# Patient Record
Sex: MALE | Race: WHITE | NOT HISPANIC OR LATINO | Employment: OTHER | URBAN - METROPOLITAN AREA
[De-identification: names, ages, dates, MRNs, and addresses within clinical notes are randomized per-mention and may not be internally consistent; named-entity substitution may affect disease eponyms.]

---

## 2017-04-19 PROBLEM — D69.6 THROMBOCYTOPENIA: Status: ACTIVE | Noted: 2017-04-19

## 2017-04-19 PROBLEM — M1A.09X0 IDIOPATHIC CHRONIC GOUT OF MULTIPLE SITES WITHOUT TOPHUS: Status: ACTIVE | Noted: 2017-04-19

## 2017-04-19 PROBLEM — D64.9 ANEMIA: Status: ACTIVE | Noted: 2017-04-19

## 2017-04-19 PROBLEM — K76.0 FATTY LIVER: Status: ACTIVE | Noted: 2017-04-19

## 2017-04-19 PROBLEM — E03.8 SUBCLINICAL HYPOTHYROIDISM: Status: ACTIVE | Noted: 2017-04-19

## 2017-04-19 PROBLEM — R74.01 TRANSAMINITIS: Status: ACTIVE | Noted: 2017-04-19

## 2017-05-19 PROBLEM — K62.5 BRBPR (BRIGHT RED BLOOD PER RECTUM): Status: ACTIVE | Noted: 2017-05-19

## 2017-05-23 DIAGNOSIS — E11.9 DIABETES MELLITUS WITHOUT COMPLICATION: ICD-10-CM

## 2017-06-07 PROBLEM — K62.5 RECTAL BLEEDING: Status: ACTIVE | Noted: 2017-06-07

## 2017-07-17 PROBLEM — K90.0 CELIAC DISEASE: Status: ACTIVE | Noted: 2017-07-17

## 2017-07-17 PROBLEM — K74.60 CIRRHOSIS, NONALCOHOLIC: Status: ACTIVE | Noted: 2017-07-17

## 2017-11-20 PROBLEM — K74.60 CIRRHOSIS OF LIVER WITHOUT ASCITES: Status: ACTIVE | Noted: 2017-11-20

## 2018-07-16 ENCOUNTER — TELEPHONE (OUTPATIENT)
Dept: PHARMACY | Facility: CLINIC | Age: 53
End: 2018-07-16

## 2018-07-16 PROBLEM — R93.3 IMAGING OF GASTROINTESTINAL TRACT ABNORMAL: Status: ACTIVE | Noted: 2018-07-16

## 2018-07-24 NOTE — TELEPHONE ENCOUNTER
I am faxing over the provider portion of the [employer] financial assistance application to [phone]. Please review and sign the prescription portion then fax back to [phone] so that I may submit the application in its entirety.    Thank you,  Christian Sileo [facility]   Ph: [phone]

## 2018-09-06 NOTE — TELEPHONE ENCOUNTER
We have tried contacting the patient several times to try to check the status of the [employer] assistance application and the patient's income. We sent a postcard to the patient on 8/27 and sent a message to the provider that the has been non-compliant. Closing the patient out due to no contact. Jules [email or web address]

## 2018-09-11 ENCOUNTER — TELEPHONE (OUTPATIENT)
Dept: ADMINISTRATIVE | Facility: HOSPITAL | Age: 53
End: 2018-09-11

## 2018-11-13 PROBLEM — K76.6 PORTAL HYPERTENSIVE GASTROPATHY: Status: ACTIVE | Noted: 2018-11-13

## 2018-11-13 PROBLEM — K31.89 PORTAL HYPERTENSIVE GASTROPATHY: Status: ACTIVE | Noted: 2018-11-13

## 2018-11-13 PROBLEM — L40.9 PSORIASIS: Status: ACTIVE | Noted: 2018-11-13

## 2018-11-13 PROBLEM — L40.9 PSORIASIS: Status: RESOLVED | Noted: 2018-11-13 | Resolved: 2018-11-13

## 2019-07-08 ENCOUNTER — PATIENT OUTREACH (OUTPATIENT)
Dept: ADMINISTRATIVE | Facility: HOSPITAL | Age: 54
End: 2019-07-08

## 2020-07-15 ENCOUNTER — PATIENT OUTREACH (OUTPATIENT)
Dept: ADMINISTRATIVE | Facility: HOSPITAL | Age: 55
End: 2020-07-15

## 2020-07-22 PROBLEM — Z12.12 SCREENING FOR COLORECTAL CANCER: Status: ACTIVE | Noted: 2020-07-22

## 2020-07-22 PROBLEM — Z12.11 SCREENING FOR COLORECTAL CANCER: Status: ACTIVE | Noted: 2020-07-22

## 2020-07-29 PROBLEM — R82.79 URINE CULTURE POSITIVE: Status: ACTIVE | Noted: 2020-07-29

## 2020-07-29 PROBLEM — R39.14 BENIGN PROSTATIC HYPERPLASIA WITH INCOMPLETE BLADDER EMPTYING: Status: ACTIVE | Noted: 2020-07-29

## 2020-07-29 PROBLEM — N40.1 BENIGN PROSTATIC HYPERPLASIA WITH INCOMPLETE BLADDER EMPTYING: Status: ACTIVE | Noted: 2020-07-29

## 2020-07-29 PROBLEM — Z22.322 MRSA (METHICILLIN RESISTANT STAPH AUREUS) CULTURE POSITIVE: Status: ACTIVE | Noted: 2020-07-29

## 2020-11-30 PROBLEM — M54.42 CHRONIC BILATERAL LOW BACK PAIN WITH LEFT-SIDED SCIATICA: Status: ACTIVE | Noted: 2020-11-30

## 2020-11-30 PROBLEM — R06.09 DYSPNEA ON EXERTION: Status: ACTIVE | Noted: 2020-11-30

## 2020-11-30 PROBLEM — G89.29 CHRONIC BILATERAL LOW BACK PAIN WITH LEFT-SIDED SCIATICA: Status: ACTIVE | Noted: 2020-11-30

## 2020-11-30 PROBLEM — R04.0 EPISTAXIS: Status: ACTIVE | Noted: 2020-11-30

## 2020-11-30 PROBLEM — M54.2 CERVICALGIA: Status: ACTIVE | Noted: 2020-11-30

## 2021-05-25 PROBLEM — C88.40 MALT LYMPHOMA: Status: ACTIVE | Noted: 2021-05-25

## 2021-05-25 PROBLEM — C88.4 MALT LYMPHOMA: Status: ACTIVE | Noted: 2021-05-25

## 2021-08-05 ENCOUNTER — TELEPHONE (OUTPATIENT)
Dept: HEMATOLOGY/ONCOLOGY | Facility: CLINIC | Age: 56
End: 2021-08-05

## 2021-08-06 ENCOUNTER — TELEPHONE (OUTPATIENT)
Dept: HEMATOLOGY/ONCOLOGY | Facility: CLINIC | Age: 56
End: 2021-08-06

## 2021-08-06 DIAGNOSIS — C88.4 MALT LYMPHOMA: Primary | ICD-10-CM

## 2021-08-10 PROBLEM — I81 PORTAL VEIN THROMBOSIS: Status: ACTIVE | Noted: 2021-08-10

## 2021-08-10 PROBLEM — R16.1 SPLENOMEGALY: Status: ACTIVE | Noted: 2021-08-10

## 2021-08-17 ENCOUNTER — OFFICE VISIT (OUTPATIENT)
Dept: HEMATOLOGY/ONCOLOGY | Facility: CLINIC | Age: 56
End: 2021-08-17
Payer: MEDICAID

## 2021-08-17 VITALS
TEMPERATURE: 98 F | BODY MASS INDEX: 32.38 KG/M2 | OXYGEN SATURATION: 97 % | HEIGHT: 75 IN | RESPIRATION RATE: 12 BRPM | DIASTOLIC BLOOD PRESSURE: 69 MMHG | HEART RATE: 75 BPM | WEIGHT: 260.38 LBS | SYSTOLIC BLOOD PRESSURE: 125 MMHG

## 2021-08-17 DIAGNOSIS — D61.818 PANCYTOPENIA: ICD-10-CM

## 2021-08-17 DIAGNOSIS — D50.8 OTHER IRON DEFICIENCY ANEMIA: ICD-10-CM

## 2021-08-17 DIAGNOSIS — K74.60 CIRRHOSIS OF LIVER WITHOUT ASCITES, UNSPECIFIED HEPATIC CIRRHOSIS TYPE: ICD-10-CM

## 2021-08-17 DIAGNOSIS — C88.4 MALT LYMPHOMA: Primary | ICD-10-CM

## 2021-08-17 PROCEDURE — 99999 PR PBB SHADOW E&M-EST. PATIENT-LVL IV: ICD-10-PCS | Mod: PBBFAC,,,

## 2021-08-17 PROCEDURE — 99999 PR PBB SHADOW E&M-EST. PATIENT-LVL IV: CPT | Mod: PBBFAC,,,

## 2021-08-17 PROCEDURE — 99214 OFFICE O/P EST MOD 30 MIN: CPT | Mod: PBBFAC

## 2021-08-17 PROCEDURE — 99205 PR OFFICE/OUTPT VISIT, NEW, LEVL V, 60-74 MIN: ICD-10-PCS | Mod: S$PBB,,,

## 2021-08-17 PROCEDURE — 99205 OFFICE O/P NEW HI 60 MIN: CPT | Mod: S$PBB,,,

## 2021-09-15 ENCOUNTER — TELEPHONE (OUTPATIENT)
Dept: HEMATOLOGY/ONCOLOGY | Facility: CLINIC | Age: 56
End: 2021-09-15

## 2021-09-15 DIAGNOSIS — C88.4 MALT LYMPHOMA: Primary | ICD-10-CM

## 2021-09-15 DIAGNOSIS — D50.8 OTHER IRON DEFICIENCY ANEMIA: ICD-10-CM

## 2021-09-24 DIAGNOSIS — C88.4 MALT LYMPHOMA: Primary | ICD-10-CM

## 2021-09-27 ENCOUNTER — TELEPHONE (OUTPATIENT)
Dept: HEMATOLOGY/ONCOLOGY | Facility: CLINIC | Age: 56
End: 2021-09-27

## 2021-09-27 ENCOUNTER — OFFICE VISIT (OUTPATIENT)
Dept: HEMATOLOGY/ONCOLOGY | Facility: CLINIC | Age: 56
End: 2021-09-27
Payer: MEDICAID

## 2021-09-27 ENCOUNTER — LAB VISIT (OUTPATIENT)
Dept: LAB | Facility: HOSPITAL | Age: 56
End: 2021-09-27
Payer: MEDICAID

## 2021-09-27 VITALS
TEMPERATURE: 98 F | HEIGHT: 72 IN | WEIGHT: 255.63 LBS | OXYGEN SATURATION: 97 % | DIASTOLIC BLOOD PRESSURE: 58 MMHG | BODY MASS INDEX: 34.62 KG/M2 | RESPIRATION RATE: 17 BRPM | SYSTOLIC BLOOD PRESSURE: 121 MMHG | HEART RATE: 69 BPM

## 2021-09-27 DIAGNOSIS — Z51.11 ENCOUNTER FOR ANTINEOPLASTIC CHEMOTHERAPY: ICD-10-CM

## 2021-09-27 DIAGNOSIS — C88.4 MALT LYMPHOMA: Primary | ICD-10-CM

## 2021-09-27 DIAGNOSIS — C88.4 MALT LYMPHOMA: ICD-10-CM

## 2021-09-27 PROCEDURE — 99215 OFFICE O/P EST HI 40 MIN: CPT | Mod: S$PBB,,, | Performed by: INTERNAL MEDICINE

## 2021-09-27 PROCEDURE — 86706 HEP B SURFACE ANTIBODY: CPT

## 2021-09-27 PROCEDURE — 87340 HEPATITIS B SURFACE AG IA: CPT

## 2021-09-27 PROCEDURE — 99213 OFFICE O/P EST LOW 20 MIN: CPT | Mod: PBBFAC | Performed by: INTERNAL MEDICINE

## 2021-09-27 PROCEDURE — 36415 COLL VENOUS BLD VENIPUNCTURE: CPT

## 2021-09-27 PROCEDURE — 99215 PR OFFICE/OUTPT VISIT, EST, LEVL V, 40-54 MIN: ICD-10-PCS | Mod: S$PBB,,, | Performed by: INTERNAL MEDICINE

## 2021-09-27 PROCEDURE — 99999 PR PBB SHADOW E&M-EST. PATIENT-LVL III: CPT | Mod: PBBFAC,,, | Performed by: INTERNAL MEDICINE

## 2021-09-27 PROCEDURE — 99999 PR PBB SHADOW E&M-EST. PATIENT-LVL III: ICD-10-PCS | Mod: PBBFAC,,, | Performed by: INTERNAL MEDICINE

## 2021-09-27 PROCEDURE — 86704 HEP B CORE ANTIBODY TOTAL: CPT

## 2021-09-27 RX ORDER — ACETAMINOPHEN 325 MG/1
650 TABLET ORAL
Status: CANCELLED | OUTPATIENT
Start: 2021-10-05

## 2021-09-27 RX ORDER — ACETAMINOPHEN 325 MG/1
650 TABLET ORAL
Status: CANCELLED | OUTPATIENT
Start: 2021-09-28

## 2021-09-27 RX ORDER — SODIUM CHLORIDE 0.9 % (FLUSH) 0.9 %
10 SYRINGE (ML) INJECTION
Status: CANCELLED | OUTPATIENT
Start: 2021-09-28

## 2021-09-27 RX ORDER — MEPERIDINE HYDROCHLORIDE 50 MG/ML
25 INJECTION INTRAMUSCULAR; INTRAVENOUS; SUBCUTANEOUS
Status: CANCELLED | OUTPATIENT
Start: 2021-10-12

## 2021-09-27 RX ORDER — ACETAMINOPHEN 325 MG/1
650 TABLET ORAL
Status: CANCELLED | OUTPATIENT
Start: 2021-10-12

## 2021-09-27 RX ORDER — SODIUM CHLORIDE 0.9 % (FLUSH) 0.9 %
10 SYRINGE (ML) INJECTION
Status: CANCELLED | OUTPATIENT
Start: 2021-10-12

## 2021-09-27 RX ORDER — MEPERIDINE HYDROCHLORIDE 50 MG/ML
25 INJECTION INTRAMUSCULAR; INTRAVENOUS; SUBCUTANEOUS
Status: CANCELLED | OUTPATIENT
Start: 2021-10-19

## 2021-09-27 RX ORDER — ACETAMINOPHEN 325 MG/1
650 TABLET ORAL
Status: CANCELLED | OUTPATIENT
Start: 2021-10-19

## 2021-09-27 RX ORDER — SODIUM CHLORIDE 0.9 % (FLUSH) 0.9 %
10 SYRINGE (ML) INJECTION
Status: CANCELLED | OUTPATIENT
Start: 2021-10-19

## 2021-09-27 RX ORDER — HEPARIN 100 UNIT/ML
500 SYRINGE INTRAVENOUS
Status: CANCELLED | OUTPATIENT
Start: 2021-09-28

## 2021-09-27 RX ORDER — MEPERIDINE HYDROCHLORIDE 50 MG/ML
25 INJECTION INTRAMUSCULAR; INTRAVENOUS; SUBCUTANEOUS
Status: CANCELLED | OUTPATIENT
Start: 2021-09-28

## 2021-09-27 RX ORDER — FAMOTIDINE 20 MG/1
20 TABLET, FILM COATED ORAL
Status: CANCELLED | OUTPATIENT
Start: 2021-10-19

## 2021-09-27 RX ORDER — FAMOTIDINE 20 MG/1
20 TABLET, FILM COATED ORAL
Status: CANCELLED | OUTPATIENT
Start: 2021-10-12

## 2021-09-27 RX ORDER — DIPHENHYDRAMINE HCL 50 MG
50 CAPSULE ORAL
Status: CANCELLED | OUTPATIENT
Start: 2021-10-19

## 2021-09-27 RX ORDER — HEPARIN 100 UNIT/ML
500 SYRINGE INTRAVENOUS
Status: CANCELLED | OUTPATIENT
Start: 2021-10-19

## 2021-09-27 RX ORDER — HEPARIN 100 UNIT/ML
500 SYRINGE INTRAVENOUS
Status: CANCELLED | OUTPATIENT
Start: 2021-10-05

## 2021-09-27 RX ORDER — FAMOTIDINE 10 MG/ML
20 INJECTION INTRAVENOUS
Status: CANCELLED | OUTPATIENT
Start: 2021-09-28

## 2021-09-27 RX ORDER — FAMOTIDINE 20 MG/1
20 TABLET, FILM COATED ORAL
Status: CANCELLED | OUTPATIENT
Start: 2021-10-05

## 2021-09-27 RX ORDER — DIPHENHYDRAMINE HCL 50 MG
50 CAPSULE ORAL
Status: CANCELLED | OUTPATIENT
Start: 2021-10-05

## 2021-09-27 RX ORDER — HEPARIN 100 UNIT/ML
500 SYRINGE INTRAVENOUS
Status: CANCELLED | OUTPATIENT
Start: 2021-10-12

## 2021-09-27 RX ORDER — MEPERIDINE HYDROCHLORIDE 50 MG/ML
25 INJECTION INTRAMUSCULAR; INTRAVENOUS; SUBCUTANEOUS
Status: CANCELLED | OUTPATIENT
Start: 2021-10-05

## 2021-09-27 RX ORDER — SODIUM CHLORIDE 0.9 % (FLUSH) 0.9 %
10 SYRINGE (ML) INJECTION
Status: CANCELLED | OUTPATIENT
Start: 2021-10-05

## 2021-09-27 RX ORDER — DIPHENHYDRAMINE HCL 50 MG
50 CAPSULE ORAL
Status: CANCELLED | OUTPATIENT
Start: 2021-10-12

## 2021-09-28 ENCOUNTER — INFUSION (OUTPATIENT)
Dept: INFUSION THERAPY | Facility: HOSPITAL | Age: 56
End: 2021-09-28
Payer: MEDICAID

## 2021-09-28 VITALS
DIASTOLIC BLOOD PRESSURE: 74 MMHG | WEIGHT: 255.63 LBS | OXYGEN SATURATION: 99 % | RESPIRATION RATE: 18 BRPM | TEMPERATURE: 98 F | HEIGHT: 72 IN | BODY MASS INDEX: 34.62 KG/M2 | HEART RATE: 75 BPM | SYSTOLIC BLOOD PRESSURE: 122 MMHG

## 2021-09-28 DIAGNOSIS — C88.4 MALT LYMPHOMA: Primary | ICD-10-CM

## 2021-09-28 LAB
HBV CORE AB SERPL QL IA: NEGATIVE
HBV SURFACE AB SER-ACNC: NEGATIVE M[IU]/ML
HBV SURFACE AG SERPL QL IA: NEGATIVE

## 2021-09-28 PROCEDURE — 96375 TX/PRO/DX INJ NEW DRUG ADDON: CPT

## 2021-09-28 PROCEDURE — 96367 TX/PROPH/DG ADDL SEQ IV INF: CPT

## 2021-09-28 PROCEDURE — 96415 CHEMO IV INFUSION ADDL HR: CPT

## 2021-09-28 PROCEDURE — 96413 CHEMO IV INFUSION 1 HR: CPT

## 2021-09-28 PROCEDURE — 25000003 PHARM REV CODE 250: Performed by: INTERNAL MEDICINE

## 2021-09-28 PROCEDURE — 63600175 PHARM REV CODE 636 W HCPCS: Mod: TB | Performed by: INTERNAL MEDICINE

## 2021-09-28 RX ORDER — MEPERIDINE HYDROCHLORIDE 50 MG/ML
25 INJECTION INTRAMUSCULAR; INTRAVENOUS; SUBCUTANEOUS
Status: DISCONTINUED | OUTPATIENT
Start: 2021-09-28 | End: 2021-09-28 | Stop reason: HOSPADM

## 2021-09-28 RX ORDER — FAMOTIDINE 10 MG/ML
20 INJECTION INTRAVENOUS
Status: COMPLETED | OUTPATIENT
Start: 2021-09-28 | End: 2021-09-28

## 2021-09-28 RX ORDER — SODIUM CHLORIDE 0.9 % (FLUSH) 0.9 %
10 SYRINGE (ML) INJECTION
Status: DISCONTINUED | OUTPATIENT
Start: 2021-09-28 | End: 2021-09-28 | Stop reason: HOSPADM

## 2021-09-28 RX ORDER — ACETAMINOPHEN 325 MG/1
650 TABLET ORAL
Status: COMPLETED | OUTPATIENT
Start: 2021-09-28 | End: 2021-09-28

## 2021-09-28 RX ORDER — HEPARIN 100 UNIT/ML
500 SYRINGE INTRAVENOUS
Status: DISCONTINUED | OUTPATIENT
Start: 2021-09-28 | End: 2021-09-28 | Stop reason: HOSPADM

## 2021-09-28 RX ADMIN — DIPHENHYDRAMINE HYDROCHLORIDE 50 MG: 50 INJECTION INTRAMUSCULAR; INTRAVENOUS at 11:09

## 2021-09-28 RX ADMIN — SODIUM CHLORIDE 938 MG: 0.9 INJECTION, SOLUTION INTRAVENOUS at 11:09

## 2021-09-28 RX ADMIN — FAMOTIDINE 20 MG: 10 INJECTION INTRAVENOUS at 11:09

## 2021-09-28 RX ADMIN — SODIUM CHLORIDE: 0.9 INJECTION, SOLUTION INTRAVENOUS at 11:09

## 2021-09-28 RX ADMIN — ACETAMINOPHEN 650 MG: 325 TABLET ORAL at 11:09

## 2021-10-08 ENCOUNTER — INFUSION (OUTPATIENT)
Dept: INFUSION THERAPY | Facility: HOSPITAL | Age: 56
End: 2021-10-08
Payer: MEDICAID

## 2021-10-08 VITALS
HEART RATE: 67 BPM | SYSTOLIC BLOOD PRESSURE: 120 MMHG | TEMPERATURE: 98 F | DIASTOLIC BLOOD PRESSURE: 65 MMHG | WEIGHT: 255.06 LBS | BODY MASS INDEX: 34.59 KG/M2 | RESPIRATION RATE: 18 BRPM

## 2021-10-08 DIAGNOSIS — C88.4 MALT LYMPHOMA: Primary | ICD-10-CM

## 2021-10-08 PROCEDURE — 96401 CHEMO ANTI-NEOPL SQ/IM: CPT

## 2021-10-08 PROCEDURE — 63600175 PHARM REV CODE 636 W HCPCS: Mod: TB | Performed by: INTERNAL MEDICINE

## 2021-10-08 PROCEDURE — 25000003 PHARM REV CODE 250: Performed by: INTERNAL MEDICINE

## 2021-10-08 RX ORDER — HEPARIN 100 UNIT/ML
500 SYRINGE INTRAVENOUS
Status: DISCONTINUED | OUTPATIENT
Start: 2021-10-08 | End: 2021-10-08 | Stop reason: HOSPADM

## 2021-10-08 RX ORDER — ACETAMINOPHEN 325 MG/1
650 TABLET ORAL
Status: COMPLETED | OUTPATIENT
Start: 2021-10-08 | End: 2021-10-08

## 2021-10-08 RX ORDER — MEPERIDINE HYDROCHLORIDE 50 MG/ML
25 INJECTION INTRAMUSCULAR; INTRAVENOUS; SUBCUTANEOUS
Status: DISCONTINUED | OUTPATIENT
Start: 2021-10-08 | End: 2021-10-08 | Stop reason: HOSPADM

## 2021-10-08 RX ORDER — FAMOTIDINE 20 MG/1
20 TABLET, FILM COATED ORAL
Status: COMPLETED | OUTPATIENT
Start: 2021-10-08 | End: 2021-10-08

## 2021-10-08 RX ORDER — SODIUM CHLORIDE 0.9 % (FLUSH) 0.9 %
10 SYRINGE (ML) INJECTION
Status: DISCONTINUED | OUTPATIENT
Start: 2021-10-08 | End: 2021-10-08 | Stop reason: HOSPADM

## 2021-10-08 RX ORDER — DIPHENHYDRAMINE HCL 50 MG
50 CAPSULE ORAL
Status: COMPLETED | OUTPATIENT
Start: 2021-10-08 | End: 2021-10-08

## 2021-10-08 RX ADMIN — DIPHENHYDRAMINE HYDROCHLORIDE 50 MG: 50 CAPSULE ORAL at 04:10

## 2021-10-08 RX ADMIN — RITUXIMAB AND HYALURONIDASE 1400 MG: 120; 2000 INJECTION, SOLUTION SUBCUTANEOUS at 04:10

## 2021-10-08 RX ADMIN — FAMOTIDINE 20 MG: 20 TABLET, FILM COATED ORAL at 04:10

## 2021-10-08 RX ADMIN — ACETAMINOPHEN 650 MG: 325 TABLET ORAL at 04:10

## 2021-10-15 ENCOUNTER — INFUSION (OUTPATIENT)
Dept: INFUSION THERAPY | Facility: HOSPITAL | Age: 56
End: 2021-10-15
Payer: MEDICAID

## 2021-10-15 VITALS
SYSTOLIC BLOOD PRESSURE: 124 MMHG | DIASTOLIC BLOOD PRESSURE: 61 MMHG | HEART RATE: 75 BPM | TEMPERATURE: 98 F | RESPIRATION RATE: 18 BRPM

## 2021-10-15 DIAGNOSIS — C88.4 MALT LYMPHOMA: Primary | ICD-10-CM

## 2021-10-15 PROCEDURE — 96401 CHEMO ANTI-NEOPL SQ/IM: CPT

## 2021-10-15 PROCEDURE — 25000003 PHARM REV CODE 250: Performed by: INTERNAL MEDICINE

## 2021-10-15 PROCEDURE — 63600175 PHARM REV CODE 636 W HCPCS: Mod: TB | Performed by: INTERNAL MEDICINE

## 2021-10-15 RX ORDER — ACETAMINOPHEN 325 MG/1
650 TABLET ORAL
Status: COMPLETED | OUTPATIENT
Start: 2021-10-15 | End: 2021-10-15

## 2021-10-15 RX ORDER — DIPHENHYDRAMINE HCL 50 MG
50 CAPSULE ORAL
Status: COMPLETED | OUTPATIENT
Start: 2021-10-15 | End: 2021-10-15

## 2021-10-15 RX ORDER — FAMOTIDINE 20 MG/1
20 TABLET, FILM COATED ORAL
Status: COMPLETED | OUTPATIENT
Start: 2021-10-15 | End: 2021-10-15

## 2021-10-15 RX ORDER — HEPARIN 100 UNIT/ML
500 SYRINGE INTRAVENOUS
Status: DISCONTINUED | OUTPATIENT
Start: 2021-10-15 | End: 2021-10-15 | Stop reason: HOSPADM

## 2021-10-15 RX ORDER — SODIUM CHLORIDE 0.9 % (FLUSH) 0.9 %
10 SYRINGE (ML) INJECTION
Status: DISCONTINUED | OUTPATIENT
Start: 2021-10-15 | End: 2021-10-15 | Stop reason: HOSPADM

## 2021-10-15 RX ORDER — MEPERIDINE HYDROCHLORIDE 50 MG/ML
25 INJECTION INTRAMUSCULAR; INTRAVENOUS; SUBCUTANEOUS
Status: DISCONTINUED | OUTPATIENT
Start: 2021-10-15 | End: 2021-10-15 | Stop reason: HOSPADM

## 2021-10-15 RX ADMIN — RITUXIMAB AND HYALURONIDASE 1400 MG: 120; 2000 INJECTION, SOLUTION SUBCUTANEOUS at 05:10

## 2021-10-15 RX ADMIN — ACETAMINOPHEN 650 MG: 325 TABLET ORAL at 04:10

## 2021-10-15 RX ADMIN — DIPHENHYDRAMINE HYDROCHLORIDE 50 MG: 50 CAPSULE ORAL at 04:10

## 2021-10-15 RX ADMIN — FAMOTIDINE 20 MG: 20 TABLET, FILM COATED ORAL at 04:10

## 2021-10-22 ENCOUNTER — INFUSION (OUTPATIENT)
Dept: INFUSION THERAPY | Facility: HOSPITAL | Age: 56
End: 2021-10-22
Payer: MEDICAID

## 2021-10-22 VITALS
DIASTOLIC BLOOD PRESSURE: 63 MMHG | HEIGHT: 72 IN | BODY MASS INDEX: 34.55 KG/M2 | SYSTOLIC BLOOD PRESSURE: 137 MMHG | TEMPERATURE: 99 F | WEIGHT: 255.06 LBS | HEART RATE: 79 BPM

## 2021-10-22 DIAGNOSIS — C88.4 MALT LYMPHOMA: Primary | ICD-10-CM

## 2021-10-22 PROCEDURE — 63600175 PHARM REV CODE 636 W HCPCS: Mod: TB | Performed by: INTERNAL MEDICINE

## 2021-10-22 PROCEDURE — 25000003 PHARM REV CODE 250: Performed by: INTERNAL MEDICINE

## 2021-10-22 PROCEDURE — 96401 CHEMO ANTI-NEOPL SQ/IM: CPT

## 2021-10-22 RX ORDER — FAMOTIDINE 20 MG/1
20 TABLET, FILM COATED ORAL
Status: COMPLETED | OUTPATIENT
Start: 2021-10-22 | End: 2021-10-22

## 2021-10-22 RX ORDER — SODIUM CHLORIDE 0.9 % (FLUSH) 0.9 %
10 SYRINGE (ML) INJECTION
Status: DISCONTINUED | OUTPATIENT
Start: 2021-10-22 | End: 2021-10-22 | Stop reason: HOSPADM

## 2021-10-22 RX ORDER — HEPARIN 100 UNIT/ML
500 SYRINGE INTRAVENOUS
Status: DISCONTINUED | OUTPATIENT
Start: 2021-10-22 | End: 2021-10-22 | Stop reason: HOSPADM

## 2021-10-22 RX ORDER — DIPHENHYDRAMINE HCL 50 MG
50 CAPSULE ORAL
Status: COMPLETED | OUTPATIENT
Start: 2021-10-22 | End: 2021-10-22

## 2021-10-22 RX ORDER — MEPERIDINE HYDROCHLORIDE 50 MG/ML
25 INJECTION INTRAMUSCULAR; INTRAVENOUS; SUBCUTANEOUS
Status: DISCONTINUED | OUTPATIENT
Start: 2021-10-22 | End: 2021-10-22 | Stop reason: HOSPADM

## 2021-10-22 RX ORDER — ACETAMINOPHEN 325 MG/1
650 TABLET ORAL
Status: COMPLETED | OUTPATIENT
Start: 2021-10-22 | End: 2021-10-22

## 2021-10-22 RX ADMIN — DIPHENHYDRAMINE HYDROCHLORIDE 50 MG: 50 CAPSULE ORAL at 03:10

## 2021-10-22 RX ADMIN — RITUXIMAB AND HYALURONIDASE 1400 MG: 120; 2000 INJECTION, SOLUTION SUBCUTANEOUS at 04:10

## 2021-10-22 RX ADMIN — ACETAMINOPHEN 650 MG: 325 TABLET ORAL at 03:10

## 2021-10-22 RX ADMIN — FAMOTIDINE 20 MG: 20 TABLET, FILM COATED ORAL at 03:10

## 2021-11-10 PROBLEM — I85.10 SECONDARY ESOPHAGEAL VARICES WITHOUT BLEEDING: Status: ACTIVE | Noted: 2021-11-10

## 2021-11-10 PROBLEM — K76.82 HEPATIC ENCEPHALOPATHY: Status: ACTIVE | Noted: 2021-11-10

## 2021-11-10 PROBLEM — R18.8 OTHER ASCITES: Status: ACTIVE | Noted: 2021-11-10

## 2021-12-02 ENCOUNTER — OFFICE VISIT (OUTPATIENT)
Dept: HEMATOLOGY/ONCOLOGY | Facility: CLINIC | Age: 56
End: 2021-12-02
Payer: MEDICAID

## 2021-12-02 ENCOUNTER — HOSPITAL ENCOUNTER (OUTPATIENT)
Dept: RADIOLOGY | Facility: HOSPITAL | Age: 56
Discharge: HOME | End: 2021-12-02
Attending: INTERNAL MEDICINE
Payer: MEDICAID

## 2021-12-02 VITALS
SYSTOLIC BLOOD PRESSURE: 124 MMHG | BODY MASS INDEX: 31.98 KG/M2 | HEIGHT: 75 IN | WEIGHT: 257.19 LBS | OXYGEN SATURATION: 97 % | RESPIRATION RATE: 16 BRPM | DIASTOLIC BLOOD PRESSURE: 65 MMHG | HEART RATE: 78 BPM

## 2021-12-02 DIAGNOSIS — C88.4 MALT LYMPHOMA: Primary | ICD-10-CM

## 2021-12-02 DIAGNOSIS — C88.4 MALT LYMPHOMA: ICD-10-CM

## 2021-12-02 LAB — POCT GLUCOSE: 245 MG/DL (ref 70–110)

## 2021-12-02 PROCEDURE — 78815 NM PET CT ROUTINE: ICD-10-PCS | Mod: 26,PS,, | Performed by: RADIOLOGY

## 2021-12-02 PROCEDURE — 99214 OFFICE O/P EST MOD 30 MIN: CPT | Mod: PBBFAC,25 | Performed by: INTERNAL MEDICINE

## 2021-12-02 PROCEDURE — 78815 PET IMAGE W/CT SKULL-THIGH: CPT | Mod: 26,PS,, | Performed by: RADIOLOGY

## 2021-12-02 PROCEDURE — 78815 PET IMAGE W/CT SKULL-THIGH: CPT | Mod: TC

## 2021-12-02 PROCEDURE — A9698 NON-RAD CONTRAST MATERIALNOC: HCPCS | Performed by: INTERNAL MEDICINE

## 2021-12-02 PROCEDURE — 99999 PR PBB SHADOW E&M-EST. PATIENT-LVL IV: CPT | Mod: PBBFAC,,, | Performed by: INTERNAL MEDICINE

## 2021-12-02 PROCEDURE — 99215 PR OFFICE/OUTPT VISIT, EST, LEVL V, 40-54 MIN: ICD-10-PCS | Mod: S$PBB,,, | Performed by: INTERNAL MEDICINE

## 2021-12-02 PROCEDURE — 99999 PR PBB SHADOW E&M-EST. PATIENT-LVL IV: ICD-10-PCS | Mod: PBBFAC,,, | Performed by: INTERNAL MEDICINE

## 2021-12-02 PROCEDURE — 25500020 PHARM REV CODE 255: Performed by: INTERNAL MEDICINE

## 2021-12-02 PROCEDURE — 99215 OFFICE O/P EST HI 40 MIN: CPT | Mod: S$PBB,,, | Performed by: INTERNAL MEDICINE

## 2021-12-02 RX ADMIN — IOHEXOL 1000 ML: 9 SOLUTION ORAL at 08:12

## 2022-01-03 PROBLEM — K74.69 OTHER CIRRHOSIS OF LIVER: Status: ACTIVE | Noted: 2017-11-20

## 2022-01-03 PROBLEM — D61.818 PANCYTOPENIA: Status: ACTIVE | Noted: 2022-01-03

## 2022-01-29 DIAGNOSIS — D84.9 IMMUNOSUPPRESSED STATUS: ICD-10-CM

## 2022-02-04 DIAGNOSIS — D84.9 IMMUNOSUPPRESSED STATUS: ICD-10-CM

## 2022-02-06 DIAGNOSIS — D84.9 IMMUNOSUPPRESSED STATUS: ICD-10-CM

## 2022-02-10 PROBLEM — E61.1 IRON DEFICIENCY: Status: ACTIVE | Noted: 2022-02-10

## 2022-02-16 ENCOUNTER — PATIENT MESSAGE (OUTPATIENT)
Dept: ADMINISTRATIVE | Facility: HOSPITAL | Age: 57
End: 2022-02-16
Payer: MEDICAID

## 2022-03-09 DIAGNOSIS — E11.9 TYPE 2 DIABETES MELLITUS WITHOUT COMPLICATION: ICD-10-CM

## 2022-04-04 ENCOUNTER — PATIENT MESSAGE (OUTPATIENT)
Dept: ADMINISTRATIVE | Facility: HOSPITAL | Age: 57
End: 2022-04-04
Payer: MEDICAID

## 2022-07-06 DIAGNOSIS — E11.9 TYPE 2 DIABETES MELLITUS WITHOUT COMPLICATION: ICD-10-CM

## 2022-07-13 ENCOUNTER — DOCUMENTATION ONLY (OUTPATIENT)
Dept: TRANSPLANT | Facility: CLINIC | Age: 57
End: 2022-07-13
Payer: MEDICAID

## 2022-07-13 NOTE — NURSING
Pt records reviewed.  Pt will be referred to Hepatology due to Cirrhosis with meld  11  Initial referral received  from FERNANDO Baum   Referral letter sent to provider and patient.      RECORDS SCANNED IN MEDIA UNDER HEPATOLOGY REFERRAL .

## 2022-07-14 ENCOUNTER — TELEPHONE (OUTPATIENT)
Dept: HEPATOLOGY | Facility: CLINIC | Age: 57
End: 2022-07-14
Payer: MEDICAID

## 2022-07-14 NOTE — TELEPHONE ENCOUNTER
----- Message -----   From: Michelle Payne   Sent: 7/13/2022   4:45 PM CDT   To: [facility] Hepatology Scheduling   Subject: REFERRAL                                         Pt records reviewed.   Pt will be referred to Hepatology due to Cirrhosis with meld  11   Initial referral received  from FERNANDO Baum   Referral letter sent to provider and patient.       RECORDS SCANNED IN MEDIA UNDER HEPATOLOGY REFERRAL .     Spoke with Mr. Ochoa offering him Monday 7/18/2022 at 1230 to see Dr. Urban, he excepted

## 2022-07-18 ENCOUNTER — OFFICE VISIT (OUTPATIENT)
Dept: HEPATOLOGY | Facility: CLINIC | Age: 57
End: 2022-07-18
Payer: MEDICAID

## 2022-07-18 ENCOUNTER — HOSPITAL ENCOUNTER (EMERGENCY)
Facility: HOSPITAL | Age: 57
Discharge: HOME | End: 2022-07-18
Attending: EMERGENCY MEDICINE
Payer: MEDICAID

## 2022-07-18 ENCOUNTER — TELEPHONE (OUTPATIENT)
Dept: HEPATOLOGY | Facility: CLINIC | Age: 57
End: 2022-07-18
Payer: MEDICAID

## 2022-07-18 VITALS
BODY MASS INDEX: 32.2 KG/M2 | HEART RATE: 86 BPM | TEMPERATURE: 99 F | RESPIRATION RATE: 18 BRPM | OXYGEN SATURATION: 97 % | HEIGHT: 74 IN | WEIGHT: 250.88 LBS | SYSTOLIC BLOOD PRESSURE: 123 MMHG | DIASTOLIC BLOOD PRESSURE: 66 MMHG

## 2022-07-18 VITALS
RESPIRATION RATE: 18 BRPM | OXYGEN SATURATION: 96 % | HEART RATE: 98 BPM | TEMPERATURE: 99 F | DIASTOLIC BLOOD PRESSURE: 72 MMHG | BODY MASS INDEX: 31.18 KG/M2 | WEIGHT: 243 LBS | SYSTOLIC BLOOD PRESSURE: 130 MMHG | HEIGHT: 74 IN

## 2022-07-18 DIAGNOSIS — K76.82 HEPATIC ENCEPHALOPATHY: ICD-10-CM

## 2022-07-18 DIAGNOSIS — K76.0 FATTY LIVER: Primary | ICD-10-CM

## 2022-07-18 DIAGNOSIS — K76.6 PORTAL HYPERTENSIVE GASTROPATHY: ICD-10-CM

## 2022-07-18 DIAGNOSIS — K31.89 PORTAL HYPERTENSIVE GASTROPATHY: ICD-10-CM

## 2022-07-18 DIAGNOSIS — I81 PORTAL VEIN THROMBOSIS: ICD-10-CM

## 2022-07-18 DIAGNOSIS — R18.8 OTHER ASCITES: ICD-10-CM

## 2022-07-18 DIAGNOSIS — I85.10 SECONDARY ESOPHAGEAL VARICES WITHOUT BLEEDING: ICD-10-CM

## 2022-07-18 DIAGNOSIS — R73.9 HYPERGLYCEMIA: Primary | ICD-10-CM

## 2022-07-18 DIAGNOSIS — K74.69 OTHER CIRRHOSIS OF LIVER: ICD-10-CM

## 2022-07-18 LAB
ALBUMIN SERPL BCP-MCNC: 3.3 G/DL (ref 3.5–5.2)
ALLENS TEST: ABNORMAL
ALP SERPL-CCNC: 168 U/L (ref 55–135)
ALT SERPL W/O P-5'-P-CCNC: 25 U/L (ref 10–44)
ANION GAP SERPL CALC-SCNC: 10 MMOL/L (ref 8–16)
ANISOCYTOSIS BLD QL SMEAR: SLIGHT
AST SERPL-CCNC: 26 U/L (ref 10–40)
B-OH-BUTYR BLD STRIP-SCNC: 0.1 MMOL/L (ref 0–0.5)
BASOPHILS # BLD AUTO: 0.01 K/UL (ref 0–0.2)
BASOPHILS NFR BLD: 0.4 % (ref 0–1.9)
BILIRUB SERPL-MCNC: 1.1 MG/DL (ref 0.1–1)
BUN SERPL-MCNC: 16 MG/DL (ref 6–20)
CALCIUM SERPL-MCNC: 8.6 MG/DL (ref 8.7–10.5)
CHLORIDE SERPL-SCNC: 102 MMOL/L (ref 95–110)
CO2 SERPL-SCNC: 20 MMOL/L (ref 23–29)
CREAT SERPL-MCNC: 1.1 MG/DL (ref 0.5–1.4)
DIFFERENTIAL METHOD: ABNORMAL
EOSINOPHIL # BLD AUTO: 0.1 K/UL (ref 0–0.5)
EOSINOPHIL NFR BLD: 4.1 % (ref 0–8)
ERYTHROCYTE [DISTWIDTH] IN BLOOD BY AUTOMATED COUNT: 15.1 % (ref 11.5–14.5)
EST. GFR  (AFRICAN AMERICAN): >60 ML/MIN/1.73 M^2
EST. GFR  (NON AFRICAN AMERICAN): >60 ML/MIN/1.73 M^2
GLUCOSE SERPL-MCNC: 368 MG/DL (ref 70–110)
GLUCOSE SERPL-MCNC: 458 MG/DL (ref 70–110)
HCO3 UR-SCNC: 20.6 MMOL/L (ref 24–28)
HCT VFR BLD AUTO: 31.7 % (ref 40–54)
HGB BLD-MCNC: 9.8 G/DL (ref 14–18)
HYPOCHROMIA BLD QL SMEAR: ABNORMAL
IMM GRANULOCYTES # BLD AUTO: 0.01 K/UL (ref 0–0.04)
IMM GRANULOCYTES NFR BLD AUTO: 0.4 % (ref 0–0.5)
LYMPHOCYTES # BLD AUTO: 0.3 K/UL (ref 1–4.8)
LYMPHOCYTES NFR BLD: 10.7 % (ref 18–48)
MCH RBC QN AUTO: 23.6 PG (ref 27–31)
MCHC RBC AUTO-ENTMCNC: 30.9 G/DL (ref 32–36)
MCV RBC AUTO: 76 FL (ref 82–98)
MONOCYTES # BLD AUTO: 0.3 K/UL (ref 0.3–1)
MONOCYTES NFR BLD: 11.1 % (ref 4–15)
NEUTROPHILS # BLD AUTO: 2 K/UL (ref 1.8–7.7)
NEUTROPHILS NFR BLD: 73.3 % (ref 38–73)
NRBC BLD-RTO: 0 /100 WBC
PCO2 BLDA: 32.5 MMHG (ref 35–45)
PH SMN: 7.41 [PH] (ref 7.35–7.45)
PLATELET # BLD AUTO: 38 K/UL (ref 150–450)
PLATELET BLD QL SMEAR: ABNORMAL
PMV BLD AUTO: 12.2 FL (ref 9.2–12.9)
PO2 BLDA: 38 MMHG (ref 40–60)
POC BE: -4 MMOL/L
POC SATURATED O2: 74 % (ref 95–100)
POC TCO2: 22 MMOL/L (ref 24–29)
POCT GLUCOSE: 344 MG/DL (ref 70–110)
POCT GLUCOSE: 368 MG/DL (ref 70–110)
POCT GLUCOSE: 458 MG/DL (ref 70–110)
POTASSIUM SERPL-SCNC: 4.1 MMOL/L (ref 3.5–5.1)
PROT SERPL-MCNC: 7.4 G/DL (ref 6–8.4)
RBC # BLD AUTO: 4.16 M/UL (ref 4.6–6.2)
SAMPLE: ABNORMAL
SCHISTOCYTES BLD QL SMEAR: ABNORMAL
SITE: ABNORMAL
SODIUM SERPL-SCNC: 132 MMOL/L (ref 136–145)
SPHEROCYTES BLD QL SMEAR: ABNORMAL
WBC # BLD AUTO: 2.7 K/UL (ref 3.9–12.7)

## 2022-07-18 PROCEDURE — 82962 GLUCOSE BLOOD TEST: CPT

## 2022-07-18 PROCEDURE — 99999 PR PBB SHADOW E&M-EST. PATIENT-LVL V: ICD-10-PCS | Mod: PBBFAC,,, | Performed by: INTERNAL MEDICINE

## 2022-07-18 PROCEDURE — 63600175 PHARM REV CODE 636 W HCPCS: Performed by: EMERGENCY MEDICINE

## 2022-07-18 PROCEDURE — 99999 PR PBB SHADOW E&M-EST. PATIENT-LVL V: CPT | Mod: PBBFAC,,, | Performed by: INTERNAL MEDICINE

## 2022-07-18 PROCEDURE — 3008F BODY MASS INDEX DOCD: CPT | Mod: CPTII,,, | Performed by: INTERNAL MEDICINE

## 2022-07-18 PROCEDURE — 3074F SYST BP LT 130 MM HG: CPT | Mod: CPTII,,, | Performed by: INTERNAL MEDICINE

## 2022-07-18 PROCEDURE — 99284 EMERGENCY DEPT VISIT MOD MDM: CPT | Mod: 25,27

## 2022-07-18 PROCEDURE — 25000003 PHARM REV CODE 250: Performed by: EMERGENCY MEDICINE

## 2022-07-18 PROCEDURE — 85025 COMPLETE CBC W/AUTO DIFF WBC: CPT | Mod: 91 | Performed by: EMERGENCY MEDICINE

## 2022-07-18 PROCEDURE — 80053 COMPREHEN METABOLIC PANEL: CPT | Mod: 91 | Performed by: EMERGENCY MEDICINE

## 2022-07-18 PROCEDURE — 96374 THER/PROPH/DIAG INJ IV PUSH: CPT

## 2022-07-18 PROCEDURE — 3046F HEMOGLOBIN A1C LEVEL >9.0%: CPT | Mod: CPTII,,, | Performed by: INTERNAL MEDICINE

## 2022-07-18 PROCEDURE — 82010 KETONE BODYS QUAN: CPT | Performed by: EMERGENCY MEDICINE

## 2022-07-18 PROCEDURE — 82803 BLOOD GASES ANY COMBINATION: CPT

## 2022-07-18 PROCEDURE — 3074F PR MOST RECENT SYSTOLIC BLOOD PRESSURE < 130 MM HG: ICD-10-PCS | Mod: CPTII,,, | Performed by: INTERNAL MEDICINE

## 2022-07-18 PROCEDURE — 3078F DIAST BP <80 MM HG: CPT | Mod: CPTII,,, | Performed by: INTERNAL MEDICINE

## 2022-07-18 PROCEDURE — 3008F PR BODY MASS INDEX (BMI) DOCUMENTED: ICD-10-PCS | Mod: CPTII,,, | Performed by: INTERNAL MEDICINE

## 2022-07-18 PROCEDURE — 99285 PR EMERGENCY DEPT VISIT,LEVEL V: ICD-10-PCS | Mod: ,,, | Performed by: EMERGENCY MEDICINE

## 2022-07-18 PROCEDURE — 99204 OFFICE O/P NEW MOD 45 MIN: CPT | Mod: S$PBB,,, | Performed by: INTERNAL MEDICINE

## 2022-07-18 PROCEDURE — 96361 HYDRATE IV INFUSION ADD-ON: CPT

## 2022-07-18 PROCEDURE — 99204 PR OFFICE/OUTPT VISIT, NEW, LEVL IV, 45-59 MIN: ICD-10-PCS | Mod: S$PBB,,, | Performed by: INTERNAL MEDICINE

## 2022-07-18 PROCEDURE — 99215 OFFICE O/P EST HI 40 MIN: CPT | Mod: PBBFAC | Performed by: INTERNAL MEDICINE

## 2022-07-18 PROCEDURE — 3046F PR MOST RECENT HEMOGLOBIN A1C LEVEL > 9.0%: ICD-10-PCS | Mod: CPTII,,, | Performed by: INTERNAL MEDICINE

## 2022-07-18 PROCEDURE — 99900035 HC TECH TIME PER 15 MIN (STAT)

## 2022-07-18 PROCEDURE — 99285 EMERGENCY DEPT VISIT HI MDM: CPT | Mod: ,,, | Performed by: EMERGENCY MEDICINE

## 2022-07-18 PROCEDURE — 86803 HEPATITIS C AB TEST: CPT | Performed by: PHYSICIAN ASSISTANT

## 2022-07-18 PROCEDURE — 3078F PR MOST RECENT DIASTOLIC BLOOD PRESSURE < 80 MM HG: ICD-10-PCS | Mod: CPTII,,, | Performed by: INTERNAL MEDICINE

## 2022-07-18 PROCEDURE — 87389 HIV-1 AG W/HIV-1&-2 AB AG IA: CPT | Performed by: PHYSICIAN ASSISTANT

## 2022-07-18 RX ADMIN — INSULIN HUMAN 3 UNITS: 100 INJECTION, SOLUTION PARENTERAL at 09:07

## 2022-07-18 RX ADMIN — SODIUM CHLORIDE 500 ML: 0.9 INJECTION, SOLUTION INTRAVENOUS at 08:07

## 2022-07-18 NOTE — TELEPHONE ENCOUNTER
Please forward this or call [employer] regarding these numbers.  He is a potential transplant candidate and these need to be controlled or he's not going to get anything.

## 2022-07-18 NOTE — PROGRESS NOTES
"HEPATOLOGY CONSULTATION    Referring Physician: FERNANDO Baum, Sarmad Estrella MD, Vale Covarrubias MD  Current Corresponding Physician: FERNANDO Baum    Reason for Consultation: Consultation for evaluation of Cirrhosis    History of Present Illness: Corky Bach is a 56 y.o. 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    -dx few years ago  --ascites-started on diuretics lasix 40 mg and aldactone 100 mg daily; now distended abdomen  --HE- on rifaximin; no longer on lactulose  -EGD-03/22 and 07/22/20- small varices; on propranolol    MELD-Na score: 11 at 6/13/2022 10:35 AM  MELD score: 11 at 6/13/2022 10:35 AM  Calculated from:  Serum Creatinine: 0.9 mg/dL (Using min of 1 mg/dL) at 6/13/2022 10:35 AM  Serum Sodium: 133 mmol/L at 6/13/2022 10:35 AM  Total Bilirubin: 1.6 mg/dL at 6/13/2022 10:35 AM  INR(ratio): 1.3 at 6/13/2022 10:35 AM  Age: 56 years    Chief Complaint   Patient presents with    Cirrhosis       Past Medical History:   Diagnosis Date    Anemia     Celiac disease     Cervicalgia     Cirrhosis     Colon polyp     Depression     Diabetes mellitus, type 2     Elevated LFTs     Esophageal varices     Gout, unspecified     History of COVID-19 01/15/2021    Hyperlipidemia     Mood disorder in conditions classified elsewhere     Obesity, unspecified     Other chronic nonalcoholic liver disease     Portal hypertensive gastropathy     Thyroid disease     Unspecified psychosis      Outpatient Encounter Medications as of 7/18/2022   Medication Sig Dispense Refill    ascorbic acid, vitamin C, (VITAMIN C) 500 MG tablet Take 1 tablet (500 mg total) by mouth once daily. 90 tablet 3    BD ULTRA-FINE ORIG PEN NEEDLE 29 gauge x 1/2" Ndle USE PEN NEEDLE TO INJECT INSULIN EVERY EVENING 100 each 11    ferrous sulfate 325 (65 FE) " MG EC tablet Take 1 tablet (325 mg total) by mouth once daily. Take with vitamin C 90 tablet 3    finasteride (PROSCAR) 5 mg tablet Take 1 tablet (5 mg total) by mouth once daily. 90 tablet 3    furosemide (LASIX) 40 MG tablet Take 1 tablet (40 mg total) by mouth once daily. New dose as of 07/11/2022- for fluid management / ascites 90 tablet 3    insulin detemir U-100 (LEVEMIR FLEXTOUCH U-100 INSULN) 100 unit/mL (3 mL) InPn pen Inject 80 Units into the skin every evening. New dose as of 01/03/2022 72 mL 3    insulin lispro (HUMALOG KWIKPEN INSULIN) 100 unit/mL pen Inject 60 Units into the skin 3 (three) times daily with meals. 162 mL 3    ketoconazole (NIZORAL) 2 % shampoo Apply topically twice a week. 120 mL 3    lovastatin (MEVACOR) 10 MG tablet TAKE 1 TABLET BY MOUTH ONCE DAILY IN THE EVENING 90 tablet 3    magnesium oxide (MAGOX) 400 mg (241.3 mg magnesium) tablet Take 1 tablet (400 mg total) by mouth once daily. 200 tablet 1    metFORMIN (GLUCOPHAGE) 1000 MG tablet TAKE 1 TABLET BY MOUTH TWICE DAILY WITH MEALS 180 tablet 3    oxybutynin (DITROPAN-XL) 5 MG TR24 Take 1 tablet (5 mg total) by mouth once daily. 30 tablet 11    propranoloL (INDERAL) 10 MG tablet Take 1 tablet (10 mg total) by mouth 3 (three) times daily. 90 tablet 0    rifAXIMin (XIFAXAN) 550 mg Tab Take 1 tablet (550 mg total) by mouth 2 (two) times daily. 60 tablet 11    spironolactone (ALDACTONE) 100 MG tablet Take 1 tablet (100 mg total) by mouth once daily. For fluid management / ascites- new as of 07/11/2022 90 tablet 3    tamsulosin (FLOMAX) 0.4 mg Cap Take 1 capsule (0.4 mg total) by mouth every evening. 90 capsule 3    TRADJENTA 5 mg Tab tablet Take 1 tablet (5 mg total) by mouth once daily. 90 tablet 3    triamcinolone acetonide 0.1% (KENALOG) 0.1 % cream Apply topically 2 (two) times daily. Use 3-4 wks max 240 g 0    venlafaxine (EFFEXOR) 75 MG tablet Take 1 tablet (75 mg total) by mouth 2 (two) times daily. 180 tablet 3     JARDIANCE 10 mg tablet Take 1 tablet (10 mg total) by mouth every morning. (Patient not taking: Reported on 7/18/2022) 90 tablet 3     No facility-administered encounter medications on file as of 7/18/2022.     Review of patient's allergies indicates:  No Known Allergies  Family History   Problem Relation Age of Onset    Hypertension Mother     Diabetes Mother     Diabetes Father     Heart disease Father     Ovarian cancer Maternal Grandmother     Colon cancer Neg Hx        Social History     Socioeconomic History    Marital status:     Years of education: 12   Tobacco Use    Smoking status: Never Smoker    Smokeless tobacco: Never Used   Substance and Sexual Activity    Alcohol use: No    Drug use: No     Review of Systems   Constitutional: Negative.    HENT: Negative.    Eyes: Negative.    Respiratory: Negative.    Cardiovascular: Negative.    Gastrointestinal: Negative.    Genitourinary: Negative.    Musculoskeletal: Negative.    Skin: Negative.    Neurological: Negative.    Psychiatric/Behavioral: Negative.      Vitals:    07/18/22 1213   BP: 123/66   Pulse: 86   Resp: 18   Temp: 98.6 °F (37 °C)       Physical Exam  Vitals reviewed.   Constitutional:       Appearance: He is well-developed.   HENT:      Head: Normocephalic and atraumatic.   Eyes:      General: No scleral icterus.     Conjunctiva/sclera: Conjunctivae normal.      Pupils: Pupils are equal, round, and reactive to light.   Neck:      Thyroid: No thyromegaly.   Cardiovascular:      Rate and Rhythm: Normal rate and regular rhythm.      Heart sounds: Normal heart sounds.   Pulmonary:      Effort: Pulmonary effort is normal.      Breath sounds: Normal breath sounds. No rales.   Abdominal:      General: Bowel sounds are normal. There is no distension.      Palpations: Abdomen is soft. There is no mass.      Tenderness: There is no abdominal tenderness.   Musculoskeletal:         General: Normal range of motion.      Cervical back:  Normal range of motion and neck supple.   Skin:     General: Skin is warm and dry.      Findings: No rash.   Neurological:      Mental Status: He is alert and oriented to person, place, and time.         MELD-Na score: 11 at 6/13/2022 10:35 AM  MELD score: 11 at 6/13/2022 10:35 AM  Calculated from:  Serum Creatinine: 0.9 mg/dL (Using min of 1 mg/dL) at 6/13/2022 10:35 AM  Serum Sodium: 133 mmol/L at 6/13/2022 10:35 AM  Total Bilirubin: 1.6 mg/dL at 6/13/2022 10:35 AM  INR(ratio): 1.3 at 6/13/2022 10:35 AM  Age: 56 years    Lab Results   Component Value Date     (H) 07/14/2022    BUN 16 07/14/2022    CREATININE 1.2 07/14/2022    CALCIUM 8.7 07/14/2022     (L) 07/14/2022    K 4.5 07/14/2022    CL 99 07/14/2022    PROT 7.5 07/14/2022    CO2 27 07/14/2022    ANIONGAP 7 (L) 07/14/2022    WBC 4.54 07/14/2022    RBC 4.51 (L) 07/14/2022    HGB 10.3 (L) 07/14/2022    HCT 34.0 (L) 07/14/2022    MCV 75 (L) 07/14/2022    MCH 22.8 (L) 07/14/2022    MCHC 30.3 (L) 07/14/2022     Lab Results   Component Value Date    RDW 15.3 (H) 07/14/2022    PLT 43 (L) 07/14/2022    MPV 10.7 07/14/2022    GRAN 3.5 07/14/2022    GRAN 77.8 (H) 07/14/2022    LYMPH 0.4 (L) 07/14/2022    LYMPH 8.8 (L) 07/14/2022    MONO 0.4 07/14/2022    MONO 9.7 07/14/2022    EOSINOPHIL 3.3 07/14/2022    BASOPHIL 0.2 07/14/2022    EOS 0.2 07/14/2022    BASO 0.01 07/14/2022    APTT 27.8 04/19/2017    CHOL 89 (L) 07/06/2022    TRIG 195 (H) 07/06/2022    HDL 25 (L) 07/06/2022    CHOLHDL 28.1 07/06/2022    TOTALCHOLEST 3.6 07/06/2022    ALBUMIN 3.3 (L) 07/14/2022    AST 29 07/14/2022    ALT 32 07/14/2022    ALKPHOS 147 (H) 07/14/2022    MG 1.3 (L) 07/06/2022    LABPROT 13.6 (H) 06/13/2022    INR 1.3 (H) 06/13/2022    PSA 0.78 07/06/2022       Assessment and Plan:  Corky Bach is a 56 y.o. male with decompensated NASG Cirrhosis  Current recommendations:  1. Decompensated cirrhosis; MELD <15; recommend referral for liver tx when MELD >/= 15. Will need  oncology clearance prior to liver transplant listing. Check meld labs monthly; screen for HCC every 6 months;   2. Portal htn: recheck EV with EGD in 1-2 years; continue propranolol  3. AScites: paracentesis; continue diuretics    Return 3 months

## 2022-07-18 NOTE — FIRST PROVIDER EVALUATION
"Medical screening exam completed.  I have conducted a focused provider triage encounter, findings are as follows:    Brief history of present illness:  56yM with liver disease, getting worked up for transplant, DM, here with elevated glucose on labs drawn today. Was not fasting when labs were drawn. Called to come in for glucose in the 500s.       Vitals:    07/18/22 1759   BP: 130/72   Pulse: 98   Resp: 18   Temp: 99.1 °F (37.3 °C)   TempSrc: Oral   SpO2: 96%   Weight: 110.2 kg (243 lb)   Height: 6' 2" (1.88 m)       Pertinent physical exam:     Gen: AxOx4, NAD, appears stated age, well appearing  Eye: EOMI, no scleral icterus, no periorbital edema or ecchymosis  Head: Normocephalic, atraumatic, no lesions, scalp grossly normal  ENT: neck supple, no stridor, no masses, no abnormal phonation or drooling  CVS: warm and well perfused, cap refill <2 seconds, no extremity pallor  PULM: normal work of breathing, no stridor, equal chest rise, no peripheral cyanosis  ABD: scaphoid, nondistended  Ext: no rash, no deformities, moving all joints with normal ROM  Neuro: ORELLANA, gait intact, face grossly symmetric  Psych: well groomed, mood and affect congruent, makes good eye contact, cooperative      Brief workup plan: labs to evaluate for DKA    Preliminary workup initiated; this workup will be continued and followed by the physician or advanced practice provider that is assigned to the patient when roomed.  "

## 2022-07-18 NOTE — PATIENT INSTRUCTIONS
Liver cancer screening every 6 months with US  EGD and propranolol in 1-2 years  Labs monthly  Paracentesis and LVP  Return 3 months    [phone]

## 2022-07-18 NOTE — TELEPHONE ENCOUNTER
Received call form Nickie SANTOS with [facility] with a critical lab value.  Glucose 548. Read back and verified.  Secure chat message sent to Dr Urban.  Response: Have the Patient go to the ED.    Call placed to the patient. Message relayed from Dr Urban. Patient stated he was [place].    Patient asked if he has been taking his Insulin?  Patient stated sometimes. I don't have any place to do on my stomach. You have other areas on your body where you can give yourself the Insulin, your thighs and arms.  You will need further diabetic instructions.  Voiced understanding.    Will let Provider know.

## 2022-07-19 ENCOUNTER — TELEPHONE (OUTPATIENT)
Dept: HEPATOLOGY | Facility: CLINIC | Age: 57
End: 2022-07-19
Payer: MEDICAID

## 2022-07-19 LAB
HCV AB SERPL QL IA: NEGATIVE
HIV 1+2 AB+HIV1 P24 AG SERPL QL IA: NEGATIVE

## 2022-07-19 NOTE — TELEPHONE ENCOUNTER
Response from Ana Paula Burkett he will do para this week        Mitch Pan MD sent to Ila Chaudhary MA; Malick Castro RN; Rachel Headley RN  No problem,     Malick/Racehl, please schedule him this week     ThanksCiara             Previous Messages       ----- Message -----   From: Ila Chaudhary MA   Sent: 7/18/2022   1:31 PM CDT   To: Mitch Pan MD   Subject: in need od a PARA                                 Dr. Lehman is requesting for this pt to have a paracentesis. Would you please consider him.   Thank you   Ila Chaudhary

## 2022-07-19 NOTE — ED TRIAGE NOTES
Came here for an appointment had blood work done and glucose was in the 500s, has not taken his home medications today. Has no complaints of symptoms.

## 2022-07-19 NOTE — DISCHARGE INSTRUCTIONS
You were seen in the emergency department for high blood sugar.  It is very important for you to monitor your blood sugar regularly and take insulin as prescribed to control your blood sugar.  Please follow-up with your PCP for further management.

## 2022-07-19 NOTE — TELEPHONE ENCOUNTER
RE: in need od a PARA  Received: Today  Rachel Headley RN sent to Ila Chaudhary MA; Mitch Pan MD; Malick Castro RN  Spoke with patient and he does not think he needs a Para this week. Paracentesis scheduled next week 7/27/22. Pt verbalized understanding

## 2022-07-19 NOTE — ED PROVIDER NOTES
Encounter Date: 7/18/2022    SCRIBE #1 NOTE: I, Laure Silva, am scribing for, and in the presence of,  Salina Robin MD. I have scribed the entire note.       History     Chief Complaint   Patient presents with    Abnormal Lab     Not fasting blood lab, glucose 600     Time patient was seen by the provider: 7:55 PM      The patient is a 56 y.o. male with co-morbidities including: T2 DM, cirrhosis of the liver with portal vein thrombosis, MALT lymphoma who presents to the ED with a complaint of hyperglycemia today. He takes insulin and normally regularly injects it into the abdomen. He has been experiencing abdominal swelling so has not been taking his insulin regularly. H was noted to be hyperglycemic recently and was referred to ED for workup and glucose control. He was recently told by his doctor other locations he can inject his insulin.  The swelling has decreased some, and he denies abdominal pain.  He is scheduled for a paracentesis soon and is waiting to be called in by his doctor. He has not had a previous paracentesis done. He denies shortness of breath, fevr/chills, changes in urination, N/V, diarrhea, confusion.    The history is provided by the patient, medical records and the spouse. No  was used.     Review of patient's allergies indicates:  No Known Allergies  Past Medical History:   Diagnosis Date    Anemia     Celiac disease     Cervicalgia     Cirrhosis     Colon polyp     Depression     Diabetes mellitus, type 2     Elevated LFTs     Esophageal varices     Gout, unspecified     History of COVID-19 01/15/2021    Hyperlipidemia     Mood disorder in conditions classified elsewhere     Obesity, unspecified     Other chronic nonalcoholic liver disease     Portal hypertensive gastropathy     Thyroid disease     Unspecified psychosis      Past Surgical History:   Procedure Laterality Date    COLONOSCOPY N/A 6/7/2017    Procedure: COLONOSCOPY;  Surgeon:  Placido Green MD;  Location: [facility];  Service: Endoscopy;  Laterality: N/A;    ESOPHAGOGASTRODUODENOSCOPY N/A 7/16/2018    Procedure: ESOPHAGOGASTRODUODENOSCOPY (EGD);  Surgeon: Mitch Pan MD;  Location: [facility];  Service: Endoscopy;  Laterality: N/A;    ESOPHAGOGASTRODUODENOSCOPY N/A 7/22/2020    Procedure: ESOPHAGOGASTRODUODENOSCOPY (EGD);  Surgeon: Mitch Pan MD;  Location: [facility];  Service: Endoscopy;  Laterality: N/A;    ESOPHAGOGASTRODUODENOSCOPY N/A 3/22/2022    Procedure: EGD (ESOPHAGOGASTRODUODENOSCOPY);  Surgeon: Mitch Pan MD;  Location: [facility];  Service: Endoscopy;  Laterality: N/A;    HERNIA REPAIR      TONSILLECTOMY      UPPER GASTROINTESTINAL ENDOSCOPY       Family History   Problem Relation Age of Onset    Hypertension Mother     Diabetes Mother     Diabetes Father     Heart disease Father     Ovarian cancer Maternal Grandmother     Colon cancer Neg Hx      Social History     Tobacco Use    Smoking status: Never Smoker    Smokeless tobacco: Never Used   Substance Use Topics    Alcohol use: No    Drug use: No     Review of Systems   Constitutional: Negative for chills, diaphoresis, fatigue and fever.   HENT: Negative for congestion, rhinorrhea and sore throat.    Respiratory: Negative for cough, chest tightness and shortness of breath.    Gastrointestinal: Positive for abdominal distention. Negative for abdominal pain, diarrhea, nausea and vomiting.   Endocrine: Positive for polyuria (chronic).   Genitourinary: Negative for decreased urine volume, dysuria, flank pain and urgency.        Neg changes in urination   Musculoskeletal: Negative for back pain and joint swelling.   Skin: Negative for color change.   Allergic/Immunologic: Negative for immunocompromised state.   Neurological: Negative for dizziness, weakness, light-headedness and headaches.   Hematological: Does not bruise/bleed easily.   Psychiatric/Behavioral: Negative  for confusion.       Physical Exam     Initial Vitals [07/18/22 1759]   BP Pulse Resp Temp SpO2   130/72 98 18 99.1 °F (37.3 °C) 96 %      MAP       --         Physical Exam    Nursing note and vitals reviewed.  Constitutional: He appears well-developed and well-nourished. He is not diaphoretic. No distress.   HENT:   Head: Normocephalic and atraumatic.   Nose: Nose normal.   Eyes: Conjunctivae and EOM are normal. Pupils are equal, round, and reactive to light. No scleral icterus.   Neck: Neck supple.   Normal range of motion.  Cardiovascular: Normal rate, regular rhythm and intact distal pulses.   Pulmonary/Chest: Breath sounds normal. No respiratory distress. He has no wheezes. He has no rhonchi. He has no rales. He exhibits no tenderness.   Abdominal: Abdomen is soft. Bowel sounds are normal. He exhibits distension (+ fluid wave). There is no abdominal tenderness. There is no rebound and no guarding.   Musculoskeletal:         General: Edema present. No tenderness. Normal range of motion.      Cervical back: Normal range of motion and neck supple.      Comments: 1-2+ bilateral lower extremity edema.     Neurological: He is alert and oriented to person, place, and time. He has normal strength.   Skin: Skin is warm and dry. Capillary refill takes less than 2 seconds. No rash noted. No pallor.   Psoriatic plaques on chest.   Psychiatric: He has a normal mood and affect. His behavior is normal. Judgment and thought content normal.         ED Course   Procedures  Labs Reviewed   CBC W/ AUTO DIFFERENTIAL - Abnormal; Notable for the following components:       Result Value    WBC 2.70 (*)     RBC 4.16 (*)     Hemoglobin 9.8 (*)     Hematocrit 31.7 (*)     MCV 76 (*)     MCH 23.6 (*)     MCHC 30.9 (*)     RDW 15.1 (*)     Platelets 38 (*)     Lymph # 0.3 (*)     Gran % 73.3 (*)     Lymph % 10.7 (*)     Platelet Estimate Decreased (*)     All other components within normal limits    Narrative:     Release to  patient->Immediate   PLT  critical result(s) called and verbal readback obtained from   SANDRA GOLDBERG RN @7:38PM by CHECO 07/18/2022 19:38   COMPREHENSIVE METABOLIC PANEL - Abnormal; Notable for the following components:    Sodium 132 (*)     CO2 20 (*)     Glucose 458 (*)     Calcium 8.6 (*)     Albumin 3.3 (*)     Total Bilirubin 1.1 (*)     Alkaline Phosphatase 168 (*)     All other components within normal limits    Narrative:     Release to patient->Immediate   POCT GLUCOSE - Abnormal; Notable for the following components:    POCT Glucose 458 (*)     All other components within normal limits   ISTAT PROCEDURE - Abnormal; Notable for the following components:    POC PCO2 32.5 (*)     POC PO2 38 (*)     POC HCO3 20.6 (*)     POC SATURATED O2 74 (*)     POC TCO2 22 (*)     All other components within normal limits   POCT GLUCOSE - Abnormal; Notable for the following components:    POCT Glucose 368 (*)     All other components within normal limits   POCT GLUCOSE - Abnormal; Notable for the following components:    POCT Glucose 344 (*)     All other components within normal limits   BETA - HYDROXYBUTYRATE, SERUM    Narrative:     Release to patient->Immediate   HIV 1 / 2 ANTIBODY    Narrative:     Release to patient->Immediate   HEPATITIS C ANTIBODY    Narrative:     Release to patient->Immediate          Imaging Results    None          Medications   insulin regular injection 3 Units 0.03 mL (3 Units Intravenous Given 7/18/22 2107)   sodium chloride 0.9% bolus 500 mL (0 mLs Intravenous Stopped 7/18/22 2142)     Medical Decision Making:   History:   Old Medical Records: I decided to obtain old medical records.  Old Records Summarized: records from clinic visits.       <> Summary of Records: Reviewed records and patient was notified of hyperglycemia and advised to be compliant with insulin as he is a potential liver transplant candidate.   Initial Assessment:   Patient well appearing, no distress, abdomen soft and  nontender without concern for SBP, however, glucose elevated to 400s associated with patient's noncompliance with insulin. Will give patient IV insulin and a small dose of IV fluids (given general volume overload status). Advised on insulin injection sites for subcutaneous at home.   Differential Diagnosis:   Hyperglycemia, DKA, electrolyte abnormality, no signs of SBP, no symptoms of UTI or PNA  Clinical Tests:   Lab Tests: Ordered and Reviewed  ED Management:  No DKA. Pt given IV insulin with improvement in glucose to 344, no change in pulm status after 500 ml and will give additional IVF and insulin.    Pt declines to wait for additional treatment in ED, he states that now he knows of other injection sites he will be more compliant with insulin, states he has enough supplies and insulin to monitor and adjust regularly. Stable for d/c, I discussed outpatient follow up and return precautions with pt and answered all questions.            Scribe Attestation:   Scribe #1: I performed the above scribed service and the documentation accurately describes the services I performed. I attest to the accuracy of the note.        ED Course as of 07/19/22 1626   Mon Jul 18, 2022 1951 Glucose(!!): 458 [JR]   1949 WBC(!): 2.70 [JR]   1949 Hemoglobin(!): 9.8  chronic [JR]   1951 Platelets(!!): 38  Chronic thrombocytopenia  [JR]   1951 Beta-Hydroxybutyrate: 0.1 [JR]   2001 BILIRUBIN TOTAL(!): 1.1  improved [JR]      ED Course User Index  [JR] Salina Robin MD             Clinical Impression:   Final diagnoses:  [R73.9] Hyperglycemia (Primary)          ED Disposition Condition    Discharge Stable        ED Prescriptions     None        Follow-up Information     Follow up With Specialties Details Why Contact Info    Sarmad Estrella MD Internal Medicine, Critical Care Medicine Schedule an appointment as soon as possible for a visit   [address] [place] [id number] [phone]      [facility] - Emergency Dept Emergency  Medicine Go to  As needed, If symptoms worsen [address]  [facility] [id number] [phone]           Salina Robin MD  07/19/22 5889

## 2022-07-28 ENCOUNTER — TELEPHONE (OUTPATIENT)
Dept: HEPATOLOGY | Facility: CLINIC | Age: 57
End: 2022-07-28
Payer: MEDICAID

## 2022-07-28 NOTE — TELEPHONE ENCOUNTER
Patient went to ER on 7/18/22.      ----- Message from Sri Urban MD sent at 7/25/2022  4:20 PM CDT -----  BS>500- to go to ER  MELD-Na score: 11

## 2022-09-12 ENCOUNTER — TELEPHONE (OUTPATIENT)
Dept: HEPATOLOGY | Facility: CLINIC | Age: 57
End: 2022-09-12
Payer: MEDICARE

## 2022-09-12 NOTE — TELEPHONE ENCOUNTER
Received call from Kadie Zimmer with [facility] with a Critical Lab Value.  Platelets 35. Read back and verified. History of similar abnormal lab value.  Will notify Provider.

## 2022-09-12 NOTE — TELEPHONE ENCOUNTER
----- Message from Sri Urban MD -----  Cbc stable. Let pt know. Rest of labs are pending.    Call placed to the patient at [phone]. No Answer. Left above VM message. Will send a My Chart message.

## 2022-09-22 PROBLEM — Z79.4 TYPE 2 DIABETES MELLITUS WITH HYPERGLYCEMIA, WITH LONG-TERM CURRENT USE OF INSULIN: Status: ACTIVE | Noted: 2022-09-22

## 2022-09-22 PROBLEM — E11.65 TYPE 2 DIABETES MELLITUS WITH HYPERGLYCEMIA, WITH LONG-TERM CURRENT USE OF INSULIN: Status: ACTIVE | Noted: 2022-09-22

## 2022-09-22 PROBLEM — E83.42 HYPOMAGNESEMIA: Status: ACTIVE | Noted: 2022-09-22

## 2022-10-25 ENCOUNTER — OFFICE VISIT (OUTPATIENT)
Dept: HEPATOLOGY | Facility: CLINIC | Age: 57
DRG: 871 | End: 2022-10-25
Payer: MEDICAID

## 2022-10-25 ENCOUNTER — TELEPHONE (OUTPATIENT)
Dept: HEPATOLOGY | Facility: CLINIC | Age: 57
End: 2022-10-25
Payer: MEDICAID

## 2022-10-25 ENCOUNTER — HOSPITAL ENCOUNTER (INPATIENT)
Facility: HOSPITAL | Age: 57
LOS: 4 days | Discharge: HOME | DRG: 871 | End: 2022-10-29
Attending: EMERGENCY MEDICINE | Admitting: EMERGENCY MEDICINE
Payer: MEDICAID

## 2022-10-25 VITALS
DIASTOLIC BLOOD PRESSURE: 76 MMHG | SYSTOLIC BLOOD PRESSURE: 129 MMHG | RESPIRATION RATE: 19 BRPM | HEIGHT: 74 IN | WEIGHT: 260.38 LBS | OXYGEN SATURATION: 97 % | TEMPERATURE: 100 F | HEART RATE: 89 BPM | BODY MASS INDEX: 33.42 KG/M2

## 2022-10-25 DIAGNOSIS — K76.6 PORTAL HYPERTENSIVE GASTROPATHY: ICD-10-CM

## 2022-10-25 DIAGNOSIS — D61.818 PANCYTOPENIA: ICD-10-CM

## 2022-10-25 DIAGNOSIS — E61.1 IRON DEFICIENCY: ICD-10-CM

## 2022-10-25 DIAGNOSIS — K76.0 FATTY LIVER: Primary | ICD-10-CM

## 2022-10-25 DIAGNOSIS — K65.2 SBP (SPONTANEOUS BACTERIAL PERITONITIS): Primary | ICD-10-CM

## 2022-10-25 DIAGNOSIS — Z79.4 TYPE 2 DIABETES MELLITUS WITH HYPERGLYCEMIA, WITH LONG-TERM CURRENT USE OF INSULIN: ICD-10-CM

## 2022-10-25 DIAGNOSIS — R05.9 COUGH, UNSPECIFIED TYPE: ICD-10-CM

## 2022-10-25 DIAGNOSIS — E11.65 TYPE 2 DIABETES MELLITUS WITH HYPERGLYCEMIA, WITH LONG-TERM CURRENT USE OF INSULIN: ICD-10-CM

## 2022-10-25 DIAGNOSIS — R18.8 OTHER ASCITES: ICD-10-CM

## 2022-10-25 DIAGNOSIS — D69.6 THROMBOCYTOPENIA: ICD-10-CM

## 2022-10-25 DIAGNOSIS — C88.4 MALT LYMPHOMA: ICD-10-CM

## 2022-10-25 DIAGNOSIS — I81 PORTAL VEIN THROMBOSIS: ICD-10-CM

## 2022-10-25 DIAGNOSIS — I85.10 SECONDARY ESOPHAGEAL VARICES WITHOUT BLEEDING: ICD-10-CM

## 2022-10-25 DIAGNOSIS — K76.82 HEPATIC ENCEPHALOPATHY: ICD-10-CM

## 2022-10-25 DIAGNOSIS — K65.9 BACTERIAL PERITONITIS: ICD-10-CM

## 2022-10-25 DIAGNOSIS — E83.42 HYPOMAGNESEMIA: ICD-10-CM

## 2022-10-25 DIAGNOSIS — K31.89 PORTAL HYPERTENSIVE GASTROPATHY: ICD-10-CM

## 2022-10-25 DIAGNOSIS — Z51.81 MEDICATION MONITORING ENCOUNTER: ICD-10-CM

## 2022-10-25 DIAGNOSIS — K74.69 OTHER CIRRHOSIS OF LIVER: ICD-10-CM

## 2022-10-25 LAB
ALBUMIN SERPL BCP-MCNC: 2.9 G/DL (ref 3.5–5.2)
ALP SERPL-CCNC: 128 U/L (ref 55–135)
ALT SERPL W/O P-5'-P-CCNC: 32 U/L (ref 10–44)
ANION GAP SERPL CALC-SCNC: 8 MMOL/L (ref 8–16)
AST SERPL-CCNC: 45 U/L (ref 10–40)
BACTERIA #/AREA URNS AUTO: ABNORMAL /HPF
BASOPHILS # BLD AUTO: 0.01 K/UL (ref 0–0.2)
BASOPHILS NFR BLD: 0.5 % (ref 0–1.9)
BILIRUB SERPL-MCNC: 1.7 MG/DL (ref 0.1–1)
BILIRUB UR QL STRIP: NEGATIVE
BUN SERPL-MCNC: 12 MG/DL (ref 6–20)
CALCIUM SERPL-MCNC: 7.9 MG/DL (ref 8.7–10.5)
CHLORIDE SERPL-SCNC: 99 MMOL/L (ref 95–110)
CLARITY UR REFRACT.AUTO: CLEAR
CO2 SERPL-SCNC: 22 MMOL/L (ref 23–29)
COLOR UR AUTO: YELLOW
CREAT SERPL-MCNC: 0.8 MG/DL (ref 0.5–1.4)
DIFFERENTIAL METHOD: ABNORMAL
EOSINOPHIL # BLD AUTO: 0 K/UL (ref 0–0.5)
EOSINOPHIL NFR BLD: 0.9 % (ref 0–8)
ERYTHROCYTE [DISTWIDTH] IN BLOOD BY AUTOMATED COUNT: 17.4 % (ref 11.5–14.5)
EST. GFR  (NO RACE VARIABLE): >60 ML/MIN/1.73 M^2
ESTIMATED AVG GLUCOSE: 301 MG/DL (ref 68–131)
GLUCOSE SERPL-MCNC: 313 MG/DL (ref 70–110)
GLUCOSE UR QL STRIP: ABNORMAL
HBA1C MFR BLD: 12.1 % (ref 4–5.6)
HCT VFR BLD AUTO: 34.3 % (ref 40–54)
HGB BLD-MCNC: 10.7 G/DL (ref 14–18)
HGB UR QL STRIP: ABNORMAL
IMM GRANULOCYTES # BLD AUTO: 0.01 K/UL (ref 0–0.04)
IMM GRANULOCYTES NFR BLD AUTO: 0.5 % (ref 0–0.5)
INFLUENZA A, MOLECULAR: POSITIVE
INFLUENZA B, MOLECULAR: NEGATIVE
INR PPP: 1.4 (ref 0.8–1.2)
KETONES UR QL STRIP: NEGATIVE
LACTATE SERPL-SCNC: 2.2 MMOL/L (ref 0.5–2.2)
LEUKOCYTE ESTERASE UR QL STRIP: NEGATIVE
LYMPHOCYTES # BLD AUTO: 0.2 K/UL (ref 1–4.8)
LYMPHOCYTES NFR BLD: 11.1 % (ref 18–48)
MCH RBC QN AUTO: 22.9 PG (ref 27–31)
MCHC RBC AUTO-ENTMCNC: 31.2 G/DL (ref 32–36)
MCV RBC AUTO: 73 FL (ref 82–98)
MICROSCOPIC COMMENT: ABNORMAL
MONOCYTES # BLD AUTO: 0.4 K/UL (ref 0.3–1)
MONOCYTES NFR BLD: 16.7 % (ref 4–15)
NEUTROPHILS # BLD AUTO: 1.5 K/UL (ref 1.8–7.7)
NEUTROPHILS NFR BLD: 70.3 % (ref 38–73)
NITRITE UR QL STRIP: NEGATIVE
NRBC BLD-RTO: 0 /100 WBC
PH UR STRIP: 6 [PH] (ref 5–8)
PLATELET # BLD AUTO: 40 K/UL (ref 150–450)
PMV BLD AUTO: ABNORMAL FL (ref 9.2–12.9)
POCT GLUCOSE: 334 MG/DL (ref 70–110)
POTASSIUM SERPL-SCNC: 3.4 MMOL/L (ref 3.5–5.1)
PROCALCITONIN SERPL IA-MCNC: 0.08 NG/ML
PROT SERPL-MCNC: 6.3 G/DL (ref 6–8.4)
PROT UR QL STRIP: ABNORMAL
PROTHROMBIN TIME: 14.3 SEC (ref 9–12.5)
RBC # BLD AUTO: 4.67 M/UL (ref 4.6–6.2)
RBC #/AREA URNS AUTO: 89 /HPF (ref 0–4)
SARS-COV-2 RDRP RESP QL NAA+PROBE: NEGATIVE
SODIUM SERPL-SCNC: 129 MMOL/L (ref 136–145)
SP GR UR STRIP: 1.02 (ref 1–1.03)
SPECIMEN SOURCE: ABNORMAL
SQUAMOUS #/AREA URNS AUTO: 0 /HPF
URN SPEC COLLECT METH UR: ABNORMAL
WBC # BLD AUTO: 2.16 K/UL (ref 3.9–12.7)
WBC #/AREA URNS AUTO: 4 /HPF (ref 0–5)
YEAST UR QL AUTO: ABNORMAL

## 2022-10-25 PROCEDURE — 88189 PR  FLOWCYTOMETRY/READ, 16 & > MARKERS: ICD-10-PCS | Mod: ,,, | Performed by: PATHOLOGY

## 2022-10-25 PROCEDURE — 87075 CULTR BACTERIA EXCEPT BLOOD: CPT | Performed by: EMERGENCY MEDICINE

## 2022-10-25 PROCEDURE — 88112 CYTOPATH CELL ENHANCE TECH: CPT | Performed by: PATHOLOGY

## 2022-10-25 PROCEDURE — 99285 EMERGENCY DEPT VISIT HI MDM: CPT | Mod: CS,,, | Performed by: EMERGENCY MEDICINE

## 2022-10-25 PROCEDURE — 99999 PR PBB SHADOW E&M-EST. PATIENT-LVL IV: CPT | Mod: PBBFAC,,, | Performed by: INTERNAL MEDICINE

## 2022-10-25 PROCEDURE — 83036 HEMOGLOBIN GLYCOSYLATED A1C: CPT | Performed by: PHYSICIAN ASSISTANT

## 2022-10-25 PROCEDURE — 88185 FLOWCYTOMETRY/TC ADD-ON: CPT | Mod: 59 | Performed by: PATHOLOGY

## 2022-10-25 PROCEDURE — 99215 PR OFFICE/OUTPT VISIT, EST, LEVL V, 40-54 MIN: ICD-10-PCS | Mod: S$PBB,,, | Performed by: INTERNAL MEDICINE

## 2022-10-25 PROCEDURE — 88341 IMHCHEM/IMCYTCHM EA ADD ANTB: CPT | Mod: 59 | Performed by: PATHOLOGY

## 2022-10-25 PROCEDURE — 3074F SYST BP LT 130 MM HG: CPT | Mod: CPTII,,, | Performed by: INTERNAL MEDICINE

## 2022-10-25 PROCEDURE — 99214 OFFICE O/P EST MOD 30 MIN: CPT | Mod: PBBFAC,PN,25 | Performed by: INTERNAL MEDICINE

## 2022-10-25 PROCEDURE — 36415 COLL VENOUS BLD VENIPUNCTURE: CPT | Performed by: HOSPITALIST

## 2022-10-25 PROCEDURE — 85025 COMPLETE CBC W/AUTO DIFF WBC: CPT | Performed by: PHYSICIAN ASSISTANT

## 2022-10-25 PROCEDURE — 84157 ASSAY OF PROTEIN OTHER: CPT | Performed by: EMERGENCY MEDICINE

## 2022-10-25 PROCEDURE — 88342 IMHCHEM/IMCYTCHM 1ST ANTB: CPT | Mod: 26,,, | Performed by: PATHOLOGY

## 2022-10-25 PROCEDURE — 87502 INFLUENZA DNA AMP PROBE: CPT

## 2022-10-25 PROCEDURE — 99999 PR PBB SHADOW E&M-EST. PATIENT-LVL IV: ICD-10-PCS | Mod: PBBFAC,,, | Performed by: INTERNAL MEDICINE

## 2022-10-25 PROCEDURE — 25000003 PHARM REV CODE 250: Performed by: EMERGENCY MEDICINE

## 2022-10-25 PROCEDURE — 88184 FLOWCYTOMETRY/ TC 1 MARKER: CPT | Performed by: PATHOLOGY

## 2022-10-25 PROCEDURE — 85610 PROTHROMBIN TIME: CPT | Performed by: EMERGENCY MEDICINE

## 2022-10-25 PROCEDURE — 88112 CYTOPATH CELL ENHANCE TECH: CPT | Mod: 26,,, | Performed by: PATHOLOGY

## 2022-10-25 PROCEDURE — 3074F PR MOST RECENT SYSTOLIC BLOOD PRESSURE < 130 MM HG: ICD-10-PCS | Mod: CPTII,,, | Performed by: INTERNAL MEDICINE

## 2022-10-25 PROCEDURE — 81001 URINALYSIS AUTO W/SCOPE: CPT | Performed by: PHYSICIAN ASSISTANT

## 2022-10-25 PROCEDURE — 1160F RVW MEDS BY RX/DR IN RCRD: CPT | Mod: CPTII,,, | Performed by: INTERNAL MEDICINE

## 2022-10-25 PROCEDURE — 12000002 HC ACUTE/MED SURGE SEMI-PRIVATE ROOM

## 2022-10-25 PROCEDURE — 89051 BODY FLUID CELL COUNT: CPT | Performed by: EMERGENCY MEDICINE

## 2022-10-25 PROCEDURE — 88305 TISSUE EXAM BY PATHOLOGIST: CPT | Mod: 26,,, | Performed by: PATHOLOGY

## 2022-10-25 PROCEDURE — 87502 INFLUENZA DNA AMP PROBE: CPT | Performed by: EMERGENCY MEDICINE

## 2022-10-25 PROCEDURE — 1159F PR MEDICATION LIST DOCUMENTED IN MEDICAL RECORD: ICD-10-PCS | Mod: CPTII,,, | Performed by: INTERNAL MEDICINE

## 2022-10-25 PROCEDURE — 1159F MED LIST DOCD IN RCRD: CPT | Mod: CPTII,,, | Performed by: INTERNAL MEDICINE

## 2022-10-25 PROCEDURE — 88189 FLOWCYTOMETRY/READ 16 & >: CPT | Mod: ,,, | Performed by: PATHOLOGY

## 2022-10-25 PROCEDURE — 87205 SMEAR GRAM STAIN: CPT | Performed by: EMERGENCY MEDICINE

## 2022-10-25 PROCEDURE — 87070 CULTURE OTHR SPECIMN AEROBIC: CPT | Performed by: EMERGENCY MEDICINE

## 2022-10-25 PROCEDURE — 80053 COMPREHEN METABOLIC PANEL: CPT | Performed by: HOSPITALIST

## 2022-10-25 PROCEDURE — 63600175 PHARM REV CODE 636 W HCPCS: Performed by: EMERGENCY MEDICINE

## 2022-10-25 PROCEDURE — 99215 OFFICE O/P EST HI 40 MIN: CPT | Mod: S$PBB,,, | Performed by: INTERNAL MEDICINE

## 2022-10-25 PROCEDURE — 3078F PR MOST RECENT DIASTOLIC BLOOD PRESSURE < 80 MM HG: ICD-10-PCS | Mod: CPTII,,, | Performed by: INTERNAL MEDICINE

## 2022-10-25 PROCEDURE — U0002 COVID-19 LAB TEST NON-CDC: HCPCS | Performed by: EMERGENCY MEDICINE

## 2022-10-25 PROCEDURE — 88305 TISSUE EXAM BY PATHOLOGIST: CPT | Performed by: PATHOLOGY

## 2022-10-25 PROCEDURE — 99285 EMERGENCY DEPT VISIT HI MDM: CPT | Mod: 25,27

## 2022-10-25 PROCEDURE — 83605 ASSAY OF LACTIC ACID: CPT | Performed by: PHYSICIAN ASSISTANT

## 2022-10-25 PROCEDURE — 84145 PROCALCITONIN (PCT): CPT | Performed by: HOSPITALIST

## 2022-10-25 PROCEDURE — 25500020 PHARM REV CODE 255: Performed by: HOSPITALIST

## 2022-10-25 PROCEDURE — 87040 BLOOD CULTURE FOR BACTERIA: CPT | Performed by: PHYSICIAN ASSISTANT

## 2022-10-25 PROCEDURE — 83615 LACTATE (LD) (LDH) ENZYME: CPT | Performed by: EMERGENCY MEDICINE

## 2022-10-25 PROCEDURE — 27000207 HC ISOLATION

## 2022-10-25 PROCEDURE — 88305 TISSUE EXAM BY PATHOLOGIST: ICD-10-PCS | Mod: 26,,, | Performed by: PATHOLOGY

## 2022-10-25 PROCEDURE — 3046F HEMOGLOBIN A1C LEVEL >9.0%: CPT | Mod: CPTII,,, | Performed by: INTERNAL MEDICINE

## 2022-10-25 PROCEDURE — 1160F PR REVIEW ALL MEDS BY PRESCRIBER/CLIN PHARMACIST DOCUMENTED: ICD-10-PCS | Mod: CPTII,,, | Performed by: INTERNAL MEDICINE

## 2022-10-25 PROCEDURE — 3078F DIAST BP <80 MM HG: CPT | Mod: CPTII,,, | Performed by: INTERNAL MEDICINE

## 2022-10-25 PROCEDURE — 88341 PR IHC OR ICC EACH ADD'L SINGLE ANTIBODY  STAINPR: ICD-10-PCS | Mod: 26,,, | Performed by: PATHOLOGY

## 2022-10-25 PROCEDURE — 88112 PR  CYTOPATH, CELL ENHANCE TECH: ICD-10-PCS | Mod: 26,,, | Performed by: PATHOLOGY

## 2022-10-25 PROCEDURE — 63600175 PHARM REV CODE 636 W HCPCS: Performed by: HOSPITALIST

## 2022-10-25 PROCEDURE — 88342 CHG IMMUNOCYTOCHEMISTRY: ICD-10-PCS | Mod: 26,,, | Performed by: PATHOLOGY

## 2022-10-25 PROCEDURE — 82042 OTHER SOURCE ALBUMIN QUAN EA: CPT | Performed by: EMERGENCY MEDICINE

## 2022-10-25 PROCEDURE — 99285 PR EMERGENCY DEPT VISIT,LEVEL V: ICD-10-PCS | Mod: CS,,, | Performed by: EMERGENCY MEDICINE

## 2022-10-25 PROCEDURE — 88342 IMHCHEM/IMCYTCHM 1ST ANTB: CPT | Performed by: PATHOLOGY

## 2022-10-25 PROCEDURE — 25000003 PHARM REV CODE 250: Performed by: HOSPITALIST

## 2022-10-25 PROCEDURE — 88341 IMHCHEM/IMCYTCHM EA ADD ANTB: CPT | Mod: 26,,, | Performed by: PATHOLOGY

## 2022-10-25 PROCEDURE — 3046F PR MOST RECENT HEMOGLOBIN A1C LEVEL > 9.0%: ICD-10-PCS | Mod: CPTII,,, | Performed by: INTERNAL MEDICINE

## 2022-10-25 RX ORDER — FAMOTIDINE 20 MG/1
20 TABLET, FILM COATED ORAL 2 TIMES DAILY
Status: DISCONTINUED | OUTPATIENT
Start: 2022-10-25 | End: 2022-10-29 | Stop reason: HOSPADM

## 2022-10-25 RX ORDER — FINASTERIDE 5 MG/1
5 TABLET, FILM COATED ORAL DAILY
Status: DISCONTINUED | OUTPATIENT
Start: 2022-10-25 | End: 2022-10-29 | Stop reason: HOSPADM

## 2022-10-25 RX ORDER — INSULIN ASPART 100 [IU]/ML
0-5 INJECTION, SOLUTION INTRAVENOUS; SUBCUTANEOUS
Status: DISCONTINUED | OUTPATIENT
Start: 2022-10-25 | End: 2022-10-29 | Stop reason: HOSPADM

## 2022-10-25 RX ORDER — VENLAFAXINE HYDROCHLORIDE 75 MG/1
75 CAPSULE, EXTENDED RELEASE ORAL 2 TIMES DAILY
Status: DISCONTINUED | OUTPATIENT
Start: 2022-10-25 | End: 2022-10-25

## 2022-10-25 RX ORDER — IBUPROFEN 200 MG
16 TABLET ORAL
Status: DISCONTINUED | OUTPATIENT
Start: 2022-10-25 | End: 2022-10-29 | Stop reason: HOSPADM

## 2022-10-25 RX ORDER — IBUPROFEN 200 MG
24 TABLET ORAL
Status: DISCONTINUED | OUTPATIENT
Start: 2022-10-25 | End: 2022-10-29 | Stop reason: HOSPADM

## 2022-10-25 RX ORDER — SODIUM CHLORIDE 0.9 % (FLUSH) 0.9 %
10 SYRINGE (ML) INJECTION
Status: DISCONTINUED | OUTPATIENT
Start: 2022-10-25 | End: 2022-10-29 | Stop reason: HOSPADM

## 2022-10-25 RX ORDER — PROCHLORPERAZINE EDISYLATE 5 MG/ML
5 INJECTION INTRAMUSCULAR; INTRAVENOUS EVERY 6 HOURS PRN
Status: DISCONTINUED | OUTPATIENT
Start: 2022-10-25 | End: 2022-10-29 | Stop reason: HOSPADM

## 2022-10-25 RX ORDER — OXYBUTYNIN CHLORIDE 5 MG/1
5 TABLET ORAL DAILY
Status: DISCONTINUED | OUTPATIENT
Start: 2022-10-25 | End: 2022-10-25

## 2022-10-25 RX ORDER — INSULIN ASPART 100 [IU]/ML
20 INJECTION, SOLUTION INTRAVENOUS; SUBCUTANEOUS
Status: DISCONTINUED | OUTPATIENT
Start: 2022-10-25 | End: 2022-10-29 | Stop reason: HOSPADM

## 2022-10-25 RX ORDER — ATORVASTATIN CALCIUM 10 MG/1
10 TABLET, FILM COATED ORAL DAILY
Status: DISCONTINUED | OUTPATIENT
Start: 2022-10-25 | End: 2022-10-29 | Stop reason: HOSPADM

## 2022-10-25 RX ORDER — VENLAFAXINE 37.5 MG/1
75 TABLET ORAL 2 TIMES DAILY
Status: DISCONTINUED | OUTPATIENT
Start: 2022-10-25 | End: 2022-10-29 | Stop reason: HOSPADM

## 2022-10-25 RX ORDER — TALC
6 POWDER (GRAM) TOPICAL NIGHTLY PRN
Status: DISCONTINUED | OUTPATIENT
Start: 2022-10-25 | End: 2022-10-29 | Stop reason: HOSPADM

## 2022-10-25 RX ORDER — TAMSULOSIN HYDROCHLORIDE 0.4 MG/1
0.4 CAPSULE ORAL DAILY
Status: DISCONTINUED | OUTPATIENT
Start: 2022-10-25 | End: 2022-10-29 | Stop reason: HOSPADM

## 2022-10-25 RX ORDER — ACETAMINOPHEN 325 MG/1
650 TABLET ORAL EVERY 4 HOURS PRN
Status: DISCONTINUED | OUTPATIENT
Start: 2022-10-25 | End: 2022-10-29 | Stop reason: HOSPADM

## 2022-10-25 RX ORDER — SODIUM CHLORIDE 0.9 % (FLUSH) 0.9 %
10 SYRINGE (ML) INJECTION
Status: CANCELLED | OUTPATIENT
Start: 2022-10-25

## 2022-10-25 RX ORDER — HYDROMORPHONE HYDROCHLORIDE 1 MG/ML
1 INJECTION, SOLUTION INTRAMUSCULAR; INTRAVENOUS; SUBCUTANEOUS EVERY 4 HOURS PRN
Status: DISCONTINUED | OUTPATIENT
Start: 2022-10-25 | End: 2022-10-29 | Stop reason: HOSPADM

## 2022-10-25 RX ORDER — ACETAMINOPHEN 325 MG/1
650 TABLET ORAL
Status: COMPLETED | OUTPATIENT
Start: 2022-10-25 | End: 2022-10-25

## 2022-10-25 RX ORDER — OXYBUTYNIN CHLORIDE 5 MG/1
5 TABLET, EXTENDED RELEASE ORAL DAILY
Status: DISCONTINUED | OUTPATIENT
Start: 2022-10-26 | End: 2022-10-29 | Stop reason: HOSPADM

## 2022-10-25 RX ORDER — GLUCAGON 1 MG
1 KIT INJECTION
Status: DISCONTINUED | OUTPATIENT
Start: 2022-10-25 | End: 2022-10-29 | Stop reason: HOSPADM

## 2022-10-25 RX ORDER — OXYCODONE HYDROCHLORIDE 5 MG/1
5 TABLET ORAL EVERY 4 HOURS PRN
Status: DISCONTINUED | OUTPATIENT
Start: 2022-10-25 | End: 2022-10-29 | Stop reason: HOSPADM

## 2022-10-25 RX ORDER — ONDANSETRON 8 MG/1
8 TABLET, ORALLY DISINTEGRATING ORAL EVERY 8 HOURS PRN
Status: DISCONTINUED | OUTPATIENT
Start: 2022-10-25 | End: 2022-10-29 | Stop reason: HOSPADM

## 2022-10-25 RX ORDER — OSELTAMIVIR PHOSPHATE 75 MG/1
75 CAPSULE ORAL 2 TIMES DAILY
Status: DISCONTINUED | OUTPATIENT
Start: 2022-10-25 | End: 2022-10-26

## 2022-10-25 RX ORDER — TALC
6 POWDER (GRAM) TOPICAL NIGHTLY PRN
Status: CANCELLED | OUTPATIENT
Start: 2022-10-25

## 2022-10-25 RX ORDER — ACETAMINOPHEN 325 MG/1
650 TABLET ORAL EVERY 8 HOURS PRN
Status: DISCONTINUED | OUTPATIENT
Start: 2022-10-25 | End: 2022-10-29 | Stop reason: HOSPADM

## 2022-10-25 RX ORDER — FLUCONAZOLE 2 MG/ML
400 INJECTION, SOLUTION INTRAVENOUS
Status: DISCONTINUED | OUTPATIENT
Start: 2022-10-25 | End: 2022-10-26

## 2022-10-25 RX ADMIN — VENLAFAXINE HYDROCHLORIDE 75 MG: 37.5 TABLET ORAL at 08:10

## 2022-10-25 RX ADMIN — RIFAXIMIN 550 MG: 550 TABLET ORAL at 08:10

## 2022-10-25 RX ADMIN — FAMOTIDINE 20 MG: 20 TABLET ORAL at 08:10

## 2022-10-25 RX ADMIN — VANCOMYCIN HYDROCHLORIDE 2250 MG: 1.25 INJECTION, POWDER, LYOPHILIZED, FOR SOLUTION INTRAVENOUS at 03:10

## 2022-10-25 RX ADMIN — INSULIN ASPART 2 UNITS: 100 INJECTION, SOLUTION INTRAVENOUS; SUBCUTANEOUS at 08:10

## 2022-10-25 RX ADMIN — Medication 6 MG: at 08:10

## 2022-10-25 RX ADMIN — MEROPENEM 2 G: 1 INJECTION INTRAVENOUS at 06:10

## 2022-10-25 RX ADMIN — OSELTAMIVIR PHOSPHATE 75 MG: 75 CAPSULE ORAL at 09:10

## 2022-10-25 RX ADMIN — IOHEXOL 100 ML: 350 INJECTION, SOLUTION INTRAVENOUS at 02:10

## 2022-10-25 RX ADMIN — INSULIN DETEMIR 30 UNITS: 100 INJECTION, SOLUTION SUBCUTANEOUS at 08:10

## 2022-10-25 RX ADMIN — ACETAMINOPHEN 650 MG: 325 TABLET ORAL at 02:10

## 2022-10-25 NOTE — PLAN OF CARE
Please send peritoneal fluid for the following studies:     Cell count and differential  Gram stain  Cultures: Bacterial, fungal, AFB and modified AFB for nocardia   Protein  LDH  Adenosine deaminase  TB PCR  Cytology    Start meropenem after paracentesis    Full consult to follow    Abigail Monet DO  Transplant Infectious Disease

## 2022-10-25 NOTE — NURSING
Pt arrived to the unit at 1750. Pt is able to ambulate. A&O x 4 and able to make needs known. Pt had a paracentesis today before arriving to the floor. Pt arrived with Vanco infusing. Vanco dose completed and meropenum dose started. Pt diet added for low Na diet.

## 2022-10-25 NOTE — PLAN OF CARE
Paracentesis done. Removed 4950 ml. No Albumin administered. Patient AAOx3, no distress noted, respirations even and unlabored, will continue to monitor. VSS. Abdominal site clean, dry, and intact; no bleeding or hematoma noted. Patient to be transferred to ED via patient transporter. Patient stable for transport. Phone report given to RIK Graves.

## 2022-10-25 NOTE — HPI
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 Patient has a pMHx of cirrhosis likely 2/2 to CORDOVA, obesity, hypothyroidism, HLD, HTN, and NIDDM. Was seen by hem/onc in March 2021 for pancytopenia as well has multiple concerning symptoms as in abdominal distension/pain, unintentional weight loss, night sweats, fatigue and general unwell feeling. US of the liver 1 month prior to visit revealed cirrhosis, mild ascites and splenomegaly (24 cm). Recommendation per H/O was for a bone marrow biopsy and work up. Bone marrow that was done for his pancytopenia showed (4/7/21) involvement with a mature b-cell lymphoma (accounting for 10% of cellularity). Normocellularity of 50%. No obvious dysplasia was noted. Absent iron storage. Flow showed a lambda restricted b cell population that was CD 5 negative, CD 10 negative, CD 19/20 +. ddx at that time was a low-grade b cell lymphoma. He underwent a PET on 4/30 that was normal. He was given iron supplementation and 3 month follow up was recommended.

## 2022-10-25 NOTE — PROGRESS NOTES
HEPATOLOGY FOLLOW UP    Referring Physician: FERNANDO Baum  Current Corresponding Physician: FERNANDO Baum, Sarmad Estrella MD, Vale Covarrubias MD, Tank Curtis MD    Corky Peraltascvicky is here for follow up of     HPI   Corky Bach is a 56 y.o. male with a past medical history of MALT lymphoma, on maintenance Rituxan every 4 weeks (for >2 years; has 15 mos left) who presented 7/24/22 for evaluation of decompensated cirrhosis secondary to CORDOVA.     -dx few years ago  --ascites-started on diuretics lasix 40 mg and aldactone 100 mg daily; noted to have distended abdomen when seen in consultation  --HE- on rifaximin; no longer on lactulose  -EGD-03/22 and 07/22/20- small varices; on propranolol     MELD-Na score: 11 at 6/13/2022 10:35 AM  MELD score: 11 at 6/13/2022 10:35 AM  Calculated from:  Serum Creatinine: 0.9 mg/dL (Using min of 1 mg/dL) at 6/13/2022 10:35 AM  Serum Sodium: 133 mmol/L at 6/13/2022 10:35 AM  Total Bilirubin: 1.6 mg/dL at 6/13/2022 10:35 AM  INR(ratio): 1.3 at 6/13/2022 10:35 AM    Interval History  At Corky J Isreal's consultation with me:  --paracentesis ordered; diuretics continued; paracentesis not done  --monthly labs to monitor meld score; most recent meld 13 on 10/20/22    Has develoepd large ascites. Had paracentesis 10/10/22:  ,000; 12% PMNs = 18,720 ANC; 14% lymph; rest monocytes  Had repeat paracentesis 10/20/22 but no cell count sent  Now with continued large ascites  CT PET 8/11/22: neg pet scan; no appreciable ct scan change  Temp today 99.6    HE, ongoing; on rifaximin; has not had a need for lactulose    WBC 2.78, ANC 2.1, ALC 0.3, plts 41  MELD-Na score: 13 at 10/20/2022  2:22 PM  MELD score: 12 at 10/20/2022  2:22 PM  Calculated from:  Serum Creatinine: 0.8 mg/dL (Using min of 1 mg/dL) at 10/20/2022  2:22 PM  Serum Sodium: 136 mmol/L at 10/20/2022  2:22 PM  Total Bilirubin: 1.8 mg/dL at 10/20/2022  2:22 PM  INR(ratio): 1.3 at 10/20/2022   2:22 PM  Age: 56 years     Outpatient Encounter Medications as of 10/25/2022   Medication Sig Dispense Refill    ascorbic acid, vitamin C, (VITAMIN C) 500 MG tablet Take 1 tablet (500 mg total) by mouth once daily. 90 tablet 3    ferrous sulfate 325 (65 FE) MG EC tablet Take 1 tablet (325 mg total) by mouth once daily. Take with vitamin C 90 tablet 3    finasteride (PROSCAR) 5 mg tablet Take 1 tablet (5 mg total) by mouth once daily. 90 tablet 3    furosemide (LASIX) 40 MG tablet Take 1 tablet (40 mg total) by mouth once daily. New dose as of 07/11/2022- for fluid management / ascites 90 tablet 3    insulin detemir U-100 (LEVEMIR FLEXTOUCH U-100 INSULN) 100 unit/mL (3 mL) InPn pen Inject 80 Units into the skin every evening. New dose as of 01/03/2022 (Patient taking differently: Inject 60 Units into the skin every evening. New dose as of 01/03/2022) 72 mL 3    insulin lispro (HUMALOG KWIKPEN INSULIN) 100 unit/mL pen Inject 60 Units into the skin 3 (three) times daily with meals. (Patient taking differently: Inject 20 Units into the skin 3 (three) times daily with meals.) 162 mL 3    linaGLIPtin (TRADJENTA) 5 mg Tab tablet TAKE 1 TABLET (5 MG TOTAL) BY MOUTH ONCE DAILY. 90 tablet 3    lovastatin (MEVACOR) 10 MG tablet TAKE 1 TABLET BY MOUTH ONCE DAILY IN THE EVENING 90 tablet 3    magnesium oxide (MAGOX) 400 mg (241.3 mg magnesium) tablet Take 1 tablet (400 mg total) by mouth once daily. 200 tablet 1    metFORMIN (GLUCOPHAGE) 1000 MG tablet TAKE 1 TABLET BY MOUTH TWICE DAILY WITH MEALS 180 tablet 3    oxybutynin (DITROPAN-XL) 5 MG TR24 Take 1 tablet (5 mg total) by mouth once daily. 30 tablet 11    propranoloL (INDERAL) 10 MG tablet TAKE 1 TABLET (10 MG TOTAL) BY MOUTH 3 (THREE) TIMES DAILY. 90 tablet 0    rifAXIMin (XIFAXAN) 550 mg Tab Take 1 tablet (550 mg total) by mouth 2 (two) times daily. 60 tablet 11    spironolactone (ALDACTONE) 100 MG tablet Take 1 tablet (100 mg total) by mouth once daily. For fluid  "management / ascites- new as of 07/11/2022 90 tablet 3    tamsulosin (FLOMAX) 0.4 mg Cap Take 1 capsule (0.4 mg total) by mouth every evening. 90 capsule 3    venlafaxine (EFFEXOR) 75 MG tablet TAKE 1 TABLET (75 MG TOTAL) BY MOUTH 2 (TWO) TIMES DAILY. 180 tablet 3    BD ULTRA-FINE ORIG PEN NEEDLE 29 gauge x 1/2" Ndle USE PEN NEEDLE TO INJECT INSULIN EVERY EVENING 100 each 11    JARDIANCE 10 mg tablet Take 1 tablet (10 mg total) by mouth every morning. (Patient not taking: Reported on 10/25/2022) 90 tablet 3    ketoconazole (NIZORAL) 2 % shampoo Apply topically twice a week. (Patient not taking: Reported on 10/25/2022) 120 mL 3    triamcinolone acetonide 0.1% (KENALOG) 0.1 % cream Apply topically 2 (two) times daily. Use 3-4 wks max 240 g 0     No facility-administered encounter medications on file as of 10/25/2022.     Review of patient's allergies indicates:  No Known Allergies  Past Medical History:   Diagnosis Date    Anemia     Celiac disease     Cervicalgia     Cirrhosis     Colon polyp     Depression     Diabetes mellitus, type 2     Elevated LFTs     Esophageal varices     Gout, unspecified     History of COVID-19 01/15/2021    Hyperlipidemia     Mood disorder in conditions classified elsewhere     Obesity, unspecified     Other chronic nonalcoholic liver disease     Portal hypertensive gastropathy     Thyroid disease     Unspecified psychosis        Review of Systems   Constitutional:  Positive for fever.   HENT: Negative.     Eyes: Negative.    Respiratory: Negative.     Cardiovascular: Negative.    Gastrointestinal:  Positive for abdominal distention.   Genitourinary: Negative.    Musculoskeletal: Negative.    Skin: Negative.    Neurological: Negative.    Psychiatric/Behavioral: Negative.     Vitals:    10/25/22 1027   BP: 129/76   Pulse: 89   Resp: 19   Temp: 99.6 °F (37.6 °C)       Physical Exam  Vitals reviewed.   Constitutional:       Appearance: He is well-developed.   HENT:      Head: Normocephalic " and atraumatic.   Eyes:      General: No scleral icterus.     Conjunctiva/sclera: Conjunctivae normal.      Pupils: Pupils are equal, round, and reactive to light.   Neck:      Thyroid: No thyromegaly.   Cardiovascular:      Rate and Rhythm: Normal rate and regular rhythm.      Heart sounds: Normal heart sounds.   Pulmonary:      Effort: Pulmonary effort is normal.      Breath sounds: Normal breath sounds. No rales.   Abdominal:      General: Bowel sounds are normal. There is distension.      Palpations: Abdomen is soft. There is no mass.      Tenderness: There is no abdominal tenderness.   Musculoskeletal:         General: Normal range of motion.      Cervical back: Normal range of motion and neck supple.   Skin:     General: Skin is warm and dry.      Findings: No rash.   Neurological:      Mental Status: He is alert and oriented to person, place, and time.       MELD-Na score: 13 at 10/20/2022  2:22 PM  MELD score: 12 at 10/20/2022  2:22 PM  Calculated from:  Serum Creatinine: 0.8 mg/dL (Using min of 1 mg/dL) at 10/20/2022  2:22 PM  Serum Sodium: 136 mmol/L at 10/20/2022  2:22 PM  Total Bilirubin: 1.8 mg/dL at 10/20/2022  2:22 PM  INR(ratio): 1.3 at 10/20/2022  2:22 PM  Age: 56 years    Lab Results   Component Value Date     (H) 10/20/2022    BUN 11 10/20/2022    CREATININE 0.8 10/20/2022    CALCIUM 8.6 (L) 10/20/2022     10/20/2022    K 4.0 10/20/2022     10/20/2022    PROT 7.2 10/20/2022    CO2 25 10/20/2022    ANIONGAP 8 10/20/2022    WBC 2.78 (L) 10/20/2022    RBC 4.40 (L) 10/20/2022    HGB 9.8 (L) 10/20/2022    HCT 32.3 (L) 10/20/2022    MCV 73 (L) 10/20/2022    MCH 22.3 (L) 10/20/2022    MCHC 30.3 (L) 10/20/2022     Lab Results   Component Value Date    RDW 16.9 (H) 10/20/2022    PLT 41 (L) 10/20/2022    MPV 9.6 10/20/2022    GRAN 2.1 10/20/2022    GRAN 76.1 (H) 10/20/2022    LYMPH 0.3 (L) 10/20/2022    LYMPH 9.4 (L) 10/20/2022    MONO 0.3 10/20/2022    MONO 10.1 10/20/2022    EOSINOPHIL  3.6 10/20/2022    BASOPHIL 0.4 10/20/2022    EOS 0.1 10/20/2022    BASO 0.01 10/20/2022    APTT 24.1 10/10/2022    CHOL 89 (L) 07/06/2022    TRIG 195 (H) 07/06/2022    HDL 25 (L) 07/06/2022    CHOLHDL 28.1 07/06/2022    TOTALCHOLEST 3.6 07/06/2022    ALBUMIN 3.6 10/20/2022    BILIDIR 0.7 (H) 10/20/2022    AST 27 10/20/2022    ALT 30 10/20/2022    ALKPHOS 123 10/20/2022    MG 1.4 (L) 09/22/2022    LABPROT 13.8 (H) 10/20/2022    INR 1.3 (H) 10/20/2022    PSA 0.46 10/17/2022       Assessment and Plan:  Corky Bach is a 56 y.o. male with Cirrhosis and Ascites  He has developed bacterial peritonitis with an unusually very high WBC, concerning for a secondary infection. I am recommending admission to hospital with: urgent repeat paracentesis with cell count, albumin, protein, cytology, cultures; Tx ID consult; broad spectrum abx, TP ct abdo/pelvis; hepatology consult; oncology consult. Continue HE meds. May need a change in diuretics after LVP.

## 2022-10-25 NOTE — TELEPHONE ENCOUNTER
Call for direct admission.  Paracentesis 10/10/22: 156,000 WBC; 12% neurtrophils  Repeat paracentesis yesterday- no cell count  Has temp 99.6     Needs admission with ID consult, repeat para, TP ct scan

## 2022-10-25 NOTE — PLAN OF CARE
Pt arrived to [facility] for INpt PARA. Pt in no acute distress, placed on monitor, VSS. Awaiting consent.

## 2022-10-25 NOTE — CONSULTS
Bone Marrow Transplant    Mr. oCrky Bach is a 56 year old male with MALT lymphoma who is currently on maintenance rituximab and followed by Dr. Rene. He was admitted to internal medicine today with concern for SBP after he was seen in hepatology clinic today. Hematology was consulted due to the concerning differential on recent paracentesis with only 12% segmented cells but [id number] WBC overall.     Unable to see patient today as he is in IR for paracentesis.     Recommend obtaining flow cytometry and cytology on ascitic fluid. Discussed plan for cytology specimen with pathology and IR nurse. To maximize the diagnostic yield of cytology, as much fluid as possible should be sent to pathology department for cytology evaluation.     Will follow up on cytology and flow cytometry when these have resulted. Discussed with Dr. Winter.    Buffy Clark DO  PGY-V  Hematology/Oncology Fellow

## 2022-10-25 NOTE — H&P
Inpatient Radiology Pre-procedure Note    History of Present Illness:  Corky Bach is a 56 y.o. male who presents for  US guided Paracentesis.    Admission H&P reviewed.  Past Medical History:   Diagnosis Date    Anemia     Bacterial peritonitis 10/25/2022    Celiac disease     Cervicalgia     Cirrhosis     Colon polyp     Depression     Diabetes mellitus, type 2     Elevated LFTs     Esophageal varices     Gout, unspecified     History of COVID-19 01/15/2021    Hyperlipidemia     Mood disorder in conditions classified elsewhere     Obesity, unspecified     Other chronic nonalcoholic liver disease     Portal hypertensive gastropathy     Thyroid disease     Unspecified psychosis      Past Surgical History:   Procedure Laterality Date    COLONOSCOPY N/A 6/7/2017    Procedure: COLONOSCOPY;  Surgeon: Placido Green MD;  Location: [facility];  Service: Endoscopy;  Laterality: N/A;    ESOPHAGOGASTRODUODENOSCOPY N/A 7/16/2018    Procedure: ESOPHAGOGASTRODUODENOSCOPY (EGD);  Surgeon: Mitch Pan MD;  Location: [facility];  Service: Endoscopy;  Laterality: N/A;    ESOPHAGOGASTRODUODENOSCOPY N/A 7/22/2020    Procedure: ESOPHAGOGASTRODUODENOSCOPY (EGD);  Surgeon: Mitch Pan MD;  Location: [facility];  Service: Endoscopy;  Laterality: N/A;    ESOPHAGOGASTRODUODENOSCOPY N/A 3/22/2022    Procedure: EGD (ESOPHAGOGASTRODUODENOSCOPY);  Surgeon: Mitch Pan MD;  Location: [facility];  Service: Endoscopy;  Laterality: N/A;    HERNIA REPAIR      REPEAT ABDOMINAL PARACENTESIS N/A 10/20/2022    Procedure: PARACENTESIS, ABDOMINAL, REPEAT;  Surgeon: Ruddy Salomon MD;  Location: [facility];  Service: Endoscopy;  Laterality: N/A;    TONSILLECTOMY      UPPER GASTROINTESTINAL ENDOSCOPY         Review of Systems:   As documented in primary team H&P    Home Meds:   Prior to Admission medications    Medication Sig Start Date End Date Taking? Authorizing Provider   ascorbic acid, vitamin C, (VITAMIN  "C) 500 MG tablet Take 1 tablet (500 mg total) by mouth once daily. 1/3/22 1/3/23 Yes Sarmad Estrella MD   BD ULTRA-FINE ORIG PEN NEEDLE 29 gauge x 1/2" Ndle USE PEN NEEDLE TO INJECT INSULIN EVERY EVENING 11/17/21  Yes Sarmad Estrella MD   ferrous sulfate 325 (65 FE) MG EC tablet Take 1 tablet (325 mg total) by mouth once daily. Take with vitamin C 1/4/22  Yes Sarmad Estrella MD   finasteride (PROSCAR) 5 mg tablet Take 1 tablet (5 mg total) by mouth once daily. 6/20/22 6/20/23 Yes Willie Carolina PA-C   furosemide (LASIX) 40 MG tablet Take 1 tablet (40 mg total) by mouth once daily. New dose as of 07/11/2022- for fluid management / ascites 7/11/22 7/11/23 Yes Sarmad Estrella MD   insulin detemir U-100 (LEVEMIR FLEXTOUCH U-100 INSULN) 100 unit/mL (3 mL) InPn pen Inject 80 Units into the skin every evening. New dose as of 01/03/2022  Patient taking differently: Inject 60 Units into the skin every evening. New dose as of 01/03/2022 2/10/22 2/10/23 Yes Sarmad Estrella MD   insulin lispro (HUMALOG KWIKPEN INSULIN) 100 unit/mL pen Inject 60 Units into the skin 3 (three) times daily with meals.  Patient taking differently: Inject 20 Units into the skin 3 (three) times daily with meals. 2/10/22 2/10/23 Yes Sarmad Estrella MD   JARDIANCE 10 mg tablet Take 1 tablet (10 mg total) by mouth every morning. 7/11/22  Yes Sarmad Estrella MD   ketoconazole (NIZORAL) 2 % shampoo Apply topically twice a week. 5/17/18  Yes Sarmad Estrella MD   linaGLIPtin (TRADJENTA) 5 mg Tab tablet TAKE 1 TABLET (5 MG TOTAL) BY MOUTH ONCE DAILY. 6/2/22 6/2/23 Yes Sarmad Estrella MD   lovastatin (MEVACOR) 10 MG tablet TAKE 1 TABLET BY MOUTH ONCE DAILY IN THE EVENING 2/14/21  Yes Sarmad Estrella MD   magnesium oxide (MAGOX) 400 mg (241.3 mg magnesium) tablet Take 1 tablet (400 mg total) by mouth once daily. 2/10/22 2/10/23 Yes Malika Ventura NP   metFORMIN (GLUCOPHAGE) 1000 MG tablet TAKE 1 TABLET BY MOUTH TWICE DAILY WITH MEALS " 5/12/21  Yes Sarmad Estrella MD   oxybutynin (DITROPAN-XL) 5 MG TR24 Take 1 tablet (5 mg total) by mouth once daily. 6/20/22 6/20/23 Yes Willie Carolina PA-C   propranoloL (INDERAL) 10 MG tablet TAKE 1 TABLET (10 MG TOTAL) BY MOUTH 3 (THREE) TIMES DAILY. 9/22/22 9/22/23 Yes FERNANDO Baum   rifAXIMin (XIFAXAN) 550 mg Tab Take 1 tablet (550 mg total) by mouth 2 (two) times daily. 6/13/22 6/13/23 Yes FERNANDO Baum   spironolactone (ALDACTONE) 100 MG tablet Take 1 tablet (100 mg total) by mouth once daily. For fluid management / ascites- new as of 07/11/2022 7/11/22 7/11/23 Yes Sarmad Estrella MD   tamsulosin (FLOMAX) 0.4 mg Cap Take 1 capsule (0.4 mg total) by mouth every evening. 6/20/22 6/20/23 Yes Willie Carolina PA-C   venlafaxine (EFFEXOR) 75 MG tablet TAKE 1 TABLET (75 MG TOTAL) BY MOUTH 2 (TWO) TIMES DAILY. 6/2/22 6/2/23 Yes Sarmad Estrella MD   triamcinolone acetonide 0.1% (KENALOG) 0.1 % cream Apply topically 2 (two) times daily. Use 3-4 wks max 6/11/18 7/27/22  Konstantin Gold MD     Scheduled Meds:    atorvastatin  10 mg Oral Daily    famotidine  20 mg Oral BID    finasteride  5 mg Oral Daily    fluconazole (DIFLUCAN) IV (PEDS and ADULTS)  400 mg Intravenous Q24H    insulin aspart U-100  20 Units Subcutaneous TIDWM    insulin detemir U-100  30 Units Subcutaneous QHS    meropenem (MERREM) IVPB  2 g Intravenous Q8H    oseltamivir  75 mg Oral BID    [START ON 10/26/2022] oxybutynin  5 mg Oral Daily    rifAXImin  550 mg Oral BID    tamsulosin  0.4 mg Oral Daily    vancomycin (VANCOCIN) IVPB  20 mg/kg Intravenous ED 1 Time    venlafaxine  75 mg Oral BID     Continuous Infusions:   PRN Meds:acetaminophen, acetaminophen, dextrose 10%, dextrose 10%, glucagon (human recombinant), glucose, glucose, HYDROmorphone, insulin aspart U-100, melatonin, ondansetron, oxyCODONE, prochlorperazine, sodium chloride 0.9%  Anticoagulants/Antiplatelets: no anticoagulation    Allergies: Review of  patient's allergies indicates:  No Known Allergies  Sedation Hx: have not been any systemic reactions    Vitals:  Temp: (!) 101.4 °F (38.6 °C) (10/25/22 1253)  Pulse: 95 (10/25/22 1618)  Resp: 18 (10/25/22 1618)  BP: 133/74 (10/25/22 1618)  SpO2: (!) 93 % (10/25/22 1618)     Physical Exam:  ASA: 2  Mallampati: N/A    General: no acute distress  Mental Status: alert and oriented to person, place and time  HEENT: normocephalic, atraumatic  Chest: unlabored breathing  Heart: regular heart rate  Abdomen: distended  Extremity: moves all extremities    Plan: US guided paracentesis.  Sedation Plan: local    Crystal Jacobson PA-C  Interventional Radiology  [phone]

## 2022-10-25 NOTE — ED PROVIDER NOTES
CC: abdominal swelling (Sent from [facility] for ID admission, abdominal swelling (liver hx), and fever (last night). Paracentesis last week showed infection)      History provided by:   Patient   Family wife    HPI: Corky Bach is a 56 y.o. year old male PMH of cirrhosis, depression, diabetes, esophageal varices, bacterial peritonitis, who presents to the ED for positive paracentesis on 10/10/22, patient with fever    Patient reports paracentesis done on October 10th and the fluid reaccumulation happened immediately, currently with discomfort over the upper side of the abdomen secondary to abdominal distension also has with shortness of breath also secondary to abdominal distension   has been having a new cough for the last 2 days, no chest pain, fever since last night, also sore throat no congestion, no diarrhea no nausea no vomiting no skin rash  He is not currently on antibiotics  Last chemotherapy was last month        October 10, 2020 2 g stain no organisms, culture no growth, urine culture positive for Staph aureus sensitive to Bactrim and oxacillin  Paracentesis with [id number] wbc's    Past Medical History:   Diagnosis Date    Anemia     Bacterial peritonitis 10/25/2022    Celiac disease     Cervicalgia     Cirrhosis     Colon polyp     Depression     Diabetes mellitus, type 2     Elevated LFTs     Esophageal varices     Gout, unspecified     History of COVID-19 01/15/2021    Hyperlipidemia     Mood disorder in conditions classified elsewhere     Obesity, unspecified     Other chronic nonalcoholic liver disease     Portal hypertensive gastropathy     Thyroid disease     Unspecified psychosis      Past Surgical History:   Procedure Laterality Date    COLONOSCOPY N/A 6/7/2017    Procedure: COLONOSCOPY;  Surgeon: Placido Green MD;  Location: [facility];  Service: Endoscopy;  Laterality: N/A;    ESOPHAGOGASTRODUODENOSCOPY N/A 7/16/2018    Procedure: ESOPHAGOGASTRODUODENOSCOPY (EGD);  Surgeon:  "Mitch Pan MD;  Location: [facility];  Service: Endoscopy;  Laterality: N/A;    ESOPHAGOGASTRODUODENOSCOPY N/A 7/22/2020    Procedure: ESOPHAGOGASTRODUODENOSCOPY (EGD);  Surgeon: Mitch Pan MD;  Location: [facility];  Service: Endoscopy;  Laterality: N/A;    ESOPHAGOGASTRODUODENOSCOPY N/A 3/22/2022    Procedure: EGD (ESOPHAGOGASTRODUODENOSCOPY);  Surgeon: Mitch Pan MD;  Location: [facility];  Service: Endoscopy;  Laterality: N/A;    HERNIA REPAIR      REPEAT ABDOMINAL PARACENTESIS N/A 10/20/2022    Procedure: PARACENTESIS, ABDOMINAL, REPEAT;  Surgeon: Ruddy Salomon MD;  Location: [facility];  Service: Endoscopy;  Laterality: N/A;    TONSILLECTOMY      UPPER GASTROINTESTINAL ENDOSCOPY       Family History   Problem Relation Age of Onset    Hypertension Mother     Diabetes Mother     Diabetes Father     Heart disease Father     Ovarian cancer Maternal Grandmother     Colon cancer Neg Hx      No current facility-administered medications on file prior to encounter.     Current Outpatient Medications on File Prior to Encounter   Medication Sig Dispense Refill    ascorbic acid, vitamin C, (VITAMIN C) 500 MG tablet Take 1 tablet (500 mg total) by mouth once daily. 90 tablet 3    BD ULTRA-FINE ORIG PEN NEEDLE 29 gauge x 1/2" Ndle USE PEN NEEDLE TO INJECT INSULIN EVERY EVENING 100 each 11    ferrous sulfate 325 (65 FE) MG EC tablet Take 1 tablet (325 mg total) by mouth once daily. Take with vitamin C 90 tablet 3    finasteride (PROSCAR) 5 mg tablet Take 1 tablet (5 mg total) by mouth once daily. 90 tablet 3    furosemide (LASIX) 40 MG tablet Take 1 tablet (40 mg total) by mouth once daily. New dose as of 07/11/2022- for fluid management / ascites 90 tablet 3    insulin detemir U-100 (LEVEMIR FLEXTOUCH U-100 INSULN) 100 unit/mL (3 mL) InPn pen Inject 80 Units into the skin every evening. New dose as of 01/03/2022 (Patient taking differently: Inject 60 Units into the skin every evening. New " dose as of 01/03/2022) 72 mL 3    insulin lispro (HUMALOG KWIKPEN INSULIN) 100 unit/mL pen Inject 60 Units into the skin 3 (three) times daily with meals. (Patient taking differently: Inject 20 Units into the skin 3 (three) times daily with meals.) 162 mL 3    JARDIANCE 10 mg tablet Take 1 tablet (10 mg total) by mouth every morning. 90 tablet 3    ketoconazole (NIZORAL) 2 % shampoo Apply topically twice a week. 120 mL 3    linaGLIPtin (TRADJENTA) 5 mg Tab tablet TAKE 1 TABLET (5 MG TOTAL) BY MOUTH ONCE DAILY. 90 tablet 3    lovastatin (MEVACOR) 10 MG tablet TAKE 1 TABLET BY MOUTH ONCE DAILY IN THE EVENING 90 tablet 3    magnesium oxide (MAGOX) 400 mg (241.3 mg magnesium) tablet Take 1 tablet (400 mg total) by mouth once daily. 200 tablet 1    metFORMIN (GLUCOPHAGE) 1000 MG tablet TAKE 1 TABLET BY MOUTH TWICE DAILY WITH MEALS 180 tablet 3    oxybutynin (DITROPAN-XL) 5 MG TR24 Take 1 tablet (5 mg total) by mouth once daily. 30 tablet 11    propranoloL (INDERAL) 10 MG tablet TAKE 1 TABLET (10 MG TOTAL) BY MOUTH 3 (THREE) TIMES DAILY. 90 tablet 0    rifAXIMin (XIFAXAN) 550 mg Tab Take 1 tablet (550 mg total) by mouth 2 (two) times daily. 60 tablet 11    spironolactone (ALDACTONE) 100 MG tablet Take 1 tablet (100 mg total) by mouth once daily. For fluid management / ascites- new as of 07/11/2022 90 tablet 3    tamsulosin (FLOMAX) 0.4 mg Cap Take 1 capsule (0.4 mg total) by mouth every evening. 90 capsule 3    venlafaxine (EFFEXOR) 75 MG tablet TAKE 1 TABLET (75 MG TOTAL) BY MOUTH 2 (TWO) TIMES DAILY. 180 tablet 3    triamcinolone acetonide 0.1% (KENALOG) 0.1 % cream Apply topically 2 (two) times daily. Use 3-4 wks max 240 g 0     Patient has no known allergies.  Social History     Socioeconomic History    Marital status:     Years of education: 12   Tobacco Use    Smoking status: Never    Smokeless tobacco: Never   Substance and Sexual Activity    Alcohol use: No    Drug use: No       ROS:     Constitutional :  pos for fever, neg for weakness  HENT neg for head injury, neg for sore throat  Eyes: neg for visual changes, neg for eye pain  Resp pos for SOB, neg for cough  Cardiac  neg for chest pain, neg for palpitations  GI pos for abd pain and distension, neg for nausea, neg for vomiting   neg for urinary changes  Neuro neg for focal weakness or numbness  Skin neg for skin rash  MSK: neg for myalgia, neg for arthralgia  ALL: Patient has no known allergies.    PHYSICAL EXAM:  Vitals:    10/25/22 1618   BP: 133/74   Pulse: 95   Resp: 18   Temp:      VS: triage VS reviewed    general: comfortable, in no acute distress, pleasant  HENT: neck symmetric, trachea midline, + post oropharynx erythema, no edema no ulcers or patches  Eyes: PERRL,no conjunctival injection and symmetrical eyelids  CV: RRR, no  murmurs, no rubs, no gallops, no LE edema  Resp: comfortable breathing, speaks in full sentences, CTAB, no wheezing, no crackles, no ronchi  ABD:  soft, ++ abdominal distension,  + normal BS, mild discomfort upper abdomen  Renal: No CVAT  Neuro: AAO x 3, 5/5 strength x 4 extremities, sensation intact, face symmetric, speech normal  MSK: NC/AT, extremities w/out deformity   Skin: warm, dry            DATA & INTERVENTIONS:    LABS reviewed:  Labs Reviewed   INFLUENZA A & B BY MOLECULAR - Abnormal; Notable for the following components:       Result Value    Influenza A, Molecular Positive (*)     All other components within normal limits   CBC W/ AUTO DIFFERENTIAL - Abnormal; Notable for the following components:    WBC 2.16 (*)     Hemoglobin 10.7 (*)     Hematocrit 34.3 (*)     MCV 73 (*)     MCH 22.9 (*)     MCHC 31.2 (*)     RDW 17.4 (*)     Platelets 40 (*)     Gran # (ANC) 1.5 (*)     Lymph # 0.2 (*)     Lymph % 11.1 (*)     Mono % 16.7 (*)     All other components within normal limits   URINALYSIS, REFLEX TO URINE CULTURE - Abnormal; Notable for the following components:    Protein, UA Trace (*)     Glucose, UA 4+ (*)      Occult Blood UA 2+ (*)     All other components within normal limits    Narrative:     Specimen Source->Urine   PROTIME-INR - Abnormal; Notable for the following components:    Prothrombin Time 14.3 (*)     INR 1.4 (*)     All other components within normal limits   URINALYSIS MICROSCOPIC - Abnormal; Notable for the following components:    RBC, UA 89 (*)     All other components within normal limits    Narrative:     Specimen Source->Urine   CULTURE, BLOOD   CULTURE, BLOOD   CULTURE, AEROBIC  (SPECIFY SOURCE)   CULTURE, ANAEROBIC   GRAM STAIN   RESPIRATORY INFECTION PANEL (PCR), NASOPHARYNGEAL   LACTIC ACID, PLASMA   SARS-COV-2 RNA AMPLIFICATION, QUAL   PROCALCITONIN   HEMOGLOBIN A1C   COMPREHENSIVE METABOLIC PANEL   LIPASE   WBC & DIFF, BODY FLUID   ALBUMIN, PERITONEAL, PLEURAL FLUID OR ISRA DRAINAGE, IN-HOUSE   PROTEIN, PERITONEAL, PLEURAL FLUID OR ISRA DRAINAGE, IN-HOUSE   LDH, PERITONEAL, PLEURAL FLUID OR ISRA DRAINAGE, IN-HOUSE   FLOW CYTOMETRY ANALYSIS (BODY FLUID)   CYTOLOGY SPECIMEN- MEDICAL CYTOLOGY (FLUID/WASH/BRUSH)   POCT GLUCOSE MONITORING CONTINUOUS       RADIOLOGY reviewed:  Imaging Results              IR Paracentesis with Imaging (In process)                       CT Abdomen Pelvis With Contrast (Final result)  Result time 10/25/22 15:19:46      Final result by Ruddy Villanueva MD (10/25/22 15:19:46)                   Impression:      This report was flagged in Epic as abnormal.    1. Findings suggesting cirrhosis superimposed portal hypertension including ascites, splenomegaly, and several varices.  The ascites has increased since the previous examination without discrete peritoneal thickening.  Correlation with ascitic fluid sampling as warranted.  2. There is some indistinctness about the distal descending colon and proximal sigmoid colon.  Possible infectious or inflammatory colitis is a consideration versus diverticulitis however evaluation for inflammation is limited secondary to fluid in the  region.  Correlation with any symptomatology to support a diagnosis of the same should be assessed.  3. Stable chronic appearing thrombus involving the portal vein and splenic vein as described.  4. Right nonobstructive nephrolithiasis.  5. Wall thickening involving the distal esophagus, correlation with any history of reflux esophagitis.  Direct visualization as warranted.  6. Please see above for additional findings.      Electronically signed by: Ruddy Villanueva MD  Date:    10/25/2022  Time:    15:19               Narrative:    EXAMINATION:  CT ABDOMEN PELVIS WITH CONTRAST    CLINICAL HISTORY:  Sepsis;fever;    TECHNIQUE:  Low dose axial images, sagittal and coronal reformations were obtained from the lung bases to the pubic symphysis following the IV administration of 100 mL of Omnipaque 350 .  Oral contrast was not given.    COMPARISON:  PET-CT 08/11/2022, CT urogram 04/22/2022    FINDINGS:  Images of the lower thorax are remarkable for bilateral dependent atelectasis.  There is thickening of the distal esophagus, may be on the basis of reflux esophagitis however correlation with direct visualization as warranted.    The liver is cirrhotic in configuration.  The spleen is enlarged.  There is a low attenuating lesion within the posterior aspect of the spleen measuring 2.5 cm, stable.  The gallbladder is grossly unremarkable.  The stomach is decompressed without wall thickening.  There are several scattered abdominal lymph nodes.  There is cavernous transformation of the portal vein.  There are splenic and gabriela hepatic collateral vessels.  There is chronic appearing thrombus with calcification involving the extrahepatic portal vein, similar to the previous examination extending to the proximal aspect of the SMV.  Additional thrombus noted within the splenic vein near the pancreatic tail also containing scattered regions of calcification.  These findings were better demonstrated on previous examination secondary  to improved bolus timing at that time.    The kidneys enhance symmetrically without hydronephrosis.  There is right nonobstructive nephrolithiasis.  The bilateral ureters are unable to be followed in their entirety to the urinary bladder, no definite calculi seen.  The urinary bladder is decompressed noting there may be residual wall thickening.  There is moderate to large volume abdominopelvic ascites, worsened since most recent PET-CT.  The prostate is not enlarged.    There are several scattered colonic diverticula.  There is some indistinctness about the distal descending colon and proximal sigmoid colon.  The terminal ileum is unremarkable.  The appendix is unremarkable.  The small bowel is grossly unremarkable.  There is scattered mesenteric edema.  There are a few scattered shotty periaortic, pericaval, and mesenteric lymph nodes.  No focal organized fluid collection or overt peritoneal thickening to suggest discrete changes of peritonitis.  Peritoneal fluid sampling however would be needed for definitive exclusion of the same.    There is osteopenia.  There are degenerative changes of the spine.  There is bilateral gynecomastia.  No significant inguinal lymphadenopathy.                                       X-Ray Chest AP Portable (Final result)  Result time 10/25/22 14:27:33      Final result by Hubert Dupont MD (10/25/22 14:27:33)                   Impression:      No significant intrathoracic abnormality.  Allowing for differences in projection and a poorer inspiratory depth level on the current examination, there has been no significant detrimental interval change in the appearance of the chest since 01/15/2021.      Electronically signed by: Hubert Dupont MD  Date:    10/25/2022  Time:    14:27               Narrative:    EXAMINATION:  XR CHEST AP PORTABLE    CLINICAL HISTORY:  fever;    TECHNIQUE:  One view    COMPARISON:  Comparison is made to 01/15/2021, the only available prior chest  radiograph.    FINDINGS:  Allowing for magnification of the cardiomediastinal silhouette related to projection and inspiratory depth level, the heart size is within normal limits.  Pulmonary vascularity is normal as well.  Lung zones are clear, and are free of significant airspace consolidation or volume loss.  No pleural fluid.  No abnormal mediastinal widening.  No pneumothorax.                                      MEDICATIONS/FLUIDS:  Medications   vancomycin (VANCOCIN) 2,250 mg in dextrose 5 % 500 mL IVPB (2,250 mg Intravenous New Bag 10/25/22 3544)   finasteride tablet 5 mg (has no administration in time range)   atorvastatin tablet 10 mg (has no administration in time range)   tamsulosin 24 hr capsule 0.4 mg (has no administration in time range)   glucose chewable tablet 16 g (has no administration in time range)   glucose chewable tablet 24 g (has no administration in time range)   glucagon (human recombinant) injection 1 mg (has no administration in time range)   dextrose 10% bolus 125 mL (has no administration in time range)   dextrose 10% bolus 250 mL (has no administration in time range)   insulin detemir U-100 pen 30 Units (has no administration in time range)   insulin aspart U-100 pen 0-5 Units (has no administration in time range)   insulin aspart U-100 pen 20 Units (has no administration in time range)   rifAXIMin tablet 550 mg (has no administration in time range)   sodium chloride 0.9% flush 10 mL (has no administration in time range)   acetaminophen tablet 650 mg (has no administration in time range)   oxyCODONE immediate release tablet 5 mg (has no administration in time range)   HYDROmorphone injection 1 mg (has no administration in time range)   famotidine tablet 20 mg (has no administration in time range)   ondansetron disintegrating tablet 8 mg (has no administration in time range)   prochlorperazine injection Soln 5 mg (has no administration in time range)   acetaminophen tablet 650 mg (has  no administration in time range)   melatonin tablet 6 mg (has no administration in time range)   fluconazole (DIFLUCAN) IVPB 400 mg (has no administration in time range)   venlafaxine tablet 75 mg (has no administration in time range)   meropenem (MERREM) 2 g in sodium chloride 0.9% 100 mL IVPB (has no administration in time range)   oxybutynin 24 hr tablet 5 mg (has no administration in time range)   oseltamivir capsule 75 mg (has no administration in time range)   acetaminophen tablet 650 mg (650 mg Oral Given 10/25/22 1446)   iohexoL (OMNIPAQUE 350) injection 100 mL (100 mLs Intravenous Given 10/25/22 1440)         MDM:  Corky Bach is a 56 y.o. year old male who presents to the ED for fever since yesterday, + cough x 2 days, + abd distension with upper abdominal discomfort and shortness of breath  Previous para on October 10th abnormal  Patient referred to the emergency department today for full workup with CT abdomen pelvis, broad-spectrum antibiotics, IR for paracentesis, Heme-Onc, ID, hepatology consult   patient to be admitted to Hospital Medicine  DDX includes but not limited to: sbp, viral vs bacterial resp infection    Labs ordered and reviewed:   Blood cx  Cbc white count of 2.1, platelets 40 similar to prior, hemoglobin 10.7 stable from prior  Cmp  Lipase  Ua  Lactate within normal limits  Inr 1.4  Procalcitonin normal  COVID negative  Influenza a positive  Para labs  UA negative for nitrites and leukocytes       Medication given in the ED:  Tylenol for fever, vancomycin and Zosyn    CXR (ordered and reviewed): No significant intrathoracic abnormality.  Allowing for differences in projection and a poorer inspiratory depth level on the current examination, there has been no significant detrimental interval change in the appearance of the chest since 01/15/2021.   CT abdomen pelvis  1. Findings suggesting cirrhosis superimposed portal hypertension including ascites, splenomegaly, and several  varices.  The ascites has increased since the previous examination without discrete peritoneal thickening.  Correlation with ascitic fluid sampling as warranted.   2. There is some indistinctness about the distal descending colon and proximal sigmoid colon.  Possible infectious or inflammatory colitis is a consideration versus diverticulitis however evaluation for inflammation is limited secondary to fluid in the region.  Correlation with any symptomatology to support a diagnosis of the same should be assessed.   3. Stable chronic appearing thrombus involving the portal vein and splenic vein as described.   4. Right nonobstructive nephrolithiasis.   5. Wall thickening involving the distal esophagus, correlation with any history of reflux esophagitis.  Direct visualization as warranted  Imagings independently visualized: n/a    Old records obtained and reviewed: yes, previous para results    Case discussed with the consultant:     IMPRESSION:  1.) ascites r/o SBP  2.) Influenza A    Dispo: admit    Critical Care Time: N/A       Layla Andrew MD  10/25/22 0940

## 2022-10-25 NOTE — FIRST PROVIDER EVALUATION
Emergency Department TeleTriage Encounter Note      CHIEF COMPLAINT    Chief Complaint   Patient presents with    abdominal swelling     Sent from [facility] for ID admission, abdominal swelling (liver hx), and fever (last night). Paracentesis last week showed infection       VITAL SIGNS   Initial Vitals [10/25/22 1253]   BP Pulse Resp Temp SpO2   (!) 163/76 98 20 (!) 101.4 °F (38.6 °C) 98 %      MAP       --            ALLERGIES    Review of patient's allergies indicates:  No Known Allergies    PROVIDER TRIAGE NOTE  This is a teletriage evaluation of a 56 y.o. male presenting to the ED complaining of abdominal distention. Patient sent from hepatology clinic for admission. Patient has abdominal distention and fever since last night. Recent paracentesis last week showed elevated WBC count. Dr. Urban's note says needs admission with ID consult, repeat para, TP CT scan.    Initial orders will be placed and care will be transferred to an alternate provider when patient is roomed for a full evaluation. Any additional orders and the final disposition will be determined by that provider.         ORDERS  Labs Reviewed   CULTURE, BLOOD   CULTURE, BLOOD   CBC W/ AUTO DIFFERENTIAL   COMPREHENSIVE METABOLIC PANEL   LIPASE   URINALYSIS, REFLEX TO URINE CULTURE   LACTIC ACID, PLASMA       ED Orders (720h ago, onward)      Start Ordered     Status Ordering Provider    10/25/22 1315 10/25/22 1314  Blood culture #1 **CANNOT BE ORDERED STAT**  Once         Ordered ANASTASIIA, ALISTAIR G.    10/25/22 1315 10/25/22 1314  Blood culture #2 **CANNOT BE ORDERED STAT**  Once         Ordered ANASTASIIA, ALISTAIR G.    10/25/22 1314 10/25/22 1313  Vital signs  Every 2 hours         Ordered ANASTASIIA, ALISTAIR G.    10/25/22 1314 10/25/22 1313  Diet NPO  Diet effective now         Ordered ANASTASIIA, ALISTAIR G.    10/25/22 1314 10/25/22 1313  Insert peripheral IV  Once         Ordered ANASTASIIA, ALISTAIR G.    10/25/22 1314 10/25/22 1313  CBC W/ AUTO DIFFERENTIAL  STAT          Ordered ANASTASIIA, ALISTAIR G.    10/25/22 1314 10/25/22 1313  Comp. Metabolic Panel  STAT         Ordered ANASTASIIA, ALISTAIR G.    10/25/22 1314 10/25/22 1313  Lipase  STAT         Ordered ANASTASIIA, ALISTAIR G.    10/25/22 1314 10/25/22 1313  Urinalysis, Reflex to Urine Culture Urine, Clean Catch  STAT         Ordered ANASTASIIA, ALISTAIR G.    10/25/22 1314 10/25/22 1314  Lactic acid, plasma  STAT         Ordered ALISTAIR LAURENT G.              Virtual Visit Note: The provider triage portion of this emergency department evaluation and documentation was performed via [employer], a HIPAA-compliant telemedicine application, in concert with a tele-presenter in the room. A face to face patient evaluation with one of my colleagues will occur once the patient is placed in an emergency department room.      DISCLAIMER: This note was prepared with digitalbox voice recognition transcription software. Garbled syntax, mangled pronouns, and other bizarre constructions may be attributed to that software system.

## 2022-10-25 NOTE — PLAN OF CARE
Plan of care  Full HPI to follow    Lymphoma - on rituxan, immunosuppressed  SBP  Sepsis  Colitis from CT scan?   Merlyn Root

## 2022-10-25 NOTE — CONSULTS
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 Pt follows w/ GI/hepatology for serial paracenteses for recurrent ascites that impair ADLs (such as causing LARKIN and abdominal discomfort). Pt has had developed some fevers over the past few days and a recent OSH paracentesis done showed significant WBCs >15k as per discussion with OSH providers.     SBP rule out vs recurrence/progression in malignancy  -Obtain paracentesis and send for: Cell count and differential, Gram stain, Protein, LDH, Adenosine deaminase, TB PCR, CytologyCultures -- Bacterial, fungal, AFB and modified AFB for nocardia   -Awaiting CXR and CT AP -- will follow-up  -Once paracentesis is obtained -- ok to start vancomycin, meropenem, and fluconazole    Thank you for allowing us to participate in the care of this patient. Please contact me if you have any questions regarding this consult.    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 Pt follows w/ GI/hepatology for serial paracenteses for recurrent ascites that impair ADLs (such as causing LARKIN and abdominal discomfort). Pt has had developed some fevers over the past few days and a recent OSH paracentesis done showed significant WBCs >15k as per discussion with OSH providers. Due to fevers and significant WBCs found on the paracentesis, ID service has been consulted for assistance in management.    Past Medical History:  Past Medical History:   Diagnosis Date    Anemia     Bacterial peritonitis 10/25/2022    Celiac disease     Cervicalgia     Cirrhosis     Colon polyp     Depression     Diabetes mellitus, type 2     Elevated LFTs     Esophageal varices     Gout, unspecified     History of COVID-19 01/15/2021    Hyperlipidemia     Mood disorder in conditions classified elsewhere     Obesity, unspecified     Other chronic nonalcoholic liver disease     Portal hypertensive gastropathy     Thyroid disease     Unspecified psychosis        Past Surgical History:  Past Surgical History:   Procedure Laterality Date    COLONOSCOPY N/A 6/7/2017    Procedure: COLONOSCOPY;  Surgeon: Placido Green MD;  Location: [facility];  Service: Endoscopy;  Laterality: N/A;    ESOPHAGOGASTRODUODENOSCOPY N/A 7/16/2018    Procedure: ESOPHAGOGASTRODUODENOSCOPY (EGD);  Surgeon: Mitch Pan MD;  Location: [facility];  Service: Endoscopy;  Laterality: N/A;    ESOPHAGOGASTRODUODENOSCOPY N/A 7/22/2020    Procedure: ESOPHAGOGASTRODUODENOSCOPY (EGD);  Surgeon: Mitch Pan MD;  Location: [facility];  Service: Endoscopy;  Laterality: N/A;    ESOPHAGOGASTRODUODENOSCOPY N/A 3/22/2022    Procedure: EGD (ESOPHAGOGASTRODUODENOSCOPY);  Surgeon: Mitch Pan MD;  Location: [facility];  Service: Endoscopy;  Laterality: N/A;    HERNIA REPAIR      REPEAT ABDOMINAL PARACENTESIS N/A 10/20/2022    Procedure: PARACENTESIS, ABDOMINAL, REPEAT;  Surgeon: Ruddy Salomon MD;  Location: [facility];   "Service: Endoscopy;  Laterality: N/A;    TONSILLECTOMY      UPPER GASTROINTESTINAL ENDOSCOPY         Allergies:  Review of patient's allergies indicates:  No Known Allergies    Medications:   Home Medications:  Prior to Admission medications    Medication Sig Start Date End Date Taking? Authorizing Provider   ascorbic acid, vitamin C, (VITAMIN C) 500 MG tablet Take 1 tablet (500 mg total) by mouth once daily. 1/3/22 1/3/23  Sarmad Estrella MD   BD ULTRA-FINE ORIG PEN NEEDLE 29 gauge x 1/2" Ndle USE PEN NEEDLE TO INJECT INSULIN EVERY EVENING 11/17/21   Sarmad Estrella MD   ferrous sulfate 325 (65 FE) MG EC tablet Take 1 tablet (325 mg total) by mouth once daily. Take with vitamin C 1/4/22   Sarmad Estrella MD   finasteride (PROSCAR) 5 mg tablet Take 1 tablet (5 mg total) by mouth once daily. 6/20/22 6/20/23  Willie Carolina PA-C   furosemide (LASIX) 40 MG tablet Take 1 tablet (40 mg total) by mouth once daily. New dose as of 07/11/2022- for fluid management / ascites 7/11/22 7/11/23  Sarmad Estrella MD   insulin detemir U-100 (LEVEMIR FLEXTOUCH U-100 INSULN) 100 unit/mL (3 mL) InPn pen Inject 80 Units into the skin every evening. New dose as of 01/03/2022  Patient taking differently: Inject 60 Units into the skin every evening. New dose as of 01/03/2022 2/10/22 2/10/23  Sarmad Estrella MD   insulin lispro (HUMALOG KWIKPEN INSULIN) 100 unit/mL pen Inject 60 Units into the skin 3 (three) times daily with meals.  Patient taking differently: Inject 20 Units into the skin 3 (three) times daily with meals. 2/10/22 2/10/23  Sarmad Estrella MD   JARDIANCE 10 mg tablet Take 1 tablet (10 mg total) by mouth every morning.  Patient not taking: Reported on 10/25/2022 7/11/22   Sarmad Estrella MD   ketoconazole (NIZORAL) 2 % shampoo Apply topically twice a week.  Patient not taking: Reported on 10/25/2022 5/17/18   Sarmad Estrella MD   linaGLIPtin (TRADJENTA) 5 mg Tab tablet TAKE 1 TABLET (5 MG TOTAL) BY MOUTH " ONCE DAILY. 6/2/22 6/2/23  Sarmad Estrella MD   lovastatin (MEVACOR) 10 MG tablet TAKE 1 TABLET BY MOUTH ONCE DAILY IN THE EVENING 2/14/21   Sarmad Estrella MD   magnesium oxide (MAGOX) 400 mg (241.3 mg magnesium) tablet Take 1 tablet (400 mg total) by mouth once daily. 2/10/22 2/10/23  Malika Montgomeryte, NP   metFORMIN (GLUCOPHAGE) 1000 MG tablet TAKE 1 TABLET BY MOUTH TWICE DAILY WITH MEALS 5/12/21   Sarmad Estrella MD   oxybutynin (DITROPAN-XL) 5 MG TR24 Take 1 tablet (5 mg total) by mouth once daily. 6/20/22 6/20/23  Willie Carolina PA-C   propranoloL (INDERAL) 10 MG tablet TAKE 1 TABLET (10 MG TOTAL) BY MOUTH 3 (THREE) TIMES DAILY. 9/22/22 9/22/23  FERNANDO Baum   rifAXIMin (XIFAXAN) 550 mg Tab Take 1 tablet (550 mg total) by mouth 2 (two) times daily. 6/13/22 6/13/23  FERNANDO Baum   spironolactone (ALDACTONE) 100 MG tablet Take 1 tablet (100 mg total) by mouth once daily. For fluid management / ascites- new as of 07/11/2022 7/11/22 7/11/23  Sarmad Estrella MD   tamsulosin (FLOMAX) 0.4 mg Cap Take 1 capsule (0.4 mg total) by mouth every evening. 6/20/22 6/20/23  CRIS YuC   triamcinolone acetonide 0.1% (KENALOG) 0.1 % cream Apply topically 2 (two) times daily. Use 3-4 wks max 6/11/18 7/27/22  Konstantin Gold MD   venlafaxine (EFFEXOR) 75 MG tablet TAKE 1 TABLET (75 MG TOTAL) BY MOUTH 2 (TWO) TIMES DAILY. 6/2/22 6/2/23  Sarmad Estrella MD       Family History:  Family History   Problem Relation Age of Onset    Hypertension Mother     Diabetes Mother     Diabetes Father     Heart disease Father     Ovarian cancer Maternal Grandmother     Colon cancer Neg Hx        Social History:  Social History     Tobacco Use    Smoking status: Never    Smokeless tobacco: Never   Substance Use Topics    Alcohol use: No    Drug use: No       Review of Systems   Constitutional:  Positive for fever.   HENT:  Negative for congestion and sinus pain.    Eyes:  Negative for blurred vision  "and double vision.   Respiratory:  Positive for shortness of breath. Negative for sputum production.         LARKIN   Cardiovascular:  Positive for orthopnea. Negative for chest pain and palpitations.   Gastrointestinal:  Negative for constipation, diarrhea, heartburn, nausea and vomiting.   Genitourinary:  Negative for dysuria.   Musculoskeletal:  Negative for myalgias.   Neurological:  Negative for dizziness and headaches.        Objective:   Last 24 Hour Vital Signs:  BP  Min: 129/76  Max: 163/76  Temp  Av.5 °F (38.1 °C)  Min: 99.6 °F (37.6 °C)  Max: 101.4 °F (38.6 °C)  Pulse  Av.5  Min: 89  Max: 98  Resp  Av.5  Min: 19  Max: 20  SpO2  Av.5 %  Min: 97 %  Max: 98 %  Height  Av' 2" (188 cm)  Min: 6' 2" (188 cm)  Max: 6' 2" (188 cm)  Weight  Av kg (260 lb 4 oz)  Min: 118 kg (260 lb 2.3 oz)  Max: 118.1 kg (260 lb 5.8 oz)  No intake/output data recorded.    Physical Exam  Constitutional:       General: He is not in acute distress.     Appearance: Normal appearance. He is not toxic-appearing.   HENT:      Head: Normocephalic and atraumatic.      Nose: Nose normal. No congestion.      Mouth/Throat:      Mouth: Mucous membranes are moist.      Pharynx: Oropharynx is clear.   Eyes:      Extraocular Movements: Extraocular movements intact.      Conjunctiva/sclera: Conjunctivae normal.      Pupils: Pupils are equal, round, and reactive to light.   Cardiovascular:      Rate and Rhythm: Normal rate and regular rhythm.      Pulses: Normal pulses.      Heart sounds: Normal heart sounds.   Pulmonary:      Effort: Pulmonary effort is normal. No respiratory distress.   Abdominal:      General: There is distension.      Tenderness: There is abdominal tenderness.      Comments: LLQ mild/slight tenderness to palpation   Musculoskeletal:         General: Normal range of motion.      Cervical back: Normal range of motion and neck supple.   Skin:     General: Skin is warm and dry.   Neurological:      " General: No focal deficit present.      Mental Status: He is alert and oriented to person, place, and time.   Psychiatric:         Mood and Affect: Mood normal.         Behavior: Behavior normal.       Laboratory Results:  Most Recent Data:  CBC:   Lab Results   Component Value Date    WBC 2.78 (L) 10/20/2022    HGB 9.8 (L) 10/20/2022    HCT 32.3 (L) 10/20/2022    PLT 41 (L) 10/20/2022    MCV 73 (L) 10/20/2022    RDW 16.9 (H) 10/20/2022     BMP:   Lab Results   Component Value Date     10/20/2022    K 4.0 10/20/2022     10/20/2022    CO2 25 10/20/2022    BUN 11 10/20/2022     (H) 10/20/2022    CALCIUM 8.6 (L) 10/20/2022    MG 1.4 (L) 09/22/2022    PHOS 3.3 09/22/2022     LFTs:   Lab Results   Component Value Date    PROT 7.2 10/20/2022    ALBUMIN 3.6 10/20/2022    BILITOT 1.8 (H) 10/20/2022    AST 27 10/20/2022    ALKPHOS 123 10/20/2022    ALT 30 10/20/2022    GGT 90 (H) 11/03/2020     Coags:   Lab Results   Component Value Date    INR 1.3 (H) 10/20/2022     FLP:   Lab Results   Component Value Date    CHOL 89 (L) 07/06/2022    HDL 25 (L) 07/06/2022    LDLCALC 25.0 (L) 07/06/2022    TRIG 195 (H) 07/06/2022    CHOLHDL 28.1 07/06/2022     DM:   Lab Results   Component Value Date    HGBA1C 12.5 (H) 07/06/2022    HGBA1C 9.9 (H) 03/09/2022    HGBA1C 10.1 (H) 02/10/2022    GLUF 381 (H) 07/22/2022    LDLCALC 25.0 (L) 07/06/2022    CREATININE 0.8 10/20/2022     Thyroid:   Lab Results   Component Value Date    TSH 2.797 07/06/2022    FREET4 0.84 09/27/2021     Anemia:   Lab Results   Component Value Date    IRON 27 (L) 07/28/2022    TIBC 524 (H) 07/28/2022    FERRITIN 31 07/28/2022    EHBSFJWD31 1134 (H) 11/16/2021    FOLATE 14.8 11/16/2021     Cardiac:   Lab Results   Component Value Date    TROPONINI 0.014 01/15/2021     Urinalysis:   Lab Results   Component Value Date    LABURIN STAPHYLOCOCCUS AUREUS  >100,000cfu/ml   (A) 10/10/2022    COLORU Yellow 10/24/2022    CLARITYU Clear 06/20/2022    SPECGRAV  1.025 10/24/2022    NITRITE Negative 10/24/2022    KETONESU Negative 10/24/2022    UROBILINOGEN 0.2 06/20/2022       Trended Lab Data:  Recent Labs   Lab 10/20/22  1422   WBC 2.78*   HGB 9.8*   HCT 32.3*   PLT 41*   MCV 73*   RDW 16.9*      K 4.0      CO2 25   BUN 11   *   PROT 7.2   ALBUMIN 3.6   BILITOT 1.8*   AST 27   ALKPHOS 123   ALT 30         Microbiology Data:  Microbiology Results (last 7 days)       Procedure Component Value Units Date/Time    Respiratory Infection Panel (PCR), Nasopharyngeal [[id number]]     Order Status: No result Specimen: Nasopharyngeal Swab     (rule out SBP) Aerobic culture [[id number]]     Order Status: No result Specimen: Ascites     (rule out SBP) Culture, Anaerobic [[id number]]     Order Status: No result Specimen: Ascites     (rule out SBP) Gram stain [[id number]]     Order Status: No result Specimen: Ascites     Influenza A & B by Molecular [[id number]]     Order Status: No result Specimen: Nasopharyngeal Swab     Blood culture #1 **CANNOT BE ORDERED STAT** [[id number]]     Order Status: Sent Specimen: Blood     Blood culture #2 **CANNOT BE ORDERED STAT** [[id number]]     Order Status: Sent Specimen: Blood               Antimicrobials:  Antibiotics (From admission, onward)      Start     Stop Route Frequency Ordered    10/25/22 2100  rifAXIMin tablet 550 mg         -- Oral 2 times daily 10/25/22 1428    10/25/22 1400  vancomycin (VANCOCIN) 2,250 mg in dextrose 5 % 500 mL IVPB         10/26 0159 IV ED 1 Time 10/25/22 1335    10/25/22 1345  piperacillin-tazobactam 4.5 g in sodium chloride 0.9% 100 mL IVPB (ready to mix system)         10/26 0144 IV ED 1 Time 10/25/22 1335          Antifungals (From admission, onward)      None          Antivirals (From admission, onward)      None              Other Results:    Radiology:  US Abdomen Complete    Result Date: 10/24/2022  EXAMINATION: US ABDOMEN COMPLETE CLINICAL HISTORY: Unspecified abdominal pain TECHNIQUE: Multiple  real-time sonographic images were obtained of the abdomen. COMPARISON: This examination was correlated with a CT scan of the abdomen and pelvis from April 22, 2022 and an ultrasound of the right upper quadrant from February 10, 2022. FINDINGS: There is a large amount of ascites in the abdomen. The surface of the liver is nodular compatible with cirrhosis. There is normal flow in the portal vein and partially calcified thrombus in the main portal vein which is nonocclusive The gallbladder is unremarkable.  The kidneys are normal in size and echogenicity. The spleen is enlarged with a persistent hyperechoic lesion in the mid to lower portion of the spleen measuring 2.9 cm in greatest dimension. The visualized portions of the pancreas, abdominal aorta and inferior vena cava are unremarkable.     Large amount of ascites in the abdomen. Splenomegaly with a persistent hyperechoic lesion in the mid to lower portion of the spleen. Partially calcified thrombus in the main portal vein. Nodular surface of the liver compatible with cirrhosis. Electronically signed by: Vito Rose MD Date:    10/24/2022 Time:    11:05    X-Ray Chest AP Portable    Result Date: 10/25/2022  EXAMINATION: XR CHEST AP PORTABLE CLINICAL HISTORY: fever; TECHNIQUE: One view COMPARISON: Comparison is made to 01/15/2021, the only available prior chest radiograph. FINDINGS: Allowing for magnification of the cardiomediastinal silhouette related to projection and inspiratory depth level, the heart size is within normal limits.  Pulmonary vascularity is normal as well.  Lung zones are clear, and are free of significant airspace consolidation or volume loss.  No pleural fluid.  No abnormal mediastinal widening.  No pneumothorax.     No significant intrathoracic abnormality.  Allowing for differences in projection and a poorer inspiratory depth level on the current examination, there has been no significant detrimental interval change in the  appearance of the chest since 01/15/2021. Electronically signed by: Hubert Dupont MD Date:    10/25/2022 Time:    14:27

## 2022-10-25 NOTE — ED TRIAGE NOTES
Patient identifiers for Corky Bach 56 y.o. male checked and correct.  Chief Complaint   Patient presents with    abdominal swelling     Sent from [facility] for ID admission, abdominal swelling (liver hx), and fever (last night). Paracentesis last week showed infection     Past Medical History:   Diagnosis Date    Anemia     Bacterial peritonitis 10/25/2022    Celiac disease     Cervicalgia     Cirrhosis     Colon polyp     Depression     Diabetes mellitus, type 2     Elevated LFTs     Esophageal varices     Gout, unspecified     History of COVID-19 01/15/2021    Hyperlipidemia     Mood disorder in conditions classified elsewhere     Obesity, unspecified     Other chronic nonalcoholic liver disease     Portal hypertensive gastropathy     Thyroid disease     Unspecified psychosis      Allergies reported: Review of patient's allergies indicates:  No Known Allergies      LOC: Patient is awake, alert, and aware of environment with an appropriate affect. Patient is oriented x 4 and speaking appropriately.  APPEARANCE: Patient resting comfortably and in no acute distress. Patient is clean and well groomed, patient's clothing is properly fastened.  SKIN: The skin is warm and dry. Patient has normal skin turgor and moist mucus membranes.   MUSKULOSKELETAL: Patient is moving all extremities well, no obvious deformities noted. Pulses intact.   RESPIRATORY: Airway is open and patent. Respirations are spontaneous and non-labored with normal effort and rate.  CARDIAC: Patient has a normal rate and rhythm on cardiac monitor. No peripheral edema noted.   ABDOMEN: distention noted. Hard and non-tender upon palpation.  NEUROLOGICAL: PERRL. Facial expression is symmetrical. Hand grasps are equal bilaterally. Normal sensation in all extremities when touched with finger.

## 2022-10-26 PROBLEM — K75.81 LIVER CIRRHOSIS SECONDARY TO NASH: Status: ACTIVE | Noted: 2017-11-20

## 2022-10-26 PROBLEM — J10.1 INFLUENZA A: Status: ACTIVE | Noted: 2022-10-26

## 2022-10-26 LAB
ADENOVIRUS: NOT DETECTED
ALBUMIN FLD-MCNC: 0.6 G/DL
ALBUMIN SERPL BCP-MCNC: 2.7 G/DL (ref 3.5–5.2)
ALP SERPL-CCNC: 123 U/L (ref 55–135)
ALT SERPL W/O P-5'-P-CCNC: 32 U/L (ref 10–44)
ANION GAP SERPL CALC-SCNC: 5 MMOL/L (ref 8–16)
APPEARANCE FLD: CLEAR
AST SERPL-CCNC: 41 U/L (ref 10–40)
BASOPHILS NFR BLD: 0 % (ref 0–1.9)
BILIRUB SERPL-MCNC: 1.3 MG/DL (ref 0.1–1)
BODY FLD TYPE: NORMAL
BODY FLUID SOURCE, LDH: NORMAL
BORDETELLA PARAPERTUSSIS (IS1001): NOT DETECTED
BORDETELLA PERTUSSIS (PTXP): NOT DETECTED
BUN SERPL-MCNC: 10 MG/DL (ref 6–20)
CALCIUM SERPL-MCNC: 7.4 MG/DL (ref 8.7–10.5)
CHLAMYDIA PNEUMONIAE: NOT DETECTED
CHLORIDE SERPL-SCNC: 103 MMOL/L (ref 95–110)
CO2 SERPL-SCNC: 21 MMOL/L (ref 23–29)
COLOR FLD: YELLOW
CORONAVIRUS 229E, COMMON COLD VIRUS: NOT DETECTED
CORONAVIRUS HKU1, COMMON COLD VIRUS: NOT DETECTED
CORONAVIRUS NL63, COMMON COLD VIRUS: NOT DETECTED
CORONAVIRUS OC43, COMMON COLD VIRUS: NOT DETECTED
CREAT SERPL-MCNC: 0.8 MG/DL (ref 0.5–1.4)
DIFFERENTIAL METHOD: ABNORMAL
EOSINOPHIL NFR BLD: 1 % (ref 0–8)
ERYTHROCYTE [DISTWIDTH] IN BLOOD BY AUTOMATED COUNT: 17.1 % (ref 11.5–14.5)
EST. GFR  (NO RACE VARIABLE): >60 ML/MIN/1.73 M^2
FLOW CYTOMETRY ANTIBODIES ANALYZED - FLUID: NORMAL
FLOW CYTOMETRY COMMENT - FLUID: NORMAL
FLOW CYTOMETRY INTERPRETATION - FLUID: NORMAL
FLUBV RNA NPH QL NAA+NON-PROBE: NOT DETECTED
FLUID TYPE: NORMAL
GLUCOSE SERPL-MCNC: 275 MG/DL (ref 70–110)
GRAM STN SPEC: NORMAL
GRAM STN SPEC: NORMAL
HCT VFR BLD AUTO: 28.7 % (ref 40–54)
HGB BLD-MCNC: 9.1 G/DL (ref 14–18)
HPIV1 RNA NPH QL NAA+NON-PROBE: NOT DETECTED
HPIV2 RNA NPH QL NAA+NON-PROBE: NOT DETECTED
HPIV3 RNA NPH QL NAA+NON-PROBE: NOT DETECTED
HPIV4 RNA NPH QL NAA+NON-PROBE: NOT DETECTED
HUMAN METAPNEUMOVIRUS: NOT DETECTED
HYPOCHROMIA BLD QL SMEAR: ABNORMAL
IMM GRANULOCYTES # BLD AUTO: ABNORMAL K/UL (ref 0–0.04)
IMM GRANULOCYTES NFR BLD AUTO: ABNORMAL % (ref 0–0.5)
INFLUENZA A (SUBTYPES H1,H1-2009,H3): DETECTED
INR PPP: 1.4 (ref 0.8–1.2)
LDH FLD L TO P-CCNC: 42 U/L
LYMPHOCYTES NFR BLD: 10 % (ref 18–48)
LYMPHOCYTES NFR FLD MANUAL: 31 %
MCH RBC QN AUTO: 22.5 PG (ref 27–31)
MCHC RBC AUTO-ENTMCNC: 31.7 G/DL (ref 32–36)
MCV RBC AUTO: 71 FL (ref 82–98)
MESOTHL CELL NFR FLD MANUAL: 7 %
MONOCYTES NFR BLD: 11 % (ref 4–15)
MONOS+MACROS NFR FLD MANUAL: 60 %
MYCOPLASMA PNEUMONIAE: NOT DETECTED
NEUTROPHILS NFR BLD: 78 % (ref 38–73)
NEUTROPHILS NFR FLD MANUAL: 2 %
NRBC BLD-RTO: 0 /100 WBC
PLATELET # BLD AUTO: 28 K/UL (ref 150–450)
PLATELET BLD QL SMEAR: ABNORMAL
PMV BLD AUTO: ABNORMAL FL (ref 9.2–12.9)
POCT GLUCOSE: 128 MG/DL (ref 70–110)
POCT GLUCOSE: 132 MG/DL (ref 70–110)
POCT GLUCOSE: 225 MG/DL (ref 70–110)
POCT GLUCOSE: 246 MG/DL (ref 70–110)
POTASSIUM SERPL-SCNC: 3.3 MMOL/L (ref 3.5–5.1)
PROT FLD-MCNC: <1 G/DL
PROT SERPL-MCNC: 5.7 G/DL (ref 6–8.4)
PROTHROMBIN TIME: 14.2 SEC (ref 9–12.5)
RBC # BLD AUTO: 4.05 M/UL (ref 4.6–6.2)
RESPIRATORY INFECTION PANEL SOURCE: ABNORMAL
RSV RNA NPH QL NAA+NON-PROBE: NOT DETECTED
RV+EV RNA NPH QL NAA+NON-PROBE: NOT DETECTED
SARS-COV-2 RNA RESP QL NAA+PROBE: NOT DETECTED
SODIUM SERPL-SCNC: 129 MMOL/L (ref 136–145)
SPECIMEN SOURCE: NORMAL
SPECIMEN SOURCE: NORMAL
WBC # BLD AUTO: 1.43 K/UL (ref 3.9–12.7)
WBC # FLD: 43 /CU MM

## 2022-10-26 PROCEDURE — 85027 COMPLETE CBC AUTOMATED: CPT | Performed by: HOSPITALIST

## 2022-10-26 PROCEDURE — 93010 ELECTROCARDIOGRAM REPORT: CPT | Mod: ,,, | Performed by: INTERNAL MEDICINE

## 2022-10-26 PROCEDURE — 12000002 HC ACUTE/MED SURGE SEMI-PRIVATE ROOM

## 2022-10-26 PROCEDURE — 99222 1ST HOSP IP/OBS MODERATE 55: CPT | Mod: ,,, | Performed by: HOSPITALIST

## 2022-10-26 PROCEDURE — 85007 BL SMEAR W/DIFF WBC COUNT: CPT | Performed by: HOSPITALIST

## 2022-10-26 PROCEDURE — 99233 SBSQ HOSP IP/OBS HIGH 50: CPT | Mod: ,,, | Performed by: INTERNAL MEDICINE

## 2022-10-26 PROCEDURE — 93010 EKG 12-LEAD: ICD-10-PCS | Mod: ,,, | Performed by: INTERNAL MEDICINE

## 2022-10-26 PROCEDURE — 93005 ELECTROCARDIOGRAM TRACING: CPT

## 2022-10-26 PROCEDURE — 99223 PR INITIAL HOSPITAL CARE,LEVL III: ICD-10-PCS | Mod: ,,, | Performed by: INTERNAL MEDICINE

## 2022-10-26 PROCEDURE — 25000003 PHARM REV CODE 250: Performed by: STUDENT IN AN ORGANIZED HEALTH CARE EDUCATION/TRAINING PROGRAM

## 2022-10-26 PROCEDURE — 36415 COLL VENOUS BLD VENIPUNCTURE: CPT | Performed by: HOSPITALIST

## 2022-10-26 PROCEDURE — 85610 PROTHROMBIN TIME: CPT | Performed by: HOSPITALIST

## 2022-10-26 PROCEDURE — 63600175 PHARM REV CODE 636 W HCPCS: Performed by: HOSPITALIST

## 2022-10-26 PROCEDURE — 80053 COMPREHEN METABOLIC PANEL: CPT | Performed by: HOSPITALIST

## 2022-10-26 PROCEDURE — 87798 DETECT AGENT NOS DNA AMP: CPT | Performed by: EMERGENCY MEDICINE

## 2022-10-26 PROCEDURE — 99223 1ST HOSP IP/OBS HIGH 75: CPT | Mod: ,,, | Performed by: INTERNAL MEDICINE

## 2022-10-26 PROCEDURE — 99233 PR SUBSEQUENT HOSPITAL CARE,LEVL III: ICD-10-PCS | Mod: ,,, | Performed by: INTERNAL MEDICINE

## 2022-10-26 PROCEDURE — 25000003 PHARM REV CODE 250: Performed by: HOSPITALIST

## 2022-10-26 PROCEDURE — 99222 PR INITIAL HOSPITAL CARE,LEVL II: ICD-10-PCS | Mod: ,,, | Performed by: HOSPITALIST

## 2022-10-26 PROCEDURE — 27000207 HC ISOLATION

## 2022-10-26 RX ORDER — ACETAMINOPHEN 500 MG
1000 TABLET ORAL 2 TIMES DAILY PRN
COMMUNITY

## 2022-10-26 RX ORDER — PROMETHAZINE HYDROCHLORIDE AND CODEINE PHOSPHATE 6.25; 1 MG/5ML; MG/5ML
5 SOLUTION ORAL EVERY 4 HOURS PRN
Status: DISCONTINUED | OUTPATIENT
Start: 2022-10-26 | End: 2022-10-29 | Stop reason: HOSPADM

## 2022-10-26 RX ORDER — OSELTAMIVIR PHOSPHATE 75 MG/1
75 CAPSULE ORAL 2 TIMES DAILY
Status: DISCONTINUED | OUTPATIENT
Start: 2022-10-26 | End: 2022-10-29 | Stop reason: HOSPADM

## 2022-10-26 RX ORDER — FUROSEMIDE 20 MG/1
40 TABLET ORAL DAILY
Status: DISCONTINUED | OUTPATIENT
Start: 2022-10-26 | End: 2022-10-28

## 2022-10-26 RX ORDER — BENZONATATE 100 MG/1
100 CAPSULE ORAL 3 TIMES DAILY PRN
Status: DISCONTINUED | OUTPATIENT
Start: 2022-10-26 | End: 2022-10-29 | Stop reason: HOSPADM

## 2022-10-26 RX ORDER — SPIRONOLACTONE 25 MG/1
100 TABLET ORAL DAILY
Status: DISCONTINUED | OUTPATIENT
Start: 2022-10-26 | End: 2022-10-29 | Stop reason: HOSPADM

## 2022-10-26 RX ADMIN — VENLAFAXINE HYDROCHLORIDE 75 MG: 37.5 TABLET ORAL at 10:10

## 2022-10-26 RX ADMIN — INSULIN ASPART 20 UNITS: 100 INJECTION, SOLUTION INTRAVENOUS; SUBCUTANEOUS at 01:10

## 2022-10-26 RX ADMIN — FUROSEMIDE 40 MG: 20 TABLET ORAL at 10:10

## 2022-10-26 RX ADMIN — INSULIN ASPART 20 UNITS: 100 INJECTION, SOLUTION INTRAVENOUS; SUBCUTANEOUS at 05:10

## 2022-10-26 RX ADMIN — FAMOTIDINE 20 MG: 20 TABLET ORAL at 10:10

## 2022-10-26 RX ADMIN — MEROPENEM 2 G: 1 INJECTION INTRAVENOUS at 01:10

## 2022-10-26 RX ADMIN — GUAIFENESIN AND DEXTROMETHORPHAN HYDROBROMIDE 1 TABLET: 600; 30 TABLET, EXTENDED RELEASE ORAL at 10:10

## 2022-10-26 RX ADMIN — MEROPENEM 2 G: 1 INJECTION INTRAVENOUS at 10:10

## 2022-10-26 RX ADMIN — INSULIN ASPART 20 UNITS: 100 INJECTION, SOLUTION INTRAVENOUS; SUBCUTANEOUS at 10:10

## 2022-10-26 RX ADMIN — POTASSIUM BICARBONATE 50 MEQ: 977.5 TABLET, EFFERVESCENT ORAL at 10:10

## 2022-10-26 RX ADMIN — TAMSULOSIN HYDROCHLORIDE 0.4 MG: 0.4 CAPSULE ORAL at 10:10

## 2022-10-26 RX ADMIN — MEROPENEM 2 G: 1 INJECTION INTRAVENOUS at 05:10

## 2022-10-26 RX ADMIN — RIFAXIMIN 550 MG: 550 TABLET ORAL at 10:10

## 2022-10-26 RX ADMIN — INSULIN ASPART 2 UNITS: 100 INJECTION, SOLUTION INTRAVENOUS; SUBCUTANEOUS at 10:10

## 2022-10-26 RX ADMIN — OSELTAMIVIR PHOSPHATE 75 MG: 75 CAPSULE ORAL at 10:10

## 2022-10-26 RX ADMIN — ATORVASTATIN CALCIUM 10 MG: 10 TABLET, FILM COATED ORAL at 10:10

## 2022-10-26 RX ADMIN — BENZONATATE 100 MG: 100 CAPSULE ORAL at 10:10

## 2022-10-26 RX ADMIN — FINASTERIDE 5 MG: 5 TABLET, FILM COATED ORAL at 10:10

## 2022-10-26 RX ADMIN — SPIRONOLACTONE 100 MG: 25 TABLET, FILM COATED ORAL at 10:10

## 2022-10-26 RX ADMIN — INSULIN ASPART 2 UNITS: 100 INJECTION, SOLUTION INTRAVENOUS; SUBCUTANEOUS at 01:10

## 2022-10-26 RX ADMIN — OXYBUTYNIN CHLORIDE 5 MG: 5 TABLET, EXTENDED RELEASE ORAL at 10:10

## 2022-10-26 NOTE — HPI
Corky Bach is a 56 y.o. male with a past medical history of MALT lymphoma, on maintenance Rituxan every 4 weeks (for >2 years; has 15 mos left), decompensated cirrhosis secondary to CORDOVA complicated by ascites and hepatic encephalopathy. Presented to ED from hepatology office due to WBC of 16 with fevers. Patient of Dr. Urban. He takes lasix 40 mg and aldactone 100 mg daily at home. EGD 3/22 showed small varices, on propranolol. On rifaximin for hepatic encephalopathy.     Paracentesis hx:  10/10/22: 5.5 L removed. [id number] WBC   10/20/22: 11 L removed. No labs   10/25/22: 4.950 L removed. 43 WBC. Calculated SAAG 2.1    Hepatology consulted for abnormal paracentesis and fever.

## 2022-10-26 NOTE — SUBJECTIVE & OBJECTIVE
Interval History: Patient seen at bedside. Has abdominal distension but no pain. Ascites analysis not indicative of infection with monocyte predominance. Will begin tailoring regimen.     Review of Systems   Constitutional:  Positive for fatigue. Negative for chills, fever and unexpected weight change.   HENT:  Negative for congestion, postnasal drip and trouble swallowing.    Respiratory:  Negative for cough, chest tightness and shortness of breath.    Cardiovascular:  Negative for chest pain, palpitations and leg swelling.   Gastrointestinal:  Positive for abdominal distention. Negative for abdominal pain, blood in stool, constipation, diarrhea, nausea and vomiting.   Genitourinary:  Negative for difficulty urinating, dysuria and hematuria.   Musculoskeletal:  Negative for arthralgias and back pain.   Neurological:  Negative for dizziness and light-headedness.   Psychiatric/Behavioral:  Negative for confusion.    Objective:     Vital Signs (Most Recent):  Temp: 98.3 °F (36.8 °C) (10/26/22 1204)  Pulse: 82 (10/26/22 1204)  Resp: 18 (10/26/22 1204)  BP: 137/67 (10/26/22 1204)  SpO2: (!) 93 % (10/26/22 1204)   Vital Signs (24h Range):  Temp:  [98.2 °F (36.8 °C)-100 °F (37.8 °C)] 98.3 °F (36.8 °C)  Pulse:  [65-98] 82  Resp:  [18-19] 18  SpO2:  [93 %-95 %] 93 %  BP: (120-137)/(60-74) 137/67     Weight: 113.5 kg (250 lb 3.6 oz)  Body mass index is 32.13 kg/m².    Estimated Creatinine Clearance: 141.4 mL/min (based on SCr of 0.8 mg/dL).    Physical Exam  Constitutional:       General: He is not in acute distress.     Appearance: Normal appearance. He is not ill-appearing.   Cardiovascular:      Rate and Rhythm: Normal rate and regular rhythm.      Pulses: Normal pulses.      Heart sounds: Normal heart sounds. No murmur heard.    No friction rub. No gallop.   Pulmonary:      Effort: Pulmonary effort is normal. No respiratory distress.      Breath sounds: Normal breath sounds.   Abdominal:      General: Abdomen is flat.  Bowel sounds are normal. There is distension.      Palpations: Abdomen is soft.      Tenderness: There is no abdominal tenderness.   Skin:     General: Skin is warm and dry.   Neurological:      Mental Status: He is alert.       Significant Labs: All pertinent labs within the past 24 hours have been reviewed.    Significant Imaging: I have reviewed all pertinent imaging results/findings within the past 24 hours.

## 2022-10-26 NOTE — ASSESSMENT & PLAN NOTE
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 Pt follows w/ GI/hepatology for serial paracenteses for recurrent ascites that impair ADLs (such as causing LARKIN and abdominal discomfort). Pt has had developed some fevers over the past few days and a recent OSH paracentesis done showed significant WBCs >15k as per discussion with OSH providers. Paracentesis at [facility] [place] on 10/25 shows only 43 WBC, 2% segs, 60% monocytes, clear and yellow. Patient started on meropenem, fluconazole, and rifaximin. CT a/p reports cirrhosis with superimposed portal hypertension. Findings of possible colitis. Stable chronic appearing thrombus involving the portal vein and splenic vein.      Recommendations:   --Follow ascitic adenosine deaminase, TB PCR, Cytology, Cultures -- Bacterial, fungal, AFB and modified AFB for nocardia   --Continue IV meropenem while following cultures   --Can discontinue fluconazole at this time   --Recommend repeat paracentesis on Friday to ensure cell counts continue to improve   --Will check a CMV serum quant   --Recommend 10 day course of Tamiflu; can consider extending if symptoms progress

## 2022-10-26 NOTE — H&P
[facility] ([place] [id number])    History & Physical      Patient Name: Corky Bach  MRN: [id number]  Admission Date: 10/25/2022  Attending Physician: Nichole Root MD   Primary Care Provider: Sarmad Estrella MD         Patient information was obtained from patient, past medical records, and ER records.     Subjective:     Principal Problem: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    Chief Complaint:   Chief Complaint   Patient presents with    abdominal swelling     Sent from [facility] for ID admission, abdominal swelling (liver hx), and fever (last night). Paracentesis last week showed infection        HPI: Corky Bach is a 56 y.o. 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 Since he has developed progressive ascites requiring frequent paracentesis. Around Oct 10 he had a paracentesis which was concerning for SBP but I can't see any place he received ABX.     Had paracentesis 10/10/22:  ,000; 12% PMNs = 18,720 ANC; 14% lymph; rest monocytes  Had repeat paracentesis 10/20/22 but no cell count sent  Now with continued large ascites again.   Came to Hepatology clinic with abdominal pain and fever. On admit to ED - fever 102. Influenca positive. Abdomen tender. Discussed with hematology, ID and hepatology - admission with broad spectrum abx/antifungal, tamiflu, CT scan and repeat para. Other infectious evaluation completed and pending. Cytology and flow from peritoneal fluid.        Past Medical History:   Diagnosis Date    Anemia     Bacterial peritonitis 10/25/2022    Celiac disease     Cervicalgia     Cirrhosis     Colon polyp     Depression     Diabetes mellitus, type 2     Elevated LFTs     Esophageal varices     Gout, unspecified     History of COVID-19 01/15/2021    Hyperlipidemia     Mood disorder in  conditions classified elsewhere     Obesity, unspecified     Other chronic nonalcoholic liver disease     Portal hypertensive gastropathy     Thyroid disease     Unspecified psychosis        Past Surgical History:   Procedure Laterality Date    COLONOSCOPY N/A 6/7/2017    Procedure: COLONOSCOPY;  Surgeon: Placido Green MD;  Location: [facility];  Service: Endoscopy;  Laterality: N/A;    ESOPHAGOGASTRODUODENOSCOPY N/A 7/16/2018    Procedure: ESOPHAGOGASTRODUODENOSCOPY (EGD);  Surgeon: Mitch Pan MD;  Location: [facility];  Service: Endoscopy;  Laterality: N/A;    ESOPHAGOGASTRODUODENOSCOPY N/A 7/22/2020    Procedure: ESOPHAGOGASTRODUODENOSCOPY (EGD);  Surgeon: Mitch Pan MD;  Location: [facility];  Service: Endoscopy;  Laterality: N/A;    ESOPHAGOGASTRODUODENOSCOPY N/A 3/22/2022    Procedure: EGD (ESOPHAGOGASTRODUODENOSCOPY);  Surgeon: Mitch Pan MD;  Location: [facility];  Service: Endoscopy;  Laterality: N/A;    HERNIA REPAIR      REPEAT ABDOMINAL PARACENTESIS N/A 10/20/2022    Procedure: PARACENTESIS, ABDOMINAL, REPEAT;  Surgeon: Ruddy Salomon MD;  Location: [facility];  Service: Endoscopy;  Laterality: N/A;    TONSILLECTOMY      UPPER GASTROINTESTINAL ENDOSCOPY         Review of patient's allergies indicates:  No Known Allergies    No current facility-administered medications on file prior to encounter.     Current Outpatient Medications on File Prior to Encounter   Medication Sig    acetaminophen (TYLENOL) 500 MG tablet Take 1,000 mg by mouth 2 (two) times daily as needed for Pain.    ascorbic acid, vitamin C, (VITAMIN C) 500 MG tablet Take 1 tablet (500 mg total) by mouth once daily.    ferrous sulfate 325 (65 FE) MG EC tablet Take 1 tablet (325 mg total) by mouth once daily. Take with vitamin C (Patient taking differently: Take 325 mg by mouth every other day.)    finasteride (PROSCAR) 5 mg tablet Take 1 tablet (5 mg total) by mouth once daily.    furosemide (LASIX)  "40 MG tablet Take 1 tablet (40 mg total) by mouth once daily. New dose as of 07/11/2022- for fluid management / ascites    insulin detemir U-100 (LEVEMIR FLEXTOUCH U-100 INSULN) 100 unit/mL (3 mL) InPn pen Inject 80 Units into the skin every evening. New dose as of 01/03/2022 (Patient taking differently: Inject 60 Units into the skin every evening. New dose as of 01/03/2022)    insulin lispro (HUMALOG KWIKPEN INSULIN) 100 unit/mL pen Inject 60 Units into the skin 3 (three) times daily with meals. (Patient taking differently: Inject 20 Units into the skin 3 (three) times daily with meals.)    linaGLIPtin (TRADJENTA) 5 mg Tab tablet TAKE 1 TABLET (5 MG TOTAL) BY MOUTH ONCE DAILY.    magnesium oxide (MAGOX) 400 mg (241.3 mg magnesium) tablet Take 1 tablet (400 mg total) by mouth once daily.    oxybutynin (DITROPAN-XL) 5 MG TR24 Take 1 tablet (5 mg total) by mouth once daily.    propranoloL (INDERAL) 10 MG tablet TAKE 1 TABLET (10 MG TOTAL) BY MOUTH 3 (THREE) TIMES DAILY.    rifAXIMin (XIFAXAN) 550 mg Tab Take 1 tablet (550 mg total) by mouth 2 (two) times daily.    spironolactone (ALDACTONE) 100 MG tablet Take 1 tablet (100 mg total) by mouth once daily. For fluid management / ascites- new as of 07/11/2022    tamsulosin (FLOMAX) 0.4 mg Cap Take 1 capsule (0.4 mg total) by mouth every evening.    venlafaxine (EFFEXOR) 75 MG tablet TAKE 1 TABLET (75 MG TOTAL) BY MOUTH 2 (TWO) TIMES DAILY. (Patient taking differently: Take 75 mg by mouth once daily.)    [DISCONTINUED] JARDIANCE 10 mg tablet Take 1 tablet (10 mg total) by mouth every morning.    [DISCONTINUED] lovastatin (MEVACOR) 10 MG tablet TAKE 1 TABLET BY MOUTH ONCE DAILY IN THE EVENING    [DISCONTINUED] metFORMIN (GLUCOPHAGE) 1000 MG tablet TAKE 1 TABLET BY MOUTH TWICE DAILY WITH MEALS    BD ULTRA-FINE ORIG PEN NEEDLE 29 gauge x 1/2" Ndle USE PEN NEEDLE TO INJECT INSULIN EVERY EVENING    [DISCONTINUED] ketoconazole (NIZORAL) 2 % shampoo Apply topically twice a week. "    [DISCONTINUED] triamcinolone acetonide 0.1% (KENALOG) 0.1 % cream Apply topically 2 (two) times daily. Use 3-4 wks max     Family History       Problem Relation (Age of Onset)    Diabetes Mother, Father    Heart disease Father    Hypertension Mother    Ovarian cancer Maternal Grandmother          Tobacco Use    Smoking status: Never    Smokeless tobacco: Never   Substance and Sexual Activity    Alcohol use: No    Drug use: No    Sexual activity: Not on file     Review of Systems    Constitutional : pos for fever, neg for weakness  HENT neg for head injury, neg for sore throat  Eyes: neg for visual changes, neg for eye pain  Resp pos for SOB, neg for cough  Cardiac  neg for chest pain, neg for palpitations  GI pos for abd pain and distension, neg for nausea, neg for vomiting   neg for urinary changes  Neuro neg for focal weakness or numbness  Skin neg for skin rash  MSK: neg for myalgia, neg for arthralgia  ALL: Patient has no known allergies.    Objective:     Vital Signs (Most Recent):  Temp: 98.3 °F (36.8 °C) (10/26/22 1204)  Pulse: 82 (10/26/22 1204)  Resp: 18 (10/26/22 1204)  BP: 137/67 (10/26/22 1204)  SpO2: (!) 93 % (10/26/22 1204)   Vital Signs (24h Range):  Temp:  [98.2 °F (36.8 °C)-100 °F (37.8 °C)] 98.3 °F (36.8 °C)  Pulse:  [65-98] 82  Resp:  [18-19] 18  SpO2:  [93 %-95 %] 93 %  BP: (120-137)/(60-74) 137/67     Weight: 113.5 kg (250 lb 3.6 oz)  Body mass index is 32.13 kg/m².    Physical Exam     general: comfortable, in no acute distress, pleasant  HENT: neck symmetric, trachea midline, + post oropharynx erythema, no edema no ulcers or patches  Eyes: PERRL,no conjunctival injection and symmetrical eyelids  CV: RRR, no  murmurs, no rubs, no gallops, no LE edema  Resp: comfortable breathing, speaks in full sentences, CTAB, no wheezing, no crackles, no ronchi  ABD:  soft, ++ abdominal distension,  + normal BS, mild discomfort upper abdomen  Renal: No CVAT  Neuro: AAO x 3, 5/5 strength x 4 extremities,  sensation intact, face symmetric, speech normal  MSK: NC/AT, extremities w/out deformity   Skin: warm, dry    Significant Labs: All pertinent labs within the past 24 hours have been reviewed.    Significant Imaging: I have reviewed all pertinent imaging results/findings within the past 24 hours.    Assessment/Plan:     Active Diagnoses:    Diagnosis Date Noted POA    PRINCIPAL PROBLEM:  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 [K75.81, K74.60] 11/20/2017 Unknown    Influenza A [J10.1] 10/26/2022 Yes    MALT lymphoma [C88.4] 05/25/2021 Yes    Portal hypertensive gastropathy [K76.6, K31.89] 11/13/2018 Yes    Celiac disease [K90.0] 07/17/2017 Yes    Idiopathic chronic gout of multiple sites without tophus [M1A.09X0] 04/19/2017 Yes    Type 2 diabetes mellitus without ophthalmic manifestations [E11.9] 07/29/2015 Yes      Problems Resolved During this Admission:         Sepsis   - unclear etiology but concern for intra-abdominal versus Influenza  - paracentesis on 10/25 not consistent with overt SBP, culture pending.   - influenza positive on Tamiflu  -  Discussed with ID team - continue broad spectrum abx in immunocompromised patient.   - repeat paracentesis to ensure cell count correct  - pending rest of infectious eval.   - CT abdomen with mild findings maybe colitis but patient denies any diarrhea symptomst.     2. Influenza  - on room air  - some cough  - on tamiflu    3. CORDOVA cirrhosis.   MELD-Na score: 11 at 10/26/2022  4:09 AM  MELD score: 11 at 10/26/2022  4:09 AM  Calculated from:  Serum Creatinine: 0.8 mg/dL (Using min of 1 mg/dL) at 10/26/2022  4:09 AM  Serum Sodium: 129 mmol/L at 10/26/2022  4:09 AM  Total Bilirubin: 1.3 mg/dL at 10/26/2022  4:09 AM  INR(ratio): 1.4 at 10/26/2022  4:09 AM  Age: 56 years     - not on lactulose currently   - renal function preserved - continue diuretics  - ascites large volume - ordered repeat para for tomorrow.   - hepatology to follow  - checking cytology and flow cytometry from  ascites sample      - Pancytopenia - patient is on rituxan with liver cirrhosis but I am also seeing pancytopenia in the setting of acute influenza infection. Will continue to monitor.       4. MALToma on maintenance rituxan  - hematology input appreciated.     5. DM2 uncontrolled.   - levemir 40 Qhs  - novolog 20 TID  - ISS               Nichole Root MD  Department of Hospital Medicine   [facility] - Intensive [facility] ([place]

## 2022-10-26 NOTE — ASSESSMENT & PLAN NOTE
Hx of Malt lymphoma. 10/25 seen in Hepatology clinic with fevers and WBC 16.9 sent to ED. Paracentesis 10/10/22 revealed [id number] WBC, repeat paracentesis 10/25/22 with 4 WBC. CT in ED shows stable chronic portal vein & splenic thrombus with evidence of colitits. MELD Na 13 and SAAG of 2.1.    Recommendations:  - Repeat diagnostic paracentesis with cultures/labs given with drastic change in WBC 10/10 to 10/25  - Consider stool studies with colitis seen on CT  - Add Protonix if reflux   - Add home diuretics  - Agree with continuing broad spec antibiotics  - Follow up on ID & Heme recs

## 2022-10-26 NOTE — PHARMACY MED REC
"  Admission Medication History     The home medication history was taken by Megan Perkins.    You may go to "Admission" then "Reconcile Home Medications" tabs to review and/or act upon these items.     The home medication list has been updated by the Pharmacy department.   Please read ALL comments highlighted in yellow.   Please address this information as you see fit.    Feel free to contact us if you have any questions or require assistance.      The medications listed below were removed from the home medication list. Please reorder if appropriate:  Patient reports no longer taking the following medication(s):  KETOCONAZOLE 2% SHAMPOO  TRIAMCINOLONE ACETONIDE 0.1% CREAM    Medications listed below were obtained from: Patient/family  PTA Medications   Medication Sig    acetaminophen (TYLENOL) 500 MG tablet   Take 1,000 mg by mouth 2 (two) times daily as needed for Pain.    ascorbic acid, vitamin C, (VITAMIN C) 500 MG tablet   Take 1 tablet (500 mg total) by mouth once daily.    ferrous sulfate 325 (65 FE) MG EC tablet   Take 325 mg by mouth every other day.    finasteride (PROSCAR) 5 mg tablet   Take 1 tablet (5 mg total) by mouth once daily.    furosemide (LASIX) 40 MG tablet   Take 1 tablet (40 mg total) by mouth once daily.    insulin detemir U-100 (LEVEMIR FLEXTOUCH U-100 INSULN) 100 unit/mL (3 mL) InPn pen   Inject 60 Units into the skin every evening    insulin lispro (HUMALOG KWIKPEN INSULIN) 100 unit/mL pen   Inject 20 Units into the skin 3 (three) times daily with meals.    linaGLIPtin (TRADJENTA) 5 mg Tab tablet   TAKE 1 TABLET (5 MG TOTAL) BY MOUTH ONCE DAILY.    magnesium oxide (MAGOX) 400 mg (241.3 mg magnesium) tablet   Take 1 tablet (400 mg total) by mouth once daily.    oxybutynin (DITROPAN-XL) 5 MG TR24   Take 1 tablet (5 mg total) by mouth once daily.    propranoloL (INDERAL) 10 MG tablet   TAKE 1 TABLET (10 MG TOTAL) BY MOUTH 3 (THREE) TIMES DAILY.    rifAXIMin (XIFAXAN) 550 mg Tab   Take 1 " "tablet (550 mg total) by mouth 2 (two) times daily.    spironolactone (ALDACTONE) 100 MG tablet   Take 1 tablet (100 mg total) by mouth once daily.    tamsulosin (FLOMAX) 0.4 mg Cap   Take 1 capsule (0.4 mg total) by mouth every evening.    venlafaxine (EFFEXOR) 75 MG tablet   Take 75 mg by mouth once daily.    BD ULTRA-FINE ORIG PEN NEEDLE 29 gauge x 1/2" Ndle USE PEN NEEDLE TO INJECT INSULIN EVERY EVENING       Potential issues to be addressed PRIOR TO DISCHARGE  Please discuss with the patient barriers to adherence with medication treatment plans  Patient requires education regarding drug therapies     Megan Perkins  EXT [phone]    '            .        "

## 2022-10-26 NOTE — PROGRESS NOTES
Notified on call Dr. Conner of patient critical wbc count and platelet count this morning. MD to review. No acute distress noted at this time. Will continue to monitor.

## 2022-10-26 NOTE — CONSULTS
Nutrition-Related Diabetes Education      Time Spent: 15 min    Learners: Pt and family    Current HbA1c: 12.1    Is patient aware of their A1c and their goal A1c? Yes    Home diabetes medication(s):   Humalog 20# TID  Levemir 60# every evening     Nutrition Education with handouts: Educated pt on CHO counting for people with diabetes. Pt verbalized understanding. Emphasized the importance of diet adherence. Pt with no additional questions. No other needs identified. Left all education material with pt at bedside.    Comments: Pt reports appetite improving, had low appetite 2 months PTA. Was following diabetic diet at home and avoid gluten containing food. Pt admit that he wasn't taking as much insulin as he should. Wt stable. Denies any significant wt changes. No indicators of malnutrition.    Barriers to Learning: No    Follow up: Yes    Please consult as needed.  Thank you!    Kinjal MCKINLEY

## 2022-10-26 NOTE — SUBJECTIVE & OBJECTIVE
Review of Systems   Constitutional:  Positive for activity change, chills and fever. Negative for diaphoresis.   HENT:  Positive for sore throat. Negative for nosebleeds.    Eyes:  Negative for pain.   Respiratory:  Negative for shortness of breath.    Cardiovascular:  Negative for chest pain and leg swelling.   Gastrointestinal:  Positive for abdominal distention and abdominal pain. Negative for constipation, diarrhea and vomiting.   Endocrine: Negative for polyuria.   Genitourinary:  Negative for decreased urine volume.   Musculoskeletal:  Negative for neck stiffness.   Skin:  Negative for color change.   Neurological:  Negative for syncope and light-headedness.   Psychiatric/Behavioral:  Negative for agitation.      Past Medical History:   Diagnosis Date    Anemia     Bacterial peritonitis 10/25/2022    Celiac disease     Cervicalgia     Cirrhosis     Colon polyp     Depression     Diabetes mellitus, type 2     Elevated LFTs     Esophageal varices     Gout, unspecified     History of COVID-19 01/15/2021    Hyperlipidemia     Mood disorder in conditions classified elsewhere     Obesity, unspecified     Other chronic nonalcoholic liver disease     Portal hypertensive gastropathy     Thyroid disease     Unspecified psychosis        Past Surgical History:   Procedure Laterality Date    COLONOSCOPY N/A 6/7/2017    Procedure: COLONOSCOPY;  Surgeon: Placido Green MD;  Location: [facility];  Service: Endoscopy;  Laterality: N/A;    ESOPHAGOGASTRODUODENOSCOPY N/A 7/16/2018    Procedure: ESOPHAGOGASTRODUODENOSCOPY (EGD);  Surgeon: Mitch Pan MD;  Location: [facility];  Service: Endoscopy;  Laterality: N/A;    ESOPHAGOGASTRODUODENOSCOPY N/A 7/22/2020    Procedure: ESOPHAGOGASTRODUODENOSCOPY (EGD);  Surgeon: Mitch Pan MD;  Location: [facility];  Service: Endoscopy;  Laterality: N/A;    ESOPHAGOGASTRODUODENOSCOPY N/A 3/22/2022    Procedure: EGD (ESOPHAGOGASTRODUODENOSCOPY);  Surgeon: Mitch  "MD Maxim;  Location: [facility];  Service: Endoscopy;  Laterality: N/A;    HERNIA REPAIR      REPEAT ABDOMINAL PARACENTESIS N/A 10/20/2022    Procedure: PARACENTESIS, ABDOMINAL, REPEAT;  Surgeon: Ruddy Salomon MD;  Location: [facility];  Service: Endoscopy;  Laterality: N/A;    TONSILLECTOMY      UPPER GASTROINTESTINAL ENDOSCOPY         Family history of liver disease: No    Review of patient's allergies indicates:  No Known Allergies      Tobacco Use    Smoking status: Never    Smokeless tobacco: Never   Substance and Sexual Activity    Alcohol use: No    Drug use: No    Sexual activity: Not on file       Medications Prior to Admission   Medication Sig Dispense Refill Last Dose    ascorbic acid, vitamin C, (VITAMIN C) 500 MG tablet Take 1 tablet (500 mg total) by mouth once daily. 90 tablet 3 10/24/2022    BD ULTRA-FINE ORIG PEN NEEDLE 29 gauge x 1/2" Ndle USE PEN NEEDLE TO INJECT INSULIN EVERY EVENING 100 each 11 10/24/2022    ferrous sulfate 325 (65 FE) MG EC tablet Take 1 tablet (325 mg total) by mouth once daily. Take with vitamin C 90 tablet 3 10/24/2022    finasteride (PROSCAR) 5 mg tablet Take 1 tablet (5 mg total) by mouth once daily. 90 tablet 3 10/24/2022    furosemide (LASIX) 40 MG tablet Take 1 tablet (40 mg total) by mouth once daily. New dose as of 07/11/2022- for fluid management / ascites 90 tablet 3 10/24/2022    insulin detemir U-100 (LEVEMIR FLEXTOUCH U-100 INSULN) 100 unit/mL (3 mL) InPn pen Inject 80 Units into the skin every evening. New dose as of 01/03/2022 (Patient taking differently: Inject 60 Units into the skin every evening. New dose as of 01/03/2022) 72 mL 3 10/24/2022    insulin lispro (HUMALOG KWIKPEN INSULIN) 100 unit/mL pen Inject 60 Units into the skin 3 (three) times daily with meals. (Patient taking differently: Inject 20 Units into the skin 3 (three) times daily with meals.) 162 mL 3 10/24/2022    JARDIANCE 10 mg tablet Take 1 tablet (10 mg total) by mouth every morning. " 90 tablet 3 10/24/2022    ketoconazole (NIZORAL) 2 % shampoo Apply topically twice a week. 120 mL 3 10/24/2022    linaGLIPtin (TRADJENTA) 5 mg Tab tablet TAKE 1 TABLET (5 MG TOTAL) BY MOUTH ONCE DAILY. 90 tablet 3 10/24/2022    lovastatin (MEVACOR) 10 MG tablet TAKE 1 TABLET BY MOUTH ONCE DAILY IN THE EVENING 90 tablet 3 10/24/2022    magnesium oxide (MAGOX) 400 mg (241.3 mg magnesium) tablet Take 1 tablet (400 mg total) by mouth once daily. 200 tablet 1 10/24/2022    metFORMIN (GLUCOPHAGE) 1000 MG tablet TAKE 1 TABLET BY MOUTH TWICE DAILY WITH MEALS 180 tablet 3 10/24/2022    oxybutynin (DITROPAN-XL) 5 MG TR24 Take 1 tablet (5 mg total) by mouth once daily. 30 tablet 11 10/24/2022    propranoloL (INDERAL) 10 MG tablet TAKE 1 TABLET (10 MG TOTAL) BY MOUTH 3 (THREE) TIMES DAILY. 90 tablet 0 10/24/2022    rifAXIMin (XIFAXAN) 550 mg Tab Take 1 tablet (550 mg total) by mouth 2 (two) times daily. 60 tablet 11 10/24/2022    spironolactone (ALDACTONE) 100 MG tablet Take 1 tablet (100 mg total) by mouth once daily. For fluid management / ascites- new as of 07/11/2022 90 tablet 3 10/24/2022    tamsulosin (FLOMAX) 0.4 mg Cap Take 1 capsule (0.4 mg total) by mouth every evening. 90 capsule 3 10/24/2022    venlafaxine (EFFEXOR) 75 MG tablet TAKE 1 TABLET (75 MG TOTAL) BY MOUTH 2 (TWO) TIMES DAILY. 180 tablet 3 10/24/2022    triamcinolone acetonide 0.1% (KENALOG) 0.1 % cream Apply topically 2 (two) times daily. Use 3-4 wks max 240 g 0        Objective:     Vital Signs (Most Recent):  Temp: 98.2 °F (36.8 °C) (10/26/22 0747)  Pulse: 77 (10/26/22 0747)  Resp: 19 (10/26/22 0747)  BP: 121/60 (10/26/22 0747)  SpO2: (!) 94 % (10/26/22 4349)   Vital Signs (24h Range):  Temp:  [98.2 °F (36.8 °C)-101.4 °F (38.6 °C)] 98.2 °F (36.8 °C)  Pulse:  [65-98] 77  Resp:  [18-20] 19  SpO2:  [93 %-98 %] 94 %  BP: (120-163)/(60-76) 121/60     Weight: 113.5 kg (250 lb 3.6 oz) (10/25/22 2100)  Body mass index is 32.13 kg/m².    Physical Exam  Vitals and  nursing note reviewed.   Constitutional:       General: He is not in acute distress.     Appearance: Normal appearance. He is not ill-appearing.   HENT:      Head: Normocephalic and atraumatic.      Mouth/Throat:      Mouth: Mucous membranes are moist.   Eyes:      General: No scleral icterus.     Extraocular Movements: Extraocular movements intact.   Cardiovascular:      Rate and Rhythm: Normal rate.   Pulmonary:      Effort: Pulmonary effort is normal. No respiratory distress.   Abdominal:      General: There is distension.      Tenderness: There is abdominal tenderness (LUQ). There is no guarding.   Musculoskeletal:         General: No swelling.      Cervical back: Normal range of motion.   Neurological:      General: No focal deficit present.      Mental Status: He is alert and oriented to person, place, and time.   Psychiatric:         Mood and Affect: Mood normal.         Behavior: Behavior normal.       MELD-Na score: 11 at 10/26/2022  4:09 AM  MELD score: 11 at 10/26/2022  4:09 AM  Calculated from:  Serum Creatinine: 0.8 mg/dL (Using min of 1 mg/dL) at 10/26/2022  4:09 AM  Serum Sodium: 129 mmol/L at 10/26/2022  4:09 AM  Total Bilirubin: 1.3 mg/dL at 10/26/2022  4:09 AM  INR(ratio): 1.4 at 10/26/2022  4:09 AM  Age: 56 years    Significant Labs:  Labs within the past month have been reviewed.    Significant Imaging:     Imaging Results              IR Paracentesis with Imaging (Preliminary result)  Result time 10/26/22 08:11:17      Preliminary result by Crystal Jacobson PA-C (10/26/22 08:11:17)                   Impression:      Ultrasound-guided paracentesis with drainage of 4950 mL of serous fluid.    _______________________________________________________________    Electronically signed by resident: Crystal Jacobson  Date:    10/26/2022  Time:    08:10                 Narrative:    EXAMINATION:  Ultrasound-guided paracentesis    Procedural Personnel    Attending physician(s): Shahid Phipps MD    Fellow  physician(s): None    Resident physician(s): None    Advanced practice provider(s): Crystal Jacobson PA-C    Pre-procedure diagnosis: Ascites    Post-procedure diagnosis: Same    Complications: No immediate complications.    CLINICAL HISTORY:  Recurrent Ascites    TECHNIQUE:  - Ultrasound-guided paracentesis    FINDINGS:  Pre-procedure    Consent: Informed consent for the procedure was obtained and time-out was performed prior to the procedure.    Preparation: The site was prepared and draped using maximal sterile barrier technique including cutaneous antisepsis.    Anesthesia/sedation    Level of anesthesia/sedation: No sedation    Anesthesia/sedation administered by: Not applicable    Total intra-service sedation time (minutes): 0    Limited abdominal ultrasound    Limited abdominal ultrasound was performed.    Moderate ascites. A safe window for paracentesis was identified.    Paracentesis    Local anesthesia was administered. The peritoneal cavity was accessed on the right lower quadrant and fluid return confirmed position. Ascites was drained.    Paracentesis access technique: Real-time ultrasound guidance    Catheter placed: 5F Yueh    Closure    The catheter was removed. A sterile bandage was applied.    Post-drainage ultrasound: Not performed    Additional Details    Additional description of procedure: None    Equipment details: None    Specimens removed: Abdominal fluid    Estimated blood loss (mL): Less than 10    Standardized report: SIR_Paracentesis_v2    Attestation    Signer name:    I attest that I reviewed the stored images and agree with the report as written.                                        CT Abdomen Pelvis With Contrast (Final result)  Result time 10/25/22 15:19:46      Final result by Ruddy Villanueva MD (10/25/22 15:19:46)                   Impression:      This report was flagged in Epic as abnormal.    1. Findings suggesting cirrhosis superimposed portal hypertension including ascites,  splenomegaly, and several varices.  The ascites has increased since the previous examination without discrete peritoneal thickening.  Correlation with ascitic fluid sampling as warranted.  2. There is some indistinctness about the distal descending colon and proximal sigmoid colon.  Possible infectious or inflammatory colitis is a consideration versus diverticulitis however evaluation for inflammation is limited secondary to fluid in the region.  Correlation with any symptomatology to support a diagnosis of the same should be assessed.  3. Stable chronic appearing thrombus involving the portal vein and splenic vein as described.  4. Right nonobstructive nephrolithiasis.  5. Wall thickening involving the distal esophagus, correlation with any history of reflux esophagitis.  Direct visualization as warranted.  6. Please see above for additional findings.      Electronically signed by: Ruddy Villanueva MD  Date:    10/25/2022  Time:    15:19               Narrative:    EXAMINATION:  CT ABDOMEN PELVIS WITH CONTRAST    CLINICAL HISTORY:  Sepsis;fever;    TECHNIQUE:  Low dose axial images, sagittal and coronal reformations were obtained from the lung bases to the pubic symphysis following the IV administration of 100 mL of Omnipaque 350 .  Oral contrast was not given.    COMPARISON:  PET-CT 08/11/2022, CT urogram 04/22/2022    FINDINGS:  Images of the lower thorax are remarkable for bilateral dependent atelectasis.  There is thickening of the distal esophagus, may be on the basis of reflux esophagitis however correlation with direct visualization as warranted.    The liver is cirrhotic in configuration.  The spleen is enlarged.  There is a low attenuating lesion within the posterior aspect of the spleen measuring 2.5 cm, stable.  The gallbladder is grossly unremarkable.  The stomach is decompressed without wall thickening.  There are several scattered abdominal lymph nodes.  There is cavernous transformation of the portal  vein.  There are splenic and gabriela hepatic collateral vessels.  There is chronic appearing thrombus with calcification involving the extrahepatic portal vein, similar to the previous examination extending to the proximal aspect of the SMV.  Additional thrombus noted within the splenic vein near the pancreatic tail also containing scattered regions of calcification.  These findings were better demonstrated on previous examination secondary to improved bolus timing at that time.    The kidneys enhance symmetrically without hydronephrosis.  There is right nonobstructive nephrolithiasis.  The bilateral ureters are unable to be followed in their entirety to the urinary bladder, no definite calculi seen.  The urinary bladder is decompressed noting there may be residual wall thickening.  There is moderate to large volume abdominopelvic ascites, worsened since most recent PET-CT.  The prostate is not enlarged.    There are several scattered colonic diverticula.  There is some indistinctness about the distal descending colon and proximal sigmoid colon.  The terminal ileum is unremarkable.  The appendix is unremarkable.  The small bowel is grossly unremarkable.  There is scattered mesenteric edema.  There are a few scattered shotty periaortic, pericaval, and mesenteric lymph nodes.  No focal organized fluid collection or overt peritoneal thickening to suggest discrete changes of peritonitis.  Peritoneal fluid sampling however would be needed for definitive exclusion of the same.    There is osteopenia.  There are degenerative changes of the spine.  There is bilateral gynecomastia.  No significant inguinal lymphadenopathy.                                       X-Ray Chest AP Portable (Final result)  Result time 10/25/22 14:27:33      Final result by Hubert Dupont MD (10/25/22 14:27:33)                   Impression:      No significant intrathoracic abnormality.  Allowing for differences in projection and a poorer inspiratory  depth level on the current examination, there has been no significant detrimental interval change in the appearance of the chest since 01/15/2021.      Electronically signed by: Hubert Dupont MD  Date:    10/25/2022  Time:    14:27               Narrative:    EXAMINATION:  XR CHEST AP PORTABLE    CLINICAL HISTORY:  fever;    TECHNIQUE:  One view    COMPARISON:  Comparison is made to 01/15/2021, the only available prior chest radiograph.    FINDINGS:  Allowing for magnification of the cardiomediastinal silhouette related to projection and inspiratory depth level, the heart size is within normal limits.  Pulmonary vascularity is normal as well.  Lung zones are clear, and are free of significant airspace consolidation or volume loss.  No pleural fluid.  No abnormal mediastinal widening.  No pneumothorax.

## 2022-10-26 NOTE — PLAN OF CARE
Problem: Adult Inpatient Plan of Care  Goal: Plan of Care Review  Outcome: Ongoing, Progressing     Problem: Infection  Goal: Absence of Infection Signs and Symptoms  Outcome: Ongoing, Progressing  Abx administered to prevent pt intra-abdominal infection. And no signs of complication of infection noted.     Problem: Diabetes Comorbidity  Goal: Blood Glucose Level Within Targeted Range  Outcome: Ongoing, Progressing  Cbg monitored to manage pt DM2 and insulin administered per MAR ordered.    Will continue to monitor pt.

## 2022-10-26 NOTE — PLAN OF CARE
[facility] ([place] [address])  Initial Discharge Assessment       Primary Care Provider: Sarmad Estrella MD    Admission Diagnosis: Hepatic encephalopathy [K76.82]  SBP (spontaneous bacterial peritonitis) [K65.2]  Bacterial peritonitis [K65.9]  Type 2 diabetes mellitus with hyperglycemia, with long-term current use of insulin [E11.65, Z79.4]  Cough, unspecified type [R05.9]    Admission Date: 10/25/2022  Expected Discharge Date: 10/28/2022    Discharge Barriers Identified: (P) Underinsured    Payor: MEDICAID / Plan: [place] / Product Type: Managed Medicaid /     Extended Emergency Contact Information  Primary Emergency Contact: Odette Bach  Mobile Phone: [phone]  Relation: Spouse   needed? No    Discharge Plan A: (P) Home with family  Discharge Plan B: (P) Home      [facility] [place] - [address] [address]  [facility] [address]  Phone: [phone] Fax: [phone]    [facility]  [address]  [place] [address]  Phone: [phone] Fax: [phone]    [facility] [address]  [address] [facility] [address]  Phone: [phone] Fax: [phone]      Initial Assessment (most recent)       Adult Discharge Assessment - 10/26/22 1349          Discharge Assessment    Assessment Type Discharge Planning Assessment (P)      Confirmed/corrected address, phone number and insurance Yes (P)      Confirmed Demographics Correct on Facesheet (P)      Source of Information patient;family (P)      Reason For Admission encephalopathy flu (P)      Lives With spouse (P)      Facility Arrived From: home (P)      Do you expect to return to your current living situation? Yes (P)      Do you have help at home or someone to help you manage your care at home? Yes (P)      Who are your caregiver(s) and their phone number(s)? wife (P)      Prior to hospitilization  cognitive status: Alert/Oriented (P)      Current cognitive status: Alert/Oriented (P)      Walking or Climbing Stairs Difficulty none (P)      Dressing/Bathing Difficulty none (P)      Equipment Currently Used at Home cane, straight (P)    occ. uses a cane also has a walker    Readmission within 30 days? No (P)      Patient currently being followed by outpatient case management? No (P)      Do you currently have service(s) that help you manage your care at home? No (P)      Do you take prescription medications? Yes (P)      Do you have prescription coverage? Yes (P)      Coverage medicaid (P)      Do you have any problems affording any of your prescribed medications? No (P)      Is the patient taking medications as prescribed? yes (P)      Who is going to help you get home at discharge? wife (P)      How do you get to doctors appointments? family or friend will provide (P)      Are you on dialysis? No (P)      Do you take coumadin? No (P)      Discharge Plan A Home with family (P)      Discharge Plan B Home (P)      DME Needed Upon Discharge  none (P)      Discharge Plan discussed with: Spouse/sig other;Patient (P)      Discharge Barriers Identified Underinsured (P)                           Pt occ. Uses a cane he does have a walker his wife assist pt as needed

## 2022-10-26 NOTE — PROGRESS NOTES
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 Pt follows w/ GI/hepatology for serial paracenteses for recurrent ascites that impair ADLs (such as causing LARKIN and abdominal discomfort). Pt has had developed some fevers over the past few days and a recent OSH paracentesis done showed significant WBCs >15k as per discussion with OSH providers. Paracentesis at [facility] on 10/25 shows only 43 WBC, 2% segs, 60% monocytes, clear and yellow. Patient started on meropenem, fluconazole, and rifaximin. CT a/p reports cirrhosis with superimposed portal hypertension. Findings of possible colitis. Stable chronic appearing thrombus involving the portal vein and splenic vein.      Recommendations:   --Follow ascitic adenosine deaminase, TB PCR, Cytology, Cultures -- Bacterial, fungal, AFB and modified AFB for nocardia   --Continue IV meropenem while following cultures   --Can discontinue fluconazole at this time   --Recommend repeat paracentesis on Friday to ensure cell counts continue to improve   --Will check a CMV serum quant   --Recommend 10 day course of Tamiflu; can consider extending if symptoms progress       Thank you for your consult. I will follow-up with patient. Please contact us if you have  any additional questions.    Blaine Noel, DO  Infectious Disease  [facility] ([facility])    Subjective:     Principal Problem:<principal problem not specified>    HPI: No notes on file  Interval History: Patient seen at bedside. Has abdominal distension but no pain. Ascites analysis not indicative of infection with monocyte predominance. Will begin tailoring regimen.     Review of Systems   Constitutional:  Positive for fatigue. Negative for chills, fever and unexpected weight change.   HENT:  Negative for congestion, postnasal drip and trouble swallowing.    Respiratory:  Negative for cough, chest tightness and shortness of breath.    Cardiovascular:  Negative for chest pain, palpitations and leg swelling.   Gastrointestinal:  Positive for abdominal distention. Negative for abdominal pain, blood in stool, constipation, diarrhea, nausea and vomiting.   Genitourinary:  Negative for difficulty urinating, dysuria and hematuria.   Musculoskeletal:  Negative for arthralgias and back pain.   Neurological:  Negative for dizziness and light-headedness.   Psychiatric/Behavioral:  Negative for confusion.    Objective:     Vital Signs (Most Recent):  Temp: 98.3 °F (36.8 °C) (10/26/22 1204)  Pulse: 82 (10/26/22 1204)  Resp: 18 (10/26/22 1204)  BP: 137/67 (10/26/22 1204)  SpO2: (!) 93 % (10/26/22 1204)   Vital Signs (24h Range):  Temp:  [98.2 °F (36.8 °C)-100 °F (37.8 °C)] 98.3 °F (36.8 °C)  Pulse:  [65-98] 82  Resp:  [18-19] 18  SpO2:  [93 %-95 %] 93 %  BP: (120-137)/(60-74) 137/67     Weight: 113.5 kg (250 lb 3.6 oz)  Body mass index is 32.13 kg/m².    Estimated Creatinine Clearance: 141.4 mL/min (based on SCr of 0.8 mg/dL).    Physical Exam  Constitutional:       General: He is not in acute distress.     Appearance: Normal appearance. He is not ill-appearing.   Cardiovascular:      Rate and Rhythm: Normal rate and regular rhythm.      Pulses: Normal pulses.      Heart sounds: Normal heart sounds. No  murmur heard.    No friction rub. No gallop.   Pulmonary:      Effort: Pulmonary effort is normal. No respiratory distress.      Breath sounds: Normal breath sounds.   Abdominal:      General: Abdomen is flat. Bowel sounds are normal. There is distension.      Palpations: Abdomen is soft.      Tenderness: There is no abdominal tenderness.   Skin:     General: Skin is warm and dry.   Neurological:      Mental Status: He is alert.       Significant Labs: All pertinent labs within the past 24 hours have been reviewed.    Significant Imaging: I have reviewed all pertinent imaging results/findings within the past 24 hours.

## 2022-10-27 PROBLEM — R50.9 FEVER: Status: ACTIVE | Noted: 2022-10-27

## 2022-10-27 PROBLEM — K90.0 CELIAC DISEASE: Status: RESOLVED | Noted: 2017-07-17 | Resolved: 2022-10-27

## 2022-10-27 PROBLEM — M1A.09X0 IDIOPATHIC CHRONIC GOUT OF MULTIPLE SITES WITHOUT TOPHUS: Status: RESOLVED | Noted: 2017-04-19 | Resolved: 2022-10-27

## 2022-10-27 LAB
ALBUMIN FLD-MCNC: 0.6 G/DL
ALBUMIN SERPL BCP-MCNC: 2.7 G/DL (ref 3.5–5.2)
ALP SERPL-CCNC: 117 U/L (ref 55–135)
ALT SERPL W/O P-5'-P-CCNC: 30 U/L (ref 10–44)
ANION GAP SERPL CALC-SCNC: 10 MMOL/L (ref 8–16)
ANISOCYTOSIS BLD QL SMEAR: SLIGHT
APPEARANCE FLD: CLEAR
AST SERPL-CCNC: 39 U/L (ref 10–40)
BASOPHILS # BLD AUTO: 0.01 K/UL (ref 0–0.2)
BASOPHILS NFR BLD: 0.6 % (ref 0–1.9)
BILIRUB SERPL-MCNC: 1.1 MG/DL (ref 0.1–1)
BODY FLD TYPE: NORMAL
BUN SERPL-MCNC: 11 MG/DL (ref 6–20)
CALCIUM SERPL-MCNC: 7.8 MG/DL (ref 8.7–10.5)
CHLORIDE SERPL-SCNC: 105 MMOL/L (ref 95–110)
CO2 SERPL-SCNC: 21 MMOL/L (ref 23–29)
COLOR FLD: YELLOW
CREAT SERPL-MCNC: 0.7 MG/DL (ref 0.5–1.4)
DIFFERENTIAL METHOD: ABNORMAL
EOSINOPHIL # BLD AUTO: 0.1 K/UL (ref 0–0.5)
EOSINOPHIL NFR BLD: 5.1 % (ref 0–8)
ERYTHROCYTE [DISTWIDTH] IN BLOOD BY AUTOMATED COUNT: 17.2 % (ref 11.5–14.5)
EST. GFR  (NO RACE VARIABLE): >60 ML/MIN/1.73 M^2
GLUCOSE SERPL-MCNC: 121 MG/DL (ref 70–110)
GRAM STN SPEC: NORMAL
GRAM STN SPEC: NORMAL
HCT VFR BLD AUTO: 29.9 % (ref 40–54)
HGB BLD-MCNC: 9.3 G/DL (ref 14–18)
IMM GRANULOCYTES # BLD AUTO: 0.01 K/UL (ref 0–0.04)
IMM GRANULOCYTES NFR BLD AUTO: 0.6 % (ref 0–0.5)
INR PPP: 1.3 (ref 0.8–1.2)
LYMPHOCYTES # BLD AUTO: 0.2 K/UL (ref 1–4.8)
LYMPHOCYTES NFR BLD: 15.4 % (ref 18–48)
LYMPHOCYTES NFR FLD MANUAL: 72 %
MCH RBC QN AUTO: 22.1 PG (ref 27–31)
MCHC RBC AUTO-ENTMCNC: 31.1 G/DL (ref 32–36)
MCV RBC AUTO: 71 FL (ref 82–98)
MESOTHL CELL NFR FLD MANUAL: 5 %
MONOCYTES # BLD AUTO: 0.2 K/UL (ref 0.3–1)
MONOCYTES NFR BLD: 14.7 % (ref 4–15)
MONOS+MACROS NFR FLD MANUAL: 20 %
NEUTROPHILS # BLD AUTO: 1 K/UL (ref 1.8–7.7)
NEUTROPHILS NFR BLD: 63.6 % (ref 38–73)
NEUTROPHILS NFR FLD MANUAL: 3 %
NRBC BLD-RTO: 0 /100 WBC
OVALOCYTES BLD QL SMEAR: ABNORMAL
PLATELET # BLD AUTO: 27 K/UL (ref 150–450)
PLATELET BLD QL SMEAR: ABNORMAL
PMV BLD AUTO: ABNORMAL FL (ref 9.2–12.9)
POCT GLUCOSE: 114 MG/DL (ref 70–110)
POCT GLUCOSE: 129 MG/DL (ref 70–110)
POCT GLUCOSE: 179 MG/DL (ref 70–110)
POCT GLUCOSE: 223 MG/DL (ref 70–110)
POIKILOCYTOSIS BLD QL SMEAR: SLIGHT
POTASSIUM SERPL-SCNC: 3 MMOL/L (ref 3.5–5.1)
PROT FLD-MCNC: <1 G/DL
PROT SERPL-MCNC: 6 G/DL (ref 6–8.4)
PROTHROMBIN TIME: 13.7 SEC (ref 9–12.5)
RBC # BLD AUTO: 4.2 M/UL (ref 4.6–6.2)
SODIUM SERPL-SCNC: 136 MMOL/L (ref 136–145)
SPECIMEN SOURCE: NORMAL
SPECIMEN SOURCE: NORMAL
WBC # BLD AUTO: 1.56 K/UL (ref 3.9–12.7)
WBC # FLD: 64 /CU MM

## 2022-10-27 PROCEDURE — 25000003 PHARM REV CODE 250: Performed by: STUDENT IN AN ORGANIZED HEALTH CARE EDUCATION/TRAINING PROGRAM

## 2022-10-27 PROCEDURE — 89051 BODY FLUID CELL COUNT: CPT | Performed by: HOSPITALIST

## 2022-10-27 PROCEDURE — 99233 PR SUBSEQUENT HOSPITAL CARE,LEVL III: ICD-10-PCS | Mod: ,,, | Performed by: STUDENT IN AN ORGANIZED HEALTH CARE EDUCATION/TRAINING PROGRAM

## 2022-10-27 PROCEDURE — 87556 M.TUBERCULO DNA AMP PROBE: CPT | Performed by: HOSPITALIST

## 2022-10-27 PROCEDURE — 87075 CULTR BACTERIA EXCEPT BLOOD: CPT | Performed by: HOSPITALIST

## 2022-10-27 PROCEDURE — 84157 ASSAY OF PROTEIN OTHER: CPT | Performed by: HOSPITALIST

## 2022-10-27 PROCEDURE — 82042 OTHER SOURCE ALBUMIN QUAN EA: CPT | Performed by: HOSPITALIST

## 2022-10-27 PROCEDURE — 99233 PR SUBSEQUENT HOSPITAL CARE,LEVL III: ICD-10-PCS | Mod: ,,, | Performed by: INTERNAL MEDICINE

## 2022-10-27 PROCEDURE — 85025 COMPLETE CBC W/AUTO DIFF WBC: CPT | Performed by: HOSPITALIST

## 2022-10-27 PROCEDURE — 63600175 PHARM REV CODE 636 W HCPCS: Performed by: HOSPITALIST

## 2022-10-27 PROCEDURE — 99233 SBSQ HOSP IP/OBS HIGH 50: CPT | Mod: ,,, | Performed by: STUDENT IN AN ORGANIZED HEALTH CARE EDUCATION/TRAINING PROGRAM

## 2022-10-27 PROCEDURE — 85610 PROTHROMBIN TIME: CPT | Performed by: HOSPITALIST

## 2022-10-27 PROCEDURE — 36415 COLL VENOUS BLD VENIPUNCTURE: CPT | Performed by: HOSPITALIST

## 2022-10-27 PROCEDURE — 87116 MYCOBACTERIA CULTURE: CPT | Performed by: STUDENT IN AN ORGANIZED HEALTH CARE EDUCATION/TRAINING PROGRAM

## 2022-10-27 PROCEDURE — 27000207 HC ISOLATION

## 2022-10-27 PROCEDURE — 87206 SMEAR FLUORESCENT/ACID STAI: CPT | Performed by: STUDENT IN AN ORGANIZED HEALTH CARE EDUCATION/TRAINING PROGRAM

## 2022-10-27 PROCEDURE — 12000002 HC ACUTE/MED SURGE SEMI-PRIVATE ROOM

## 2022-10-27 PROCEDURE — 63600175 PHARM REV CODE 636 W HCPCS: Mod: JG

## 2022-10-27 PROCEDURE — 84311 SPECTROPHOTOMETRY: CPT | Performed by: HOSPITALIST

## 2022-10-27 PROCEDURE — P9047 ALBUMIN (HUMAN), 25%, 50ML: HCPCS | Mod: JG

## 2022-10-27 PROCEDURE — 87205 SMEAR GRAM STAIN: CPT | Performed by: HOSPITALIST

## 2022-10-27 PROCEDURE — 80053 COMPREHEN METABOLIC PANEL: CPT | Performed by: HOSPITALIST

## 2022-10-27 PROCEDURE — 25000003 PHARM REV CODE 250: Performed by: HOSPITALIST

## 2022-10-27 PROCEDURE — 99233 SBSQ HOSP IP/OBS HIGH 50: CPT | Mod: ,,, | Performed by: INTERNAL MEDICINE

## 2022-10-27 RX ORDER — ALBUMIN HUMAN 250 G/1000ML
SOLUTION INTRAVENOUS
Status: COMPLETED | OUTPATIENT
Start: 2022-10-27 | End: 2022-10-27

## 2022-10-27 RX ORDER — POTASSIUM CHLORIDE 20 MEQ/1
40 TABLET, EXTENDED RELEASE ORAL EVERY 4 HOURS
Status: COMPLETED | OUTPATIENT
Start: 2022-10-27 | End: 2022-10-27

## 2022-10-27 RX ADMIN — OSELTAMIVIR PHOSPHATE 75 MG: 75 CAPSULE ORAL at 08:10

## 2022-10-27 RX ADMIN — MEROPENEM 2 G: 1 INJECTION INTRAVENOUS at 12:10

## 2022-10-27 RX ADMIN — VENLAFAXINE HYDROCHLORIDE 75 MG: 37.5 TABLET ORAL at 11:10

## 2022-10-27 RX ADMIN — FUROSEMIDE 40 MG: 20 TABLET ORAL at 11:10

## 2022-10-27 RX ADMIN — TAMSULOSIN HYDROCHLORIDE 0.4 MG: 0.4 CAPSULE ORAL at 11:10

## 2022-10-27 RX ADMIN — GUAIFENESIN AND DEXTROMETHORPHAN HYDROBROMIDE 1 TABLET: 600; 30 TABLET, EXTENDED RELEASE ORAL at 11:10

## 2022-10-27 RX ADMIN — RIFAXIMIN 550 MG: 550 TABLET ORAL at 11:10

## 2022-10-27 RX ADMIN — INSULIN ASPART 20 UNITS: 100 INJECTION, SOLUTION INTRAVENOUS; SUBCUTANEOUS at 05:10

## 2022-10-27 RX ADMIN — POTASSIUM CHLORIDE EXTENDED-RELEASE 40 MEQ: 1500 TABLET ORAL at 04:10

## 2022-10-27 RX ADMIN — FAMOTIDINE 20 MG: 20 TABLET ORAL at 11:10

## 2022-10-27 RX ADMIN — INSULIN ASPART 20 UNITS: 100 INJECTION, SOLUTION INTRAVENOUS; SUBCUTANEOUS at 11:10

## 2022-10-27 RX ADMIN — ALBUMIN (HUMAN) 50 G: 12.5 SOLUTION INTRAVENOUS at 09:10

## 2022-10-27 RX ADMIN — INSULIN ASPART 1 UNITS: 100 INJECTION, SOLUTION INTRAVENOUS; SUBCUTANEOUS at 08:10

## 2022-10-27 RX ADMIN — FAMOTIDINE 20 MG: 20 TABLET ORAL at 08:10

## 2022-10-27 RX ADMIN — VENLAFAXINE HYDROCHLORIDE 75 MG: 37.5 TABLET ORAL at 08:10

## 2022-10-27 RX ADMIN — OSELTAMIVIR PHOSPHATE 75 MG: 75 CAPSULE ORAL at 11:10

## 2022-10-27 RX ADMIN — ATORVASTATIN CALCIUM 10 MG: 10 TABLET, FILM COATED ORAL at 11:10

## 2022-10-27 RX ADMIN — MEROPENEM 2 G: 1 INJECTION INTRAVENOUS at 08:10

## 2022-10-27 RX ADMIN — POTASSIUM CHLORIDE EXTENDED-RELEASE 40 MEQ: 1500 TABLET ORAL at 11:10

## 2022-10-27 RX ADMIN — SPIRONOLACTONE 100 MG: 25 TABLET, FILM COATED ORAL at 11:10

## 2022-10-27 RX ADMIN — MEROPENEM 2 G: 1 INJECTION INTRAVENOUS at 11:10

## 2022-10-27 RX ADMIN — GUAIFENESIN AND DEXTROMETHORPHAN HYDROBROMIDE 1 TABLET: 600; 30 TABLET, EXTENDED RELEASE ORAL at 08:10

## 2022-10-27 RX ADMIN — RIFAXIMIN 550 MG: 550 TABLET ORAL at 08:10

## 2022-10-27 RX ADMIN — OXYBUTYNIN CHLORIDE 5 MG: 5 TABLET, EXTENDED RELEASE ORAL at 11:10

## 2022-10-27 RX ADMIN — FINASTERIDE 5 MG: 5 TABLET, FILM COATED ORAL at 11:10

## 2022-10-27 NOTE — H&P
Inpatient Radiology Pre-procedure Note    History of Present Illness:  Corky Bach is a 56 y.o. male who presents for  US guided Paracentesis.    Admission H&P reviewed.  Past Medical History:   Diagnosis Date    Anemia     Bacterial peritonitis 10/25/2022    Celiac disease     Cervicalgia     Cirrhosis     Colon polyp     Depression     Diabetes mellitus, type 2     Elevated LFTs     Esophageal varices     Gout, unspecified     History of COVID-19 01/15/2021    Hyperlipidemia     Mood disorder in conditions classified elsewhere     Obesity, unspecified     Other chronic nonalcoholic liver disease     Portal hypertensive gastropathy     Thyroid disease     Unspecified psychosis      Past Surgical History:   Procedure Laterality Date    COLONOSCOPY N/A 6/7/2017    Procedure: COLONOSCOPY;  Surgeon: Placido Green MD;  Location: [facility];  Service: Endoscopy;  Laterality: N/A;    ESOPHAGOGASTRODUODENOSCOPY N/A 7/16/2018    Procedure: ESOPHAGOGASTRODUODENOSCOPY (EGD);  Surgeon: Mitch Pan MD;  Location: [facility];  Service: Endoscopy;  Laterality: N/A;    ESOPHAGOGASTRODUODENOSCOPY N/A 7/22/2020    Procedure: ESOPHAGOGASTRODUODENOSCOPY (EGD);  Surgeon: Mitch Pan MD;  Location: [facility];  Service: Endoscopy;  Laterality: N/A;    ESOPHAGOGASTRODUODENOSCOPY N/A 3/22/2022    Procedure: EGD (ESOPHAGOGASTRODUODENOSCOPY);  Surgeon: Mitch Pan MD;  Location: [facility];  Service: Endoscopy;  Laterality: N/A;    HERNIA REPAIR      REPEAT ABDOMINAL PARACENTESIS N/A 10/20/2022    Procedure: PARACENTESIS, ABDOMINAL, REPEAT;  Surgeon: Ruddy Salomon MD;  Location: [facility];  Service: Endoscopy;  Laterality: N/A;    TONSILLECTOMY      UPPER GASTROINTESTINAL ENDOSCOPY         Review of Systems:   As documented in primary team H&P    Home Meds:   Prior to Admission medications    Medication Sig Start Date End Date Taking? Authorizing Provider   acetaminophen (TYLENOL) 500 MG  tablet Take 1,000 mg by mouth 2 (two) times daily as needed for Pain.   Yes Historical Provider   ascorbic acid, vitamin C, (VITAMIN C) 500 MG tablet Take 1 tablet (500 mg total) by mouth once daily. 1/3/22 1/3/23 Yes Sarmad Estrella MD   ferrous sulfate 325 (65 FE) MG EC tablet Take 1 tablet (325 mg total) by mouth once daily. Take with vitamin C  Patient taking differently: Take 325 mg by mouth every other day. 1/4/22  Yes Sarmad Estrella MD   finasteride (PROSCAR) 5 mg tablet Take 1 tablet (5 mg total) by mouth once daily. 6/20/22 6/20/23 Yes Willie Carolina PA-C   furosemide (LASIX) 40 MG tablet Take 1 tablet (40 mg total) by mouth once daily. New dose as of 07/11/2022- for fluid management / ascites 7/11/22 7/11/23 Yes Sarmad Estrella MD   insulin detemir U-100 (LEVEMIR FLEXTOUCH U-100 INSULN) 100 unit/mL (3 mL) InPn pen Inject 80 Units into the skin every evening. New dose as of 01/03/2022  Patient taking differently: Inject 60 Units into the skin every evening. New dose as of 01/03/2022 2/10/22 2/10/23 Yes Sarmad Estrella MD   insulin lispro (HUMALOG KWIKPEN INSULIN) 100 unit/mL pen Inject 60 Units into the skin 3 (three) times daily with meals.  Patient taking differently: Inject 20 Units into the skin 3 (three) times daily with meals. 2/10/22 2/10/23 Yes Sarmad Estrella MD   linaGLIPtin (TRADJENTA) 5 mg Tab tablet TAKE 1 TABLET (5 MG TOTAL) BY MOUTH ONCE DAILY. 6/2/22 6/2/23 Yes Sarmad Estrella MD   magnesium oxide (MAGOX) 400 mg (241.3 mg magnesium) tablet Take 1 tablet (400 mg total) by mouth once daily. 2/10/22 2/10/23 Yes Malika Ventura, RADAMES   oxybutynin (DITROPAN-XL) 5 MG TR24 Take 1 tablet (5 mg total) by mouth once daily. 6/20/22 6/20/23 Yes Willie Carolina PA-C   propranoloL (INDERAL) 10 MG tablet TAKE 1 TABLET (10 MG TOTAL) BY MOUTH 3 (THREE) TIMES DAILY. 9/22/22 9/22/23 Yes FERNANDO Baum   rifAXIMin (XIFAXAN) 550 mg Tab Take 1 tablet (550 mg total) by mouth 2 (two)  "times daily. 6/13/22 6/13/23 Yes FERNANDO Baum   spironolactone (ALDACTONE) 100 MG tablet Take 1 tablet (100 mg total) by mouth once daily. For fluid management / ascites- new as of 07/11/2022 7/11/22 7/11/23 Yes Sarmad Estrella MD   tamsulosin (FLOMAX) 0.4 mg Cap Take 1 capsule (0.4 mg total) by mouth every evening. 6/20/22 6/20/23 Yes Willie Carolina PA-C   venlafaxine (EFFEXOR) 75 MG tablet TAKE 1 TABLET (75 MG TOTAL) BY MOUTH 2 (TWO) TIMES DAILY.  Patient taking differently: Take 75 mg by mouth once daily. 6/2/22 6/2/23 Yes Sarmad Estrella MD   BD ULTRA-FINE ORIG PEN NEEDLE 29 gauge x 1/2" Ndle USE PEN NEEDLE TO INJECT INSULIN EVERY EVENING 11/17/21   Sarmad Estrella MD     Scheduled Meds:    atorvastatin  10 mg Oral Daily    dextromethorphan-guaiFENesin  mg  1 tablet Oral BID    famotidine  20 mg Oral BID    finasteride  5 mg Oral Daily    furosemide  40 mg Oral Daily    insulin aspart U-100  20 Units Subcutaneous TIDWM    insulin detemir U-100  40 Units Subcutaneous QHS    meropenem (MERREM) IVPB  2 g Intravenous Q8H    oseltamivir  75 mg Oral BID    oxybutynin  5 mg Oral Daily    potassium chloride  40 mEq Oral Q4H    rifAXImin  550 mg Oral BID    spironolactone  100 mg Oral Daily    tamsulosin  0.4 mg Oral Daily    venlafaxine  75 mg Oral BID     Continuous Infusions:   PRN Meds:acetaminophen, acetaminophen, benzonatate, dextrose 10%, dextrose 10%, glucagon (human recombinant), glucose, glucose, HYDROmorphone, insulin aspart U-100, melatonin, ondansetron, oxyCODONE, prochlorperazine, promethazine-codeine 6.25-10 mg/5 ml, sodium chloride 0.9%  Anticoagulants/Antiplatelets: no anticoagulation    Allergies: Review of patient's allergies indicates:  No Known Allergies  Sedation Hx: have not been any systemic reactions    Vitals:  Temp: 97.7 °F (36.5 °C) (10/27/22 0730)  Pulse: 73 (10/27/22 0730)  Resp: 19 (10/27/22 0730)  BP: 133/72 (10/27/22 0730)  SpO2: 96 % (10/27/22 0730)     Physical " Exam:  ASA: 3  Mallampati: n/a    General: no acute distress  Mental Status: alert and oriented to person, place and time  HEENT: normocephalic, atraumatic  Chest: unlabored breathing  Heart: regular heart rate  Abdomen: distended  Extremity: moves all extremities    Plan: US guided paracentesis  Sedation Plan: Local    Crystal Jacobson PA-C  Interventional Radiology  [phone]

## 2022-10-27 NOTE — PROGRESS NOTES
Paracentesis complete. 8000 mLs peritoneal fluid drained. Pt tolerated well. Dressing to right abd clean, dry, and intact. Albumin 25% given 200 mLs. Specimens sent per lab order. Report called to floor nurse.

## 2022-10-27 NOTE — ASSESSMENT & PLAN NOTE
Presumably secondary to influenza infection however patient had ascitic fluid from two weeks ago with 150k cell count. Repeat tap on admission negative for SBP. Following first tap, he was treated with keflex for a UTI but no other abx. Patient without SBP symptoms. Patient did however on admission test positive for influenza A.  - Repeat paracentesis today - sent for AFB cx, tb pcr, and repeat cell count w/ cytology.  - Continuing merem for SBP treatment per ID recs  - Treating flu with tamiflu  - Transplant ID following

## 2022-10-27 NOTE — HPI
Corky Bach is a 56 y.o. 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 Since he has developed progressive ascites requiring frequent paracentesis. Around Oct 10 he had a paracentesis which was concerning for SBP but I can't see any place he received ABX.      Had paracentesis 10/10/22:  ,000; 12% PMNs = 18,720 ANC; 14% lymph; rest monocytes  Had repeat paracentesis 10/20/22 but no cell count sent  Now with continued large ascites again.   Came to Hepatology clinic with abdominal pain and fever. On admit to ED - fever 102. Influenca positive. Abdomen tender. Discussed with hematology, ID and hepatology - admission with broad spectrum abx/antifungal, tamiflu, CT scan and repeat para. Other infectious evaluation completed and pending. Cytology and flow from peritoneal fluid.

## 2022-10-27 NOTE — ASSESSMENT & PLAN NOTE
MELD-Na score: 10 at 10/27/2022  3:23 AM  MELD score: 10 at 10/27/2022  3:23 AM  Calculated from:  Serum Creatinine: 0.7 mg/dL (Using min of 1 mg/dL) at 10/27/2022  3:23 AM  Serum Sodium: 136 mmol/L at 10/27/2022  3:23 AM  Total Bilirubin: 1.1 mg/dL at 10/27/2022  3:23 AM  INR(ratio): 1.3 at 10/27/2022  3:23 AM  Age: 56 years  Complicated by reaccumulating ascites. Recently had para that was concerning for infection however repeat negative.   - Continue lasix 40mg/aldactone 100mg daily for volume  - Continue rifaximin for HE, not on lactulose  - Hepatology following.

## 2022-10-27 NOTE — PROGRESS NOTES
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 Pt follows w/ GI/hepatology for serial paracenteses for recurrent ascites that impair ADLs (such as causing LARKIN and abdominal discomfort). Pt has had developed some fevers over the past few days and a recent OSH paracentesis done showed significant WBCs >15k as per discussion with OSH providers. Patient was started on meropenem, fluconazole, and rifaximin empirically prior to paracentesis results. 10/25 CT a/p reports cirrhosis with superimposed portal hypertension. Findings of possible colitis. Stable chronic appearing thrombus involving the portal vein and splenic vein.     Ascites  -[place] [facility] [place] WBC, 2% segs, 60% monocytes, clear and yellow  Flow cytometry did not show mature B-cells, no aberrant T cell antigen expression noted  Gram stains did not note for organisms  Aerobic cultures no growth (Anaerobic bacterial cultures in progress)    -10/27 another paracentesis done by IR  -Ascitic adenosine deaminase, TB PCR (pending)  -Cultures: Bacterial, AFB (pending)  -Another flow cytometry (pending)  -Will await cell count w/ diff  -Will check a CMV serum quant (pending)  -Will treat as SBP empirically given clinical response  -Can continue meropenem for now    Influenza A   -Tamiflu x10 day course    Alvarez Vallejo MD  [facility] Infectious Diseases  Cell: [phone]    Thank you for allowing us to participate in the care of this patient. We will continue to follow along. Case has been discussed with consult staff, who is in agreement with assessment and plan. ID will  continue to follow.    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 Pt follows w/ GI/hepatology for serial paracenteses for recurrent ascites that impair ADLs (such as causing LARKIN and abdominal discomfort). Pt has had developed some fevers over the past few days and a recent OSH paracentesis done showed significant WBCs >15k as per discussion with OSH providers.     Interval History:   Abd feels better sp paracenteses, denies f/c/ns/nv, tolerating po  Afeb ~24h, T97.7, Satting RA  WBC 1.56 (stable from 1.43), Hgb 9.3 (stable), Plts 27 (stable from 28), BUN/Cr 11/0.7, LFTs WNL     Review of Systems   Constitutional:  Positive for fatigue. Negative for chills, fever and unexpected weight change.   HENT:  Negative for congestion, postnasal drip and trouble swallowing.    Respiratory:  Negative for cough, chest tightness and shortness of breath.    Cardiovascular:  Negative for chest pain, palpitations and leg swelling.   Gastrointestinal:  Positive for abdominal distention. Negative for abdominal pain, blood in stool, constipation, diarrhea, nausea and vomiting.   Genitourinary:  Negative for difficulty urinating, dysuria and hematuria.   Musculoskeletal:  Negative for arthralgias and back pain.   Neurological:  Negative for dizziness and light-headedness.   Psychiatric/Behavioral:  Negative for confusion.       Objective:   Last 24 Hour Vital Signs:  BP  Min: 112/61  Max: 144/78  Temp  Av °F (36.7 °C)  Min: 97.4 °F (36.3 °C)  Max: 98.5 °F (36.9 °C)  Pulse  Av  Min: 67  Max: 85  Resp  Av  Min: 18  Max: 20  SpO2  Av.7 %  Min: 92 %  Max: 98 %  I/O  last 3 completed shifts:  In: -   Out: 1101 [Urine:1101]    Physical Exam  Constitutional:       General: He is not in acute distress.     Appearance: Normal appearance. He is not ill-appearing.   Cardiovascular:      Rate and Rhythm: Normal rate and regular rhythm.      Pulses: Normal pulses.      Heart sounds: Normal heart sounds. No murmur heard.    No friction rub. No gallop.   Pulmonary:      Effort: Pulmonary effort is normal. No respiratory distress.      Breath sounds: Normal breath sounds.   Abdominal:      General: Abdomen is flat. Bowel sounds are normal. There is distension (improving).      Palpations: Abdomen is soft.      Tenderness: There is no abdominal tenderness.   Skin:     General: Skin is warm and dry.   Neurological:      Mental Status: He is alert.     Laboratory:  Laboratory Data Reviewed: yes  Pertinent Findings:  Recent Labs   Lab 10/25/22  1436 10/25/22  1828 10/26/22  0409 10/27/22  0323   WBC 2.16*  --  1.43* 1.56*   HGB 10.7*  --  9.1* 9.3*   HCT 34.3*  --  28.7* 29.9*   PLT 40*  --  28* 27*   MCV 73*  --  71* 71*   RDW 17.4*  --  17.1* 17.2*   NA  --  129* 129* 136   K  --  3.4* 3.3* 3.0*   CL  --  99 103 105   CO2  --  22* 21* 21*   BUN  --  12 10 11   CREATININE  --  0.8 0.8 0.7   GLU  --  313* 275* 121*   PROT  --  6.3 5.7* 6.0   ALBUMIN  --  2.9* 2.7* 2.7*   BILITOT  --  1.7* 1.3* 1.1*   AST  --  45* 41* 39   ALKPHOS  --  128 123 117   ALT  --  32 32 30         Microbiology Data:  Microbiology Results (last 7 days)       Procedure Component Value Units Date/Time    (rule out SBP) Gram stain [[id number]] Collected: 10/27/22 0914    Order Status: Sent Specimen: Ascites Updated: 10/27/22 0915    (rule out SBP) Culture, Anaerobic [[id number]] Collected: 10/27/22 0914    Order Status: Sent Specimen: Ascites Updated: 10/27/22 0915    (rule out SBP) Culture, Anaerobic [[id number]] Collected: 10/25/22 1621    Order Status: Completed Specimen: Ascites Updated: 10/27/22 0720     Anaerobic  Culture Culture in progress    Narrative:      To rule out SBP order labs: Aerobic culture [[id number]],  Culture, Anaerobic [SVY482], Gram stain [RJJ302], Albumin  [QCS446], Protein [BLO836], LDH [YDQ403], WBC \T\ Dff  [NRK6543].    (rule out SBP) Aerobic culture [[id number]] Collected: 10/25/22 1621    Order Status: Completed Specimen: Ascites Updated: 10/27/22 0645     Aerobic Bacterial Culture No growth    Narrative:      To rule out SBP order labs: Aerobic culture [[id number]],  Culture, Anaerobic [YPA626], Gram stain [SKE077], Albumin  [HBV764], Protein [QWT969], LDH [WOB530], WBC \T\ Dff  [JFP9361].    Blood culture #1 **CANNOT BE ORDERED STAT** [[id number]] Collected: 10/25/22 1439    Order Status: Completed Specimen: Blood from Peripheral, Forearm, Left Updated: 10/26/22 1612     Blood Culture, Routine No Growth to date      No Growth to date    Blood culture #2 **CANNOT BE ORDERED STAT** [[id number]] Collected: 10/25/22 1438    Order Status: Completed Specimen: Blood from Peripheral, Forearm, Right Updated: 10/26/22 1612     Blood Culture, Routine No Growth to date      No Growth to date    Respiratory Infection Panel (PCR), Nasopharyngeal [[id number]]  (Abnormal) Collected: 10/26/22 1057    Order Status: Completed Specimen: Nasopharyngeal Swab Updated: 10/26/22 1248     Respiratory Infection Panel Source NP Swab     Adenovirus Not Detected     Coronavirus 229E, Common Cold Virus Not Detected     Coronavirus HKU1, Common Cold Virus Not Detected     Coronavirus NL63, Common Cold Virus Not Detected     Coronavirus OC43, Common Cold Virus Not Detected     Comment: The Coronavirus strains detected in this test cause the common cold.  These strains are not the COVID-19 (novel Coronavirus)strain   associated with the respiratory disease outbreak.          SARS-CoV2 (COVID-19) Qualitative PCR Not Detected     Human Metapneumovirus Not Detected     Human Rhinovirus/Enterovirus Not Detected     Influenza B Not Detected      Parainfluenza Virus 1 Not Detected     Parainfluenza Virus 2 Not Detected     Parainfluenza Virus 3 Not Detected     Parainfluenza Virus 4 Not Detected     Respiratory Syncytial Virus Not Detected     Bordetella Parapertussis (WL7686) Not Detected     Bordetella pertussis (ptxP) Not Detected     Chlamydia pneumoniae Not Detected     Mycoplasma pneumoniae Not Detected     Influenza A (subtypes H1, H1-2009,H3) Detected    Narrative:      For all other respiratory sources, order [id number] -  Respiratory Viral Panel by PCR    (rule out SBP) Gram stain [[id number]] Collected: 10/25/22 1621    Order Status: Completed Specimen: Ascites Updated: 10/26/22 0445     Gram Stain Result No organisms seen      Rare WBC's    Narrative:      To rule out SBP order labs: Aerobic culture [[id number]],  Culture, Anaerobic [DFT769], Gram stain [AXI038], Albumin  [VQR072], Protein [WWW200], LDH [TOP476], WBC \T\ Dff  [NWC7229].    Influenza A & B by Molecular [[id number]]  (Abnormal) Collected: 10/25/22 1440    Order Status: Completed Specimen: Nasopharyngeal Swab Updated: 10/25/22 1558     Influenza A, Molecular Positive     Influenza B, Molecular Negative     Flu A & B Source Nasal swab              Antimicrobials:  Antibiotics (From admission, onward)      Start     Stop Route Frequency Ordered    10/25/22 2100  rifAXIMin tablet 550 mg         -- Oral 2 times daily 10/25/22 1428    10/25/22 1615  meropenem (MERREM) 2 g in sodium chloride 0.9% 100 mL IVPB         -- IV Every 8 hours (non-standard times) 10/25/22 1507          Antifungals (From admission, onward)      None          Antivirals (From admission, onward)          Stop Route Frequency     oseltamivir         11/03 2059 Oral 2 times daily              Other Results:  Radiology Results:  US Abdomen Complete    Result Date: 10/24/2022  EXAMINATION: US ABDOMEN COMPLETE CLINICAL HISTORY: Unspecified abdominal pain TECHNIQUE: Multiple real-time sonographic images were obtained of the  abdomen. COMPARISON: This examination was correlated with a CT scan of the abdomen and pelvis from April 22, 2022 and an ultrasound of the right upper quadrant from February 10, 2022. FINDINGS: There is a large amount of ascites in the abdomen. The surface of the liver is nodular compatible with cirrhosis. There is normal flow in the portal vein and partially calcified thrombus in the main portal vein which is nonocclusive The gallbladder is unremarkable.  The kidneys are normal in size and echogenicity. The spleen is enlarged with a persistent hyperechoic lesion in the mid to lower portion of the spleen measuring 2.9 cm in greatest dimension. The visualized portions of the pancreas, abdominal aorta and inferior vena cava are unremarkable.     Large amount of ascites in the abdomen. Splenomegaly with a persistent hyperechoic lesion in the mid to lower portion of the spleen. Partially calcified thrombus in the main portal vein. Nodular surface of the liver compatible with cirrhosis. Electronically signed by: Vito Rose MD Date:    10/24/2022 Time:    11:05    CT Abdomen Pelvis With Contrast    Result Date: 10/25/2022  EXAMINATION: CT ABDOMEN PELVIS WITH CONTRAST CLINICAL HISTORY: Sepsis;fever; TECHNIQUE: Low dose axial images, sagittal and coronal reformations were obtained from the lung bases to the pubic symphysis following the IV administration of 100 mL of Omnipaque 350 .  Oral contrast was not given. COMPARISON: PET-CT 08/11/2022, CT urogram 04/22/2022 FINDINGS: Images of the lower thorax are remarkable for bilateral dependent atelectasis.  There is thickening of the distal esophagus, may be on the basis of reflux esophagitis however correlation with direct visualization as warranted. The liver is cirrhotic in configuration.  The spleen is enlarged.  There is a low attenuating lesion within the posterior aspect of the spleen measuring 2.5 cm, stable.  The gallbladder is grossly unremarkable.  The  stomach is decompressed without wall thickening.  There are several scattered abdominal lymph nodes.  There is cavernous transformation of the portal vein.  There are splenic and gabriela hepatic collateral vessels.  There is chronic appearing thrombus with calcification involving the extrahepatic portal vein, similar to the previous examination extending to the proximal aspect of the SMV.  Additional thrombus noted within the splenic vein near the pancreatic tail also containing scattered regions of calcification.  These findings were better demonstrated on previous examination secondary to improved bolus timing at that time. The kidneys enhance symmetrically without hydronephrosis.  There is right nonobstructive nephrolithiasis.  The bilateral ureters are unable to be followed in their entirety to the urinary bladder, no definite calculi seen.  The urinary bladder is decompressed noting there may be residual wall thickening.  There is moderate to large volume abdominopelvic ascites, worsened since most recent PET-CT.  The prostate is not enlarged. There are several scattered colonic diverticula.  There is some indistinctness about the distal descending colon and proximal sigmoid colon.  The terminal ileum is unremarkable.  The appendix is unremarkable.  The small bowel is grossly unremarkable.  There is scattered mesenteric edema.  There are a few scattered shotty periaortic, pericaval, and mesenteric lymph nodes.  No focal organized fluid collection or overt peritoneal thickening to suggest discrete changes of peritonitis.  Peritoneal fluid sampling however would be needed for definitive exclusion of the same. There is osteopenia.  There are degenerative changes of the spine.  There is bilateral gynecomastia.  No significant inguinal lymphadenopathy.     This report was flagged in Epic as abnormal. 1. Findings suggesting cirrhosis superimposed portal hypertension including ascites, splenomegaly, and several varices.   The ascites has increased since the previous examination without discrete peritoneal thickening.  Correlation with ascitic fluid sampling as warranted. 2. There is some indistinctness about the distal descending colon and proximal sigmoid colon.  Possible infectious or inflammatory colitis is a consideration versus diverticulitis however evaluation for inflammation is limited secondary to fluid in the region.  Correlation with any symptomatology to support a diagnosis of the same should be assessed. 3. Stable chronic appearing thrombus involving the portal vein and splenic vein as described. 4. Right nonobstructive nephrolithiasis. 5. Wall thickening involving the distal esophagus, correlation with any history of reflux esophagitis.  Direct visualization as warranted. 6. Please see above for additional findings. Electronically signed by: Ruddy Villanueva MD Date:    10/25/2022 Time:    15:19    X-Ray Chest AP Portable    Result Date: 10/25/2022  EXAMINATION: XR CHEST AP PORTABLE CLINICAL HISTORY: fever; TECHNIQUE: One view COMPARISON: Comparison is made to 01/15/2021, the only available prior chest radiograph. FINDINGS: Allowing for magnification of the cardiomediastinal silhouette related to projection and inspiratory depth level, the heart size is within normal limits.  Pulmonary vascularity is normal as well.  Lung zones are clear, and are free of significant airspace consolidation or volume loss.  No pleural fluid.  No abnormal mediastinal widening.  No pneumothorax.     No significant intrathoracic abnormality.  Allowing for differences in projection and a poorer inspiratory depth level on the current examination, there has been no significant detrimental interval change in the appearance of the chest since 01/15/2021. Electronically signed by: Hubert Dupont MD Date:    10/25/2022 Time:    14:27    IR Paracentesis with Imaging    Result Date: 10/26/2022  EXAMINATION: Ultrasound-guided paracentesis Procedural  Personnel Attending physician(s): Shahid Phipps MD Fellow physician(s): None Resident physician(s): None Advanced practice provider(s): Crystal Jacobson PA-C Pre-procedure diagnosis: Ascites Post-procedure diagnosis: Same Complications: No immediate complications. CLINICAL HISTORY: Recurrent Ascites TECHNIQUE: - Ultrasound-guided paracentesis FINDINGS: Pre-procedure Consent: Informed consent for the procedure was obtained and time-out was performed prior to the procedure. Preparation: The site was prepared and draped using maximal sterile barrier technique including cutaneous antisepsis. Anesthesia/sedation Level of anesthesia/sedation: No sedation Anesthesia/sedation administered by: Not applicable Total intra-service sedation time (minutes): 0 Limited abdominal ultrasound Limited abdominal ultrasound was performed. Moderate ascites. A safe window for paracentesis was identified. Paracentesis Local anesthesia was administered. The peritoneal cavity was accessed on the right lower quadrant and fluid return confirmed position. Ascites was drained. Paracentesis access technique: Real-time ultrasound guidance Catheter placed: 5F Yueh Closure The catheter was removed. A sterile bandage was applied. Post-drainage ultrasound: Not performed Additional Details Additional description of procedure: None Equipment details: None Specimens removed: Abdominal fluid Estimated blood loss (mL): Less than 10 Standardized report: [email or web address] Attestation Signer name: Shahid Phipps I attest that I reviewed the stored images and agree with the report as written.     Ultrasound-guided paracentesis with drainage of 4950 mL of serous fluid. _______________________________________________________________ Electronically signed by resident: Crystal Jacobson Date:    10/26/2022 Time:    08:10 Electronically signed by: Shahid Phipps Date:    10/26/2022 Time:    10:43      Current Medications:     Infusions:   albumin human 25%           Scheduled:   atorvastatin  10 mg Oral Daily    dextromethorphan-guaiFENesin  mg  1 tablet Oral BID    famotidine  20 mg Oral BID    finasteride  5 mg Oral Daily    furosemide  40 mg Oral Daily    insulin aspart U-100  20 Units Subcutaneous TIDWM    insulin detemir U-100  40 Units Subcutaneous QHS    meropenem (MERREM) IVPB  2 g Intravenous Q8H    oseltamivir  75 mg Oral BID    oxybutynin  5 mg Oral Daily    potassium chloride  40 mEq Oral Q4H    rifAXImin  550 mg Oral BID    spironolactone  100 mg Oral Daily    tamsulosin  0.4 mg Oral Daily    venlafaxine  75 mg Oral BID        PRN:  acetaminophen, acetaminophen, albumin human 25%, benzonatate, dextrose 10%, dextrose 10%, glucagon (human recombinant), glucose, glucose, HYDROmorphone, insulin aspart U-100, melatonin, ondansetron, oxyCODONE, prochlorperazine, promethazine-codeine 6.25-10 mg/5 ml, sodium chloride 0.9%

## 2022-10-27 NOTE — HOSPITAL COURSE
Initial repeat paracentesis from 10/25 without evidence of SBP. Repeat para ordered 10/27, negative for SBP. Sent for additional studies including ADA, tb culture, tb pcr. TB Pcr negative. Patient received approximately 4 days of merem, okay to be transitioned to cipro 750mg q12 on discharge. Furthermore, will continue treating for flu with tamiflu x10d total. Will try to arrange for f/u paracentesis at [facility]

## 2022-10-27 NOTE — ASSESSMENT & PLAN NOTE
Patient's FSGs are controlled on current medication regimen.  Last A1c reviewed-   Lab Results   Component Value Date    HGBA1C 12.1 (H) 10/25/2022     Most recent fingerstick glucose reviewed-   Recent Labs   Lab 10/26/22  1630 10/26/22  1953 10/27/22  0729 10/27/22  1124   POCTGLUCOSE 128* 132* 114* 129*     Current correctional scale  Low  Maintain anti-hyperglycemic dose as follows-   Antihyperglycemics (From admission, onward)    Start     Stop Route Frequency Ordered    10/26/22 2100  insulin detemir U-100 pen 40 Units         -- SubQ Nightly 10/26/22 0918    10/25/22 1645  insulin aspart U-100 pen 20 Units         -- SubQ 3 times daily with meals 10/25/22 1426    10/25/22 1525  insulin aspart U-100 pen 0-5 Units         -- SubQ Before meals & nightly PRN 10/25/22 1426        Hold Oral hypoglycemics while patient is in the hospital.

## 2022-10-27 NOTE — PROGRESS NOTES
[facility] - [facility] ([address] Medicine  Progress Note    Patient Name: Corky Bach  MRN: [id number]  Patient Class: IP- Inpatient   Admission Date: 10/25/2022  Length of Stay: 2 days  Attending Physician: Amadeo Rutherford MD  Primary Care Provider: Sarmad Estrella MD        Subjective:     Principal Problem:Fever        HPI:  Corky Bach is a 56 y.o. 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 Since he has developed progressive ascites requiring frequent paracentesis. Around Oct 10 he had a paracentesis which was concerning for SBP but I can't see any place he received ABX.      Had paracentesis 10/10/22:  ,000; 12% PMNs = 18,720 ANC; 14% lymph; rest monocytes  Had repeat paracentesis 10/20/22 but no cell count sent  Now with continued large ascites again.   Came to Hepatology clinic with abdominal pain and fever. On admit to ED - fever 102. Influenca positive. Abdomen tender. Discussed with hematology, ID and hepatology - admission with broad spectrum abx/antifungal, tamiflu, CT scan and repeat para. Other infectious evaluation completed and pending. Cytology and flow from peritoneal fluid.       Overview/Hospital Course:  Initial repeat paracentesis from 10/25 without evidence of SBP. Repeat para ordered 10/27, sent for additional studies including ADA, tb culture, tb pcr.       Interval History: No acute events overnight. Going for repeat para today, 8L removed per chart review. Patient and wife endorse that he re accumulates quickly. On exam, he has 1-2+ pitting edema to thighs though denies tight feeling in legs.     Review of Systems   Constitutional:  Negative for chills and fever.   HENT:  Negative for congestion and sore throat.    Eyes:  Negative for photophobia and visual disturbance.    Respiratory:  Negative for cough and shortness of breath.    Cardiovascular:  Positive for leg swelling. Negative for chest pain and palpitations.   Gastrointestinal:  Positive for abdominal distention. Negative for abdominal pain, blood in stool, constipation, diarrhea, nausea and vomiting.   Endocrine: Negative for cold intolerance and heat intolerance.   Genitourinary:  Negative for dysuria and hematuria.   Musculoskeletal:  Negative for arthralgias and myalgias.   Skin:  Negative for rash and wound.   Allergic/Immunologic: Negative for environmental allergies and food allergies.   Neurological:  Negative for seizures and numbness.   Hematological:  Negative for adenopathy. Does not bruise/bleed easily.   Psychiatric/Behavioral:  Negative for hallucinations and suicidal ideas.    Objective:     Vital Signs (Most Recent):  Temp: 97.6 °F (36.4 °C) (10/27/22 1121)  Pulse: 76 (10/27/22 1121)  Resp: 18 (10/27/22 1121)  BP: 122/68 (10/27/22 1121)  SpO2: 99 % (10/27/22 1121) Vital Signs (24h Range):  Temp:  [97.4 °F (36.3 °C)-98.5 °F (36.9 °C)] 97.6 °F (36.4 °C)  Pulse:  [67-85] 76  Resp:  [18-20] 18  SpO2:  [92 %-99 %] 99 %  BP: (112-144)/(61-78) 122/68     Weight: 113.5 kg (250 lb 3.6 oz)  Body mass index is 32.13 kg/m².    Intake/Output Summary (Last 24 hours) at 10/27/2022 1214  Last data filed at 10/27/2022 1000  Gross per 24 hour   Intake --   Output 8701 ml   Net -8701 ml      Physical Exam  Constitutional:       Appearance: He is well-developed.   HENT:      Head: Normocephalic and atraumatic.   Eyes:      General: No scleral icterus.     Conjunctiva/sclera: Conjunctivae normal.      Pupils: Pupils are equal, round, and reactive to light.   Neck:      Thyroid: No thyromegaly.      Vascular: No JVD.   Cardiovascular:      Rate and Rhythm: Normal rate and regular rhythm.      Heart sounds: Normal heart sounds. No murmur heard.    No friction rub. No gallop.   Pulmonary:      Effort: Pulmonary effort is normal.  No respiratory distress.      Breath sounds: Normal breath sounds. No wheezing or rales.   Abdominal:      General: Bowel sounds are normal. There is distension.      Palpations: Abdomen is soft. There is no mass.      Tenderness: There is no abdominal tenderness. There is no guarding or rebound.      Hernia: No hernia is present.   Musculoskeletal:         General: No deformity. Normal range of motion.      Cervical back: Normal range of motion and neck supple.      Right lower leg: Edema present.      Left lower leg: Edema present.   Skin:     General: Skin is warm and dry.      Coloration: Skin is not jaundiced.   Neurological:      Mental Status: He is alert and oriented to person, place, and time.      Cranial Nerves: No cranial nerve deficit.   Psychiatric:         Behavior: Behavior normal.       Significant Labs: All pertinent labs within the past 24 hours have been reviewed.  CBC:   Recent Labs   Lab 10/25/22  1436 10/26/22  0409 10/27/22  0323   WBC 2.16* 1.43* 1.56*   HGB 10.7* 9.1* 9.3*   HCT 34.3* 28.7* 29.9*   PLT 40* 28* 27*     CMP:   Recent Labs   Lab 10/25/22  1828 10/26/22  0409 10/27/22  0323   * 129* 136   K 3.4* 3.3* 3.0*   CL 99 103 105   CO2 22* 21* 21*   * 275* 121*   BUN 12 10 11   CREATININE 0.8 0.8 0.7   CALCIUM 7.9* 7.4* 7.8*   PROT 6.3 5.7* 6.0   ALBUMIN 2.9* 2.7* 2.7*   BILITOT 1.7* 1.3* 1.1*   ALKPHOS 128 123 117   AST 45* 41* 39   ALT 32 32 30   ANIONGAP 8 5* 10     Coagulation:   Recent Labs   Lab 10/27/22  0323   INR 1.3*       Significant Imaging: I have reviewed all pertinent imaging results/findings within the past 24 hours.      Assessment/Plan:      * Fever  Presumably secondary to influenza infection however patient had ascitic fluid from two weeks ago with 150k cell count. Repeat tap on admission negative for SBP. Following first tap, he was treated with keflex for a UTI but no other abx. Patient without SBP symptoms. Patient did however on admission test  positive for influenza A.  - Repeat paracentesis today - sent for AFB cx, tb pcr, and repeat cell count w/ cytology.  - Continuing merem for SBP treatment per ID recs  - Treating flu with tamiflu  - Transplant ID following      Influenza A  Continuing tamiflu  On room air    Hepatic encephalopathy  Chronic, controlled  - Continue rifaximin      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  MELD-Na score: 10 at 10/27/2022  3:23 AM  MELD score: 10 at 10/27/2022  3:23 AM  Calculated from:  Serum Creatinine: 0.7 mg/dL (Using min of 1 mg/dL) at 10/27/2022  3:23 AM  Serum Sodium: 136 mmol/L at 10/27/2022  3:23 AM  Total Bilirubin: 1.1 mg/dL at 10/27/2022  3:23 AM  INR(ratio): 1.3 at 10/27/2022  3:23 AM  Age: 56 years  Complicated by reaccumulating ascites. Recently had para that was concerning for infection however repeat negative.   - Continue lasix 40mg/aldactone 100mg daily for volume  - Continue rifaximin for HE, not on lactulose  - Hepatology following.    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  Last A1c reviewed-   Lab Results   Component Value Date    HGBA1C 12.1 (H) 10/25/2022     Most recent fingerstick glucose reviewed-   Recent Labs   Lab 10/26/22  1630 10/26/22  1953 10/27/22  0729 10/27/22  1124   POCTGLUCOSE 128* 132* 114* 129*     Current correctional scale  Low  Maintain anti-hyperglycemic dose as follows-   Antihyperglycemics (From admission, onward)    Start     Stop Route Frequency Ordered    10/26/22 2100  insulin detemir U-100 pen 40 Units         -- SubQ Nightly 10/26/22 0918    10/25/22 1645  insulin aspart U-100 pen 20 Units         -- SubQ 3 times daily with meals 10/25/22 1426    10/25/22 1525  insulin aspart U-100 pen 0-5 Units         -- SubQ Before meals & nightly PRN 10/25/22 1426        Hold Oral hypoglycemics while patient is in the hospital.      VTE Risk Mitigation (From admission, onward)         Ordered     IP VTE  HIGH RISK PATIENT  Once         10/25/22 1430     Place sequential compression device  Until discontinued         10/25/22 1430                Discharge Planning   GARY: 10/28/2022     Code Status: Not on file   Is the patient medically ready for discharge?: No    Reason for patient still in hospital (select all that apply): Patient trending condition  Discharge Plan A: Home with family                  Amadeo Rutherford MD  Department of Hospital Medicine   [facility] - Intensive Care ([address]

## 2022-10-27 NOTE — PROCEDURES
Radiology Post-Procedure Note    Pre Op Diagnosis: Ascites  Post Op Diagnosis: Same    Procedure: Ultrasound Guided Paracentesis    Procedure performed by: Crystal Jacobson PA-C    Written Informed Consent Obtained: Yes  Specimen Removed: YES clear, yellow  Estimated Blood Loss: Minimal    Findings:   Successful paracentesis.  Albumin administered PRN per protocol.    Patient tolerated procedure well.    Crystal Jacobson PA-C  Interventional Radiology  [phone]

## 2022-10-27 NOTE — SUBJECTIVE & OBJECTIVE
Interval History: No acute events overnight. Going for repeat para today, 8L removed per chart review. Patient and wife endorse that he re accumulates quickly. On exam, he has 1-2+ pitting edema to thighs though denies tight feeling in legs.     Review of Systems   Constitutional:  Negative for chills and fever.   HENT:  Negative for congestion and sore throat.    Eyes:  Negative for photophobia and visual disturbance.   Respiratory:  Negative for cough and shortness of breath.    Cardiovascular:  Positive for leg swelling. Negative for chest pain and palpitations.   Gastrointestinal:  Positive for abdominal distention. Negative for abdominal pain, blood in stool, constipation, diarrhea, nausea and vomiting.   Endocrine: Negative for cold intolerance and heat intolerance.   Genitourinary:  Negative for dysuria and hematuria.   Musculoskeletal:  Negative for arthralgias and myalgias.   Skin:  Negative for rash and wound.   Allergic/Immunologic: Negative for environmental allergies and food allergies.   Neurological:  Negative for seizures and numbness.   Hematological:  Negative for adenopathy. Does not bruise/bleed easily.   Psychiatric/Behavioral:  Negative for hallucinations and suicidal ideas.    Objective:     Vital Signs (Most Recent):  Temp: 97.6 °F (36.4 °C) (10/27/22 1121)  Pulse: 76 (10/27/22 1121)  Resp: 18 (10/27/22 1121)  BP: 122/68 (10/27/22 1121)  SpO2: 99 % (10/27/22 1121) Vital Signs (24h Range):  Temp:  [97.4 °F (36.3 °C)-98.5 °F (36.9 °C)] 97.6 °F (36.4 °C)  Pulse:  [67-85] 76  Resp:  [18-20] 18  SpO2:  [92 %-99 %] 99 %  BP: (112-144)/(61-78) 122/68     Weight: 113.5 kg (250 lb 3.6 oz)  Body mass index is 32.13 kg/m².    Intake/Output Summary (Last 24 hours) at 10/27/2022 1214  Last data filed at 10/27/2022 1000  Gross per 24 hour   Intake --   Output 8701 ml   Net -8701 ml      Physical Exam  Constitutional:       Appearance: He is well-developed.   HENT:      Head: Normocephalic and atraumatic.    Eyes:      General: No scleral icterus.     Conjunctiva/sclera: Conjunctivae normal.      Pupils: Pupils are equal, round, and reactive to light.   Neck:      Thyroid: No thyromegaly.      Vascular: No JVD.   Cardiovascular:      Rate and Rhythm: Normal rate and regular rhythm.      Heart sounds: Normal heart sounds. No murmur heard.    No friction rub. No gallop.   Pulmonary:      Effort: Pulmonary effort is normal. No respiratory distress.      Breath sounds: Normal breath sounds. No wheezing or rales.   Abdominal:      General: Bowel sounds are normal. There is distension.      Palpations: Abdomen is soft. There is no mass.      Tenderness: There is no abdominal tenderness. There is no guarding or rebound.      Hernia: No hernia is present.   Musculoskeletal:         General: No deformity. Normal range of motion.      Cervical back: Normal range of motion and neck supple.      Right lower leg: Edema present.      Left lower leg: Edema present.   Skin:     General: Skin is warm and dry.      Coloration: Skin is not jaundiced.   Neurological:      Mental Status: He is alert and oriented to person, place, and time.      Cranial Nerves: No cranial nerve deficit.   Psychiatric:         Behavior: Behavior normal.       Significant Labs: All pertinent labs within the past 24 hours have been reviewed.  CBC:   Recent Labs   Lab 10/25/22  1436 10/26/22  0409 10/27/22  0323   WBC 2.16* 1.43* 1.56*   HGB 10.7* 9.1* 9.3*   HCT 34.3* 28.7* 29.9*   PLT 40* 28* 27*     CMP:   Recent Labs   Lab 10/25/22  1828 10/26/22  0409 10/27/22  0323   * 129* 136   K 3.4* 3.3* 3.0*   CL 99 103 105   CO2 22* 21* 21*   * 275* 121*   BUN 12 10 11   CREATININE 0.8 0.8 0.7   CALCIUM 7.9* 7.4* 7.8*   PROT 6.3 5.7* 6.0   ALBUMIN 2.9* 2.7* 2.7*   BILITOT 1.7* 1.3* 1.1*   ALKPHOS 128 123 117   AST 45* 41* 39   ALT 32 32 30   ANIONGAP 8 5* 10     Coagulation:   Recent Labs   Lab 10/27/22  0323   INR 1.3*       Significant Imaging: I  have reviewed all pertinent imaging results/findings within the past 24 hours.

## 2022-10-28 LAB
ALBUMIN SERPL BCP-MCNC: 3.1 G/DL (ref 3.5–5.2)
ALP SERPL-CCNC: 108 U/L (ref 55–135)
ALT SERPL W/O P-5'-P-CCNC: 27 U/L (ref 10–44)
ANION GAP SERPL CALC-SCNC: 9 MMOL/L (ref 8–16)
AST SERPL-CCNC: 35 U/L (ref 10–40)
BASOPHILS # BLD AUTO: 0 K/UL (ref 0–0.2)
BASOPHILS NFR BLD: 0 % (ref 0–1.9)
BILIRUB SERPL-MCNC: 1.2 MG/DL (ref 0.1–1)
BUN SERPL-MCNC: 12 MG/DL (ref 6–20)
CALCIUM SERPL-MCNC: 8.1 MG/DL (ref 8.7–10.5)
CHLORIDE SERPL-SCNC: 106 MMOL/L (ref 95–110)
CMV DNA SPEC QL NAA+PROBE: NOT DETECTED
CO2 SERPL-SCNC: 22 MMOL/L (ref 23–29)
COMMENT: NORMAL
CREAT SERPL-MCNC: 0.8 MG/DL (ref 0.5–1.4)
CYTOMEGALOVIRUS LOG (IU/ML): NOT DETECTED LOGIU/ML
CYTOMEGALOVIRUS PCR, QUANT: NOT DETECTED IU/ML
DIFFERENTIAL METHOD: ABNORMAL
EOSINOPHIL # BLD AUTO: 0.1 K/UL (ref 0–0.5)
EOSINOPHIL NFR BLD: 6 % (ref 0–8)
ERYTHROCYTE [DISTWIDTH] IN BLOOD BY AUTOMATED COUNT: 17.4 % (ref 11.5–14.5)
EST. GFR  (NO RACE VARIABLE): >60 ML/MIN/1.73 M^2
FINAL PATHOLOGIC DIAGNOSIS: NORMAL
GLUCOSE SERPL-MCNC: 113 MG/DL (ref 70–110)
HCT VFR BLD AUTO: 32.5 % (ref 40–54)
HGB BLD-MCNC: 9.9 G/DL (ref 14–18)
IMM GRANULOCYTES # BLD AUTO: 0 K/UL (ref 0–0.04)
IMM GRANULOCYTES NFR BLD AUTO: 0 % (ref 0–0.5)
INR PPP: 1.3 (ref 0.8–1.2)
LYMPHOCYTES # BLD AUTO: 0.3 K/UL (ref 1–4.8)
LYMPHOCYTES NFR BLD: 19 % (ref 18–48)
Lab: NORMAL
MCH RBC QN AUTO: 22.3 PG (ref 27–31)
MCHC RBC AUTO-ENTMCNC: 30.5 G/DL (ref 32–36)
MCV RBC AUTO: 73 FL (ref 82–98)
MONOCYTES # BLD AUTO: 0.2 K/UL (ref 0.3–1)
MONOCYTES NFR BLD: 13.7 % (ref 4–15)
NEUTROPHILS # BLD AUTO: 1 K/UL (ref 1.8–7.7)
NEUTROPHILS NFR BLD: 61.3 % (ref 38–73)
NRBC BLD-RTO: 0 /100 WBC
PLATELET # BLD AUTO: 29 K/UL (ref 150–450)
PLATELET BLD QL SMEAR: ABNORMAL
PMV BLD AUTO: ABNORMAL FL (ref 9.2–12.9)
POCT GLUCOSE: 133 MG/DL (ref 70–110)
POCT GLUCOSE: 151 MG/DL (ref 70–110)
POCT GLUCOSE: 152 MG/DL (ref 70–110)
POCT GLUCOSE: 233 MG/DL (ref 70–110)
POTASSIUM SERPL-SCNC: 3.9 MMOL/L (ref 3.5–5.1)
PROT SERPL-MCNC: 6.1 G/DL (ref 6–8.4)
PROTHROMBIN TIME: 13.3 SEC (ref 9–12.5)
RBC # BLD AUTO: 4.43 M/UL (ref 4.6–6.2)
SODIUM SERPL-SCNC: 137 MMOL/L (ref 136–145)
WBC # BLD AUTO: 1.68 K/UL (ref 3.9–12.7)

## 2022-10-28 PROCEDURE — 99232 SBSQ HOSP IP/OBS MODERATE 35: CPT | Mod: ,,, | Performed by: STUDENT IN AN ORGANIZED HEALTH CARE EDUCATION/TRAINING PROGRAM

## 2022-10-28 PROCEDURE — 12000002 HC ACUTE/MED SURGE SEMI-PRIVATE ROOM

## 2022-10-28 PROCEDURE — 99232 PR SUBSEQUENT HOSPITAL CARE,LEVL II: ICD-10-PCS | Mod: ,,, | Performed by: STUDENT IN AN ORGANIZED HEALTH CARE EDUCATION/TRAINING PROGRAM

## 2022-10-28 PROCEDURE — 80053 COMPREHEN METABOLIC PANEL: CPT | Performed by: HOSPITALIST

## 2022-10-28 PROCEDURE — 25000003 PHARM REV CODE 250: Performed by: STUDENT IN AN ORGANIZED HEALTH CARE EDUCATION/TRAINING PROGRAM

## 2022-10-28 PROCEDURE — 85025 COMPLETE CBC W/AUTO DIFF WBC: CPT | Performed by: HOSPITALIST

## 2022-10-28 PROCEDURE — 99233 SBSQ HOSP IP/OBS HIGH 50: CPT | Mod: ,,, | Performed by: INTERNAL MEDICINE

## 2022-10-28 PROCEDURE — 36415 COLL VENOUS BLD VENIPUNCTURE: CPT | Performed by: HOSPITALIST

## 2022-10-28 PROCEDURE — 25000003 PHARM REV CODE 250: Performed by: HOSPITALIST

## 2022-10-28 PROCEDURE — 27000207 HC ISOLATION

## 2022-10-28 PROCEDURE — 63600175 PHARM REV CODE 636 W HCPCS: Performed by: HOSPITALIST

## 2022-10-28 PROCEDURE — 85610 PROTHROMBIN TIME: CPT | Performed by: HOSPITALIST

## 2022-10-28 PROCEDURE — 63600175 PHARM REV CODE 636 W HCPCS: Performed by: STUDENT IN AN ORGANIZED HEALTH CARE EDUCATION/TRAINING PROGRAM

## 2022-10-28 PROCEDURE — 99233 PR SUBSEQUENT HOSPITAL CARE,LEVL III: ICD-10-PCS | Mod: ,,, | Performed by: INTERNAL MEDICINE

## 2022-10-28 RX ORDER — FUROSEMIDE 10 MG/ML
40 INJECTION INTRAMUSCULAR; INTRAVENOUS ONCE
Status: COMPLETED | OUTPATIENT
Start: 2022-10-28 | End: 2022-10-28

## 2022-10-28 RX ADMIN — OXYBUTYNIN CHLORIDE 5 MG: 5 TABLET, EXTENDED RELEASE ORAL at 09:10

## 2022-10-28 RX ADMIN — FUROSEMIDE 40 MG: 20 TABLET ORAL at 09:10

## 2022-10-28 RX ADMIN — ATORVASTATIN CALCIUM 10 MG: 10 TABLET, FILM COATED ORAL at 09:10

## 2022-10-28 RX ADMIN — INSULIN ASPART 20 UNITS: 100 INJECTION, SOLUTION INTRAVENOUS; SUBCUTANEOUS at 06:10

## 2022-10-28 RX ADMIN — VENLAFAXINE HYDROCHLORIDE 75 MG: 37.5 TABLET ORAL at 09:10

## 2022-10-28 RX ADMIN — INSULIN ASPART 20 UNITS: 100 INJECTION, SOLUTION INTRAVENOUS; SUBCUTANEOUS at 09:10

## 2022-10-28 RX ADMIN — FAMOTIDINE 20 MG: 20 TABLET ORAL at 09:10

## 2022-10-28 RX ADMIN — GUAIFENESIN AND DEXTROMETHORPHAN HYDROBROMIDE 1 TABLET: 600; 30 TABLET, EXTENDED RELEASE ORAL at 09:10

## 2022-10-28 RX ADMIN — TAMSULOSIN HYDROCHLORIDE 0.4 MG: 0.4 CAPSULE ORAL at 09:10

## 2022-10-28 RX ADMIN — INSULIN ASPART 20 UNITS: 100 INJECTION, SOLUTION INTRAVENOUS; SUBCUTANEOUS at 02:10

## 2022-10-28 RX ADMIN — MEROPENEM 2 G: 1 INJECTION INTRAVENOUS at 02:10

## 2022-10-28 RX ADMIN — FUROSEMIDE 40 MG: 10 INJECTION, SOLUTION INTRAMUSCULAR; INTRAVENOUS at 06:10

## 2022-10-28 RX ADMIN — SPIRONOLACTONE 100 MG: 25 TABLET, FILM COATED ORAL at 09:10

## 2022-10-28 RX ADMIN — MEROPENEM 2 G: 1 INJECTION INTRAVENOUS at 11:10

## 2022-10-28 RX ADMIN — OSELTAMIVIR PHOSPHATE 75 MG: 75 CAPSULE ORAL at 09:10

## 2022-10-28 RX ADMIN — FINASTERIDE 5 MG: 5 TABLET, FILM COATED ORAL at 09:10

## 2022-10-28 RX ADMIN — RIFAXIMIN 550 MG: 550 TABLET ORAL at 09:10

## 2022-10-28 RX ADMIN — INSULIN ASPART 2 UNITS: 100 INJECTION, SOLUTION INTRAVENOUS; SUBCUTANEOUS at 06:10

## 2022-10-28 NOTE — ASSESSMENT & PLAN NOTE
MELD-Na score: 10 at 10/28/2022  2:33 AM  MELD score: 10 at 10/28/2022  2:33 AM  Calculated from:  Serum Creatinine: 0.8 mg/dL (Using min of 1 mg/dL) at 10/28/2022  2:33 AM  Serum Sodium: 137 mmol/L at 10/28/2022  2:33 AM  Total Bilirubin: 1.2 mg/dL at 10/28/2022  2:33 AM  INR(ratio): 1.3 at 10/28/2022  2:33 AM  Age: 56 years  Complicated by reaccumulating ascites. Recently had para that was concerning for infection however repeat negative.   - Continue lasix 40mg IV/aldactone 100mg daily for volume  - Continue rifaximin for HE, not on lactulose  - Hepatology following.

## 2022-10-28 NOTE — PROGRESS NOTES
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 Pt follows w/ GI/hepatology for serial paracenteses for recurrent ascites that impair ADLs (such as causing LARKIN and abdominal discomfort). Pt has had developed some fevers over the past few days and a recent OSH paracentesis done showed significant WBCs >15k as per discussion with OSH providers. Patient was started on meropenem, fluconazole, and rifaximin empirically prior to paracentesis results. 10/25 CT a/p reports cirrhosis with superimposed portal hypertension. Findings of possible colitis. Stable chronic appearing thrombus involving the portal vein and splenic vein.     Ascites  [place] [facility] [place] WBC, 2% segs, 60% monocytes, clear and yellow  Flow cytometry did not show mature B-cells, no aberrant T cell antigen expression noted  Gram stains did not note for organisms  Aerobic cultures no growth (Anaerobic bacterial cultures in progress)    -10/27 another paracentesis done by IR  -Ascitic adenosine deaminase, TB PCR (pending)  -Cultures: Bacterial, AFB (pending)  -Another flow cytometry (pending)  -Cell count w/ diff -- clear, WBCs 64 w/ segs 3%  -Will check a CMV serum quant (pending)  -Will treat as SBP empirically given clinical response  -Can continue meropenem while inpt, can discharge on ciprofloxacin 750 q12h on dc to finish 7 day course (end date of antibiotics is 11/1/22)    Influenza A   -Tamiflu x10 day course    Alvarez Vallejo MD  [facility] Infectious Diseases  Cell: [phone]    Thank you for allowing us to participate in the care of this patient. We  will continue to follow along. Case has been discussed with consult staff, who is in agreement with assessment and plan. ID will sign off, call back with questions.    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 Pt follows w/ GI/hepatology for serial paracenteses for recurrent ascites that impair ADLs (such as causing LARKIN and abdominal discomfort). Pt has had developed some fevers over the past few days and a recent OSH paracentesis done showed significant WBCs >15k as per discussion with OSH providers.     Interval History:   Abd feels better sp paracenteses, denies f/c/ns/nv, tolerating po  Afeb ~24h, T97.9, Satting RA  WBC 1.68 (stable), Hgb 9.9 (stable), Plts 29 (stable), BUN/Cr 12/0.8, LFTs WNL     Review of Systems   Constitutional:  Negative for chills, fever and unexpected weight change.   HENT:  Negative for congestion, postnasal drip and trouble swallowing.    Respiratory:  Negative for cough, chest tightness and shortness of breath.    Cardiovascular:  Negative for chest pain, palpitations and leg swelling.   Gastrointestinal:  Positive for abdominal distention. Negative for abdominal pain, blood in stool, constipation, diarrhea, nausea and vomiting.   Genitourinary:  Negative for difficulty urinating, dysuria and hematuria.   Musculoskeletal:  Negative for arthralgias and back pain.   Neurological:  Negative for dizziness and light-headedness.   Psychiatric/Behavioral:  Negative for confusion.       Objective:   Last 24 Hour Vital Signs:  BP  Min: 105/61  Max: 122/68  Temp  Av.9 °F (36.6 °C)  Min: 97.6 °F (36.4 °C)  Max: 98.3 °F (36.8 °C)  Pulse   Av.7  Min: 70  Max: 81  Resp  Av  Min: 18  Max: 20  SpO2  Av.8 %  Min: 95 %  Max: 100 %  I/O last 3 completed shifts:  In: 240 [P.O.:240]  Out: 8651 [Urine:651; Other:8000]    Physical Exam  Constitutional:       General: He is not in acute distress.     Appearance: Normal appearance. He is not ill-appearing.   Cardiovascular:      Rate and Rhythm: Normal rate and regular rhythm.      Pulses: Normal pulses.      Heart sounds: Normal heart sounds. No murmur heard.    No friction rub. No gallop.   Pulmonary:      Effort: Pulmonary effort is normal. No respiratory distress.      Breath sounds: Normal breath sounds.   Abdominal:      General: Abdomen is flat. Bowel sounds are normal. There is distension (improving).      Palpations: Abdomen is soft.      Tenderness: There is no abdominal tenderness.   Skin:     General: Skin is warm and dry.   Neurological:      Mental Status: He is alert.     Laboratory:  Laboratory Data Reviewed: yes  Pertinent Findings:  Recent Labs   Lab 10/26/22  0409 10/27/22  0323 10/28/22  0233   WBC 1.43* 1.56* 1.68*   HGB 9.1* 9.3* 9.9*   HCT 28.7* 29.9* 32.5*   PLT 28* 27* 29*   MCV 71* 71* 73*   RDW 17.1* 17.2* 17.4*   * 136 137   K 3.3* 3.0* 3.9    105 106   CO2 21* 21* 22*   BUN 10 11 12   CREATININE 0.8 0.7 0.8   * 121* 113*   PROT 5.7* 6.0 6.1   ALBUMIN 2.7* 2.7* 3.1*   BILITOT 1.3* 1.1* 1.2*   AST 41* 39 35   ALKPHOS 123 117 108   ALT 32 30 27         Microbiology Data:  Microbiology Results (last 7 days)       Procedure Component Value Units Date/Time    (rule out SBP) Culture, Anaerobic [[id number]] Collected: 10/27/22 0914    Order Status: Completed Specimen: Ascites Updated: 10/28/22 0729     Anaerobic Culture Culture in progress    Narrative:      To rule out SBP order labs: Aerobic culture [OSF931],  Culture, Anaerobic [STE749], Gram stain [IDK933], Albumin  [PDG910], Protein [KYB936], LDH [ZOL241], WBC \T\ Dff  [PVA3437].    Blood culture #1  **CANNOT BE ORDERED STAT** [[id number]] Collected: 10/25/22 1439    Order Status: Completed Specimen: Blood from Peripheral, Forearm, Left Updated: 10/27/22 1612     Blood Culture, Routine No Growth to date      No Growth to date      No Growth to date    Blood culture #2 **CANNOT BE ORDERED STAT** [[id number]] Collected: 10/25/22 1438    Order Status: Completed Specimen: Blood from Peripheral, Forearm, Right Updated: 10/27/22 1612     Blood Culture, Routine No Growth to date      No Growth to date      No Growth to date    (rule out SBP) Gram stain [[id number]] Collected: 10/27/22 0914    Order Status: Completed Specimen: Ascites Updated: 10/27/22 1423     Gram Stain Result No WBC's      No organisms seen    Narrative:      To rule out SBP order labs: Aerobic culture [KSV877],  Culture, Anaerobic [HSS356], Gram stain [QVD202], Albumin  [XTN756], Protein [BYT470], LDH [MMW776], WBC \T\ Dff  [XLN6760].    AFB Culture & Smear [[id number]] Collected: 10/27/22 1151    Order Status: Sent Specimen: Body Fluid from Ascites Updated: 10/27/22 1156    (rule out SBP) Culture, Anaerobic [[id number]] Collected: 10/25/22 1621    Order Status: Completed Specimen: Ascites Updated: 10/27/22 0720     Anaerobic Culture Culture in progress    Narrative:      To rule out SBP order labs: Aerobic culture [XQD325],  Culture, Anaerobic [TKH452], Gram stain [AHV224], Albumin  [EWA727], Protein [ASH391], LDH [SMC889], WBC \T\ Dff  [VIA0857].    (rule out SBP) Aerobic culture [[id number]] Collected: 10/25/22 1621    Order Status: Completed Specimen: Ascites Updated: 10/27/22 0645     Aerobic Bacterial Culture No growth    Narrative:      To rule out SBP order labs: Aerobic culture [OBY716],  Culture, Anaerobic [AWD053], Gram stain [KIR206], Albumin  [IQZ191], Protein [DJV882], LDH [CZB791], WBC \T\ Dff  [RCD3855].    Respiratory Infection Panel (PCR), Nasopharyngeal [[id number]]  (Abnormal) Collected: 10/26/22 1057    Order Status: Completed  Specimen: Nasopharyngeal Swab Updated: 10/26/22 1248     Respiratory Infection Panel Source NP Swab     Adenovirus Not Detected     Coronavirus 229E, Common Cold Virus Not Detected     Coronavirus HKU1, Common Cold Virus Not Detected     Coronavirus NL63, Common Cold Virus Not Detected     Coronavirus OC43, Common Cold Virus Not Detected     Comment: The Coronavirus strains detected in this test cause the common cold.  These strains are not the COVID-19 (novel Coronavirus)strain   associated with the respiratory disease outbreak.          SARS-CoV2 (COVID-19) Qualitative PCR Not Detected     Human Metapneumovirus Not Detected     Human Rhinovirus/Enterovirus Not Detected     Influenza B Not Detected     Parainfluenza Virus 1 Not Detected     Parainfluenza Virus 2 Not Detected     Parainfluenza Virus 3 Not Detected     Parainfluenza Virus 4 Not Detected     Respiratory Syncytial Virus Not Detected     Bordetella Parapertussis (BF6743) Not Detected     Bordetella pertussis (ptxP) Not Detected     Chlamydia pneumoniae Not Detected     Mycoplasma pneumoniae Not Detected     Influenza A (subtypes H1, H1-2009,H3) Detected    Narrative:      For all other respiratory sources, order [id number] -  Respiratory Viral Panel by PCR    (rule out SBP) Gram stain [[id number]] Collected: 10/25/22 1621    Order Status: Completed Specimen: Ascites Updated: 10/26/22 0445     Gram Stain Result No organisms seen      Rare WBC's    Narrative:      To rule out SBP order labs: Aerobic culture [YDC721],  Culture, Anaerobic [NDY515], Gram stain [JMV329], Albumin  [GBL658], Protein [XST376], LDH [OLN976], WBC \T\ Dff  [XMN6502].    Influenza A & B by Molecular [[id number]]  (Abnormal) Collected: 10/25/22 1440    Order Status: Completed Specimen: Nasopharyngeal Swab Updated: 10/25/22 1558     Influenza A, Molecular Positive     Influenza B, Molecular Negative     Flu A & B Source Nasal swab              Antimicrobials:  Antibiotics (From admission,  onward)      Start     Stop Route Frequency Ordered    10/25/22 2100  rifAXIMin tablet 550 mg         -- Oral 2 times daily 10/25/22 1428    10/25/22 1615  meropenem (MERREM) 2 g in sodium chloride 0.9% 100 mL IVPB         -- IV Every 8 hours (non-standard times) 10/25/22 1507          Antifungals (From admission, onward)      None          Antivirals (From admission, onward)          Stop Route Frequency     oseltamivir         11/03 2059 Oral 2 times daily              Other Results:  Radiology Results:  US Abdomen Complete    Result Date: 10/24/2022  EXAMINATION: US ABDOMEN COMPLETE CLINICAL HISTORY: Unspecified abdominal pain TECHNIQUE: Multiple real-time sonographic images were obtained of the abdomen. COMPARISON: This examination was correlated with a CT scan of the abdomen and pelvis from April 22, 2022 and an ultrasound of the right upper quadrant from February 10, 2022. FINDINGS: There is a large amount of ascites in the abdomen. The surface of the liver is nodular compatible with cirrhosis. There is normal flow in the portal vein and partially calcified thrombus in the main portal vein which is nonocclusive The gallbladder is unremarkable.  The kidneys are normal in size and echogenicity. The spleen is enlarged with a persistent hyperechoic lesion in the mid to lower portion of the spleen measuring 2.9 cm in greatest dimension. The visualized portions of the pancreas, abdominal aorta and inferior vena cava are unremarkable.     Large amount of ascites in the abdomen. Splenomegaly with a persistent hyperechoic lesion in the mid to lower portion of the spleen. Partially calcified thrombus in the main portal vein. Nodular surface of the liver compatible with cirrhosis. Electronically signed by: Vito Rose MD Date:    10/24/2022 Time:    11:05    CT Abdomen Pelvis With Contrast    Result Date: 10/25/2022  EXAMINATION: CT ABDOMEN PELVIS WITH CONTRAST CLINICAL HISTORY: Sepsis;fever; TECHNIQUE: Low  dose axial images, sagittal and coronal reformations were obtained from the lung bases to the pubic symphysis following the IV administration of 100 mL of Omnipaque 350 .  Oral contrast was not given. COMPARISON: PET-CT 08/11/2022, CT urogram 04/22/2022 FINDINGS: Images of the lower thorax are remarkable for bilateral dependent atelectasis.  There is thickening of the distal esophagus, may be on the basis of reflux esophagitis however correlation with direct visualization as warranted. The liver is cirrhotic in configuration.  The spleen is enlarged.  There is a low attenuating lesion within the posterior aspect of the spleen measuring 2.5 cm, stable.  The gallbladder is grossly unremarkable.  The stomach is decompressed without wall thickening.  There are several scattered abdominal lymph nodes.  There is cavernous transformation of the portal vein.  There are splenic and gabriela hepatic collateral vessels.  There is chronic appearing thrombus with calcification involving the extrahepatic portal vein, similar to the previous examination extending to the proximal aspect of the SMV.  Additional thrombus noted within the splenic vein near the pancreatic tail also containing scattered regions of calcification.  These findings were better demonstrated on previous examination secondary to improved bolus timing at that time. The kidneys enhance symmetrically without hydronephrosis.  There is right nonobstructive nephrolithiasis.  The bilateral ureters are unable to be followed in their entirety to the urinary bladder, no definite calculi seen.  The urinary bladder is decompressed noting there may be residual wall thickening.  There is moderate to large volume abdominopelvic ascites, worsened since most recent PET-CT.  The prostate is not enlarged. There are several scattered colonic diverticula.  There is some indistinctness about the distal descending colon and proximal sigmoid colon.  The terminal ileum is unremarkable.   The appendix is unremarkable.  The small bowel is grossly unremarkable.  There is scattered mesenteric edema.  There are a few scattered shotty periaortic, pericaval, and mesenteric lymph nodes.  No focal organized fluid collection or overt peritoneal thickening to suggest discrete changes of peritonitis.  Peritoneal fluid sampling however would be needed for definitive exclusion of the same. There is osteopenia.  There are degenerative changes of the spine.  There is bilateral gynecomastia.  No significant inguinal lymphadenopathy.     This report was flagged in Epic as abnormal. 1. Findings suggesting cirrhosis superimposed portal hypertension including ascites, splenomegaly, and several varices.  The ascites has increased since the previous examination without discrete peritoneal thickening.  Correlation with ascitic fluid sampling as warranted. 2. There is some indistinctness about the distal descending colon and proximal sigmoid colon.  Possible infectious or inflammatory colitis is a consideration versus diverticulitis however evaluation for inflammation is limited secondary to fluid in the region.  Correlation with any symptomatology to support a diagnosis of the same should be assessed. 3. Stable chronic appearing thrombus involving the portal vein and splenic vein as described. 4. Right nonobstructive nephrolithiasis. 5. Wall thickening involving the distal esophagus, correlation with any history of reflux esophagitis.  Direct visualization as warranted. 6. Please see above for additional findings. Electronically signed by: Ruddy Villanueva MD Date:    10/25/2022 Time:    15:19    X-Ray Chest AP Portable    Result Date: 10/25/2022  EXAMINATION: XR CHEST AP PORTABLE CLINICAL HISTORY: fever; TECHNIQUE: One view COMPARISON: Comparison is made to 01/15/2021, the only available prior chest radiograph. FINDINGS: Allowing for magnification of the cardiomediastinal silhouette related to projection and inspiratory  depth level, the heart size is within normal limits.  Pulmonary vascularity is normal as well.  Lung zones are clear, and are free of significant airspace consolidation or volume loss.  No pleural fluid.  No abnormal mediastinal widening.  No pneumothorax.     No significant intrathoracic abnormality.  Allowing for differences in projection and a poorer inspiratory depth level on the current examination, there has been no significant detrimental interval change in the appearance of the chest since 01/15/2021. Electronically signed by: Hubert Dupont MD Date:    10/25/2022 Time:    14:27    IR Paracentesis with Imaging    Result Date: 10/26/2022  EXAMINATION: Ultrasound-guided paracentesis Procedural Personnel Attending physician(s): Shahid Phipps MD Fellow physician(s): None Resident physician(s): None Advanced practice provider(s): Crystal Jacobson PA-C Pre-procedure diagnosis: Ascites Post-procedure diagnosis: Same Complications: No immediate complications. CLINICAL HISTORY: Recurrent Ascites TECHNIQUE: - Ultrasound-guided paracentesis FINDINGS: Pre-procedure Consent: Informed consent for the procedure was obtained and time-out was performed prior to the procedure. Preparation: The site was prepared and draped using maximal sterile barrier technique including cutaneous antisepsis. Anesthesia/sedation Level of anesthesia/sedation: No sedation Anesthesia/sedation administered by: Not applicable Total intra-service sedation time (minutes): 0 Limited abdominal ultrasound Limited abdominal ultrasound was performed. Moderate ascites. A safe window for paracentesis was identified. Paracentesis Local anesthesia was administered. The peritoneal cavity was accessed on the right lower quadrant and fluid return confirmed position. Ascites was drained. Paracentesis access technique: Real-time ultrasound guidance Catheter placed: 5F Yueh Closure The catheter was removed. A sterile bandage was applied. Post-drainage ultrasound: Not  performed Additional Details Additional description of procedure: None Equipment details: None Specimens removed: Abdominal fluid Estimated blood loss (mL): Less than 10 Standardized report: [email or web address] Attestation Signer name: Shahid Phipps I attest that I reviewed the stored images and agree with the report as written.     Ultrasound-guided paracentesis with drainage of 4950 mL of serous fluid. _______________________________________________________________ Electronically signed by resident: Crystal Jacobson Date:    10/26/2022 Time:    08:10 Electronically signed by: Shahid Phipps Date:    10/26/2022 Time:    10:43      Current Medications:     Infusions:         Scheduled:   atorvastatin  10 mg Oral Daily    dextromethorphan-guaiFENesin  mg  1 tablet Oral BID    famotidine  20 mg Oral BID    finasteride  5 mg Oral Daily    furosemide (LASIX) injection  40 mg Intravenous Once    insulin aspart U-100  20 Units Subcutaneous TIDWM    insulin detemir U-100  40 Units Subcutaneous QHS    meropenem (MERREM) IVPB  2 g Intravenous Q8H    oseltamivir  75 mg Oral BID    oxybutynin  5 mg Oral Daily    rifAXImin  550 mg Oral BID    spironolactone  100 mg Oral Daily    tamsulosin  0.4 mg Oral Daily    venlafaxine  75 mg Oral BID        PRN:  acetaminophen, acetaminophen, benzonatate, dextrose 10%, dextrose 10%, glucagon (human recombinant), glucose, glucose, HYDROmorphone, insulin aspart U-100, melatonin, ondansetron, oxyCODONE, prochlorperazine, promethazine-codeine 6.25-10 mg/5 ml, sodium chloride 0.9%

## 2022-10-28 NOTE — ASSESSMENT & PLAN NOTE
Presumably secondary to influenza infection however patient had ascitic fluid from two weeks ago with 150k cell count. Repeat tap on admission negative for SBP. Following first tap, he was treated with keflex for a UTI but no other abx. Patient without SBP symptoms. Patient did however on admission test positive for influenza A.  - Repeat paracentesis 10/27, remains negative for SBP- sent for AFB cx, tb pcr, and repeat cell count w/ cytology.  - Continuing merem for SBP treatment per ID recs (based on sample two weeks ago that was never properly treated)  - Treating flu with tamiflu [id number]  - Transplant ID following

## 2022-10-28 NOTE — PLAN OF CARE
[facility] - Int[facility] Care ([facility]  Discharge Reassessment    Primary Care Provider: Sarmad Estrella MD    Expected Discharge Date: 10/31/2022    Reassessment (most recent)       Discharge Reassessment - 10/28/22 1524          Discharge Reassessment    Assessment Type Discharge Planning Reassessment (P)      Did the patient's condition or plan change since previous assessment? No (P)      Discharge Plan discussed with: Patient (P)      Discharge Plan A Home with family (P)      Discharge Plan B Home (P)      DME Needed Upon Discharge  none (P)      Why the patient remains in the hospital Requires continued medical care (P)         Post-Acute Status    Discharge Delays None known at this time (P)

## 2022-10-28 NOTE — PLAN OF CARE
Problem: Adult Inpatient Plan of Care  Goal: Plan of Care Review  Outcome: Ongoing, Not Progressing  Goal: Optimal Comfort and Wellbeing  Outcome: Ongoing, Not Progressing     Problem: Infection  Goal: Absence of Infection Signs and Symptoms  Outcome: Ongoing, Not Progressing     AAOx4.  Continues with iv abx and diuretic regimen. Remains afebrile.

## 2022-10-28 NOTE — SUBJECTIVE & OBJECTIVE
Interval History: No acute events overnight. Repeat para negative for SBP. Currently being diuresed for remainder of fluid on exam.    Review of Systems   Constitutional:  Negative for chills and fever.   HENT:  Negative for congestion and sore throat.    Eyes:  Negative for photophobia and visual disturbance.   Respiratory:  Negative for cough and shortness of breath.    Cardiovascular:  Positive for leg swelling. Negative for chest pain and palpitations.   Gastrointestinal:  Positive for abdominal distention (improved s/p paracentesis). Negative for abdominal pain, blood in stool, constipation, diarrhea, nausea and vomiting.   Endocrine: Negative for cold intolerance and heat intolerance.   Genitourinary:  Negative for dysuria and hematuria.   Musculoskeletal:  Negative for arthralgias and myalgias.   Skin:  Negative for rash and wound.   Allergic/Immunologic: Negative for environmental allergies and food allergies.   Neurological:  Negative for seizures and numbness.   Hematological:  Negative for adenopathy. Does not bruise/bleed easily.   Psychiatric/Behavioral:  Negative for hallucinations and suicidal ideas.    Objective:     Vital Signs (Most Recent):  Temp: 97.9 °F (36.6 °C) (10/28/22 1132)  Pulse: 87 (10/28/22 1132)  Resp: 18 (10/28/22 1132)  BP: 115/65 (10/28/22 1132)  SpO2: 99 % (10/28/22 1132) Vital Signs (24h Range):  Temp:  [97.6 °F (36.4 °C)-98.3 °F (36.8 °C)] 97.9 °F (36.6 °C)  Pulse:  [70-87] 87  Resp:  [18-20] 18  SpO2:  [95 %-100 %] 99 %  BP: (105-116)/(61-65) 115/65     Weight: 113.5 kg (250 lb 3.6 oz)  Body mass index is 32.13 kg/m².    Intake/Output Summary (Last 24 hours) at 10/28/2022 1148  Last data filed at 10/27/2022 2300  Gross per 24 hour   Intake 240 ml   Output 650 ml   Net -410 ml        Physical Exam  Constitutional:       Appearance: He is well-developed.   HENT:      Head: Normocephalic and atraumatic.   Eyes:      General: No scleral icterus.     Conjunctiva/sclera: Conjunctivae  normal.      Pupils: Pupils are equal, round, and reactive to light.   Neck:      Thyroid: No thyromegaly.      Vascular: No JVD.   Cardiovascular:      Rate and Rhythm: Normal rate and regular rhythm.      Heart sounds: Normal heart sounds. No murmur heard.    No friction rub. No gallop.   Pulmonary:      Effort: Pulmonary effort is normal. No respiratory distress.      Breath sounds: Normal breath sounds. No wheezing or rales.   Abdominal:      General: Bowel sounds are normal. There is distension.      Palpations: Abdomen is soft. There is no mass.      Tenderness: There is no abdominal tenderness. There is no guarding or rebound.      Hernia: No hernia is present.   Musculoskeletal:         General: No deformity. Normal range of motion.      Cervical back: Normal range of motion and neck supple.      Right lower leg: Edema present.      Left lower leg: Edema present.   Skin:     General: Skin is warm and dry.      Coloration: Skin is not jaundiced.   Neurological:      Mental Status: He is alert and oriented to person, place, and time.      Cranial Nerves: No cranial nerve deficit.   Psychiatric:         Behavior: Behavior normal.       Significant Labs: All pertinent labs within the past 24 hours have been reviewed.  CBC:   Recent Labs   Lab 10/27/22  0323 10/28/22  0233   WBC 1.56* 1.68*   HGB 9.3* 9.9*   HCT 29.9* 32.5*   PLT 27* 29*       CMP:   Recent Labs   Lab 10/27/22  0323 10/28/22  0233    137   K 3.0* 3.9    106   CO2 21* 22*   * 113*   BUN 11 12   CREATININE 0.7 0.8   CALCIUM 7.8* 8.1*   PROT 6.0 6.1   ALBUMIN 2.7* 3.1*   BILITOT 1.1* 1.2*   ALKPHOS 117 108   AST 39 35   ALT 30 27   ANIONGAP 10 9       Coagulation:   Recent Labs   Lab 10/28/22  0233   INR 1.3*         Significant Imaging: I have reviewed all pertinent imaging results/findings within the past 24 hours.

## 2022-10-28 NOTE — PLAN OF CARE
Bone Marrow Transplant    Reviewed patient's chart, labs, and recent ascitic fluid studies.     Cytology from ascitic fluid: many mesothelial cells, no neoplasia  Flow cytometry: no mature B cells, no aberrant T cell antigen expression.     There is no evidence of lymphoma involvement in ascitic fluid.     Patient should follow up with Dr. Curtis on discharge. Next dose of rituximab is due 11/17 but will be rescheduled if he is not discharged by then.     Will sign off, please call with any questions.     Buffy Clark, DO  PGY-V  Hematology/Oncology Fellow

## 2022-10-28 NOTE — NURSING
At 2200 pt experiencing some mild R hand cramping. Will continue to monitor.     At 0420 notified on call physician (Dr. Bhatt) of critical platelets and WBC trending towards improvement. Will continue to monitor pt.

## 2022-10-28 NOTE — PROGRESS NOTES
[facility] ([address] Medicine  Progress Note    Patient Name: Corky Bach  MRN: [id number]  Patient Class: IP- Inpatient   Admission Date: 10/25/2022  Length of Stay: 3 days  Attending Physician: Amadeo Rutherford MD  Primary Care Provider: Sarmad Estrella MD        Subjective:     Principal Problem:Fever        HPI:  Corky Bach is a 56 y.o. 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 Since he has developed progressive ascites requiring frequent paracentesis. Around Oct 10 he had a paracentesis which was concerning for SBP but I can't see any place he received ABX.      Had paracentesis 10/10/22:  ,000; 12% PMNs = 18,720 ANC; 14% lymph; rest monocytes  Had repeat paracentesis 10/20/22 but no cell count sent  Now with continued large ascites again.   Came to Hepatology clinic with abdominal pain and fever. On admit to ED - fever 102. Influenca positive. Abdomen tender. Discussed with hematology, ID and hepatology - admission with broad spectrum abx/antifungal, tamiflu, CT scan and repeat para. Other infectious evaluation completed and pending. Cytology and flow from peritoneal fluid.       Overview/Hospital Course:  Initial repeat paracentesis from 10/25 without evidence of SBP. Repeat para ordered 10/27, negative for SBP. Sent for additional studies including ADA, tb culture, tb pcr. TB Pcr negative. Working on continuing diuresis efforts while treating for SBP per ID recs with merem for 5 days.      Interval History: No acute events overnight. Repeat para negative for SBP. Currently being diuresed for remainder of fluid on exam.    Review of Systems   Constitutional:  Negative for chills and fever.   HENT:  Negative for congestion and sore throat.    Eyes:  Negative for photophobia and visual  disturbance.   Respiratory:  Negative for cough and shortness of breath.    Cardiovascular:  Positive for leg swelling. Negative for chest pain and palpitations.   Gastrointestinal:  Positive for abdominal distention (improved s/p paracentesis). Negative for abdominal pain, blood in stool, constipation, diarrhea, nausea and vomiting.   Endocrine: Negative for cold intolerance and heat intolerance.   Genitourinary:  Negative for dysuria and hematuria.   Musculoskeletal:  Negative for arthralgias and myalgias.   Skin:  Negative for rash and wound.   Allergic/Immunologic: Negative for environmental allergies and food allergies.   Neurological:  Negative for seizures and numbness.   Hematological:  Negative for adenopathy. Does not bruise/bleed easily.   Psychiatric/Behavioral:  Negative for hallucinations and suicidal ideas.    Objective:     Vital Signs (Most Recent):  Temp: 97.9 °F (36.6 °C) (10/28/22 1132)  Pulse: 87 (10/28/22 1132)  Resp: 18 (10/28/22 1132)  BP: 115/65 (10/28/22 1132)  SpO2: 99 % (10/28/22 1132) Vital Signs (24h Range):  Temp:  [97.6 °F (36.4 °C)-98.3 °F (36.8 °C)] 97.9 °F (36.6 °C)  Pulse:  [70-87] 87  Resp:  [18-20] 18  SpO2:  [95 %-100 %] 99 %  BP: (105-116)/(61-65) 115/65     Weight: 113.5 kg (250 lb 3.6 oz)  Body mass index is 32.13 kg/m².    Intake/Output Summary (Last 24 hours) at 10/28/2022 1148  Last data filed at 10/27/2022 2300  Gross per 24 hour   Intake 240 ml   Output 650 ml   Net -410 ml        Physical Exam  Constitutional:       Appearance: He is well-developed.   HENT:      Head: Normocephalic and atraumatic.   Eyes:      General: No scleral icterus.     Conjunctiva/sclera: Conjunctivae normal.      Pupils: Pupils are equal, round, and reactive to light.   Neck:      Thyroid: No thyromegaly.      Vascular: No JVD.   Cardiovascular:      Rate and Rhythm: Normal rate and regular rhythm.      Heart sounds: Normal heart sounds. No murmur heard.    No friction rub. No gallop.    Pulmonary:      Effort: Pulmonary effort is normal. No respiratory distress.      Breath sounds: Normal breath sounds. No wheezing or rales.   Abdominal:      General: Bowel sounds are normal. There is distension.      Palpations: Abdomen is soft. There is no mass.      Tenderness: There is no abdominal tenderness. There is no guarding or rebound.      Hernia: No hernia is present.   Musculoskeletal:         General: No deformity. Normal range of motion.      Cervical back: Normal range of motion and neck supple.      Right lower leg: Edema present.      Left lower leg: Edema present.   Skin:     General: Skin is warm and dry.      Coloration: Skin is not jaundiced.   Neurological:      Mental Status: He is alert and oriented to person, place, and time.      Cranial Nerves: No cranial nerve deficit.   Psychiatric:         Behavior: Behavior normal.       Significant Labs: All pertinent labs within the past 24 hours have been reviewed.  CBC:   Recent Labs   Lab 10/27/22  0323 10/28/22  0233   WBC 1.56* 1.68*   HGB 9.3* 9.9*   HCT 29.9* 32.5*   PLT 27* 29*       CMP:   Recent Labs   Lab 10/27/22  0323 10/28/22  0233    137   K 3.0* 3.9    106   CO2 21* 22*   * 113*   BUN 11 12   CREATININE 0.7 0.8   CALCIUM 7.8* 8.1*   PROT 6.0 6.1   ALBUMIN 2.7* 3.1*   BILITOT 1.1* 1.2*   ALKPHOS 117 108   AST 39 35   ALT 30 27   ANIONGAP 10 9       Coagulation:   Recent Labs   Lab 10/28/22  0233   INR 1.3*         Significant Imaging: I have reviewed all pertinent imaging results/findings within the past 24 hours.      Assessment/Plan:      * Fever  Presumably secondary to influenza infection however patient had ascitic fluid from two weeks ago with 150k cell count. Repeat tap on admission negative for SBP. Following first tap, he was treated with keflex for a UTI but no other abx. Patient without SBP symptoms. Patient did however on admission test positive for influenza A.  - Repeat paracentesis 10/27, remains  negative for SBP- sent for AFB cx, tb pcr, and repeat cell count w/ cytology.  - Continuing merem for SBP treatment per ID recs (based on sample two weeks ago that was never properly treated)  - Treating flu with tamiflu [id number]  - Transplant ID following      Influenza A  Continuing tamiflu  On room air    Hepatic encephalopathy  Chronic, controlled  - Continue rifaximin      MALT lymphoma        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  MELD-Na score: 10 at 10/28/2022  2:33 AM  MELD score: 10 at 10/28/2022  2:33 AM  Calculated from:  Serum Creatinine: 0.8 mg/dL (Using min of 1 mg/dL) at 10/28/2022  2:33 AM  Serum Sodium: 137 mmol/L at 10/28/2022  2:33 AM  Total Bilirubin: 1.2 mg/dL at 10/28/2022  2:33 AM  INR(ratio): 1.3 at 10/28/2022  2:33 AM  Age: 56 years  Complicated by reaccumulating ascites. Recently had para that was concerning for infection however repeat negative.   - Continue lasix 40mg IV/aldactone 100mg daily for volume  - Continue rifaximin for HE, not on lactulose  - Hepatology following.    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  Last A1c reviewed-   Lab Results   Component Value Date    HGBA1C 12.1 (H) 10/25/2022     Most recent fingerstick glucose reviewed-   Recent Labs   Lab 10/26/22  1630 10/26/22  1953 10/27/22  0729 10/27/22  1124   POCTGLUCOSE 128* 132* 114* 129*     Current correctional scale  Low  Maintain anti-hyperglycemic dose as follows-   Antihyperglycemics (From admission, onward)    Start     Stop Route Frequency Ordered    10/26/22 2100  insulin detemir U-100 pen 40 Units         -- SubQ Nightly 10/26/22 0918    10/25/22 1645  insulin aspart U-100 pen 20 Units         -- SubQ 3 times daily with meals 10/25/22 1426    10/25/22 1525  insulin aspart U-100 pen 0-5 Units         -- SubQ Before meals & nightly PRN 10/25/22 1426        Hold Oral hypoglycemics while patient is in the hospital.      VTE Risk  Mitigation (From admission, onward)         Ordered     IP VTE HIGH RISK PATIENT  Once         10/25/22 1430     Place sequential compression device  Until discontinued         10/25/22 1430                Discharge Planning   GARY: 10/31/2022     Code Status: Not on file   Is the patient medically ready for discharge?: No    Reason for patient still in hospital (select all that apply): Patient trending condition  Discharge Plan A: Home with family                  Amadeo Rutherford MD  Department of Hospital Medicine   [facility] - Intensive Care ([place])

## 2022-10-29 VITALS
SYSTOLIC BLOOD PRESSURE: 117 MMHG | BODY MASS INDEX: 32.12 KG/M2 | DIASTOLIC BLOOD PRESSURE: 64 MMHG | OXYGEN SATURATION: 96 % | RESPIRATION RATE: 20 BRPM | HEIGHT: 74 IN | WEIGHT: 250.25 LBS | TEMPERATURE: 98 F | HEART RATE: 78 BPM

## 2022-10-29 LAB
ALBUMIN SERPL BCP-MCNC: 3 G/DL (ref 3.5–5.2)
ALP SERPL-CCNC: 103 U/L (ref 55–135)
ALT SERPL W/O P-5'-P-CCNC: 23 U/L (ref 10–44)
ANION GAP SERPL CALC-SCNC: 13 MMOL/L (ref 8–16)
ANISOCYTOSIS BLD QL SMEAR: SLIGHT
AST SERPL-CCNC: 30 U/L (ref 10–40)
BACTERIA SPEC AEROBE CULT: NO GROWTH
BASOPHILS # BLD AUTO: 0 K/UL (ref 0–0.2)
BASOPHILS NFR BLD: 0 % (ref 0–1.9)
BILIRUB SERPL-MCNC: 1.3 MG/DL (ref 0.1–1)
BUN SERPL-MCNC: 12 MG/DL (ref 6–20)
CALCIUM SERPL-MCNC: 8.1 MG/DL (ref 8.7–10.5)
CHLORIDE SERPL-SCNC: 102 MMOL/L (ref 95–110)
CO2 SERPL-SCNC: 21 MMOL/L (ref 23–29)
CREAT SERPL-MCNC: 0.7 MG/DL (ref 0.5–1.4)
DIFFERENTIAL METHOD: ABNORMAL
EOSINOPHIL # BLD AUTO: 0.1 K/UL (ref 0–0.5)
EOSINOPHIL NFR BLD: 4.3 % (ref 0–8)
ERYTHROCYTE [DISTWIDTH] IN BLOOD BY AUTOMATED COUNT: 17.2 % (ref 11.5–14.5)
EST. GFR  (NO RACE VARIABLE): >60 ML/MIN/1.73 M^2
GLUCOSE SERPL-MCNC: 88 MG/DL (ref 70–110)
GLUCOSE SERPL-MCNC: 95 MG/DL (ref 70–110)
HCT VFR BLD AUTO: 32.6 % (ref 40–54)
HGB BLD-MCNC: 10.1 G/DL (ref 14–18)
HYPOCHROMIA BLD QL SMEAR: ABNORMAL
IMM GRANULOCYTES # BLD AUTO: 0.01 K/UL (ref 0–0.04)
IMM GRANULOCYTES NFR BLD AUTO: 0.5 % (ref 0–0.5)
INR PPP: 1.3 (ref 0.8–1.2)
LYMPHOCYTES # BLD AUTO: 0.3 K/UL (ref 1–4.8)
LYMPHOCYTES NFR BLD: 15.1 % (ref 18–48)
MCH RBC QN AUTO: 22.3 PG (ref 27–31)
MCHC RBC AUTO-ENTMCNC: 31 G/DL (ref 32–36)
MCV RBC AUTO: 72 FL (ref 82–98)
MONOCYTES # BLD AUTO: 0.4 K/UL (ref 0.3–1)
MONOCYTES NFR BLD: 19.4 % (ref 4–15)
NEUTROPHILS # BLD AUTO: 1.1 K/UL (ref 1.8–7.7)
NEUTROPHILS NFR BLD: 60.7 % (ref 38–73)
NRBC BLD-RTO: 0 /100 WBC
OVALOCYTES BLD QL SMEAR: ABNORMAL
PLATELET # BLD AUTO: 30 K/UL (ref 150–450)
PLATELET BLD QL SMEAR: ABNORMAL
PMV BLD AUTO: ABNORMAL FL (ref 9.2–12.9)
POCT GLUCOSE: 131 MG/DL (ref 70–110)
POCT GLUCOSE: 162 MG/DL (ref 70–110)
POCT GLUCOSE: 95 MG/DL (ref 70–110)
POIKILOCYTOSIS BLD QL SMEAR: SLIGHT
POTASSIUM SERPL-SCNC: 3.2 MMOL/L (ref 3.5–5.1)
PROT SERPL-MCNC: 6 G/DL (ref 6–8.4)
PROTHROMBIN TIME: 12.9 SEC (ref 9–12.5)
RBC # BLD AUTO: 4.52 M/UL (ref 4.6–6.2)
SODIUM SERPL-SCNC: 136 MMOL/L (ref 136–145)
WBC # BLD AUTO: 1.86 K/UL (ref 3.9–12.7)

## 2022-10-29 PROCEDURE — 25000003 PHARM REV CODE 250: Performed by: HOSPITALIST

## 2022-10-29 PROCEDURE — 85610 PROTHROMBIN TIME: CPT | Performed by: HOSPITALIST

## 2022-10-29 PROCEDURE — 85025 COMPLETE CBC W/AUTO DIFF WBC: CPT | Performed by: HOSPITALIST

## 2022-10-29 PROCEDURE — 25000003 PHARM REV CODE 250: Performed by: STUDENT IN AN ORGANIZED HEALTH CARE EDUCATION/TRAINING PROGRAM

## 2022-10-29 PROCEDURE — 99239 HOSP IP/OBS DSCHRG MGMT >30: CPT | Mod: ,,, | Performed by: STUDENT IN AN ORGANIZED HEALTH CARE EDUCATION/TRAINING PROGRAM

## 2022-10-29 PROCEDURE — 63600175 PHARM REV CODE 636 W HCPCS: Performed by: HOSPITALIST

## 2022-10-29 PROCEDURE — 36415 COLL VENOUS BLD VENIPUNCTURE: CPT | Performed by: HOSPITALIST

## 2022-10-29 PROCEDURE — 80053 COMPREHEN METABOLIC PANEL: CPT | Performed by: HOSPITALIST

## 2022-10-29 PROCEDURE — 99239 PR HOSPITAL DISCHARGE DAY,>30 MIN: ICD-10-PCS | Mod: ,,, | Performed by: STUDENT IN AN ORGANIZED HEALTH CARE EDUCATION/TRAINING PROGRAM

## 2022-10-29 RX ORDER — CIPROFLOXACIN 500 MG/1
750 TABLET ORAL EVERY 12 HOURS
Qty: 9 TABLET | Refills: 0 | Status: SHIPPED | OUTPATIENT
Start: 2022-10-29 | End: 2022-11-01

## 2022-10-29 RX ORDER — OSELTAMIVIR PHOSPHATE 75 MG/1
75 CAPSULE ORAL 2 TIMES DAILY
Qty: 13 CAPSULE | Refills: 0 | Status: SHIPPED | OUTPATIENT
Start: 2022-10-29 | End: 2022-11-05

## 2022-10-29 RX ORDER — POTASSIUM CHLORIDE 20 MEQ/1
40 TABLET, EXTENDED RELEASE ORAL EVERY 4 HOURS
Status: COMPLETED | OUTPATIENT
Start: 2022-10-29 | End: 2022-10-29

## 2022-10-29 RX ADMIN — POTASSIUM CHLORIDE EXTENDED-RELEASE 40 MEQ: 1500 TABLET ORAL at 10:10

## 2022-10-29 RX ADMIN — OXYBUTYNIN CHLORIDE 5 MG: 5 TABLET, EXTENDED RELEASE ORAL at 10:10

## 2022-10-29 RX ADMIN — SPIRONOLACTONE 100 MG: 25 TABLET, FILM COATED ORAL at 10:10

## 2022-10-29 RX ADMIN — GUAIFENESIN AND DEXTROMETHORPHAN HYDROBROMIDE 1 TABLET: 600; 30 TABLET, EXTENDED RELEASE ORAL at 10:10

## 2022-10-29 RX ADMIN — FINASTERIDE 5 MG: 5 TABLET, FILM COATED ORAL at 10:10

## 2022-10-29 RX ADMIN — ATORVASTATIN CALCIUM 10 MG: 10 TABLET, FILM COATED ORAL at 10:10

## 2022-10-29 RX ADMIN — INSULIN ASPART 20 UNITS: 100 INJECTION, SOLUTION INTRAVENOUS; SUBCUTANEOUS at 12:10

## 2022-10-29 RX ADMIN — OSELTAMIVIR PHOSPHATE 75 MG: 75 CAPSULE ORAL at 10:10

## 2022-10-29 RX ADMIN — VENLAFAXINE HYDROCHLORIDE 75 MG: 37.5 TABLET ORAL at 10:10

## 2022-10-29 RX ADMIN — FAMOTIDINE 20 MG: 20 TABLET ORAL at 10:10

## 2022-10-29 RX ADMIN — TAMSULOSIN HYDROCHLORIDE 0.4 MG: 0.4 CAPSULE ORAL at 10:10

## 2022-10-29 RX ADMIN — POTASSIUM CHLORIDE EXTENDED-RELEASE 40 MEQ: 1500 TABLET ORAL at 03:10

## 2022-10-29 RX ADMIN — RIFAXIMIN 550 MG: 550 TABLET ORAL at 10:10

## 2022-10-29 RX ADMIN — MEROPENEM 2 G: 1 INJECTION INTRAVENOUS at 03:10

## 2022-10-29 NOTE — DISCHARGE SUMMARY
[facility] - Intensive Care ([place] [id number])  [facility] Medicine  Discharge Summary      Patient Name: Corky Bach  MRN: [id number]  Patient Class: IP- Inpatient  Admission Date: 10/25/2022  Hospital Length of Stay: 4 days  Discharge Date and Time:  10/29/2022 9:42 AM  Attending Physician: Amadeo Rutherford MD   Discharging Provider: Amadeo Rutherford MD  Primary Care Provider: Sarmad Estrella MD  [facility] Medicine Team: [facility] HOSP MED L Amadeo Rutherford MD    HPI:   Corky Bach is a 56 y.o. 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 Since he has developed progressive ascites requiring frequent paracentesis. Around Oct 10 he had a paracentesis which was concerning for SBP but I can't see any place he received ABX.      Had paracentesis 10/10/22:  ,000; 12% PMNs = 18,720 ANC; 14% lymph; rest monocytes  Had repeat paracentesis 10/20/22 but no cell count sent  Now with continued large ascites again.   Came to Hepatology clinic with abdominal pain and fever. On admit to ED - fever 102. Influenca positive. Abdomen tender. Discussed with hematology, ID and hepatology - admission with broad spectrum abx/antifungal, tamiflu, CT scan and repeat para. Other infectious evaluation completed and pending. Cytology and flow from peritoneal fluid.       * No surgery found *      Hospital Course:   Initial repeat paracentesis from 10/25 without evidence of SBP. Repeat para ordered 10/27, negative for SBP. Sent for additional studies including ADA, tb culture, tb pcr. TB Pcr negative. Patient received approximately 4 days of merem, okay to be transitioned to cipro 750mg q12 on discharge. Furthermore, will continue treating for flu with tamiflu x10d total. Will try to arrange for f/u paracentesis at [facility] Treatment  Preferences:         Consults:   Consults (From admission, onward)        Status Ordering Provider     Inpatient consult to Registered Dietitian/Nutritionist  Once        Provider:  (Not yet assigned)    Completed ANTHONY CUNHA     Inpatient consult to Hepatology  Once        Provider:  (Not yet assigned)    Completed LILIA RAIN     Inpatient consult to Hematology/Oncology  Once        Provider:  (Not yet assigned)    Completed LILIA RAIN     Inpatient consult to Infectious Diseases  Once        Provider:  (Not yet assigned)    Completed LILIA RAIN          * Fever  Presumably secondary to influenza infection however patient had ascitic fluid from two weeks ago with 150k cell count. Repeat tap on admission negative for SBP. Following first tap, he was treated with keflex for a UTI but no other abx. Patient without SBP symptoms. Patient did however on admission test positive for influenza A.  - Repeat paracentesis 10/27, remains negative for SBP- sent for AFB cx, tb pcr (negative), pending cytology on repeat tap  - Switching to ciprofloxacin 750 q12h to complete 7 days treatment. Received 4days of [facility] inpatient.   - Treating flu with tamiflu [id number]      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  MELD-Na score: 10 at 10/29/2022  4:16 AM  MELD score: 10 at 10/29/2022  4:16 AM  Calculated from:  Serum Creatinine: 0.7 mg/dL (Using min of 1 mg/dL) at 10/29/2022  4:16 AM  Serum Sodium: 136 mmol/L at 10/29/2022  4:16 AM  Total Bilirubin: 1.3 mg/dL at 10/29/2022  4:16 AM  INR(ratio): 1.3 at 10/29/2022  4:16 AM  Age: 56 years  Complicated by reaccumulating ascites. Recently had para that was concerning for infection however repeat negative.   - Continue lasix 40mg/aldactone 100mg daily for volume. Repeat para prn @ [facility]  - Continue rifaximin for HE, not on lactulose  - Hepatology following.      Final Active Diagnoses:    Diagnosis Date Noted POA    PRINCIPAL PROBLEM:  Fever [R50.9] 10/27/2022  Yes    Influenza A [J10.1] 10/26/2022 Yes    Hepatic encephalopathy [K76.82] 11/10/2021 Yes    MALT lymphoma [C88.4] 05/25/2021 Yes    Portal hypertensive gastropathy [K76.6, K31.89] 11/13/2018 Yes    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 [K75.81, K74.60] 11/20/2017 Yes    Type 2 diabetes mellitus without ophthalmic manifestations [E11.9] 07/29/2015 Yes      Problems Resolved During this Admission:    Diagnosis Date Noted Date Resolved POA    Celiac disease [K90.0] 07/17/2017 10/27/2022 Yes    Idiopathic chronic gout of multiple sites without tophus [M1A.09X0] 04/19/2017 10/27/2022 Yes       Discharged Condition: stable    Disposition: Home or Self Care    Follow Up:    Patient Instructions:      IR Paracentesis with Imaging   Standing Status: Standing Number of Occurrences: 12 Standing Exp. Date: 04/29/23     Order Specific Question Answer Comments   Is this Procedure Therapeutic or Diagnostic? Therapeutic    Reason for Exam: ascites    Has the patient had a prior contrast or iodine reaction? No    Is the patient currently on any blood thinners like Aspirin, Coumadin, or Plavix? No    Is the patient on any Metformin drug, such as Glucophage or Glucovance? No    May the Radiologist modify the order per protocol to meet the clinical needs of the patient? Yes    Release to patient Immediate        Significant Diagnostic Studies: Labs:   CMP   Recent Labs   Lab 10/28/22  0233 10/29/22  0416    136   K 3.9 3.2*    102   CO2 22* 21*   * 88   BUN 12 12   CREATININE 0.8 0.7   CALCIUM 8.1* 8.1*   PROT 6.1 6.0   ALBUMIN 3.1* 3.0*   BILITOT 1.2* 1.3*   ALKPHOS 108 103   AST 35 30   ALT 27 23   ANIONGAP 9 13   , CBC   Recent Labs   Lab 10/28/22  0233 10/29/22  0416   WBC 1.68* 1.86*   HGB 9.9* 10.1*   HCT 32.5* 32.6*   PLT 29*  --     and INR   Lab Results   Component Value Date    INR 1.3 (H) 10/29/2022    INR 1.3 (H) 10/28/2022    INR 1.3 (H) 10/27/2022     Radiology: CT scan: CT ABDOMEN PELVIS WITH  CONTRAST:   Results for orders placed or performed during the hospital encounter of 10/25/22   CT Abdomen Pelvis With Contrast    Narrative    EXAMINATION:  CT ABDOMEN PELVIS WITH CONTRAST    CLINICAL HISTORY:  Sepsis;fever;    TECHNIQUE:  Low dose axial images, sagittal and coronal reformations were obtained from the lung bases to the pubic symphysis following the IV administration of 100 mL of Omnipaque 350 .  Oral contrast was not given.    COMPARISON:  PET-CT 08/11/2022, CT urogram 04/22/2022    FINDINGS:  Images of the lower thorax are remarkable for bilateral dependent atelectasis.  There is thickening of the distal esophagus, may be on the basis of reflux esophagitis however correlation with direct visualization as warranted.    The liver is cirrhotic in configuration.  The spleen is enlarged.  There is a low attenuating lesion within the posterior aspect of the spleen measuring 2.5 cm, stable.  The gallbladder is grossly unremarkable.  The stomach is decompressed without wall thickening.  There are several scattered abdominal lymph nodes.  There is cavernous transformation of the portal vein.  There are splenic and gabriela hepatic collateral vessels.  There is chronic appearing thrombus with calcification involving the extrahepatic portal vein, similar to the previous examination extending to the proximal aspect of the SMV.  Additional thrombus noted within the splenic vein near the pancreatic tail also containing scattered regions of calcification.  These findings were better demonstrated on previous examination secondary to improved bolus timing at that time.    The kidneys enhance symmetrically without hydronephrosis.  There is right nonobstructive nephrolithiasis.  The bilateral ureters are unable to be followed in their entirety to the urinary bladder, no definite calculi seen.  The urinary bladder is decompressed noting there may be residual wall thickening.  There is moderate to large volume  abdominopelvic ascites, worsened since most recent PET-CT.  The prostate is not enlarged.    There are several scattered colonic diverticula.  There is some indistinctness about the distal descending colon and proximal sigmoid colon.  The terminal ileum is unremarkable.  The appendix is unremarkable.  The small bowel is grossly unremarkable.  There is scattered mesenteric edema.  There are a few scattered shotty periaortic, pericaval, and mesenteric lymph nodes.  No focal organized fluid collection or overt peritoneal thickening to suggest discrete changes of peritonitis.  Peritoneal fluid sampling however would be needed for definitive exclusion of the same.    There is osteopenia.  There are degenerative changes of the spine.  There is bilateral gynecomastia.  No significant inguinal lymphadenopathy.      Impression    This report was flagged in Epic as abnormal.    1. Findings suggesting cirrhosis superimposed portal hypertension including ascites, splenomegaly, and several varices.  The ascites has increased since the previous examination without discrete peritoneal thickening.  Correlation with ascitic fluid sampling as warranted.  2. There is some indistinctness about the distal descending colon and proximal sigmoid colon.  Possible infectious or inflammatory colitis is a consideration versus diverticulitis however evaluation for inflammation is limited secondary to fluid in the region.  Correlation with any symptomatology to support a diagnosis of the same should be assessed.  3. Stable chronic appearing thrombus involving the portal vein and splenic vein as described.  4. Right nonobstructive nephrolithiasis.  5. Wall thickening involving the distal esophagus, correlation with any history of reflux esophagitis.  Direct visualization as warranted.  6. Please see above for additional findings.      Electronically signed by: Ruddy Villanueva MD  Date:    10/25/2022  Time:    15:19     Ultrasound:  "10/29/22  Impression:     Large amount of ascites in the abdomen.     Splenomegaly with a persistent hyperechoic lesion in the mid to lower portion of the spleen.     Partially calcified thrombus in the main portal vein.     Nodular surface of the liver compatible with cirrhosis.    Pending Diagnostic Studies:     None         Medications:  Reconciled Home Medications:      Medication List      START taking these medications    ciprofloxacin HCl 500 MG tablet  Commonly known as: CIPRO  Take 1.5 tablets (750 mg total) by mouth every 12 (twelve) hours. for 3 days     dextromethorphan-guaiFENesin  mg  mg per 12 hr tablet  Commonly known as: MUCINEX DM  Take 1 tablet by mouth 2 (two) times daily. for 10 days     oseltamivir 75 MG capsule  Commonly known as: TAMIFLU  Take 1 capsule (75 mg total) by mouth 2 (two) times daily. for 7 days        CHANGE how you take these medications    ferrous sulfate 325 (65 FE) MG EC tablet  Take 1 tablet (325 mg total) by mouth once daily. Take with vitamin C  What changed:   · when to take this  · additional instructions        CONTINUE taking these medications    acetaminophen 500 MG tablet  Commonly known as: TYLENOL  Take 1,000 mg by mouth 2 (two) times daily as needed for Pain.     ascorbic acid (vitamin C) 500 MG tablet  Commonly known as: VITAMIN C  Take 1 tablet (500 mg total) by mouth once daily.     BD ULTRA-FINE ORIG PEN NEEDLE 29 gauge x 1/2" Ndle  Generic drug: pen needle, diabetic  USE PEN NEEDLE TO INJECT INSULIN EVERY EVENING     finasteride 5 mg tablet  Commonly known as: PROSCAR  Take 1 tablet (5 mg total) by mouth once daily.     furosemide 40 MG tablet  Commonly known as: LASIX  Take 1 tablet (40 mg total) by mouth once daily. New dose as of 07/11/2022- for fluid management / ascites     magnesium oxide 400 mg (241.3 mg magnesium) tablet  Commonly known as: MAGOX  Take 1 tablet (400 mg total) by mouth once daily.     oxybutynin 5 MG Tr24  Commonly known " as: DITROPAN-XL  Take 1 tablet (5 mg total) by mouth once daily.     propranoloL 10 MG tablet  Commonly known as: INDERAL  TAKE 1 TABLET (10 MG TOTAL) BY MOUTH 3 (THREE) TIMES DAILY.     rifAXIMin 550 mg Tab  Commonly known as: XIFAXAN  Take 1 tablet (550 mg total) by mouth 2 (two) times daily.     spironolactone 100 MG tablet  Commonly known as: ALDACTONE  Take 1 tablet (100 mg total) by mouth once daily. For fluid management / ascites- new as of 07/11/2022     tamsulosin 0.4 mg Cap  Commonly known as: FLOMAX  Take 1 capsule (0.4 mg total) by mouth every evening.     TRADJENTA 5 mg Tab tablet  Generic drug: linaGLIPtin  TAKE 1 TABLET (5 MG TOTAL) BY MOUTH ONCE DAILY.        ASK your doctor about these medications    insulin lispro 100 unit/mL pen  Commonly known as: HumaLOG KwikPen Insulin  Inject 60 Units into the skin 3 (three) times daily with meals.     LEVEMIR FLEXTOUCH U-100 INSULN 100 unit/mL (3 mL) Inpn pen  Generic drug: insulin detemir U-100  Inject 80 Units into the skin every evening. New dose as of 01/03/2022     venlafaxine 75 MG tablet  Commonly known as: EFFEXOR  TAKE 1 TABLET (75 MG TOTAL) BY MOUTH 2 (TWO) TIMES DAILY.            Indwelling Lines/Drains at time of discharge:   Lines/Drains/Airways     None                 Time spent on the discharge of patient: 35 minutes         Amadeo Rutherford MD  Department of Hospital Medicine  [facility] - Intensive Care ([place])

## 2022-10-29 NOTE — ASSESSMENT & PLAN NOTE
Presumably secondary to influenza infection however patient had ascitic fluid from two weeks ago with 150k cell count. Repeat tap on admission negative for SBP. Following first tap, he was treated with keflex for a UTI but no other abx. Patient without SBP symptoms. Patient did however on admission test positive for influenza A.  - Repeat paracentesis 10/27, remains negative for SBP- sent for AFB cx, tb pcr (negative), pending cytology on repeat tap  - Switching to ciprofloxacin 750 q12h to complete 7 days treatment. Received 4days of [facility] inpatient.   - Treating flu with tamiflu [id number]

## 2022-10-29 NOTE — ASSESSMENT & PLAN NOTE
MELD-Na score: 10 at 10/29/2022  4:16 AM  MELD score: 10 at 10/29/2022  4:16 AM  Calculated from:  Serum Creatinine: 0.7 mg/dL (Using min of 1 mg/dL) at 10/29/2022  4:16 AM  Serum Sodium: 136 mmol/L at 10/29/2022  4:16 AM  Total Bilirubin: 1.3 mg/dL at 10/29/2022  4:16 AM  INR(ratio): 1.3 at 10/29/2022  4:16 AM  Age: 56 years  Complicated by reaccumulating ascites. Recently had para that was concerning for infection however repeat negative.   - Continue lasix 40mg/aldactone 100mg daily for volume. Repeat para prn @ [facility]  - Continue rifaximin for HE, not on lactulose  - Hepatology following.

## 2022-10-29 NOTE — PLAN OF CARE
Problem: Infection  Goal: Absence of Infection Signs and Symptoms  Outcome: Ongoing, Progressing     Problem: Fall Injury Risk  Goal: Absence of Fall and Fall-Related Injury  Outcome: Ongoing, Progressing    Discharge instructions given. Iv removed. Iv antibiotics given. Bed locked and in lowest position. Call light in reach.

## 2022-10-30 LAB
BACTERIA BLD CULT: NORMAL
BACTERIA BLD CULT: NORMAL
M TB CMPLX DNA SPEC QL NAA+PROBE: NEGATIVE
SPECIMEN SOURCE: NORMAL

## 2022-10-31 ENCOUNTER — PATIENT OUTREACH (OUTPATIENT)
Dept: ADMINISTRATIVE | Facility: CLINIC | Age: 57
End: 2022-10-31
Payer: MEDICAID

## 2022-10-31 NOTE — PROGRESS NOTES
2nd attempt made to reach patient for TCC Call. Left voicemail please call [phone] leave first name, last name and . I will return your call.

## 2022-10-31 NOTE — TELEPHONE ENCOUNTER
----- Message from Ruth Quinn MD sent at 11/16/2017  9:42 AM CST -----  Results are within normal limits, please advise patient.  These are your recent results, continue taking your medications, and keep your upcoming appointment.    
Spoke with pt, scheduled as requested. GLADIS.    
[facility]

## 2022-10-31 NOTE — PROGRESS NOTES
C3 nurse attempted to contact Corky Bach for a TCC post hospital discharge follow up call. The patient is unable to conduct the call @ this time. The patient requested a callback.    The patient does not have a scheduled HOSFU appointment within 5-7 days post hospital discharge date 10/29/22. Message sent to Physician staff to assist with HOSFU appointment scheduling.

## 2022-11-01 LAB — ADENOSINE DEAMINASE PRT-CCNC: 4 U/L (ref 0–30)

## 2022-11-02 LAB — BACTERIA SPEC ANAEROBE CULT: NORMAL

## 2022-11-03 LAB — BACTERIA SPEC ANAEROBE CULT: NORMAL

## 2022-11-03 NOTE — PHYSICIAN QUERY
PT Name: Corky Bach  MR #: [id number]     DOCUMENTATION CLARIFICATION      CDS/: Katherine Grant RN, CDI            Contact information:[email or web address]     This form is a permanent document in the medical record.     Query Date: November 3, 2022    Dear Provider,  By submitting this query, we are merely seeking further clarification of documentation.  Please utilize your independent clinical judgment when addressing the question(s) below.     The Medical Record contains the following:    Supporting Clinical Findings Location in Medical Record   Sepsis   - unclear etiology but concern for intra-abdominal versus Influenza  - paracentesis on 10/25 not consistent with overt SBP, culture pending.   - influenza positive on Tamiflu  -  Discussed with ID team - continue broad spectrum abx in immunocompromised patient.   - repeat paracentesis to ensure cell count correct  - pending rest of infectious eval.   - CT abdomen with mild findings maybe colitis but patient denies any diarrhea symptomst.    H&P 10/26   Fever  Presumably secondary to influenza infection however patient had ascitic fluid from two weeks ago with 150k cell count. Repeat tap on admission negative for SBP. Following first tap, he was treated with keflex for a UTI but no other abx. Patient without SBP symptoms. Patient did however on admission test positive for influenza A.  - Repeat paracentesis 10/27, remains negative for SBP- sent for AFB cx, tb pcr (negative), pending cytology on repeat tap  - Switching to ciprofloxacin 750 q12h to complete 7 days treatment. Received 4days of [facility] inpatient.   - Treating flu with tamiflu [id number] Discharge summary 10/29   Last 24 Hour Vital Signs:  BP  Min: 129/76  Max: 163/76  Temp  Av.5 °F (38.1 °C)  Min: 99.6 °F (37.6 °C)  Max: 101.4 °F (38.6 °C)  Pulse  Av.5  Min: 89  Max: 98  Resp  Av.5  Min: 19  Max: 20  SpO2  Av.5 %  Min: 97 %  Max: 98 %    Vital Signs (24h Range):  Temp:  [98.2  °F (36.8 °C)-100 °F (37.8 °C)] 98.3 °F (36.8 °C)  Pulse:  [65-98] 82  Resp:  [18-19] 18  SpO2:  [93 %-95 %] 93 %  BP: (120-137)/(60-74) 137/67    Vital Signs (24h Range):  Temp:  [97.4 °F (36.3 °C)-98.5 °F (36.9 °C)] 97.6 °F (36.4 °C)  Pulse:  [67-85] 76  Resp:  [18-20] 18  SpO2:  [92 %-99 %] 99 %  BP: (112-144)/(61-78) 122/68   VS 10/25                VS 10/26              VS 10/27   WBC 2.16-->1.43-->1.56-->1.68    Lactate 2.2   Procal 0.08 Lab 10/25-28    Lab 10/25     Please clarify if the ___________________________ diagnosis has been:    [  ] Ruled In, Please specify source if known:________________   [  x] Ruled In, Now Resolved, Please specify source if known:________________   [  ] Ruled Out   [  ] Still to be ruled out at the time of discharge   [  ] Other/Clarification of findings (please specify): _______________    [   ] Clinically undetermined       Please document in your progress notes daily for the duration of treatment, until resolved, and include in your discharge summary.    Form No. [id number]

## 2022-12-11 ENCOUNTER — TELEPHONE (OUTPATIENT)
Dept: HEPATOLOGY | Facility: CLINIC | Age: 57
End: 2022-12-11
Payer: MEDICAID

## 2022-12-11 NOTE — TELEPHONE ENCOUNTER
----- Message from Sri Urban MD sent at 12/9/2022  7:14 PM CST -----  Regarding: RE: recurrent Paracentesis  Tx. Fluid appears to be transudative and thus from the liver. Will put him on my schedule in [place] this coming Tuesday: dec 13th at 8:30 am  ----- Message -----  From: Monika Miles MA  Sent: 12/9/2022   8:48 AM CST  To: Sri Urban MD  Subject: RE: recurrent Paracentesis                       I schd him in [place] on 2/14. Is this ok?    Monika  ----- Message -----  From: Sri Urban MD  Sent: 12/8/2022  11:21 PM CST  To: Monika Miles MA, Noelle Evans MD, #  Subject: RE: recurrent Paracentesis                       Pt needs f/u with me- please schedule  ----- Message -----  From: Monika Miles MA  Sent: 12/8/2022  11:38 AM CST  To: Sri Urban MD  Subject: FW: recurrent Paracentesis                         ----- Message -----  From: Noelle Evans MD  Sent: 12/8/2022  11:27 AM CST  To: [facility] Staff  Subject: recurrent Paracentesis                           Good Morning Dr. Shelton,    Mr. Verdugo was seen by the GI staff last Friday, Dec 2nd for a Paracentesis. However, it seems like he needs recurrent paracentesis amounting to once about every week. Given the rapid re-accumulation of ascites, we send off the fluid for cytology and cultures. Just wanted to give you a heads-up.     Of note, he is scheduled for another paracentesis tomorrow, Dec 9th 2022    Thank you  Noelle Evans MD, PGY1  Internal Medicine

## 2022-12-12 NOTE — TELEPHONE ENCOUNTER
Pt is unable to make appt on tomorrow at 8:30 am as he needs to pick his wife up from the airport. PITER PERDOMO

## 2022-12-15 LAB
ACID FAST MOD KINY STN SPEC: NORMAL
MYCOBACTERIUM SPEC QL CULT: NORMAL

## 2022-12-30 ENCOUNTER — TELEPHONE (OUTPATIENT)
Dept: HEPATOLOGY | Facility: CLINIC | Age: 57
End: 2022-12-30
Payer: MEDICAID

## 2022-12-30 NOTE — TELEPHONE ENCOUNTER
Call to pt -to take 1/21/2023 0900 am he accepted ---- Message from Justin Griffin sent at 12/30/2022  3:02 PM CST -----  Regarding: Schedule Follow Up  Patient called in to speak with staff to schedule patient an appointment with provider. Next available for [place] not populating until April. Spouse states if something sooner is available for her [place] location, they are open to that as well. Requesting a call back to assist further.            Contact: [phone] or [phone]

## 2023-01-21 ENCOUNTER — OFFICE VISIT (OUTPATIENT)
Dept: HEPATOLOGY | Facility: CLINIC | Age: 58
End: 2023-01-21
Payer: MEDICAID

## 2023-01-21 VITALS
RESPIRATION RATE: 18 BRPM | WEIGHT: 225.75 LBS | SYSTOLIC BLOOD PRESSURE: 109 MMHG | BODY MASS INDEX: 28.97 KG/M2 | DIASTOLIC BLOOD PRESSURE: 70 MMHG | HEIGHT: 74 IN | OXYGEN SATURATION: 97 % | TEMPERATURE: 98 F | HEART RATE: 92 BPM

## 2023-01-21 DIAGNOSIS — R18.8 OTHER ASCITES: Primary | ICD-10-CM

## 2023-01-21 DIAGNOSIS — K76.0 FATTY LIVER: ICD-10-CM

## 2023-01-21 DIAGNOSIS — I81 PORTAL VEIN THROMBOSIS: ICD-10-CM

## 2023-01-21 DIAGNOSIS — K75.81 LIVER CIRRHOSIS SECONDARY TO NASH: ICD-10-CM

## 2023-01-21 DIAGNOSIS — K74.60 LIVER CIRRHOSIS SECONDARY TO NASH: ICD-10-CM

## 2023-01-21 DIAGNOSIS — C88.4 MALT LYMPHOMA: Primary | ICD-10-CM

## 2023-01-21 DIAGNOSIS — C88.4 MALT LYMPHOMA: ICD-10-CM

## 2023-01-21 DIAGNOSIS — K76.82 HEPATIC ENCEPHALOPATHY: ICD-10-CM

## 2023-01-21 PROCEDURE — 99215 OFFICE O/P EST HI 40 MIN: CPT | Mod: S$PBB,,, | Performed by: INTERNAL MEDICINE

## 2023-01-21 PROCEDURE — 3074F SYST BP LT 130 MM HG: CPT | Mod: CPTII,,, | Performed by: INTERNAL MEDICINE

## 2023-01-21 PROCEDURE — 3078F PR MOST RECENT DIASTOLIC BLOOD PRESSURE < 80 MM HG: ICD-10-PCS | Mod: CPTII,,, | Performed by: INTERNAL MEDICINE

## 2023-01-21 PROCEDURE — 3008F PR BODY MASS INDEX (BMI) DOCUMENTED: ICD-10-PCS | Mod: CPTII,,, | Performed by: INTERNAL MEDICINE

## 2023-01-21 PROCEDURE — 1160F RVW MEDS BY RX/DR IN RCRD: CPT | Mod: CPTII,,, | Performed by: INTERNAL MEDICINE

## 2023-01-21 PROCEDURE — 3061F NEG MICROALBUMINURIA REV: CPT | Mod: CPTII,,, | Performed by: INTERNAL MEDICINE

## 2023-01-21 PROCEDURE — 3046F HEMOGLOBIN A1C LEVEL >9.0%: CPT | Mod: CPTII,,, | Performed by: INTERNAL MEDICINE

## 2023-01-21 PROCEDURE — 3046F PR MOST RECENT HEMOGLOBIN A1C LEVEL > 9.0%: ICD-10-PCS | Mod: CPTII,,, | Performed by: INTERNAL MEDICINE

## 2023-01-21 PROCEDURE — 99215 PR OFFICE/OUTPT VISIT, EST, LEVL V, 40-54 MIN: ICD-10-PCS | Mod: S$PBB,,, | Performed by: INTERNAL MEDICINE

## 2023-01-21 PROCEDURE — 3066F PR DOCUMENTATION OF TREATMENT FOR NEPHROPATHY: ICD-10-PCS | Mod: CPTII,,, | Performed by: INTERNAL MEDICINE

## 2023-01-21 PROCEDURE — 3066F NEPHROPATHY DOC TX: CPT | Mod: CPTII,,, | Performed by: INTERNAL MEDICINE

## 2023-01-21 PROCEDURE — 1159F MED LIST DOCD IN RCRD: CPT | Mod: CPTII,,, | Performed by: INTERNAL MEDICINE

## 2023-01-21 PROCEDURE — 99215 OFFICE O/P EST HI 40 MIN: CPT | Mod: PBBFAC | Performed by: INTERNAL MEDICINE

## 2023-01-21 PROCEDURE — 3008F BODY MASS INDEX DOCD: CPT | Mod: CPTII,,, | Performed by: INTERNAL MEDICINE

## 2023-01-21 PROCEDURE — 1159F PR MEDICATION LIST DOCUMENTED IN MEDICAL RECORD: ICD-10-PCS | Mod: CPTII,,, | Performed by: INTERNAL MEDICINE

## 2023-01-21 PROCEDURE — 3074F PR MOST RECENT SYSTOLIC BLOOD PRESSURE < 130 MM HG: ICD-10-PCS | Mod: CPTII,,, | Performed by: INTERNAL MEDICINE

## 2023-01-21 PROCEDURE — 1160F PR REVIEW ALL MEDS BY PRESCRIBER/CLIN PHARMACIST DOCUMENTED: ICD-10-PCS | Mod: CPTII,,, | Performed by: INTERNAL MEDICINE

## 2023-01-21 PROCEDURE — 99999 PR PBB SHADOW E&M-EST. PATIENT-LVL V: CPT | Mod: PBBFAC,,, | Performed by: INTERNAL MEDICINE

## 2023-01-21 PROCEDURE — 99999 PR PBB SHADOW E&M-EST. PATIENT-LVL V: ICD-10-PCS | Mod: PBBFAC,,, | Performed by: INTERNAL MEDICINE

## 2023-01-21 PROCEDURE — 3078F DIAST BP <80 MM HG: CPT | Mod: CPTII,,, | Performed by: INTERNAL MEDICINE

## 2023-01-21 PROCEDURE — 3061F PR NEG MICROALBUMINURIA RESULT DOCUMENTED/REVIEW: ICD-10-PCS | Mod: CPTII,,, | Performed by: INTERNAL MEDICINE

## 2023-01-21 RX ORDER — METOLAZONE 2.5 MG/1
2.5 TABLET ORAL DAILY
Qty: 30 TABLET | Refills: 11 | Status: SHIPPED | OUTPATIENT
Start: 2023-01-21 | End: 2023-02-28

## 2023-01-21 NOTE — PROGRESS NOTES
HEPATOLOGY FOLLOW UP    Referring Physician: FERNANDO Baum  Current Corresponding Physician: FERNANDO Baum, Sarmad Estrella MD, Vale Covarrubias MD, Tank Curtis MD    Corky Bach is here for follow up of     HPI   Corky Bach is a 57 y.o. 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     -dx few years ago  --ascites-started on diuretics lasix 40 mg and aldactone 100 mg daily; noted to have distended abdomen when seen in consultation  --HE- on rifaximin; no longer on lactulose  -EGD-03/22 and 07/22/20- small varices; on propranolol     MELD-Na score: 11 at 6/13/2022 10:35 AM  MELD score: 11 at 6/13/2022 10:35 AM  Calculated from:  Serum Creatinine: 0.9 mg/dL (Using min of 1 mg/dL) at 6/13/2022 10:35 AM  Serum Sodium: 133 mmol/L at 6/13/2022 10:35 AM  Total Bilirubin: 1.6 mg/dL at 6/13/2022 10:35 AM  INR(ratio): 1.3 at 6/13/2022 10:35 AM    Interval History  Since Corky COLLINS Isreal last visit with me:    Has develoepd large ascites. Having weekly paracenteses (last one this week- took 14 L fluid)  Ascites analysis: SAAG >1.1; protein <1.0  Ascites: lasix 40 mg and spironolactone 100 mg daily  HE, ongoing; on rifaximin; off lactulose but having some memory issues    Admitted for ascites cell count: ,000; 12% PMNs = 18,720 ANC; 14% lymph; rest monocytes; repeat did not show elevated WBC (no specific tx given)    CT abdo pelvis w contrast 10/25/22: Findings suggesting cirrhosis superimposed portal hypertension including ascites, splenomegaly, and several varices.  The ascites has increased since the previous examination without discrete peritoneal thickening.      WBC 2.78, ANC 2.1, ALC 0.3, plts 41  MELD-Na score: 10 at 10/29/2022  4:16 AM  MELD score: 10 at 10/29/2022  4:16 AM  Calculated from:  Serum  "Creatinine: 0.7 mg/dL (Using min of 1 mg/dL) at 10/29/2022  4:16 AM  Serum Sodium: 136 mmol/L at 10/29/2022  4:16 AM  Total Bilirubin: 1.3 mg/dL at 10/29/2022  4:16 AM  INR(ratio): 1.3 at 10/29/2022  4:16 AM  Age: 56 years     Outpatient Encounter Medications as of 1/21/2023   Medication Sig Dispense Refill    acetaminophen (TYLENOL) 500 MG tablet Take 1,000 mg by mouth 2 (two) times daily as needed for Pain.      BD ULTRA-FINE ORIG PEN NEEDLE 29 gauge x 1/2" Ndle USE PEN NEEDLE TO INJECT INSULIN EVERY EVENING 100 each 11    ferrous sulfate 325 (65 FE) MG EC tablet Take 1 tablet (325 mg total) by mouth once daily. Take with vitamin C 90 tablet 3    finasteride (PROSCAR) 5 mg tablet Take 1 tablet (5 mg total) by mouth once daily. 90 tablet 3    furosemide (LASIX) 40 MG tablet Take 1 tablet (40 mg total) by mouth once daily. New dose as of 07/11/2022- for fluid management / ascites 90 tablet 3    insulin detemir U-100 (LEVEMIR FLEXTOUCH U-100 INSULN) 100 unit/mL (3 mL) InPn pen Inject 80 Units into the skin every evening. New dose as of 01/03/2022 72 mL 3    insulin lispro (HUMALOG KWIKPEN INSULIN) 100 unit/mL pen Inject 60 Units into the skin 3 (three) times daily with meals. 162 mL 3    linaGLIPtin (TRADJENTA) 5 mg Tab tablet TAKE 1 TABLET (5 MG TOTAL) BY MOUTH ONCE DAILY. 90 tablet 3    magnesium oxide (MAGOX) 400 mg (241.3 mg magnesium) tablet Take 1 tablet (400 mg total) by mouth once daily. 200 tablet 1    oxybutynin (DITROPAN-XL) 5 MG TR24 Take 1 tablet (5 mg total) by mouth once daily. 30 tablet 11    propranoloL (INDERAL) 10 MG tablet TAKE 1 TABLET (10 MG TOTAL) BY MOUTH 3 (THREE) TIMES DAILY. 90 tablet 0    rifAXIMin (XIFAXAN) 550 mg Tab Take 1 tablet (550 mg total) by mouth 2 (two) times daily. 60 tablet 11    spironolactone (ALDACTONE) 100 MG tablet Take 1 tablet (100 mg total) by mouth once daily. For fluid management / ascites- new as of 07/11/2022 90 tablet 3    tamsulosin (FLOMAX) 0.4 mg Cap Take 1 " capsule (0.4 mg total) by mouth every evening. 90 capsule 3    venlafaxine (EFFEXOR) 75 MG tablet TAKE 1 TABLET (75 MG TOTAL) BY MOUTH 2 (TWO) TIMES DAILY. 180 tablet 3     No facility-administered encounter medications on file as of 1/21/2023.     Review of patient's allergies indicates:  No Known Allergies  Past Medical History:   Diagnosis Date    Anemia     Bacterial peritonitis 10/25/2022    Celiac disease     Cervicalgia     Cirrhosis     Colon polyp     Depression     Diabetes mellitus, type 2     Elevated LFTs     Esophageal varices     Gout, unspecified     History of COVID-19 01/15/2021    Hyperlipidemia     Mood disorder in conditions classified elsewhere     Obesity, unspecified     Other chronic nonalcoholic liver disease     Portal hypertensive gastropathy     Thyroid disease     Unspecified psychosis        Review of Systems   Constitutional:  Positive for fever.   HENT: Negative.     Eyes: Negative.    Respiratory: Negative.     Cardiovascular: Negative.    Gastrointestinal:  Positive for abdominal distention.   Genitourinary: Negative.    Musculoskeletal: Negative.    Skin: Negative.    Neurological: Negative.    Psychiatric/Behavioral: Negative.     Vitals:    01/21/23 0814   BP: 109/70   Pulse: 92   Resp: 18   Temp: 97.6 °F (36.4 °C)       Physical Exam  Vitals reviewed.   Constitutional:       Appearance: He is well-developed.   HENT:      Head: Normocephalic and atraumatic.   Eyes:      General: No scleral icterus.     Conjunctiva/sclera: Conjunctivae normal.      Pupils: Pupils are equal, round, and reactive to light.   Neck:      Thyroid: No thyromegaly.   Cardiovascular:      Rate and Rhythm: Normal rate and regular rhythm.      Heart sounds: Normal heart sounds.   Pulmonary:      Effort: Pulmonary effort is normal.      Breath sounds: Normal breath sounds. No rales.   Abdominal:      General: Bowel sounds are normal. There is distension.      Palpations: Abdomen is soft. There is no mass.       Tenderness: There is no abdominal tenderness.   Musculoskeletal:         General: Normal range of motion.      Cervical back: Normal range of motion and neck supple.   Skin:     General: Skin is warm and dry.      Findings: No rash.   Neurological:      Mental Status: He is alert and oriented to person, place, and time.       MELD-Na score: 10 at 10/29/2022  4:16 AM  MELD score: 10 at 10/29/2022  4:16 AM  Calculated from:  Serum Creatinine: 0.7 mg/dL (Using min of 1 mg/dL) at 10/29/2022  4:16 AM  Serum Sodium: 136 mmol/L at 10/29/2022  4:16 AM  Total Bilirubin: 1.3 mg/dL at 10/29/2022  4:16 AM  INR(ratio): 1.3 at 10/29/2022  4:16 AM  Age: 56 years    Lab Results   Component Value Date     (H) 01/19/2023    BUN 13 01/19/2023    CREATININE 0.9 01/19/2023    CALCIUM 8.6 (L) 01/19/2023     (L) 01/19/2023    K 4.6 01/19/2023     01/19/2023    PROT 6.9 01/19/2023    CO2 21 (L) 01/19/2023    ANIONGAP 9 01/19/2023    WBC 4.37 01/19/2023    RBC 4.82 01/19/2023    HGB 11.2 (L) 01/19/2023    HCT 35.5 (L) 01/19/2023    MCV 74 (L) 01/19/2023    MCH 23.2 (L) 01/19/2023    MCHC 31.5 (L) 01/19/2023     Lab Results   Component Value Date    RDW 18.4 (H) 01/19/2023    PLT 52 (L) 01/19/2023    MPV 9.3 01/19/2023    GRAN 3.5 01/19/2023    GRAN 79.0 (H) 01/19/2023    LYMPH 0.3 (L) 01/19/2023    LYMPH 7.3 (L) 01/19/2023    MONO 0.4 01/19/2023    MONO 8.9 01/19/2023    EOSINOPHIL 4.1 01/19/2023    BASOPHIL 0.5 01/19/2023    EOS 0.2 01/19/2023    BASO 0.02 01/19/2023    APTT 24.1 10/10/2022    CHOL 100 (L) 01/19/2023    TRIG 64 01/19/2023    HDL 42 01/19/2023    CHOLHDL 42.0 01/19/2023    TOTALCHOLEST 2.4 01/19/2023    ALBUMIN 3.1 (L) 01/19/2023    BILIDIR 0.7 (H) 10/20/2022    AST 26 01/19/2023    ALT 26 01/19/2023    ALKPHOS 145 (H) 01/19/2023    MG 1.6 01/19/2023    LABPROT 12.9 (H) 10/29/2022    INR 1.3 (H) 10/29/2022    PSA 0.46 10/17/2022       Assessment and Plan:  Corky Alfonsotao is a 57 y.o. male with  decompensated CORDOVA Cirrhosis  Current recommendations:  Decompensated cirrhosis, MELD <15; monitor meld labs monthly; have contacted oncology to see when pt might be a liver tx candidate given hx MALT lymphoma  Refractory ascites: will likely need tips; pt instructed to stop salt; add metolazone 2.5 mg daily 30 min prior to other diuretics; weekly cmp  HE, ongoing: continue rifaximin; restart lactulose    Return 6 weeks

## 2023-01-23 DIAGNOSIS — C88.4 MALT LYMPHOMA: Primary | ICD-10-CM

## 2023-01-27 ENCOUNTER — TELEPHONE (OUTPATIENT)
Dept: HEPATOLOGY | Facility: CLINIC | Age: 58
End: 2023-01-27
Payer: MEDICAID

## 2023-01-27 NOTE — TELEPHONE ENCOUNTER
----- Message from Kandice Kim MD sent at 1/27/2023  2:01 PM CST -----  Glucose elevated needs to work with his primary care or diabetes doctor on management. Sodium trending down, but overall still ok. Nothing to do differently at this time.

## 2023-02-28 ENCOUNTER — OFFICE VISIT (OUTPATIENT)
Dept: HEPATOLOGY | Facility: CLINIC | Age: 58
End: 2023-02-28
Payer: MEDICARE

## 2023-02-28 VITALS
HEART RATE: 85 BPM | WEIGHT: 237.13 LBS | TEMPERATURE: 98 F | OXYGEN SATURATION: 98 % | SYSTOLIC BLOOD PRESSURE: 122 MMHG | BODY MASS INDEX: 30.43 KG/M2 | DIASTOLIC BLOOD PRESSURE: 82 MMHG | RESPIRATION RATE: 18 BRPM | HEIGHT: 74 IN

## 2023-02-28 DIAGNOSIS — R74.01 TRANSAMINITIS: ICD-10-CM

## 2023-02-28 DIAGNOSIS — K76.6 PORTAL HYPERTENSIVE GASTROPATHY: ICD-10-CM

## 2023-02-28 DIAGNOSIS — K76.0 FATTY LIVER: ICD-10-CM

## 2023-02-28 DIAGNOSIS — R16.1 SPLENOMEGALY: ICD-10-CM

## 2023-02-28 DIAGNOSIS — K76.82 HEPATIC ENCEPHALOPATHY: ICD-10-CM

## 2023-02-28 DIAGNOSIS — K75.81 LIVER CIRRHOSIS SECONDARY TO NASH: ICD-10-CM

## 2023-02-28 DIAGNOSIS — I81 PORTAL VEIN THROMBOSIS: ICD-10-CM

## 2023-02-28 DIAGNOSIS — I85.10 SECONDARY ESOPHAGEAL VARICES WITHOUT BLEEDING: ICD-10-CM

## 2023-02-28 DIAGNOSIS — K74.60 LIVER CIRRHOSIS SECONDARY TO NASH: ICD-10-CM

## 2023-02-28 DIAGNOSIS — K31.89 PORTAL HYPERTENSIVE GASTROPATHY: ICD-10-CM

## 2023-02-28 DIAGNOSIS — C88.4 MALT LYMPHOMA: Primary | ICD-10-CM

## 2023-02-28 DIAGNOSIS — R18.8 OTHER ASCITES: ICD-10-CM

## 2023-02-28 PROBLEM — K65.9 BACTERIAL PERITONITIS: Status: RESOLVED | Noted: 2022-10-25 | Resolved: 2023-02-28

## 2023-02-28 PROCEDURE — 99999 PR PBB SHADOW E&M-EST. PATIENT-LVL V: ICD-10-PCS | Mod: PBBFAC,,, | Performed by: INTERNAL MEDICINE

## 2023-02-28 PROCEDURE — 99999 PR PBB SHADOW E&M-EST. PATIENT-LVL V: CPT | Mod: PBBFAC,,, | Performed by: INTERNAL MEDICINE

## 2023-02-28 PROCEDURE — 99215 OFFICE O/P EST HI 40 MIN: CPT | Mod: S$PBB,,, | Performed by: INTERNAL MEDICINE

## 2023-02-28 PROCEDURE — 99215 PR OFFICE/OUTPT VISIT, EST, LEVL V, 40-54 MIN: ICD-10-PCS | Mod: S$PBB,,, | Performed by: INTERNAL MEDICINE

## 2023-02-28 PROCEDURE — 99215 OFFICE O/P EST HI 40 MIN: CPT | Mod: PBBFAC,PN | Performed by: INTERNAL MEDICINE

## 2023-02-28 RX ORDER — METOLAZONE 2.5 MG/1
5 TABLET ORAL DAILY
Qty: 60 TABLET | Refills: 11 | Status: SHIPPED | OUTPATIENT
Start: 2023-02-28 | End: 2023-10-23 | Stop reason: SDUPTHER

## 2023-02-28 NOTE — PROGRESS NOTES
HEPATOLOGY FOLLOW UP    Referring Physician: FERNANDO Baum  Current Corresponding Physician: FERNANDO Baum, Sarmad Estrella MD, Vale Covarrubias MD, Tank Curtis MD    Corky Bach is here for follow up of decompensated cirrhosis    HPI   Corky Bach is a 57 y.o. 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     -dx few years ago  --ascites-started on diuretics lasix 40 mg and aldactone 100 mg daily; noted to have distended abdomen when seen in consultation  --HE- on rifaximin; no longer on lactulose  -EGD-03/22 and 07/22/20- small varices; on propranolol     MELD-Na score: 11 at 6/13/2022 10:35 AM  MELD score: 11 at 6/13/2022 10:35 AM  Calculated from:  Serum Creatinine: 0.9 mg/dL (Using min of 1 mg/dL) at 6/13/2022 10:35 AM  Serum Sodium: 133 mmol/L at 6/13/2022 10:35 AM  Total Bilirubin: 1.6 mg/dL at 6/13/2022 10:35 AM  INR(ratio): 1.3 at 6/13/2022 10:35 AM    Interval History  Since Corky Bach last few visits with me:    Has develoepd large ascites. Having paracenteses every 2 weeks  Ascites analysis: SAAG >1.1; protein <1.0  Ascites: lasix 40 mg and spironolactone 100 mg daily, metolazone 2.5 mg daily  HE, ongoing; on rifaximin; about to restart lactulose    Admitted for ascites cell count: ,000; 12% PMNs = 18,720 ANC; 14% lymph; rest monocytes; repeat did not show elevated WBC (no specific tx given)    CT abdo pelvis w contrast 10/25/22: Findings suggesting cirrhosis superimposed portal hypertension including ascites, splenomegaly, and several varices.  The ascites has increased since the previous examination without discrete peritoneal thickening. There is cavernous transformation of the portal vein.  There are splenic and gabriela hepatic collateral vessels.  There is chronic appearing thrombus  "with calcification involving the extrahepatic portal vein, similar to the previous examination extending to the proximal aspect of the SMV.  Additional thrombus noted within the splenic vein near the pancreatic tail      WBC 2.78, ANC 2.1, ALC 0.3, plts 41  MELD-Na score: 10 at 10/29/2022  4:16 AM  MELD score: 10 at 10/29/2022  4:16 AM  Calculated from:  Serum Creatinine: 0.7 mg/dL (Using min of 1 mg/dL) at 10/29/2022  4:16 AM  Serum Sodium: 136 mmol/L at 10/29/2022  4:16 AM  Total Bilirubin: 1.3 mg/dL at 10/29/2022  4:16 AM  INR(ratio): 1.3 at 10/29/2022  4:16 AM  Age: 56 years     Outpatient Encounter Medications as of 2/28/2023   Medication Sig Dispense Refill    acetaminophen (TYLENOL) 500 MG tablet Take 1,000 mg by mouth 2 (two) times daily as needed for Pain.      BD ULTRA-FINE ORIG PEN NEEDLE 29 gauge x 1/2" Ndle USE PEN NEEDLE TO INJECT INSULIN EVERY EVENING 100 each 11    ferrous sulfate 325 (65 FE) MG EC tablet Take 1 tablet (325 mg total) by mouth once daily. Take with vitamin C 90 tablet 3    finasteride (PROSCAR) 5 mg tablet Take 1 tablet (5 mg total) by mouth once daily. 90 tablet 3    furosemide (LASIX) 40 MG tablet Take 1 tablet (40 mg total) by mouth once daily. New dose as of 07/11/2022- for fluid management / ascites 90 tablet 3    lactulose (CHRONULAC) 20 gram/30 mL Soln Take 20 grams by mouth 3 times daily as needed and titrate use / amount to 3 to 5 soft brown bowel movements per day.  Decrease amount / frequency if getting diarrhea 1800 mL 5    linaGLIPtin (TRADJENTA) 5 mg Tab tablet TAKE 1 TABLET (5 MG TOTAL) BY MOUTH ONCE DAILY. 90 tablet 3    metOLazone (ZAROXOLYN) 2.5 MG tablet Take 1 tablet (2.5 mg total) by mouth once daily. Take 30 minutes before other water pills 30 tablet 11    oxybutynin (DITROPAN-XL) 5 MG TR24 Take 1 tablet (5 mg total) by mouth once daily. 30 tablet 11    propranoloL (INDERAL) 10 MG tablet TAKE 1 TABLET (10 MG TOTAL) BY MOUTH 3 (THREE) TIMES DAILY. 90 tablet 0    " rifAXIMin (XIFAXAN) 550 mg Tab Take 1 tablet (550 mg total) by mouth 2 (two) times daily. 60 tablet 11    spironolactone (ALDACTONE) 100 MG tablet Take 1 tablet (100 mg total) by mouth once daily. For fluid management / ascites- new as of 2022 90 tablet 3    tamsulosin (FLOMAX) 0.4 mg Cap Take 1 capsule (0.4 mg total) by mouth every evening. 90 capsule 3    venlafaxine (EFFEXOR) 75 MG tablet TAKE 1 TABLET (75 MG TOTAL) BY MOUTH 2 (TWO) TIMES DAILY. 180 tablet 3    insulin detemir U-100 (LEVEMIR FLEXTOUCH U-100 INSULN) 100 unit/mL (3 mL) InPn pen Inject 80 Units into the skin every evening. New dose as of 2022 72 mL 3    insulin lispro (HUMALOG KWIKPEN INSULIN) 100 unit/mL pen Inject 60 Units into the skin 3 (three) times daily with meals. 162 mL 3    [] magnesium oxide (MAGOX) 400 mg (241.3 mg magnesium) tablet Take 1 tablet (400 mg total) by mouth once daily. 200 tablet 1     No facility-administered encounter medications on file as of 2023.     Review of patient's allergies indicates:  No Known Allergies  Past Medical History:   Diagnosis Date    Anemia     Bacterial peritonitis 10/25/2022    Celiac disease     Cervicalgia     Cirrhosis     Colon polyp     Depression     Diabetes mellitus, type 2     Elevated LFTs     Esophageal varices     Gout, unspecified     History of COVID-19 01/15/2021    Hyperlipidemia     Mood disorder in conditions classified elsewhere     Obesity, unspecified     Other chronic nonalcoholic liver disease     Portal hypertensive gastropathy     Thyroid disease     Unspecified psychosis        Review of Systems   Constitutional:  Positive for fever.   HENT: Negative.     Eyes: Negative.    Respiratory: Negative.     Cardiovascular: Negative.    Gastrointestinal:  Positive for abdominal distention.   Genitourinary: Negative.    Musculoskeletal: Negative.    Skin: Negative.    Neurological: Negative.    Psychiatric/Behavioral: Negative.     Vitals:    23 1106    BP: 122/82   Pulse: 85   Resp: 18   Temp: 98 °F (36.7 °C)       Physical Exam  Vitals reviewed.   Constitutional:       Appearance: He is well-developed.   HENT:      Head: Normocephalic and atraumatic.   Eyes:      General: No scleral icterus.     Conjunctiva/sclera: Conjunctivae normal.      Pupils: Pupils are equal, round, and reactive to light.   Neck:      Thyroid: No thyromegaly.   Cardiovascular:      Rate and Rhythm: Normal rate and regular rhythm.      Heart sounds: Normal heart sounds.   Pulmonary:      Effort: Pulmonary effort is normal.      Breath sounds: Normal breath sounds. No rales.   Abdominal:      General: Bowel sounds are normal. There is distension.      Palpations: Abdomen is soft. There is no mass.      Tenderness: There is no abdominal tenderness.   Musculoskeletal:         General: Normal range of motion.      Cervical back: Normal range of motion and neck supple.   Skin:     General: Skin is warm and dry.      Findings: No rash.   Neurological:      Mental Status: He is alert and oriented to person, place, and time.       MELD-Na score: 10 at 10/29/2022  4:16 AM  MELD score: 10 at 10/29/2022  4:16 AM  Calculated from:  Serum Creatinine: 0.7 mg/dL (Using min of 1 mg/dL) at 10/29/2022  4:16 AM  Serum Sodium: 136 mmol/L at 10/29/2022  4:16 AM  Total Bilirubin: 1.3 mg/dL at 10/29/2022  4:16 AM  INR(ratio): 1.3 at 10/29/2022  4:16 AM  Age: 56 years    Lab Results   Component Value Date     (H) 02/24/2023    BUN 14 02/24/2023    CREATININE 0.9 02/24/2023    CALCIUM 8.6 (L) 02/24/2023     (L) 02/24/2023    K 4.4 02/24/2023     02/24/2023    PROT 6.9 02/24/2023    CO2 22 (L) 02/24/2023    ANIONGAP 9 02/24/2023    WBC 4.14 02/24/2023    RBC 5.10 02/24/2023    HGB 12.0 (L) 02/24/2023    HCT 37.7 (L) 02/24/2023    MCV 74 (L) 02/24/2023    MCH 23.5 (L) 02/24/2023    MCHC 31.8 (L) 02/24/2023     Lab Results   Component Value Date    RDW 17.1 (H) 02/24/2023    PLT 58 (L) 02/24/2023     MPV 10.3 02/24/2023    GRAN 3.2 02/24/2023    GRAN 77.1 (H) 02/24/2023    LYMPH 0.3 (L) 02/24/2023    LYMPH 8.0 (L) 02/24/2023    MONO 0.4 02/24/2023    MONO 10.6 02/24/2023    EOSINOPHIL 3.6 02/24/2023    BASOPHIL 0.5 02/24/2023    EOS 0.2 02/24/2023    BASO 0.02 02/24/2023    APTT 24.1 10/10/2022    CHOL 100 (L) 01/19/2023    TRIG 64 01/19/2023    HDL 42 01/19/2023    CHOLHDL 42.0 01/19/2023    TOTALCHOLEST 2.4 01/19/2023    ALBUMIN 2.9 (L) 02/24/2023    BILIDIR 0.8 (H) 02/17/2023    AST 22 02/24/2023    ALT 14 02/24/2023    ALKPHOS 158 (H) 02/24/2023    MG 1.6 02/24/2023    LABPROT 13.3 (H) 02/17/2023    INR 1.3 (H) 02/17/2023    PSA 0.46 10/17/2022       Assessment and Plan:  Corky Bach is a 57 y.o. male with decompensated CORDOVA Cirrhosis  Current recommendations:  Decompensated cirrhosis, MELD <15 but has large, refractory ascites; unlikely to be a TIPS candidate due to PVT with cavernous transformation; recommend liver tx evaluation; monitor meld labs monthly; have contacted oncology to see when pt might be a liver tx candidate given hx MALT lymphoma  Refractory ascites: unlikely to be able to do tips due to PVT; increase metolazone to 5.0 mg daily 30 min prior to other diuretics; labs every 2 weeks; paracentesis every 2 weeks  HE, ongoing: continue rifaximin; restart lactulose    Return 12 weeks

## 2023-02-28 NOTE — PATIENT INSTRUCTIONS
Have Dr Curtis call me on my cell [phone]  Paracenteses every 2 weeks  Increase metolazone to 5 mg daily and labs every 2 weeks  Start lactulose and continue rifaximin  Liver transplant evaluation

## 2023-03-01 ENCOUNTER — TELEPHONE (OUTPATIENT)
Dept: TRANSPLANT | Facility: CLINIC | Age: 58
End: 2023-03-01
Payer: MEDICARE

## 2023-03-01 NOTE — TELEPHONE ENCOUNTER
Referral received from Dr Urban  Initial  referral from FERNANDO Baum  Patient with Decompensated cirrhosis,  MELD 10  ICD-10:  k74.60   Referred for liver transplant for EVALUATION.    Referral completed and forwarded to [facility].          Insurance: EPIC  PRIMARY:   ID:  Contact #     SECONDARY:   ID:  Contact #

## 2023-03-01 NOTE — TELEPHONE ENCOUNTER
----- Message from Sri Urban MD sent at 2/28/2023  7:35 PM CST -----  Needs liver tx evaluation for CORDOVA cirrhosis, decompensated

## 2023-03-10 ENCOUNTER — TELEPHONE (OUTPATIENT)
Dept: TRANSPLANT | Facility: CLINIC | Age: 58
End: 2023-03-10
Payer: MEDICARE

## 2023-03-10 NOTE — TELEPHONE ENCOUNTER
PT called re clr for liver txp eval. Explained process, reviewed testing pt already had EGD/COLON. Pt informed he will be assigned to Arturo Regalado. Arturo on the phone with wife simultaneously

## 2023-03-10 NOTE — TELEPHONE ENCOUNTER
Called patient and wife. Discussed liver transplant evaluation.  Pt understands that he needs to bring a caregiver which will be his wife.  Evaluation dates will be March 23rd and 24th.  Allowed time for questions and answers.   Patient has h/o MALT lymphoma. Patient has f/u with [facility] on 3/16/23 (Dr. Winter).

## 2023-03-13 DIAGNOSIS — Z76.82 ORGAN TRANSPLANT CANDIDATE: ICD-10-CM

## 2023-03-13 DIAGNOSIS — K74.69 OTHER CIRRHOSIS OF LIVER: ICD-10-CM

## 2023-03-13 DIAGNOSIS — K74.60 LIVER CIRRHOSIS SECONDARY TO NASH: Primary | ICD-10-CM

## 2023-03-13 DIAGNOSIS — K75.81 LIVER CIRRHOSIS SECONDARY TO NASH: Primary | ICD-10-CM

## 2023-03-13 DIAGNOSIS — K75.81 NONALCOHOLIC STEATOHEPATITIS (NASH): ICD-10-CM

## 2023-03-16 ENCOUNTER — OFFICE VISIT (OUTPATIENT)
Dept: HEMATOLOGY/ONCOLOGY | Facility: CLINIC | Age: 58
End: 2023-03-16
Payer: MEDICARE

## 2023-03-16 VITALS
OXYGEN SATURATION: 95 % | BODY MASS INDEX: 28.88 KG/M2 | WEIGHT: 225 LBS | HEIGHT: 74 IN | HEART RATE: 102 BPM | RESPIRATION RATE: 18 BRPM | SYSTOLIC BLOOD PRESSURE: 129 MMHG | DIASTOLIC BLOOD PRESSURE: 72 MMHG | TEMPERATURE: 98 F

## 2023-03-16 DIAGNOSIS — C88.4 MALT LYMPHOMA: ICD-10-CM

## 2023-03-16 PROCEDURE — 3008F PR BODY MASS INDEX (BMI) DOCUMENTED: ICD-10-PCS | Mod: CPTII,S$GLB,, | Performed by: INTERNAL MEDICINE

## 2023-03-16 PROCEDURE — 3074F PR MOST RECENT SYSTOLIC BLOOD PRESSURE < 130 MM HG: ICD-10-PCS | Mod: CPTII,S$GLB,, | Performed by: INTERNAL MEDICINE

## 2023-03-16 PROCEDURE — 3061F NEG MICROALBUMINURIA REV: CPT | Mod: CPTII,S$GLB,, | Performed by: INTERNAL MEDICINE

## 2023-03-16 PROCEDURE — 3078F PR MOST RECENT DIASTOLIC BLOOD PRESSURE < 80 MM HG: ICD-10-PCS | Mod: CPTII,S$GLB,, | Performed by: INTERNAL MEDICINE

## 2023-03-16 PROCEDURE — 3008F BODY MASS INDEX DOCD: CPT | Mod: CPTII,S$GLB,, | Performed by: INTERNAL MEDICINE

## 2023-03-16 PROCEDURE — 3066F PR DOCUMENTATION OF TREATMENT FOR NEPHROPATHY: ICD-10-PCS | Mod: CPTII,S$GLB,, | Performed by: INTERNAL MEDICINE

## 2023-03-16 PROCEDURE — 99999 PR PBB SHADOW E&M-EST. PATIENT-LVL III: CPT | Mod: PBBFAC,,, | Performed by: INTERNAL MEDICINE

## 2023-03-16 PROCEDURE — 3046F PR MOST RECENT HEMOGLOBIN A1C LEVEL > 9.0%: ICD-10-PCS | Mod: CPTII,S$GLB,, | Performed by: INTERNAL MEDICINE

## 2023-03-16 PROCEDURE — 99999 PR PBB SHADOW E&M-EST. PATIENT-LVL III: ICD-10-PCS | Mod: PBBFAC,,, | Performed by: INTERNAL MEDICINE

## 2023-03-16 PROCEDURE — 3061F PR NEG MICROALBUMINURIA RESULT DOCUMENTED/REVIEW: ICD-10-PCS | Mod: CPTII,S$GLB,, | Performed by: INTERNAL MEDICINE

## 2023-03-16 PROCEDURE — 99214 OFFICE O/P EST MOD 30 MIN: CPT | Mod: S$GLB,,, | Performed by: INTERNAL MEDICINE

## 2023-03-16 PROCEDURE — 3078F DIAST BP <80 MM HG: CPT | Mod: CPTII,S$GLB,, | Performed by: INTERNAL MEDICINE

## 2023-03-16 PROCEDURE — 3046F HEMOGLOBIN A1C LEVEL >9.0%: CPT | Mod: CPTII,S$GLB,, | Performed by: INTERNAL MEDICINE

## 2023-03-16 PROCEDURE — 3066F NEPHROPATHY DOC TX: CPT | Mod: CPTII,S$GLB,, | Performed by: INTERNAL MEDICINE

## 2023-03-16 PROCEDURE — 99214 PR OFFICE/OUTPT VISIT, EST, LEVL IV, 30-39 MIN: ICD-10-PCS | Mod: S$GLB,,, | Performed by: INTERNAL MEDICINE

## 2023-03-16 PROCEDURE — 3074F SYST BP LT 130 MM HG: CPT | Mod: CPTII,S$GLB,, | Performed by: INTERNAL MEDICINE

## 2023-03-16 RX ORDER — LACTULOSE 10 G/15ML
SOLUTION ORAL
COMMUNITY
Start: 2023-03-10 | End: 2023-03-23 | Stop reason: SDUPTHER

## 2023-03-16 NOTE — PROGRESS NOTES
[facility]  [facility]  Hematology/Medical Oncology Clinic      PATIENT: Corky Bach  MRN: [id number]  DATE: 3/16/2023    Chief Complaint: No chief complaint on file.    Other MDs:  PCP: Sarmad Estrella MD  H/O: Dr. Curtis    Subjective:     Initial History: Mr. 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 Patient has a pMHx of cirrhosis likely 2/2 to CORDOVA, obesity, hypothyroidism, HLD, HTN, and NIDDM. Was seen by hem/onc in March 2021 for pancytopenia as well has multiple concerning symptoms as in abdominal distension/pain, unintentional weight loss, night sweats, fatigue and general unwell feeling. US of the liver 1 month prior to visit revealed cirrhosis, mild ascites and splenomegaly (24 cm). Recommendation per H/O was for a bone marrow biopsy and work up. Bone marrow that was done for his pancytopenia showed (4/7/21) involvement with a mature b-cell lymphoma (accounting for 10% of cellularity). Normocellularity of 50%. No obvious dysplasia was noted. Absent iron storage. Flow showed a lambda restricted b cell population that was CD 5 negative, CD 10 negative, CD 19/20 +. ddx at that time was a low-grade b cell lymphoma. He underwent a PET on 4/30 that was normal. He was given iron supplementation and 3 month follow up was recommended.     8/17/2021:  CBC shows a WBC of 1.71 (ANC ~1200), Hgb of 12 and plt of 30k (relatively stable compared to earlier this year's labs). CMP showing mild increase in bilirubin and glucose.   States that he is experiencing abdominal pain and distension that impacts his life negatively.     9/27/2021:  WBC 2.22 (ANC 1.5), Hgb 12.2, Plt 30k (compared to prior, labs are stable). CMP remarkable for elevated glucose @ 392, T Bili 1.7, Alk Phos 137. Bicarb and AG wnl. UA @ 6.6, LDH @ 171, Phos @ 3.6 are all wnl.  He denies B symptoms, N/V/D/C, worsening abdominal  distension. Endorses abdominal pain (RUQ,LUQ) and continued unintentional weight loss (10 lbs over past month or so).  Has multiple excoriations over BLE 2/2 his new pit bull puppy (Sena) at home.     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  PET at end of therapy shows no hypermetabolic activity, Spleen slightly larger, and colitis (he is asymptomatic on history)  No abdominal distension  Discussed transition to maintenance therapy (Rituxan every 2 months for 2 years) to improve progression free survival.    3/16/2023  History of MZL treated with Rituxan induction and maintenance with EOT at induction with ODILON with normal CBC and negative PET.  Most recent CBC 3/9/2023 showed new leukopenia, occurred at the time of a fever.   Hgb and platelet count is stable compared to recent labs      Past Medical History:   Diagnosis Date    Anemia     Bacterial peritonitis 10/25/2022    Celiac disease     Cervicalgia     Cirrhosis     Colon polyp     Depression     Diabetes mellitus, type 2     Elevated LFTs     Esophageal varices     Gout, unspecified     History of COVID-19 01/15/2021    Hyperlipidemia     Mood disorder in conditions classified elsewhere     Obesity, unspecified     Other chronic nonalcoholic liver disease     Portal hypertensive gastropathy     Thyroid disease     Unspecified psychosis      Past Surgical History:   Procedure Laterality Date    COLONOSCOPY N/A 6/7/2017    Procedure: COLONOSCOPY;  Surgeon: Placido Green MD;  Location: [facility];  Service: Endoscopy;  Laterality: N/A;    ESOPHAGOGASTRODUODENOSCOPY N/A 7/16/2018    Procedure: ESOPHAGOGASTRODUODENOSCOPY (EGD);  Surgeon: Mitch Pan MD;  Location: [facility];  Service: Endoscopy;  Laterality: N/A;    ESOPHAGOGASTRODUODENOSCOPY N/A 7/22/2020    Procedure: ESOPHAGOGASTRODUODENOSCOPY (EGD);  Surgeon: Mitch Pan MD;  Location: [facility]  ENDO;  Service: Endoscopy;  Laterality: N/A;    ESOPHAGOGASTRODUODENOSCOPY N/A 3/22/2022    Procedure: EGD (ESOPHAGOGASTRODUODENOSCOPY);  Surgeon: Mitch Pan MD;  Location: [facility] ENDO;  Service: Endoscopy;  Laterality: N/A;    HERNIA REPAIR      REPEAT ABDOMINAL PARACENTESIS N/A 10/20/2022    Procedure: PARACENTESIS, ABDOMINAL, REPEAT;  Surgeon: Ruddy Salomon MD;  Location: [facility] ENDO;  Service: Endoscopy;  Laterality: N/A;    REPEAT ABDOMINAL PARACENTESIS N/A 11/9/2022    Procedure: PARACENTESIS, ABDOMINAL, REPEAT;  Surgeon: Ruddy Salomon MD;  Location: [facility] ENDO;  Service: Endoscopy;  Laterality: N/A;    REPEAT ABDOMINAL PARACENTESIS N/A 11/17/2022    Procedure: PARACENTESIS, ABDOMINAL, REPEAT;  Surgeon: Ruddy Salomon MD;  Location: [facility] ENDO;  Service: Endoscopy;  Laterality: N/A;    REPEAT ABDOMINAL PARACENTESIS N/A 12/2/2022    Procedure: PARACENTESIS, ABDOMINAL, REPEAT;  Surgeon: Ruddy Salomon MD;  Location: [facility] ENDO;  Service: Endoscopy;  Laterality: N/A;    REPEAT ABDOMINAL PARACENTESIS N/A 11/25/2022    Procedure: PARACENTESIS, ABDOMINAL, REPEAT;  Surgeon: Mitch Pan MD;  Location: [facility] CATH LAB;  Service: Endoscopy;  Laterality: N/A;    REPEAT ABDOMINAL PARACENTESIS N/A 12/9/2022    Procedure: PARACENTESIS, ABDOMINAL, REPEAT;  Surgeon: Mitch Pan MD;  Location: [facility] CATH LAB;  Service: Endoscopy;  Laterality: N/A;    REPEAT ABDOMINAL PARACENTESIS N/A 12/20/2022    Procedure: PARACENTESIS, ABDOMINAL, REPEAT;  Surgeon: Ruddy Salomon MD;  Location: [facility] ENDO;  Service: Endoscopy;  Laterality: N/A;    REPEAT ABDOMINAL PARACENTESIS N/A 12/30/2022    Procedure: PARACENTESIS, ABDOMINAL, REPEAT;  Surgeon: Deon Kathleen II, MD;  Location: [facility] ENDO;  Service: Endoscopy;  Laterality: N/A;    REPEAT ABDOMINAL PARACENTESIS N/A 1/12/2023    Procedure: PARACENTESIS, ABDOMINAL, REPEAT;  Surgeon: Ruddy Salomon MD;  Location: [facility];  Service: Endoscopy;   "Laterality: N/A;    REPEAT ABDOMINAL PARACENTESIS N/A 1/19/2023    Procedure: PARACENTESIS, ABDOMINAL, REPEAT;  Surgeon: Vale Covarrubias MD;  Location: [facility] ENDO;  Service: Endoscopy;  Laterality: N/A;    REPEAT ABDOMINAL PARACENTESIS N/A 2/1/2023    Procedure: PARACENTESIS, ABDOMINAL, REPEAT;  Surgeon: Ruddy Salomon MD;  Location: [facility] ENDO;  Service: Endoscopy;  Laterality: N/A;    REPEAT ABDOMINAL PARACENTESIS N/A 2/17/2023    Procedure: PARACENTESIS, ABDOMINAL, REPEAT;  Surgeon: Ruddy Salomon MD;  Location: [facility] ENDO;  Service: Endoscopy;  Laterality: N/A;    REPEAT ABDOMINAL PARACENTESIS N/A 3/1/2023    Procedure: PARACENTESIS, ABDOMINAL, REPEAT;  Surgeon: Ruddy Salomon MD;  Location: [facility] ENDO;  Service: Endoscopy;  Laterality: N/A;    REPEAT ABDOMINAL PARACENTESIS N/A 3/9/2023    Procedure: PARACENTESIS, ABDOMINAL, REPEAT;  Surgeon: Ruddy Salomon MD;  Location: [facility] ENDO;  Service: Endoscopy;  Laterality: N/A;    TONSILLECTOMY      UPPER GASTROINTESTINAL ENDOSCOPY       Family History   Problem Relation Age of Onset    Hypertension Mother     Diabetes Mother     Diabetes Father     Heart disease Father     Ovarian cancer Maternal Grandmother     Colon cancer Neg Hx       reports that he has never smoked. He has never used smokeless tobacco. He reports that he does not drink alcohol and does not use drugs.  Review of patient's allergies indicates:  No Known Allergies  Current Outpatient Medications   Medication Sig Dispense Refill    acetaminophen (TYLENOL) 500 MG tablet Take 1,000 mg by mouth 2 (two) times daily as needed for Pain.      BD ULTRA-FINE ORIG PEN NEEDLE 29 gauge x 1/2" Ndle USE PEN NEEDLE TO INJECT INSULIN EVERY EVENING 100 each 11    ferrous sulfate 325 (65 FE) MG EC tablet Take 1 tablet (325 mg total) by mouth once daily. Take with vitamin C 90 tablet 3    finasteride (PROSCAR) 5 mg tablet Take 1 tablet (5 mg total) by mouth once daily. 90 tablet 3    furosemide (LASIX) 40 MG " tablet Take 1 tablet (40 mg total) by mouth once daily. New dose as of 07/11/2022- for fluid management / ascites 90 tablet 3    lactulose (CHRONULAC) 20 gram/30 mL Soln Take 20 grams by mouth 3 times daily as needed and titrate use / amount to 3 to 5 soft brown bowel movements per day.  Decrease amount / frequency if getting diarrhea 1800 mL 5    linaGLIPtin (TRADJENTA) 5 mg Tab tablet TAKE 1 TABLET (5 MG TOTAL) BY MOUTH ONCE DAILY. 90 tablet 3    metOLazone (ZAROXOLYN) 2.5 MG tablet Take 2 tablets (5 mg total) by mouth once daily. Take 30 minutes before other water pills 60 tablet 11    oxybutynin (DITROPAN-XL) 5 MG TR24 Take 1 tablet (5 mg total) by mouth once daily. 30 tablet 11    propranoloL (INDERAL) 10 MG tablet TAKE 1 TABLET (10 MG TOTAL) BY MOUTH 3 (THREE) TIMES DAILY. 90 tablet 0    rifAXIMin (XIFAXAN) 550 mg Tab Take 1 tablet (550 mg total) by mouth 2 (two) times daily. 60 tablet 11    spironolactone (ALDACTONE) 100 MG tablet Take 1 tablet (100 mg total) by mouth once daily. For fluid management / ascites- new as of 07/11/2022 90 tablet 3    tamsulosin (FLOMAX) 0.4 mg Cap Take 1 capsule (0.4 mg total) by mouth every evening. 90 capsule 3    venlafaxine (EFFEXOR) 75 MG tablet TAKE 1 TABLET (75 MG TOTAL) BY MOUTH 2 (TWO) TIMES DAILY. 180 tablet 3    CONSTULOSE 10 gram/15 mL solution Take by mouth.      insulin detemir U-100 (LEVEMIR FLEXTOUCH U-100 INSULN) 100 unit/mL (3 mL) InPn pen Inject 80 Units into the skin every evening. New dose as of 01/03/2022 72 mL 3    insulin lispro (HUMALOG KWIKPEN INSULIN) 100 unit/mL pen Inject 60 Units into the skin 3 (three) times daily with meals. 162 mL 3     No current facility-administered medications for this visit.     Review of Systems   Constitutional:  Positive for activity change, appetite change, fatigue and unexpected weight change. Negative for chills, diaphoresis and fever.   HENT:  Negative for ear discharge, ear pain and facial swelling.    Eyes:  Negative  "for photophobia and visual disturbance.   Respiratory:  Negative for shortness of breath and wheezing.    Cardiovascular:  Negative for palpitations and leg swelling.   Gastrointestinal:  Positive for abdominal distention and abdominal pain. Negative for anal bleeding, blood in stool, constipation, diarrhea, nausea and vomiting.   Genitourinary:  Positive for frequency (nocturia). Negative for decreased urine volume and hematuria.   Skin:  Positive for rash (2/2 psoriasis) and wound. Negative for color change and pallor.        multiple well-healing excoriations in various stages of healing BLE   Neurological:  Negative for weakness, light-headedness, numbness and headaches.   Hematological:  Negative for adenopathy. Bruises/bleeds easily.   Psychiatric/Behavioral:  Positive for sleep disturbance (2/2 nocturia). Negative for confusion, decreased concentration and dysphoric mood.      ECOG Performance Status: 0    Objective:      Vitals:   Vitals:    03/16/23 1355   BP: 129/72   Pulse: 102   Resp: 18   Temp: 98.3 °F (36.8 °C)   SpO2: 95%   Weight: 102 kg (224 lb 15.7 oz)   Height: 6' 2" (1.88 m)     BMI: Body mass index is 28.89 kg/m².    Physical Exam  Constitutional:       General: He is not in acute distress.     Appearance: He is obese. He is not ill-appearing or toxic-appearing.   HENT:      Head: Normocephalic and atraumatic.      Right Ear: External ear normal.      Left Ear: External ear normal.      Nose: Nose normal. No congestion.      Mouth/Throat:      Mouth: Mucous membranes are moist.      Pharynx: Oropharynx is clear.   Eyes:      General: No scleral icterus.     Extraocular Movements: Extraocular movements intact.      Conjunctiva/sclera: Conjunctivae normal.      Pupils: Pupils are equal, round, and reactive to light.   Cardiovascular:      Rate and Rhythm: Normal rate and regular rhythm.   Pulmonary:      Effort: Pulmonary effort is normal.      Breath sounds: Normal breath sounds.   Abdominal:     "  General: There is distension.      Tenderness: There is abdominal tenderness (RUQ, LUQ).      Comments: Palpable/profound HSM   Musculoskeletal:         General: No swelling.      Right lower leg: No edema.      Left lower leg: No edema.   Lymphadenopathy:      Cervical: No cervical adenopathy.   Skin:     Coloration: Skin is not jaundiced.      Findings: Bruising and rash present. No lesion.   Neurological:      General: No focal deficit present.      Mental Status: He is alert and oriented to person, place, and time. Mental status is at baseline.      Motor: No weakness.      Coordination: Coordination normal.      Gait: Gait normal.   Psychiatric:         Mood and Affect: Mood normal.         Thought Content: Thought content normal.       Laboratory Data:  No visits with results within 1 Week(s) from this visit.   Latest known visit with results is:   Admission on 03/09/2023, Discharged on 03/09/2023   Component Date Value Ref Range Status    Body Fluid Type 03/09/2023 Peritoneal Fluid   Final    Fluid Appearance 03/09/2023 Hazy   Final    Fluid Color 03/09/2023 Yellow   Final    WBC, Body Fluid 03/09/2023 86  /cu mm Final    Comment: Reference ranges for body fluids not established.   Correlate clinically.      Lymphs, Fluid 03/09/2023 18  % Final    Monocytes/Macrophages, Fluid 03/09/2023 82  % Final    Gram Stain Result 03/09/2023 Rare WBC's   Final    Gram Stain Result 03/09/2023 No organisms seen   Final    Anaerobic Culture 03/09/2023 Culture in progress   Preliminary    Aerobic Bacterial Culture 03/09/2023 No growth   Final         Assessment:       1. MALT lymphoma        Hematologic History:      See HPI and below.     Plan:     MALT Lymphoma:  Confirmed MALT Lymphoma. He has cirrhosis which complicates if his splenomegaly is 2/2 to cirrhosis or lymphoma. I would expect improvement in spleen size with treatment if it is 2/2 to the latter. Pathology revealed MALT Lymphoma, will proceed with weekly  Rituxan x4 doses w/ maintenance therapy thereafter.  - weekly Rituxan x4 with plans for q8 week maintenance starting 9/28/2021.  - PET of end of induction Rituxan still has no hypermetabolic LAD   - Recommend maintenance Rituxan every 2 months for 2 years  - Receives therapy with Dr. Tank Curtis in [place].  - 3/9/2023 labs show new leukopenia, possibly reactive   - repeat CBC planned in [place] tomorrow   - If WBC better recommend monitoring   - If WBC worse recommend bone marrow biopsy    Cirrhosis  Splenomegaly  Likely cause of splenomegaly which is causing some sort of sequestration. Slight increased on 12/2/2021 PET.  - per hepatology  - being considered for liver transplant.    - MZL in remission since 10/22/2021   - 20% risk of relapsed in 5 years after induction therapy   - risk of relapse will be higher after transplant in setting of anti rejection therapy (PTLD)   - overall patient cleared from hematologic standpoint for organ transplant    A total of 20 minutes was spent in pre-visit chart review, personal interpretation of labs and imaging, and medication review. Total visit time 30 minutes, >50 % counseling.

## 2023-03-17 ENCOUNTER — TELEPHONE (OUTPATIENT)
Dept: TRANSPLANT | Facility: CLINIC | Age: 58
End: 2023-03-17
Payer: MEDICARE

## 2023-03-17 NOTE — TELEPHONE ENCOUNTER
"Phone call returned to patient;s wife.  She reports that patient has been seen by the "cancer doctor" and the risk reoccurrence discussed on yesterday.  Per wife, patient deemed acceptable to proceed w/ transplant and Dr. Winter to send documentation to hepatologist.  Wife reports that the plan was to proceed w/ transplant evaluation on 3/23 and 3/24 if ok'd by Dr. Winter.  Appts in Epic reviewed.   has started scheduling appts.  Will notify primary nurse coordinator of above so that she can f/u w/ hepatologist and  accordingly.  Wife aware and voiced understanding that nurse will be returning on Monday.  Patient's wife denies the need for any further assistance at this time.    ===================================================    ----- Message from Radha Connolly sent at 3/17/2023 10:01 AM CDT -----  Regarding: questions answered  The patient called stating she spoke w/ Dr. Winter - oncologist and got ok for him to move forward w/Txp  Please reach out to wife at your earliest opportunity  Has more answers from Dr. Winter as below  How long since remission -  10/21/21  Chances of cancer coming back 20% in next 5 years  Her thoughts on Txp list w/cancer coming back states she would work around whatever is required for the transplant    No further information provided      Patient wife can be contacted @#  @# [phone]       "

## 2023-03-17 NOTE — TELEPHONE ENCOUNTER
Call received from [facility], with report of a critical glucose level, 504.     Spoke with patient who states he did not take his morning insulin before having labs done. Reports no symptoms at this time. Patient instructed to take his morning medication now and recheck his blood sugar in ~30 minutes. Advised to follow-up with Endocrinologists or provider that's managing his diabetes as chart review shows his glucose has been running high for the past 2-3 months. Patient expressed understanding and agreement.     Advised result will be reviewed with MD and I will call if there are any additional recommendations. Understanding expressed.

## 2023-03-22 ENCOUNTER — TELEPHONE (OUTPATIENT)
Dept: TRANSPLANT | Facility: CLINIC | Age: 58
End: 2023-03-22
Payer: MEDICARE

## 2023-03-22 NOTE — TELEPHONE ENCOUNTER
Patient called to confirm that they will be attending the scheduled appointments for Liver Transplant Fast Pass Evaluation scheduled to start 3/23/23  at 0730.  Patient confirms that caregivers will be present for the scheduled appointments.  Patient appointments reviewed along with location and special instructions.  Patient questions answered at this time and number provided to call the office if there is any problem.

## 2023-03-23 ENCOUNTER — CLINICAL SUPPORT (OUTPATIENT)
Dept: TRANSPLANT | Facility: CLINIC | Age: 58
End: 2023-03-23
Payer: MEDICARE

## 2023-03-23 ENCOUNTER — HOSPITAL ENCOUNTER (OUTPATIENT)
Dept: RADIOLOGY | Facility: HOSPITAL | Age: 58
Discharge: HOME | End: 2023-03-23
Attending: INTERNAL MEDICINE
Payer: MEDICARE

## 2023-03-23 ENCOUNTER — EVALUATION (OUTPATIENT)
Dept: TRANSPLANT | Facility: CLINIC | Age: 58
End: 2023-03-23
Payer: MEDICARE

## 2023-03-23 ENCOUNTER — SOCIAL WORK (OUTPATIENT)
Dept: TRANSPLANT | Facility: CLINIC | Age: 58
End: 2023-03-23
Payer: MEDICARE

## 2023-03-23 VITALS
OXYGEN SATURATION: 98 % | DIASTOLIC BLOOD PRESSURE: 62 MMHG | WEIGHT: 207 LBS | TEMPERATURE: 98 F | RESPIRATION RATE: 16 BRPM | HEART RATE: 54 BPM | SYSTOLIC BLOOD PRESSURE: 109 MMHG | OXYGEN SATURATION: 98 % | DIASTOLIC BLOOD PRESSURE: 62 MMHG | SYSTOLIC BLOOD PRESSURE: 109 MMHG | HEIGHT: 74 IN | RESPIRATION RATE: 16 BRPM | RESPIRATION RATE: 16 BRPM | HEIGHT: 74 IN | TEMPERATURE: 98 F | SYSTOLIC BLOOD PRESSURE: 109 MMHG | HEART RATE: 54 BPM | OXYGEN SATURATION: 98 % | BODY MASS INDEX: 26.56 KG/M2 | TEMPERATURE: 98 F | DIASTOLIC BLOOD PRESSURE: 62 MMHG | HEART RATE: 54 BPM | BODY MASS INDEX: 26.56 KG/M2 | WEIGHT: 207 LBS | HEIGHT: 74 IN | WEIGHT: 207 LBS | BODY MASS INDEX: 26.56 KG/M2

## 2023-03-23 DIAGNOSIS — Z76.82 ORGAN TRANSPLANT CANDIDATE: ICD-10-CM

## 2023-03-23 DIAGNOSIS — Z01.818 PRE-TRANSPLANT EVALUATION FOR LIVER TRANSPLANT: Primary | ICD-10-CM

## 2023-03-23 DIAGNOSIS — K74.60 LIVER CIRRHOSIS SECONDARY TO NASH: ICD-10-CM

## 2023-03-23 DIAGNOSIS — K75.81 LIVER CIRRHOSIS SECONDARY TO NASH: ICD-10-CM

## 2023-03-23 LAB
AMPHET+METHAMPHET UR QL: NEGATIVE
BARBITURATES UR QL SCN>200 NG/ML: NEGATIVE
BENZODIAZ UR QL SCN>200 NG/ML: NEGATIVE
BILIRUB UR QL STRIP: NEGATIVE
BZE UR QL SCN: NEGATIVE
CANNABINOIDS UR QL SCN: NEGATIVE
CLARITY UR REFRACT.AUTO: CLEAR
COLOR UR AUTO: YELLOW
CREAT UR-MCNC: 118 MG/DL (ref 23–375)
ETHANOL UR-MCNC: <10 MG/DL
GLUCOSE UR QL STRIP: ABNORMAL
HGB UR QL STRIP: NEGATIVE
KETONES UR QL STRIP: NEGATIVE
LEUKOCYTE ESTERASE UR QL STRIP: NEGATIVE
METHADONE UR QL SCN>300 NG/ML: NEGATIVE
NITRITE UR QL STRIP: NEGATIVE
OPIATES UR QL SCN: NEGATIVE
PCP UR QL SCN>25 NG/ML: NEGATIVE
PH UR STRIP: 5 [PH] (ref 5–8)
PROT UR QL STRIP: NEGATIVE
SP GR UR STRIP: 1.01 (ref 1–1.03)
TOXICOLOGY INFORMATION: NORMAL
URN SPEC COLLECT METH UR: ABNORMAL

## 2023-03-23 PROCEDURE — 99204 PR OFFICE/OUTPT VISIT, NEW, LEVL IV, 45-59 MIN: ICD-10-PCS | Mod: S$GLB,TXP,, | Performed by: REGISTERED NURSE

## 2023-03-23 PROCEDURE — 99215 PR OFFICE/OUTPT VISIT, EST, LEVL V, 40-54 MIN: ICD-10-PCS | Mod: S$GLB,TXP,, | Performed by: INTERNAL MEDICINE

## 2023-03-23 PROCEDURE — 99215 OFFICE O/P EST HI 40 MIN: CPT | Mod: S$GLB,TXP,, | Performed by: INTERNAL MEDICINE

## 2023-03-23 PROCEDURE — 93975 VASCULAR STUDY: CPT | Mod: TC,TXP

## 2023-03-23 PROCEDURE — 74178 CT ABD&PLV WO CNTR FLWD CNTR: CPT | Mod: TC,TXP

## 2023-03-23 PROCEDURE — 93975 VASCULAR STUDY: CPT | Mod: 26,TXP,, | Performed by: RADIOLOGY

## 2023-03-23 PROCEDURE — 99999 PR PBB SHADOW E&M-EST. PATIENT-LVL V: ICD-10-PCS | Mod: PBBFAC,TXP,,

## 2023-03-23 PROCEDURE — 25500020 PHARM REV CODE 255: Mod: TXP | Performed by: INTERNAL MEDICINE

## 2023-03-23 PROCEDURE — 99244 PR OFFICE CONSULTATION,LEVEL IV: ICD-10-PCS | Mod: S$GLB,TXP,, | Performed by: SURGERY

## 2023-03-23 PROCEDURE — 99999 PR PBB SHADOW E&M-EST. PATIENT-LVL III: ICD-10-PCS | Mod: PBBFAC,TXP,,

## 2023-03-23 PROCEDURE — 74178 CT ABD&PLV WO CNTR FLWD CNTR: CPT | Mod: 26,TXP,, | Performed by: RADIOLOGY

## 2023-03-23 PROCEDURE — 76700 US EXAM ABDOM COMPLETE: CPT | Mod: TC,TXP

## 2023-03-23 PROCEDURE — 99204 OFFICE O/P NEW MOD 45 MIN: CPT | Mod: S$GLB,TXP,, | Performed by: REGISTERED NURSE

## 2023-03-23 PROCEDURE — 93975 US ABDOMEN COMP WITH DOPPLER_PRE LIVER TRANSPLANT: ICD-10-PCS | Mod: 26,TXP,, | Performed by: RADIOLOGY

## 2023-03-23 PROCEDURE — 99999 PR PBB SHADOW E&M-EST. PATIENT-LVL V: CPT | Mod: PBBFAC,TXP,,

## 2023-03-23 PROCEDURE — 99999 PR PBB SHADOW E&M-EST. PATIENT-LVL III: CPT | Mod: PBBFAC,TXP,,

## 2023-03-23 PROCEDURE — 97802 PR MED NUTR THER, 1ST, INDIV, EA 15 MIN: ICD-10-PCS | Mod: S$GLB,TXP,, | Performed by: DIETITIAN, REGISTERED

## 2023-03-23 PROCEDURE — 76700 US EXAM ABDOM COMPLETE: CPT | Mod: 26,TXP,, | Performed by: RADIOLOGY

## 2023-03-23 PROCEDURE — 81003 URINALYSIS AUTO W/O SCOPE: CPT | Mod: TXP | Performed by: INTERNAL MEDICINE

## 2023-03-23 PROCEDURE — 99244 OFF/OP CNSLTJ NEW/EST MOD 40: CPT | Mod: S$GLB,TXP,, | Performed by: SURGERY

## 2023-03-23 PROCEDURE — 97802 MEDICAL NUTRITION INDIV IN: CPT | Mod: S$GLB,TXP,, | Performed by: DIETITIAN, REGISTERED

## 2023-03-23 PROCEDURE — 74178 CT ABDOMEN PELVIS W WO CONTRAST: ICD-10-PCS | Mod: 26,TXP,, | Performed by: RADIOLOGY

## 2023-03-23 PROCEDURE — 76700 US ABDOMEN COMP WITH DOPPLER_PRE LIVER TRANSPLANT: ICD-10-PCS | Mod: 26,TXP,, | Performed by: RADIOLOGY

## 2023-03-23 PROCEDURE — 80307 DRUG TEST PRSMV CHEM ANLYZR: CPT | Mod: TXP | Performed by: INTERNAL MEDICINE

## 2023-03-23 RX ADMIN — IOHEXOL 100 ML: 350 INJECTION, SOLUTION INTRAVENOUS at 05:03

## 2023-03-23 NOTE — PROGRESS NOTES
Transplant Surgery Liver Transplant Recipient Evaluation    Referring Physician: Melvina Irving  Corresponding Physician: Melvina Irving    Subjective:     Reason for Visit: evaluate liver transplant candidacy    History of Present Illness: Corky Bach is a 57 y.o. year old male who is being evaluated for liver transplantation.    Transplant History: N/A    Native Liver Disease (UNOS terminology): Cirrhosis: Fatty Liver (CORDOVA) (based on medical records from referral).    Manifestations of liver disease: ascites (recurrent paracentesis needed), edema, encephalopathy, fatigue, jaundice, muscle wasting, and portal hypertension  MELD-Na score: 16 at 3/23/2023  7:43 AM  MELD score: 14 at 3/23/2023  7:43 AM  Calculated from:  Serum Creatinine: 0.8 mg/dL (Using min of 1 mg/dL) at 3/23/2023  7:43 AM  Serum Sodium: 135 mmol/L at 3/23/2023  7:43 AM  Total Bilirubin: 3.9 mg/dL at 3/23/2023  7:43 AM  INR(ratio): 1.2 at 3/23/2023  7:43 AM  Age: 57 years    External provider notes reviewed: No    PMH: reviewed  PSH: reviewed    Review of Systems   Constitutional:  Positive for fatigue.   HENT:  Negative for drooling, postnasal drip and sore throat.    Eyes:  Negative for discharge and itching.   Respiratory:  Negative for choking and stridor.    Gastrointestinal:  Negative for rectal pain.   Endocrine: Negative for polydipsia.   Genitourinary:  Negative for enuresis and genital sores.   Musculoskeletal:  Positive for back pain. Negative for neck pain and neck stiffness.   Allergic/Immunologic: Negative for immunocompromised state.   Neurological:  Negative for facial asymmetry and numbness.   Hematological:  Negative for adenopathy.   Psychiatric/Behavioral:  Negative for behavioral problems, self-injury and suicidal ideas.    Objective:     Physical Exam:  Constitutional:   Vitals reviewed: yes   Well-nourished and well-groomed: yes  Eyes:   Sclerae icteric: no   Extraocular movements intact: yes  GI:    Bowel  sounds normal: yes   Tenderness: no    If yes, quadrant/location: not applicable   Palpable masses: no    If yes, quadrant/location: not applicable   Hepatosplenomegaly: no   Ascites: yes   Hernia: no    If yes, type/location: not applicable   Surgical scars: no    If yes, type/location: not applicable  Resp:   Effort normal: yes   Breath sounds normal: yes    CV:   Regular rate and rhythm: yes   Heart sounds normal: yes   Femoral pulses normal: yes   Extremities edematous: no  Skin:   Rashes or lesions present: no    If yes, describe:not applicable   Jaundice:: no    Musculoskeletal:   Gait normal: yes   Strength normal: yes  Psych:   Oriented to person, place, and time: yes   Affect and mood normal: yes    Additional comments: not applicable    Diagnostics:  The following labs have been reviewed: CBC  CMP  The following radiology images have been independently reviewed and interpreted: Liver US  CT Abd/Pelvis         Transplant Surgery - Candidacy   Assessment/Plan:   I see no surgical contraindication to liver transplantation. Based on available information, Corky Bach is a suitable liver transplant candidate. Final determination of transplant candidacy will be made once evaluation is complete and reviewed by the Liver Transplant Selection Committee.    Patient advised that it is recommended that all transplant candidates, and their close contacts and household members receive Covid vaccination.    Additional testing to be completed according to Liver : Written Order Guideline for Adult Liver Transplant Evaluation (LI-02)    Interpretation of tests and discussion of patient management involves all members of the multidisciplinary transplant team.    Rhys Bean MD     Surgical Complexity B - portal vein recannulization with calcium  Medical Complexity A  Counseling: We provided Corky Bach with a group education session today.  We discussed liver transplantation at length with him, including  risks, potential complications, and alternatives in the management of his liver disease.  The discussion included complications related to anesthesia, bleeding, infection, primary nonfunction, and vascular thrombosis.  I discussed the typical postoperative course, length of hospitalization, the need for long-term immunosuppression, and the need for long-term routine follow-up.  I discussed living-donor and -donor transplantation and the relative advantages and disadvantages of each.  I also discussed average waiting times for both living donation and  donation.  I discussed national and center-specific survival rates.  I also mentioned the potential benefit of multicenter listing to candidates listed with centers within more than one organ procurement organization.  All questions were answered.    Coronavirus disease (COVID-19) caused by severe acute respiratory virus coronavirus 2 (SARS-C0V 2) is associated with increased mortality in solid organ transplant recipients (SOT) compared to non-transplant patients. Vaccine responses to vaccination are depressed against SARS-CoV2 compared to normal individuals but improve with third vaccination doses. Vaccination prior to SOT provides both the best opportunity for transplant candidates to develop protective immunity and to reduce the risk of serious COVID19 infections post transplantation. Organ transplant candidates at [facility] will be required to receive SARS-CoV-2 vaccination prior to being listed with a an active status, whenever possible. Exceptions will be made for disability related reasons or for sincerely held Yarsanism beliefs.     PHS: I discussed the use of organs from donors with PHS risk criteria, including the testing protocols utilized, as well as data from the literature regarding the likelihood of transmission of hepatitis or HIV.  The patient that he is willing to consider such grafts.  DCD: I  discussed the use of organs recovered by donation after cardiac death (DCD), including slightly decreased graft survival and greater risk of arterial and biliary complications. The potential advantage to the recipient is possibly receiving a transplant sooner by accepting such an organ. The patient that he is willing to consider such grafts.  HBcAb: I discussed the use of organs from donors with HBcAb in conjunction with long term use of HBV antiviral drugs, such as lamivudine. The small but measurable risk of hepatitis B seroconversion was discussed as well as the potentially life long need to continue antiviral drugs. The patient that he is willing to consider such grafts.  HCV Non-viremic recipient: I discussed the use of HCV-positive organs in naive recipients, including the risk of viral transmission to the patients or others, potential insurance barriers for antiviral medication coverage, risk for fibrosing cholestatic hepatitis, death or graft loss. The potential advantage to the recipient is the possibility of receiving a transplant sooner with decreased mortality risk by accepting such an organ. The patient that he is willing to consider such grafts.  LDLT: I discussed the nature of living donor liver transplant, including donor risks and more frequent recipient complications. The patient that he is willing to consider such grafts.  Covid: I discussed that this donor has tested positive for the covid virus, but with very low levels of virus and no evidence of covid disease. Although the risk of transmission is unknown, we believe this donor is not infectious and use of the abdominal organs is safe.  To date, these organs have been used with no evidence of transmission. The patient that he is willing to consider such grafts.

## 2023-03-23 NOTE — PROGRESS NOTES
PRE-TRANSPLANT INFECTIOUS DISEASE CONSULT    Reason for Visit:  Pre-transplant evaluation  Referring Provider: Dr. Sri Urban     History of Present Illness:    57 y.o. male with a history of fatty liver presents for pre-liver transplant evaluation.    Infectious History:  Recent hospital admissions: No  Recent infections: No  Recent or current antibiotic use: No  History of recurrent infections *(sinus / pneumonia / UTI / SBP)*: No  Recent dental infections, issues or procedures: No - last dentist appt in 2020 - cracked tooth    History of chicken pox: yes   History of shingles: No  History of STI: No  History of COVID infection: Yes    History of Immunosuppression:  Prior chemotherapy / immunosuppression: Yes - currently on prophylactic rituxan, once monthly, for b cell lymphoma.   Prior transplant: No  History of splenectomy: No     Tuberculosis:  Prior screening for latent TB: Yes  Prior diagnosis of latent TB: No  Risk factors for TB *known exposure, incarceration, homelessness*: No    Geographical exposures:  Currently lives in [place], [place] with wife, mother, son  Lived in the following states: [place], [place]  Lived or travelled to the [facility] [place]: [facility]   International travel: No  Travel-associated illness: No    Social/Environmental:  Occupation:  Fire ext tech   Pets: Yes - 2 dogs and 2 cats   Livestock: No  Fishing / hunting: No  Hobbies: no  Water: [place]  Consumption of raw/undercooked meat or seafood?  Yes - sushi   Tobacco: No  Alcohol: No  Recreational drug use:  No  Sexual partners: No       Past Histories:   Past Medical History:   Diagnosis Date    Anemia     Bacterial peritonitis 10/25/2022    Celiac disease     Cervicalgia     Cirrhosis     Colon polyp     Depression     Diabetes mellitus, type 2     Elevated LFTs     Esophageal varices     Gout, unspecified     History of COVID-19 01/15/2021    Hyperlipidemia     Mood disorder in conditions classified  elsewhere     Obesity, unspecified     Other chronic nonalcoholic liver disease     Portal hypertensive gastropathy     Thyroid disease     Unspecified psychosis      Past Surgical History:   Procedure Laterality Date    COLONOSCOPY N/A 6/7/2017    Procedure: COLONOSCOPY;  Surgeon: Placido Green MD;  Location: [facility];  Service: Endoscopy;  Laterality: N/A;    ESOPHAGOGASTRODUODENOSCOPY N/A 7/16/2018    Procedure: ESOPHAGOGASTRODUODENOSCOPY (EGD);  Surgeon: Mitch Pan MD;  Location: [facility];  Service: Endoscopy;  Laterality: N/A;    ESOPHAGOGASTRODUODENOSCOPY N/A 7/22/2020    Procedure: ESOPHAGOGASTRODUODENOSCOPY (EGD);  Surgeon: Mitch Pan MD;  Location: [facility];  Service: Endoscopy;  Laterality: N/A;    ESOPHAGOGASTRODUODENOSCOPY N/A 3/22/2022    Procedure: EGD (ESOPHAGOGASTRODUODENOSCOPY);  Surgeon: Mitch Pan MD;  Location: [facility];  Service: Endoscopy;  Laterality: N/A;    HERNIA REPAIR      REPEAT ABDOMINAL PARACENTESIS N/A 10/20/2022    Procedure: PARACENTESIS, ABDOMINAL, REPEAT;  Surgeon: Ruddy Salomon MD;  Location: [facility];  Service: Endoscopy;  Laterality: N/A;    REPEAT ABDOMINAL PARACENTESIS N/A 11/9/2022    Procedure: PARACENTESIS, ABDOMINAL, REPEAT;  Surgeon: Ruddy Salomon MD;  Location: [facility];  Service: Endoscopy;  Laterality: N/A;    REPEAT ABDOMINAL PARACENTESIS N/A 11/17/2022    Procedure: PARACENTESIS, ABDOMINAL, REPEAT;  Surgeon: Ruddy Salomon MD;  Location: [facility];  Service: Endoscopy;  Laterality: N/A;    REPEAT ABDOMINAL PARACENTESIS N/A 12/2/2022    Procedure: PARACENTESIS, ABDOMINAL, REPEAT;  Surgeon: Ruddy Salomon MD;  Location: [facility];  Service: Endoscopy;  Laterality: N/A;    REPEAT ABDOMINAL PARACENTESIS N/A 11/25/2022    Procedure: PARACENTESIS, ABDOMINAL, REPEAT;  Surgeon: Mitch Pan MD;  Location: [facility];  Service: Endoscopy;  Laterality: N/A;    REPEAT ABDOMINAL PARACENTESIS N/A 12/9/2022     Procedure: PARACENTESIS, ABDOMINAL, REPEAT;  Surgeon: Mitch Pan MD;  Location: [facility] CATH LAB;  Service: Endoscopy;  Laterality: N/A;    REPEAT ABDOMINAL PARACENTESIS N/A 12/20/2022    Procedure: PARACENTESIS, ABDOMINAL, REPEAT;  Surgeon: Ruddy Salomon MD;  Location: [facility] ENDO;  Service: Endoscopy;  Laterality: N/A;    REPEAT ABDOMINAL PARACENTESIS N/A 12/30/2022    Procedure: PARACENTESIS, ABDOMINAL, REPEAT;  Surgeon: Deon Kathleen II, MD;  Location: [facility] ENDO;  Service: Endoscopy;  Laterality: N/A;    REPEAT ABDOMINAL PARACENTESIS N/A 1/12/2023    Procedure: PARACENTESIS, ABDOMINAL, REPEAT;  Surgeon: Ruddy Salomon MD;  Location: [facility] ENDO;  Service: Endoscopy;  Laterality: N/A;    REPEAT ABDOMINAL PARACENTESIS N/A 1/19/2023    Procedure: PARACENTESIS, ABDOMINAL, REPEAT;  Surgeon: Vale Covarrubias MD;  Location: [facility] ENDO;  Service: Endoscopy;  Laterality: N/A;    REPEAT ABDOMINAL PARACENTESIS N/A 2/1/2023    Procedure: PARACENTESIS, ABDOMINAL, REPEAT;  Surgeon: Ruddy Salomon MD;  Location: [facility] ENDO;  Service: Endoscopy;  Laterality: N/A;    REPEAT ABDOMINAL PARACENTESIS N/A 2/17/2023    Procedure: PARACENTESIS, ABDOMINAL, REPEAT;  Surgeon: Ruddy Salomon MD;  Location: [facility] ENDO;  Service: Endoscopy;  Laterality: N/A;    REPEAT ABDOMINAL PARACENTESIS N/A 3/1/2023    Procedure: PARACENTESIS, ABDOMINAL, REPEAT;  Surgeon: Ruddy Salomon MD;  Location: [facility] ENDO;  Service: Endoscopy;  Laterality: N/A;    REPEAT ABDOMINAL PARACENTESIS N/A 3/9/2023    Procedure: PARACENTESIS, ABDOMINAL, REPEAT;  Surgeon: Ruddy Salomon MD;  Location: [facility] ENDO;  Service: Endoscopy;  Laterality: N/A;    REPEAT ABDOMINAL PARACENTESIS N/A 3/21/2023    Procedure: PARACENTESIS, ABDOMINAL, REPEAT;  Surgeon: Ruddy Salomon MD;  Location: [facility] ENDO;  Service: Endoscopy;  Laterality: N/A;    TONSILLECTOMY      UPPER GASTROINTESTINAL ENDOSCOPY       Family History   Problem Relation Age of Onset     Hypertension Mother     Diabetes Mother     Diabetes Father     Heart disease Father     Ovarian cancer Maternal Grandmother     Colon cancer Neg Hx      Social History     Tobacco Use    Smoking status: Never    Smokeless tobacco: Never   Substance Use Topics    Alcohol use: No    Drug use: No     Review of patient's allergies indicates:  No Known Allergies      Immunization History:  Received all childhood vaccines: Yes  All household members receive annual flu vaccine: Yes  All household members are up to date on COVID vaccine: Yes    Immunization History   Administered Date(s) Administered    COVID-19, MRNA, LN-S, PF (Pfizer) (Purple Cap) 09/21/2021, 10/18/2021, 11/22/2021    COVID-19, mRNA, LNP-S, bivalent booster, PF (PFIZER OMICRON) 01/27/2023    Hepatitis A / Hepatitis B 11/20/2017, 12/20/2017, 05/14/2018    Influenza - Quadrivalent - PF *Preferred* (6 months and older) 12/20/2017, 11/13/2018, 12/06/2019, 10/21/2020, 11/22/2021, 01/27/2023    Pneumococcal Conjugate - 13 Valent 11/14/2018    Pneumococcal Polysaccharide - 23 Valent 05/16/2018    Tdap 05/16/2018          Current antibiotics:  Antibiotics (From admission, onward)      None              Review of Systems  Review of Systems   Constitutional: Negative for chills, fever and weight loss.   HENT:          Poor dentition noted    Respiratory:  Negative for cough and hemoptysis.    Skin:  Negative for poor wound healing, rash and suspicious lesions.        Objective  Physical Exam  Constitutional:       General: He is not in acute distress.     Appearance: He is well-developed. He is not diaphoretic.   HENT:      Head: Normocephalic and atraumatic.      Mouth/Throat:      Dentition: Normal dentition. Does not have dentures. No dental caries or dental abscesses.      Pharynx: No oropharyngeal exudate.   Eyes:      General: No scleral icterus.     Conjunctiva/sclera: Conjunctivae normal.   Cardiovascular:      Rate and Rhythm: Normal rate and regular  rhythm.   Pulmonary:      Effort: Pulmonary effort is normal. No respiratory distress.   Abdominal:      General: Bowel sounds are normal. There is no distension.      Palpations: Abdomen is soft.   Musculoskeletal:      Cervical back: Neck supple.   Lymphadenopathy:      Cervical: No cervical adenopathy.   Skin:     General: Skin is warm and dry.      Findings: No erythema or rash.   Neurological:      Mental Status: He is alert and oriented to person, place, and time.   Psychiatric:         Mood and Affect: Mood normal.         Behavior: Behavior normal.         Thought Content: Thought content normal.         Judgment: Judgment normal.         Labs:    CBC:   Lab Results   Component Value Date    WBC 12.38 03/23/2023    HGB 14.4 03/23/2023    HCT 43.6 03/23/2023    MCV 73 (L) 03/23/2023     (L) 03/23/2023    GRAN 9.8 (H) 03/23/2023    GRAN 79.0 (H) 03/23/2023    LYMPH 1.0 03/23/2023    LYMPH 8.1 (L) 03/23/2023    MONO 1.0 03/23/2023    MONO 8.3 03/23/2023    EOSINOPHIL 3.6 03/23/2023       Syphilis screening:   Lab Results   Component Value Date    RPR Non-reactive 11/16/2021        TB screening: No results found for: TBGOLDPLUS, TSPOTSCREN    HIV screening:   Lab Results   Component Value Date    [id number] Non-reactive 03/23/2023       Strongyloides IgG: No results found for: STRONGANTIGG    Hepatitis Serologies:   Lab Results   Component Value Date    HEPAIGG Reactive 03/23/2023    HEPBCAB Non-reactive 03/23/2023    HEPBSAB <3.00 03/23/2023    HEPBSAB Non-reactive 03/23/2023    HEPCAB Non-reactive 03/23/2023        Varicella IgG: No results found for: VARICELLAINT      Assessment and Plan    1. Risks of Infection: Available serologies were reviewed. No unusual risks of infection or significant barriers to transplantation were identified from the infectious disease standpoint given the information available at this time.    - Acute infectious issues: No absolute contraindications identified.   - Pending  serologies: NA   - Please call if any pending serologic testing is positive.    2. Immunizations:  Based on the patient's immunization history and serologies, the following immunizations are recommended:  - Hepatitis A    Patient does have immunity to hepatitis A    Vaccination ordered today: No. Reason for not ordering: immunity demonstrated on serology   - Hepatitis B    Patient does have immunity to hepatitis B    Vaccination ordered today: Yes   - COVID    Current CDC vaccination recommendations were discussed with the patient   - Annual high dose influenza     Vaccination ordered today: No. Reason for not ordering: vaccination up to date   - Prevnar 20    Vaccination ordered today: Yes   - Tdap    Vaccination ordered today: Yes   - Shingrix    Vaccination ordered today: Yes    Recommended Pre-Transplant Immunization Schedule   Vaccine  0m 1m 2m 6m   Pneumococcal conjugate vaccine (Prevnar 20) X      Tetanus-diphtheria-pertussis (Tdap)* X      Hepatitis A Vaccine (Havrix)** X   X   Hepatitis B Vaccine (Heplisav)** X X     Influenza (annual) X      Zoster Recombinant Vaccine (Shingrix) X  X           *Administer booster every 10 years.       **Administer if no immunity demonstrated on serologies               Patient will receive vaccines at local pharmacy. A written prescription was provided for all vaccine doses.     3. Counseling:   I discussed with the patient the risk for increased susceptibility to infections following transplantation including increased risk for infection right after transplant and if rejection should occur.  The patient has been counseled on the importance of vaccinations to decrease risk of infection and severe illness. Specific guidance has been provided to the patient regarding the patient's occupation, hobbies and activities to avoid future infectious complications. Recommended dental evaluation to avoid future infection.     4. Transplant Candidacy: Based on available information,  there are no identified significant barriers to transplantation from an infectious disease standpoint.  Final determination of transplant candidacy will be made once evaluation is complete and reviewed by the Selection Committee.      Follow up with infectious disease as needed.       The total time for evaluation and management services performed on 03/23/2023 was greater than 45 minutes.

## 2023-03-23 NOTE — Clinical Note
Transplant Recipient Adult Psychosocial Assessment    SW met wit patient and patient's wife, Odette Bach (Ph: [phone]) in clinic.     Corky Bach  [address] [place] [address] [place] [address]  Telephone Information:   Mobile [phone]   Home  [phone] (home)  Work  There is no work phone number on file.  E-mail  [email or web address]    Sex: male  YOB: 1965  Age: 57 y.o.    Encounter Date: 3/23/2023  [place] Citizen: yes  Primary Language: English   Needed: no    Emergency Contact:  Name: Odette Bach  Relationship: wife  Address: same as patient  Phone Numbers:   [phone] (mobile)    Family/Social Support:   Number of dependents/: Patient has no dependents in need of care.  Marital history: ; 1 previous marriage  Other family dynamics: Patient 's mother is alive and father is . Patient has been  for 6 years. Patient has 1 biological son, Jr (age 21-[place]) and 2 step-children, Rajat Resendiz Jr. (age 27-[place]) and Maryann Alexandre (age 23 [place]). Patient has 1 sister and 1  brother.  Patient's wife notes she and pts have 2 emotional support dogs. Patient's wife reports 1 dog is registered as a support dog and they are working on paying $200 to register their other dog. SW discussed if transplanted  dogs are not allowed at [employer] run apartments. Patient close relationship with his children. Patient's wife notes close relationship with [employer] members.     Household Composition:  Name: Odette Bach  Age: 57  Relationship: wife  Does person drive? yes    Name: Jr Bach  Age: 21  Relationship: son  Does person drive? no    Name: Mary West  Age: 81  Relationship: mother  Does person drive? no    Do you and your caregivers have access to reliable transportation? yes  PRIMARY CAREGIVER: Odette Bach, pts wife will be primary caregiver, phone number [phone].      provided in-depth information to patient and  caregiver regarding pre- and post-transplant caregiver role.   strongly encourages patient and caregiver to have concrete plan regarding post-transplant care giving, including back-up caregiver(s) to ensure care giving needs are met as needed.    Patient and Caregiver states understanding all aspects of caregiver role/commitment and is able/willing/committed to being caregiver to the fullest extent necessary.    Patient and Caregiver verbalizes understanding of the education provided today and caregiver responsibilities.         remains available. Patient and Caregiver agree to contact  in a timely manner if concerns arise.      Able to take time off work without financial concerns:  Patient  and pts wife  do not work and receive SSD .     Additional Significant Others who will Assist with Transplant:  Name:  Rajat Resendiz Jr.  Age: 27  City: [place] State: [place]  Relationship: step-son  Does person drive? yes  Ph: [phone]    Patient's wife notes she plans to discuss caregiver role with her  and [employer] members for increase caregivers to assist as needed. SW discussed and advised patient and pts wife to contact SW team with additional caregivers.  Patient and pts wife verbalized understanding.     Living Will: no  Healthcare Power of : no  Advance Directives on file: <<no information> per medical record.  Verbally reviewed LW/HCPA information.   provided patient with copy of LW/HCPA documents and provided education on completion of forms.    Living Donors: Yes.  Name: Jr Bach, pts son and Dr. Reza Diop (Ph: [phone], pts therapist.. .    Highest Education Level: High School (9-12) or GED  Reading Ability: 12th grade  Reports difficulty with: seeing (wears prescription glasses) and memory due to liver diagnosis. Patient reports taking lactulose.  Learns Best By:  visual information     Status: no  VA Benefits: no     Working for  Income: No  If no, reason not working: Disability  Patient is disabled.  Prior to disability, patient  Patient last worked 2020..    Spouse/Significant Other Employment: Patient's wife receives SSD.    Disabled: yes: date disability began: 2021, due to: ESLD and Cancer and type 2 diabetes.    Monthly Income:  SS Disability: $2354/monthly combined with wife.  Able to afford all costs now and if transplanted, including medications: yes, patient reports no cost at this time for his medications,however, pts wife reports limited finances. Patient's wife verbalized concerns after they were notified of $720 copay for transplant surgery and transplant expenses. SW discussed and encouraged pts and pts wife to consider fundraising for transplant related expenses. Patient and pts wife verbalized understanding.  Patient and Caregiver verbalizes understanding of personal responsibilities related to transplant costs and the importance of having a financial plan to ensure that patients transplant costs are fully covered.       provided fundraising information/education. Patient and Caregiververbalizes understanding.   remains available.    Insurance:   Payer/Plan Subscr  Sex Relation Sub. Ins. ID Effective Group Num   1. [employer]* ADRIANE FERNANDO* 1965 Male Self [id number] 3/1/23 SECUREFULL                                   [address]   2. [place]* ADRIANE FERNANDO* 1965 Male Self [id number]* 23                                    [address] [address]     Primary Insurance (for UNOS reporting): Public Insurance - Medicare & Choice  Secondary Insurance (for UNOS reporting): Public Insurance - Medicare & Choice  Patient and Caregiver verbalizes clear understanding that patient may experience difficulty obtaining and/or be denied insurance coverage post-surgery. This includes and is not limited to disability insurance, life insurance, health insurance, burial insurance, long  term care insurance, and other insurances.      Patient and Caregiver also reports understanding that future health concerns related to or unrelated to transplantation may not be covered by patient's insurance.  Resources and information provided and reviewed.     Patient and Caregiver provides verbal permission to release any necessary information to outside resources for patient care and discharge planning.  Resources and information provided are reviewed.      Dialysis Adherence: Patient denies    Infusion Service: patient utilizing? no  Home Health: patient utilizing? no  DME: Patient utilizes a cane. Patient has a rolling walker, wheelchair, shower chair from  wife pts surgeries.    Pulmonary/Cardiac Rehab: no   ADLS:  Patient drives and independent with ADL's.    Adherence:   Patient reports high level of adherence to medications and medical regimen .  Adherence education and counseling provided.     Per History Section:  Past Medical History:   Diagnosis Date    Anemia     Bacterial peritonitis 10/25/2022    Celiac disease     Cervicalgia     Cirrhosis     Colon polyp     Depression     Diabetes mellitus, type 2     Elevated LFTs     Esophageal varices     Gout, unspecified     History of COVID-19 01/15/2021    Hyperlipidemia     Mood disorder in conditions classified elsewhere     Obesity, unspecified     Other chronic nonalcoholic liver disease     Portal hypertensive gastropathy     Thyroid disease     Unspecified psychosis      Social History     Tobacco Use    Smoking status: Never    Smokeless tobacco: Never   Substance Use Topics    Alcohol use: No     Social History     Substance and Sexual Activity   Drug Use No     Social History     Substance and Sexual Activity   Sexual Activity Not on file       Per Today's Psychosocial:  Tobacco: none, patient denies any use.  Alcohol: none, patient denies any use.  Illicit Drugs/Non-prescribed Medications: none, patient denies any use.    Patient and Caregiver  states clear understanding of the potential impact of substance use as it relates to transplant candidacy and is aware of possible random substance screening.  Substance abstinence/cessation counseling, education and resources provided and reviewed.     Arrests/DWI/Treatment/Rehab: patient denies    Psychiatric History:    Mental Health: depression and anxiety  Psychiatrist/Counselor: Dr. Reza Diop with [facility], 2x/week  Medications:  Effexor twice a day daily  Suicide/Homicide Issues: Patient reports past SI years ago. Patient denies current SI/HI.   Safety at home: Patient living in a safe environment at home.    Knowledge: Patient and Caregiver states having clear understanding and realistic expectations regarding the potential risks and potential benefits of organ transplantation and organ donation and agrees to discuss with health care team members and support system members, as well as to utilize available resources and express questions and/or concerns in order to further facilitate the pt informed decision-making.  Resources and information provided and reviewed.    Patient and Caregiver is aware of [facility]'s affiliation and/or partnership with agencies in home health care, LTAC, SNF, [facility], and other hospitals and clinics.    Understanding: Patient and Caregiver reports having a clear understanding of the many lifetime commitments involved with being a transplant recipient, including costs, compliance, medications, lab work, procedures, appointments, concrete and financial planning, preparedness, timely and appropriate communication of concerns, abstinence (ETOH, tobacco, illicit non-prescribed drugs), adherence to all health care team recommendations, support system and caregiver involvement, appropriate and timely resource utilization and follow-through, mental health counseling as needed/recommended, and patient and caregiver responsibilities.  Social Service Handbook, resources and  "detailed educational information provided and reviewed.  Educational information provided.    Patient and Caregiver also reports current and expected compliance with health care regime and states having a clear understanding of the importance of compliance.      Patient and Caregiver reports a clear understanding that risks and benefits may be involved with organ transplantation and with organ donation.       Patient and Caregiver also reports clear understanding that psychosocial risk factors may affect patient, and include but are not limited to feelings of depression, generalized anxiety, anxiety regarding dependence on others, post traumatic stress disorder, feelings of guilt and other emotional and/or mental concerns, and/or exacerbation of existing mental health concerns.  Detailed resources provided and discussed.      Patient and Caregiver agrees to access appropriate resources in a timely manner as needed and/or as recommended, and to communicate concerns appropriately.  Patient and Caregiver also reports a clear understanding of treatment options available.     Patient and Caregiver received education in a group setting.   reviewed education, provided additional information, and answered questions.    Feelings or Concerns: Patient states " I want it (transplant) to work.    Coping: Identify Patient & Caregiver Strategies to [place]:   1. Currently & Pre-transplant - Patient enjoys watching tv and video games.   2. At the time of surgery - Patient plans to have support from family.   3. During post-Transplant & Recovery Period - Patient plans to have support from family.    Goals: Patient's goal is to improve his health.     Interview Behavior: Patient and Caregiver presents as alert and oriented x 4, pleasant, calm, communicative, cooperative, and asking and answering questions appropriately.          Transplant Social Work - Candidacy  Assessment/Plan:     Psychosocial Suitability: Patient " presents as  lower risk  candidate for transplant at this time. Based on psychosocial risk factors, patient presents as  lower risk. Patient has an adequate support system and confirmed caregiver plan. Patient has limited finances and verbalized concerns about transplant related expenses. Patient reports diagnosis of depression and anxiety. Patient reports he is coping with his mental health with psychotropic medication and therapy 2x/week. Patient denies any alcohol, tobacco or illicit substance use.    Final determination of transplant candidacy will be made by the Liver Transplant Committee.     Recommendations/Additional Comments:   -Local Lodging (patient and pts wife informed future of LR housing resource  is uncertain and to explore alternative lodging)  -Fundraising-resources provided   -    Maryann Cruz LCSW

## 2023-03-23 NOTE — PROGRESS NOTES
Corky was seen in clinic for Fast Pass evaluation.  Handbook on pre-liver transplant information (see outline below) was given to the patient.  Patient's wife , accompanied him to scheduled appointments.  Patient viewed pre-liver transplant education slides via desktop in transplant clinic.  Informed consent signed and written information given on selection criteria.    LIVER TRANSPLANT WORK-UP EDUCATION   I. UNDERSTANDING THE TRANSPLANT PROCESS     A. Transplant team      B. MELD score      C. Balancing urgency and outcome     D. Liver Transplant Options         1.  Donor         2. Living Donor--rationale, benefits     E. Transplant Work-up         1. Medical         2. Psychological and Social--lifetime commitment, life changes, personal plan/ goal         3. Financial--fundraising     F.  Completed work-up and Next Steps    G. Wait Time         1.  Can be listed at more than one center         2.  Can transfer wait time     H. The Call       I. Possible donor options         1. DCD         2. Hep B Core and Hep C Positive         3. Increased Risk     J.  Liver Transplant Surgery         1. Length         2. Transfusions, cell saver         3. Surgical risks         4. What to expect after sugery--Central lines, drains, Felipe catheter, incision, endotracheal              tube, NG tube, length of stay in ICU/ TSU  II.  HOW TO BEST CARE FOR YOURSELF (Take Five To Thrive)  III. UNDERSTANDING LIFE AFTER TRANSPLANT  A. Medicines after transplant      1. Immunosuppression--infection and rejection  B. Labs   IV. ADULT LIVER SURVIVAL RATES

## 2023-03-23 NOTE — PROGRESS NOTES
"  PHARM.D. PRE-TRANSPLANT NOTE:    This patient's medication therapy was evaluated as part of his pre-transplant evaluation.      The following general pharmacologic concerns were noted: none    The following concerns for post-operative pain management were noted: none    The following pharmacologic concerns related to HCV therapy were noted: none      This patient's medication profile was reviewed for considerations for DAA Hepatitis C therapy:    [x]  No current inducers of CYP 3A4 or PGP  [x]  No amiodarone on this patient's EMR profile in the last 24 months  [x]  No past or current atrial fibrillation on this patient's EMR profile       Current Outpatient Medications   Medication Sig Dispense Refill    acetaminophen (TYLENOL) 500 MG tablet Take 1,000 mg by mouth 2 (two) times daily as needed for Pain.      BD ULTRA-FINE ORIG PEN NEEDLE 29 gauge x 1/2" Ndle USE PEN NEEDLE TO INJECT INSULIN EVERY EVENING 100 each 11    ferrous sulfate 325 (65 FE) MG EC tablet Take 1 tablet (325 mg total) by mouth once daily. Take with vitamin C (Patient taking differently: Take 325 mg by mouth every other day. Take with vitamin C) 90 tablet 3    finasteride (PROSCAR) 5 mg tablet Take 1 tablet (5 mg total) by mouth once daily. 90 tablet 3    furosemide (LASIX) 40 MG tablet Take 1 tablet (40 mg total) by mouth once daily. New dose as of 07/11/2022- for fluid management / ascites 90 tablet 3    insulin detemir (LEVEMIR FLEXPEN SUBQ) Inject 60 Units into the skin every evening.      insulin lispro (HUMALOG KWIKPEN INSULIN SUBQ) Inject 20 Units into the skin 3 (three) times daily with meals.      lactulose (CHRONULAC) 20 gram/30 mL Soln Take 20 grams by mouth 3 times daily as needed and titrate use / amount to 3 to 5 soft brown bowel movements per day.  Decrease amount / frequency if getting diarrhea 1800 mL 5    linaGLIPtin (TRADJENTA) 5 mg Tab tablet TAKE 1 TABLET (5 MG TOTAL) BY MOUTH ONCE DAILY. 90 tablet 3    metOLazone " (ZAROXOLYN) 2.5 MG tablet Take 2 tablets (5 mg total) by mouth once daily. Take 30 minutes before other water pills 60 tablet 11    oxybutynin (DITROPAN-XL) 5 MG TR24 Take 1 tablet (5 mg total) by mouth once daily. 30 tablet 11    propranoloL (INDERAL) 10 MG tablet TAKE 1 TABLET (10 MG TOTAL) BY MOUTH 3 (THREE) TIMES DAILY. 90 tablet 0    rifAXIMin (XIFAXAN) 550 mg Tab Take 1 tablet (550 mg total) by mouth 2 (two) times daily. 60 tablet 11    spironolactone (ALDACTONE) 100 MG tablet Take 1 tablet (100 mg total) by mouth once daily. For fluid management / ascites- new as of 07/11/2022 90 tablet 3    tamsulosin (FLOMAX) 0.4 mg Cap Take 1 capsule (0.4 mg total) by mouth every evening. 90 capsule 3    venlafaxine (EFFEXOR) 75 MG tablet TAKE 1 TABLET (75 MG TOTAL) BY MOUTH 2 (TWO) TIMES DAILY. 180 tablet 3     No current facility-administered medications for this visit.           I am available for consultation and can be contacted, as needed by the other members of the Transplant team.

## 2023-03-23 NOTE — PROGRESS NOTES
TRANSPLANT NUTRITIONAL ASSESSMENT    Referring Provider: Sri Urban MD    Reason for Visit: Pre-liver transplant work-up    Age: 57 y.o.  Sex: male    Patient Active Problem List   Diagnosis    Cervical disc disorder    Uncontrolled type 2 diabetes mellitus with microalbuminuria, without long-term current use of insulin    Mixed hyperlipidemia    Mood disorder    Obesity (BMI 30-39.9)    Nuclear sclerosis of both eyes    Type 2 diabetes mellitus without ophthalmic manifestations    Essential hypertension    Fatty liver    Thrombocytopenia    Anemia    Subclinical hypothyroidism    Transaminitis    Rectal bleeding    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    Imaging of gastrointestinal tract abnormal    Portal hypertensive gastropathy    Urine culture positive    MRSA (methicillin resistant staph aureus) culture positive    Benign prostatic hyperplasia with incomplete bladder emptying    Chronic bilateral low back pain with left-sided sciatica    Cervicalgia    Epistaxis    Dyspnea on exertion    MALT lymphoma    Portal vein thrombosis    Splenomegaly    Other ascites    Secondary esophageal varices without bleeding    Hepatic encephalopathy    Pancytopenia    Iron deficiency    Hypomagnesemia    Type 2 diabetes mellitus with hyperglycemia, with long-term current use of insulin    Influenza A    Fever     Past Medical History:   Diagnosis Date    Anemia     Bacterial peritonitis 10/25/2022    Celiac disease     Cervicalgia     Cirrhosis     Colon polyp     Depression     Diabetes mellitus, type 2     Elevated LFTs     Esophageal varices     Gout, unspecified     History of COVID-19 01/15/2021    Hyperlipidemia     Mood disorder in conditions classified elsewhere     Obesity, unspecified     Other chronic nonalcoholic liver disease     Portal hypertensive gastropathy     Thyroid disease     Unspecified psychosis      Lab Results   Component Value Date     (H) 03/23/2023    K 3.4 (L) 03/23/2023    PHOS 3.1  03/09/2023    MG 1.5 (L) 03/09/2023    CHOL 100 (L) 01/19/2023    HDL 42 01/19/2023    TRIG 64 01/19/2023    ALBUMIN 3.5 03/23/2023    HGBA1C 12.7 (H) 01/19/2023    CALCIUM 9.2 03/23/2023     Other Pertinent Labs: none    Current Outpatient Medications   Medication Sig    acetaminophen (TYLENOL) 500 MG tablet Take 1,000 mg by mouth 2 (two) times daily as needed for Pain.    ferrous sulfate 325 (65 FE) MG EC tablet Take 1 tablet (325 mg total) by mouth once daily. Take with vitamin C (Patient taking differently: Take 325 mg by mouth every other day. Take with vitamin C)    finasteride (PROSCAR) 5 mg tablet Take 1 tablet (5 mg total) by mouth once daily.    furosemide (LASIX) 40 MG tablet Take 1 tablet (40 mg total) by mouth once daily. New dose as of 07/11/2022- for fluid management / ascites    insulin detemir (LEVEMIR FLEXPEN SUBQ) Inject 60 Units into the skin every evening.    insulin lispro (HUMALOG KWIKPEN INSULIN SUBQ) Inject 20 Units into the skin 3 (three) times daily with meals.    lactulose (CHRONULAC) 20 gram/30 mL Soln Take 20 grams by mouth 3 times daily as needed and titrate use / amount to 3 to 5 soft brown bowel movements per day.  Decrease amount / frequency if getting diarrhea    linaGLIPtin (TRADJENTA) 5 mg Tab tablet TAKE 1 TABLET (5 MG TOTAL) BY MOUTH ONCE DAILY.    metOLazone (ZAROXOLYN) 2.5 MG tablet Take 2 tablets (5 mg total) by mouth once daily. Take 30 minutes before other water pills    oxybutynin (DITROPAN-XL) 5 MG TR24 Take 1 tablet (5 mg total) by mouth once daily.    propranoloL (INDERAL) 10 MG tablet TAKE 1 TABLET (10 MG TOTAL) BY MOUTH 3 (THREE) TIMES DAILY.    rifAXIMin (XIFAXAN) 550 mg Tab Take 1 tablet (550 mg total) by mouth 2 (two) times daily.    spironolactone (ALDACTONE) 100 MG tablet Take 1 tablet (100 mg total) by mouth once daily. For fluid management / ascites- new as of 07/11/2022    tamsulosin (FLOMAX) 0.4 mg Cap Take 1 capsule (0.4 mg total) by mouth every evening.     "venlafaxine (EFFEXOR) 75 MG tablet TAKE 1 TABLET (75 MG TOTAL) BY MOUTH 2 (TWO) TIMES DAILY.    BD ULTRA-FINE ORIG PEN NEEDLE 29 gauge x 1/2" Ndle USE PEN NEEDLE TO INJECT INSULIN EVERY EVENING     No current facility-administered medications for this visit.     Allergies: Patient has no known allergies.    Ht Readings from Last 1 Encounters:   03/23/23 6' 1.62" (1.87 m)     Wt Readings from Last 1 Encounters:   03/23/23 93.9 kg (207 lb 0.2 oz)      BMI: Body mass index is 26.85 kg/m².    Usual Weight: 250 lb  Weight Change/Time: weighed around 260 lb in Oct 2022, weight loss to current weight  Current Diet: regular  Appetite/Current Intake: poor  Exercise/Physical Activity: functional in ADL's, uses cane for balance, has nerve pain and cannot walk for long  LFI: 4.10    Nutritional/Herbal Supplements: Premier Protein shake, aiming for 3 x/day lately, Vit C, iron   Potential Food/Medication Interactions: reviewed  Chewing/Swallowing Problems: none  Symptoms: nausea and occasional diarrhea  Assessment of Lab Values: Glu 155, K 3.4, Ha1c 12.7 (January 2023)  Support System: spouse present, reports she cooks most days    Estimated Kcal Need: 5308-4928 kcal (25-30 kcal/kg)  Estimated Protein Need:  gm (1-1.5 gm/kg)    Nutritional History:   Pt present with his wife today. He reports to have a poor appetite, he has nausea and smell aversions. Pt tries to eat 2 meals and snacks and has started drinking Premier Protein shakes. Pt has not been checking his BG at all, he's giving himself his insulin in the morning, no carbohydrate counting for meals/ snacks at this time. Corky describes not feeling hungry in general.   When pt can tolerate eating solid food, he reported the following:  B/L (often around 12:00 bc he sleeps late): 4 waffles (Blueberry Ego or other waffle) with sugar free syrup or 2 eggs on 4 slices white bread (2 sandwiches), 2% milk, Protein shake, water  D: 4 pm anthony; zucchini / squash/ground beef / " eric hair pasta w/ broccoli / chicken or pork [place] / occasional sushi   Snack: protein shake, peanut butter, cheese, low sodium cashews, low sodium crackers    Nutritional Diagnoses  Problem: food- and nutrition-related knowledge deficit  Etiology: r/t no prior edu no pre liver transplant nutrition rec's  Symptoms: aeb diet recall    Educational Need? yes  Barriers: none identified  Discussed with: patient and spouse  Interventions: Patient taught nutrition information regarding Pre-liver transplant work-up  Pre liver transplant nutrition packet provided and discussed. Pt advised on the recommendations to follow a low sodium diet while taking in adequate protein.  This packet includes label low sodium reading tips, seasoning suggestions, protein intake goal amount (gm) for the individual patient, as well as oral supplement suggestions.   Exercise and physical activity are encouraged.    Goals/Recommendations: diet adherence, small frequent meals and snacks, and consumption of stella nutritional supplements  Actions Taken: instruct/provide written information  Strategies Used: problem solving, goal setting, motivational interviewing  Patient and/or family comprehend instructions: yes , adherence expected  Outcome: Verbalizes understanding  Monitoring: Contact information provided, will f/u in clinic and communicate with the care team as needed.     Counseling Time: 15 minutes

## 2023-03-23 NOTE — PROGRESS NOTES
Patient seen in clinic with caregiver.  Patient contact information verified. Patient's name and date of birth verified.  Consents reviewed and signatures obtained.   Patient given supplies needed to obtain urine specimen.    Instructions reviewed with the patient on the way the specimen should be obtained.   Patient stated that  they understood the information provided for the collection and  placement of the specimen. Specimen collected in clinic.  Patient given supplies and printed instructions for the collection of the 24 hour urine specimen.  Patient reports understanding the instructions provided to obtain the specimen and the storage of the specimen while at home.  Patient given instructed to bring the container to 2nd floor lab when the collection is completed.  Patient questions answered and concerns addressed.

## 2023-03-24 ENCOUNTER — HOSPITAL ENCOUNTER (OUTPATIENT)
Dept: CARDIOLOGY | Facility: HOSPITAL | Age: 58
Discharge: HOME | End: 2023-03-24
Attending: INTERNAL MEDICINE
Payer: MEDICARE

## 2023-03-24 ENCOUNTER — HOSPITAL ENCOUNTER (OUTPATIENT)
Dept: RADIOLOGY | Facility: HOSPITAL | Age: 58
Discharge: HOME | End: 2023-03-24
Attending: INTERNAL MEDICINE
Payer: MEDICARE

## 2023-03-24 VITALS
DIASTOLIC BLOOD PRESSURE: 65 MMHG | WEIGHT: 217 LBS | BODY MASS INDEX: 27.85 KG/M2 | SYSTOLIC BLOOD PRESSURE: 98 MMHG | HEIGHT: 74 IN

## 2023-03-24 DIAGNOSIS — Z76.82 ORGAN TRANSPLANT CANDIDATE: ICD-10-CM

## 2023-03-24 LAB
ASCENDING AORTA: 3.26 CM
AV INDEX (PROSTH): 0.86
AV MEAN GRADIENT: 3 MMHG
AV PEAK GRADIENT: 5 MMHG
AV VALVE AREA: 3.13 CM2
AV VELOCITY RATIO: 0.78
BSA FOR ECHO PROCEDURE: 2.27 M2
CV ECHO LV RWT: 0.43 CM
CV STRESS BASE HR: 67 BPM
DIASTOLIC BLOOD PRESSURE: 63 MMHG
DOP CALC AO PEAK VEL: 1.16 M/S
DOP CALC AO VTI: 20.9 CM
DOP CALC LVOT AREA: 3.6 CM2
DOP CALC LVOT DIAMETER: 2.15 CM
DOP CALC LVOT PEAK VEL: 0.9 M/S
DOP CALC LVOT STROKE VOLUME: 65.39 CM3
DOP CALCLVOT PEAK VEL VTI: 18.02 CM
E WAVE DECELERATION TIME: 264.58 MSEC
E/A RATIO: 1.31
E/E' RATIO: 8.88 M/S
ECHO LV POSTERIOR WALL: 0.98 CM (ref 0.6–1.1)
EJECTION FRACTION: 65 %
FRACTIONAL SHORTENING: 32 % (ref 28–44)
INTERVENTRICULAR SEPTUM: 0.91 CM (ref 0.6–1.1)
IVRT: 82.78 MSEC
LA MAJOR: 5.31 CM
LA MINOR: 5.39 CM
LA WIDTH: 4.36 CM
LEFT ATRIUM SIZE: 3.63 CM
LEFT ATRIUM VOLUME INDEX: 32 ML/M2
LEFT ATRIUM VOLUME: 71.97 CM3
LEFT INTERNAL DIMENSION IN SYSTOLE: 3.07 CM (ref 2.1–4)
LEFT VENTRICLE DIASTOLIC VOLUME INDEX: 41.52 ML/M2
LEFT VENTRICLE DIASTOLIC VOLUME: 93.41 ML
LEFT VENTRICLE MASS INDEX: 64 G/M2
LEFT VENTRICLE SYSTOLIC VOLUME INDEX: 16.5 ML/M2
LEFT VENTRICLE SYSTOLIC VOLUME: 37.1 ML
LEFT VENTRICULAR INTERNAL DIMENSION IN DIASTOLE: 4.52 CM (ref 3.5–6)
LEFT VENTRICULAR MASS: 142.9 G
LV LATERAL E/E' RATIO: 7.1 M/S
LV SEPTAL E/E' RATIO: 11.83 M/S
MV A" WAVE DURATION": 10.56 MSEC
MV PEAK A VEL: 0.54 M/S
MV PEAK E VEL: 0.71 M/S
MV STENOSIS PRESSURE HALF TIME: 76.73 MS
MV VALVE AREA P 1/2 METHOD: 2.87 CM2
OHS CV CPX 1 MINUTE RECOVERY HEART RATE: 141 BPM
OHS CV CPX 85 PERCENT MAX PREDICTED HEART RATE MALE: 140
OHS CV CPX MAX PREDICTED HEART RATE: 165
OHS CV CPX PATIENT IS FEMALE: 0
OHS CV CPX PATIENT IS MALE: 1
OHS CV CPX PEAK DIASTOLIC BLOOD PRESSURE: 47 MMHG
OHS CV CPX PEAK HEAR RATE: 141 BPM
OHS CV CPX PEAK RATE PRESSURE PRODUCT: NORMAL
OHS CV CPX PEAK SYSTOLIC BLOOD PRESSURE: 73 MMHG
OHS CV CPX PERCENT MAX PREDICTED HEART RATE ACHIEVED: 85
OHS CV CPX RATE PRESSURE PRODUCT PRESENTING: 6968
PISA TR MAX VEL: 2.04 M/S
PULM VEIN S/D RATIO: 1.33
PV PEAK D VEL: 0.43 M/S
PV PEAK S VEL: 0.57 M/S
RA MAJOR: 4.93 CM
RA PRESSURE: 3 MMHG
RA WIDTH: 4.3 CM
RIGHT VENTRICULAR END-DIASTOLIC DIMENSION: 4.09 CM
RV TISSUE DOPPLER FREE WALL SYSTOLIC VELOCITY 1 (APICAL 4 CHAMBER VIEW): 11.92 CM/S
SINUS: 3.46 CM
STJ: 2.98 CM
SYSTOLIC BLOOD PRESSURE: 104 MMHG
TDI LATERAL: 0.1 M/S
TDI SEPTAL: 0.06 M/S
TDI: 0.08 M/S
TR MAX PG: 17 MMHG
TRICUSPID ANNULAR PLANE SYSTOLIC EXCURSION: 2.59 CM
TV REST PULMONARY ARTERY PRESSURE: 20 MMHG

## 2023-03-24 PROCEDURE — 93351 STRESS TTE COMPLETE: CPT | Mod: TXP

## 2023-03-24 PROCEDURE — 93351 STRESS ECHO (CUPID ONLY): ICD-10-PCS | Mod: 26,TXP,, | Performed by: INTERNAL MEDICINE

## 2023-03-24 PROCEDURE — 93325 DOPPLER ECHO COLOR FLOW MAPG: CPT | Mod: 26,TXP,, | Performed by: INTERNAL MEDICINE

## 2023-03-24 PROCEDURE — 93320 STRESS ECHO (CUPID ONLY): ICD-10-PCS | Mod: 26,TXP,, | Performed by: INTERNAL MEDICINE

## 2023-03-24 PROCEDURE — 63600175 PHARM REV CODE 636 W HCPCS: Mod: TXP | Performed by: INTERNAL MEDICINE

## 2023-03-24 PROCEDURE — 71046 X-RAY EXAM CHEST 2 VIEWS: CPT | Mod: 26,TXP,, | Performed by: RADIOLOGY

## 2023-03-24 PROCEDURE — 93351 STRESS TTE COMPLETE: CPT | Mod: 26,TXP,, | Performed by: INTERNAL MEDICINE

## 2023-03-24 PROCEDURE — 71046 XR CHEST PA AND LATERAL: ICD-10-PCS | Mod: 26,TXP,, | Performed by: RADIOLOGY

## 2023-03-24 PROCEDURE — 93325 STRESS ECHO (CUPID ONLY): ICD-10-PCS | Mod: 26,TXP,, | Performed by: INTERNAL MEDICINE

## 2023-03-24 PROCEDURE — 71046 X-RAY EXAM CHEST 2 VIEWS: CPT | Mod: TC,FY,TXP

## 2023-03-24 PROCEDURE — 93320 DOPPLER ECHO COMPLETE: CPT | Mod: 26,TXP,, | Performed by: INTERNAL MEDICINE

## 2023-03-24 RX ORDER — DOBUTAMINE HYDROCHLORIDE 400 MG/100ML
0-20 INJECTION, SOLUTION INTRAVENOUS CONTINUOUS
Status: DISCONTINUED | OUTPATIENT
Start: 2023-03-24 | End: 2023-03-25 | Stop reason: HOSPADM

## 2023-03-24 RX ORDER — ATROPINE SULFATE 0.4 MG/ML
0.4 INJECTION, SOLUTION ENDOTRACHEAL; INTRAMEDULLARY; INTRAMUSCULAR; INTRAVENOUS; SUBCUTANEOUS
Status: COMPLETED | OUTPATIENT
Start: 2023-03-24 | End: 2023-03-24

## 2023-03-24 RX ADMIN — DOBUTAMINE 40 MCG/KG/MIN: 12.5 INJECTION, SOLUTION, CONCENTRATE INTRAVENOUS at 10:03

## 2023-03-24 RX ADMIN — ATROPINE SULFATE 1 MG: 0.4 INJECTION, SOLUTION INTRAMUSCULAR; INTRAVENOUS; SUBCUTANEOUS at 09:03

## 2023-04-13 NOTE — CONSULTS
[facility] ([place] [id number])  Hepatology  Consult Note    Patient Name: Corky Bach  MRN: [id number]  Admission Date: 10/25/2022  Hospital Length of Stay: 1 days  Attending Provider: Nichole Root MD   Primary Care Physician: Sarmad Estrella MD  Principal Problem:<principal problem not specified>    Inpatient consult to Hepatology  Consult performed by: Jr Lilly DO  Consult ordered by: Layla Andrew MD        Subjective:     Transplant status: No    HPI:  Corky Bach is a 56 y.o. male with a past medical history of MALT lymphoma, on maintenance Rituxan every 4 weeks (for >2 years; has 15 mos left), decompensated cirrhosis secondary to CORDOVA complicated by ascites and hepatic encephalopathy. Presented to ED from hepatology office due to WBC of 16 with fevers. Patient of Dr. Urban. He takes lasix 40 mg and aldactone 100 mg daily at home. EGD 3/22 showed small varices, on propranolol. On rifaximin for hepatic encephalopathy.     Paracentesis hx:  10/10/22: 5.5 L removed. [id number] WBC   10/20/22: 11 L removed. No labs   10/25/22: 4.950 L removed. 43 WBC. Calculated SAAG 2.1    Hepatology consulted for abnormal paracentesis and fever.                 Review of Systems   Constitutional:  Positive for activity change, chills and fever. Negative for diaphoresis.   HENT:  Positive for sore throat. Negative for nosebleeds.    Eyes:  Negative for pain.   Respiratory:  Negative for shortness of breath.    Cardiovascular:  Negative for chest pain and leg swelling.   Gastrointestinal:  Positive for abdominal distention and abdominal pain. Negative for constipation, diarrhea and vomiting.   Endocrine: Negative for polyuria.   Genitourinary:  Negative for decreased urine volume.   Musculoskeletal:  Negative for neck stiffness.   Skin:  Negative for color change.   Neurological:  Negative for syncope and light-headedness.   Psychiatric/Behavioral:  Negative for agitation.      Past  Medical History:   Diagnosis Date    Anemia     Bacterial peritonitis 10/25/2022    Celiac disease     Cervicalgia     Cirrhosis     Colon polyp     Depression     Diabetes mellitus, type 2     Elevated LFTs     Esophageal varices     Gout, unspecified     History of COVID-19 01/15/2021    Hyperlipidemia     Mood disorder in conditions classified elsewhere     Obesity, unspecified     Other chronic nonalcoholic liver disease     Portal hypertensive gastropathy     Thyroid disease     Unspecified psychosis        Past Surgical History:   Procedure Laterality Date    COLONOSCOPY N/A 6/7/2017    Procedure: COLONOSCOPY;  Surgeon: Placido Green MD;  Location: [facility];  Service: Endoscopy;  Laterality: N/A;    ESOPHAGOGASTRODUODENOSCOPY N/A 7/16/2018    Procedure: ESOPHAGOGASTRODUODENOSCOPY (EGD);  Surgeon: Mitch Pan MD;  Location: [facility];  Service: Endoscopy;  Laterality: N/A;    ESOPHAGOGASTRODUODENOSCOPY N/A 7/22/2020    Procedure: ESOPHAGOGASTRODUODENOSCOPY (EGD);  Surgeon: Mitch Pan MD;  Location: [facility];  Service: Endoscopy;  Laterality: N/A;    ESOPHAGOGASTRODUODENOSCOPY N/A 3/22/2022    Procedure: EGD (ESOPHAGOGASTRODUODENOSCOPY);  Surgeon: iMtch Pan MD;  Location: [facility];  Service: Endoscopy;  Laterality: N/A;    HERNIA REPAIR      REPEAT ABDOMINAL PARACENTESIS N/A 10/20/2022    Procedure: PARACENTESIS, ABDOMINAL, REPEAT;  Surgeon: Ruddy Salomon MD;  Location: [facility];  Service: Endoscopy;  Laterality: N/A;    TONSILLECTOMY      UPPER GASTROINTESTINAL ENDOSCOPY         Family history of liver disease: No    Review of patient's allergies indicates:  No Known Allergies      Tobacco Use    Smoking status: Never    Smokeless tobacco: Never   Substance and Sexual Activity    Alcohol use: No    Drug use: No    Sexual activity: Not on file       Medications Prior to Admission   Medication Sig Dispense Refill Last Dose    ascorbic acid, vitamin C, (VITAMIN  [de-identified] : Pt. c/o flaring of eczema; recently flaring, widespread; \par uses clobetasol on affected areas;  "C) 500 MG tablet Take 1 tablet (500 mg total) by mouth once daily. 90 tablet 3 10/24/2022    BD ULTRA-FINE ORIG PEN NEEDLE 29 gauge x 1/2" Ndle USE PEN NEEDLE TO INJECT INSULIN EVERY EVENING 100 each 11 10/24/2022    ferrous sulfate 325 (65 FE) MG EC tablet Take 1 tablet (325 mg total) by mouth once daily. Take with vitamin C 90 tablet 3 10/24/2022    finasteride (PROSCAR) 5 mg tablet Take 1 tablet (5 mg total) by mouth once daily. 90 tablet 3 10/24/2022    furosemide (LASIX) 40 MG tablet Take 1 tablet (40 mg total) by mouth once daily. New dose as of 07/11/2022- for fluid management / ascites 90 tablet 3 10/24/2022    insulin detemir U-100 (LEVEMIR FLEXTOUCH U-100 INSULN) 100 unit/mL (3 mL) InPn pen Inject 80 Units into the skin every evening. New dose as of 01/03/2022 (Patient taking differently: Inject 60 Units into the skin every evening. New dose as of 01/03/2022) 72 mL 3 10/24/2022    insulin lispro (HUMALOG KWIKPEN INSULIN) 100 unit/mL pen Inject 60 Units into the skin 3 (three) times daily with meals. (Patient taking differently: Inject 20 Units into the skin 3 (three) times daily with meals.) 162 mL 3 10/24/2022    JARDIANCE 10 mg tablet Take 1 tablet (10 mg total) by mouth every morning. 90 tablet 3 10/24/2022    ketoconazole (NIZORAL) 2 % shampoo Apply topically twice a week. 120 mL 3 10/24/2022    linaGLIPtin (TRADJENTA) 5 mg Tab tablet TAKE 1 TABLET (5 MG TOTAL) BY MOUTH ONCE DAILY. 90 tablet 3 10/24/2022    lovastatin (MEVACOR) 10 MG tablet TAKE 1 TABLET BY MOUTH ONCE DAILY IN THE EVENING 90 tablet 3 10/24/2022    magnesium oxide (MAGOX) 400 mg (241.3 mg magnesium) tablet Take 1 tablet (400 mg total) by mouth once daily. 200 tablet 1 10/24/2022    metFORMIN (GLUCOPHAGE) 1000 MG tablet TAKE 1 TABLET BY MOUTH TWICE DAILY WITH MEALS 180 tablet 3 10/24/2022    oxybutynin (DITROPAN-XL) 5 MG TR24 Take 1 tablet (5 mg total) by mouth once daily. 30 tablet 11 10/24/2022    propranoloL (INDERAL) 10 MG tablet " TAKE 1 TABLET (10 MG TOTAL) BY MOUTH 3 (THREE) TIMES DAILY. 90 tablet 0 10/24/2022    rifAXIMin (XIFAXAN) 550 mg Tab Take 1 tablet (550 mg total) by mouth 2 (two) times daily. 60 tablet 11 10/24/2022    spironolactone (ALDACTONE) 100 MG tablet Take 1 tablet (100 mg total) by mouth once daily. For fluid management / ascites- new as of 07/11/2022 90 tablet 3 10/24/2022    tamsulosin (FLOMAX) 0.4 mg Cap Take 1 capsule (0.4 mg total) by mouth every evening. 90 capsule 3 10/24/2022    venlafaxine (EFFEXOR) 75 MG tablet TAKE 1 TABLET (75 MG TOTAL) BY MOUTH 2 (TWO) TIMES DAILY. 180 tablet 3 10/24/2022    triamcinolone acetonide 0.1% (KENALOG) 0.1 % cream Apply topically 2 (two) times daily. Use 3-4 wks max 240 g 0        Objective:     Vital Signs (Most Recent):  Temp: 98.2 °F (36.8 °C) (10/26/22 0747)  Pulse: 77 (10/26/22 0747)  Resp: 19 (10/26/22 0747)  BP: 121/60 (10/26/22 0747)  SpO2: (!) 94 % (10/26/22 0747)   Vital Signs (24h Range):  Temp:  [98.2 °F (36.8 °C)-101.4 °F (38.6 °C)] 98.2 °F (36.8 °C)  Pulse:  [65-98] 77  Resp:  [18-20] 19  SpO2:  [93 %-98 %] 94 %  BP: (120-163)/(60-76) 121/60     Weight: 113.5 kg (250 lb 3.6 oz) (10/25/22 2100)  Body mass index is 32.13 kg/m².    Physical Exam  Vitals and nursing note reviewed.   Constitutional:       General: He is not in acute distress.     Appearance: Normal appearance. He is not ill-appearing.   HENT:      Head: Normocephalic and atraumatic.      Mouth/Throat:      Mouth: Mucous membranes are moist.   Eyes:      General: No scleral icterus.     Extraocular Movements: Extraocular movements intact.   Cardiovascular:      Rate and Rhythm: Normal rate.   Pulmonary:      Effort: Pulmonary effort is normal. No respiratory distress.   Abdominal:      General: There is distension.      Tenderness: There is abdominal tenderness (LUQ). There is no guarding.   Musculoskeletal:         General: No swelling.      Cervical back: Normal range of motion.   Neurological:       General: No focal deficit present.      Mental Status: He is alert and oriented to person, place, and time.   Psychiatric:         Mood and Affect: Mood normal.         Behavior: Behavior normal.       MELD-Na score: 11 at 10/26/2022  4:09 AM  MELD score: 11 at 10/26/2022  4:09 AM  Calculated from:  Serum Creatinine: 0.8 mg/dL (Using min of 1 mg/dL) at 10/26/2022  4:09 AM  Serum Sodium: 129 mmol/L at 10/26/2022  4:09 AM  Total Bilirubin: 1.3 mg/dL at 10/26/2022  4:09 AM  INR(ratio): 1.4 at 10/26/2022  4:09 AM  Age: 56 years    Significant Labs:  Labs within the past month have been reviewed.    Significant Imaging:     Imaging Results              IR Paracentesis with Imaging (Preliminary result)  Result time 10/26/22 08:11:17      Preliminary result by Crystal Jacobosn PA-C (10/26/22 08:11:17)                   Impression:      Ultrasound-guided paracentesis with drainage of 4950 mL of serous fluid.    _______________________________________________________________    Electronically signed by resident: Crystal Jacobson  Date:    10/26/2022  Time:    08:10                 Narrative:    EXAMINATION:  Ultrasound-guided paracentesis    Procedural Personnel    Attending physician(s): Shahid Phipps MD    Fellow physician(s): None    Resident physician(s): None    Advanced practice provider(s): Crystal Jacobson PA-C    Pre-procedure diagnosis: Ascites    Post-procedure diagnosis: Same    Complications: No immediate complications.    CLINICAL HISTORY:  Recurrent Ascites    TECHNIQUE:  - Ultrasound-guided paracentesis    FINDINGS:  Pre-procedure    Consent: Informed consent for the procedure was obtained and time-out was performed prior to the procedure.    Preparation: The site was prepared and draped using maximal sterile barrier technique including cutaneous antisepsis.    Anesthesia/sedation    Level of anesthesia/sedation: No sedation    Anesthesia/sedation administered by: Not applicable    Total intra-service sedation time  (minutes): 0    Limited abdominal ultrasound    Limited abdominal ultrasound was performed.    Moderate ascites. A safe window for paracentesis was identified.    Paracentesis    Local anesthesia was administered. The peritoneal cavity was accessed on the right lower quadrant and fluid return confirmed position. Ascites was drained.    Paracentesis access technique: Real-time ultrasound guidance    Catheter placed: 5F Yueh    Closure    The catheter was removed. A sterile bandage was applied.    Post-drainage ultrasound: Not performed    Additional Details    Additional description of procedure: None    Equipment details: None    Specimens removed: Abdominal fluid    Estimated blood loss (mL): Less than 10    Standardized report: [email or web address]    Attestation    Signer name:    I attest that I reviewed the stored images and agree with the report as written.                                        CT Abdomen Pelvis With Contrast (Final result)  Result time 10/25/22 15:19:46      Final result by Ruddy Vilalnueva MD (10/25/22 15:19:46)                   Impression:      This report was flagged in Epic as abnormal.    1. Findings suggesting cirrhosis superimposed portal hypertension including ascites, splenomegaly, and several varices.  The ascites has increased since the previous examination without discrete peritoneal thickening.  Correlation with ascitic fluid sampling as warranted.  2. There is some indistinctness about the distal descending colon and proximal sigmoid colon.  Possible infectious or inflammatory colitis is a consideration versus diverticulitis however evaluation for inflammation is limited secondary to fluid in the region.  Correlation with any symptomatology to support a diagnosis of the same should be assessed.  3. Stable chronic appearing thrombus involving the portal vein and splenic vein as described.  4. Right nonobstructive nephrolithiasis.  5. Wall thickening involving the distal  esophagus, correlation with any history of reflux esophagitis.  Direct visualization as warranted.  6. Please see above for additional findings.      Electronically signed by: Ruddy Villanueva MD  Date:    10/25/2022  Time:    15:19               Narrative:    EXAMINATION:  CT ABDOMEN PELVIS WITH CONTRAST    CLINICAL HISTORY:  Sepsis;fever;    TECHNIQUE:  Low dose axial images, sagittal and coronal reformations were obtained from the lung bases to the pubic symphysis following the IV administration of 100 mL of Omnipaque 350 .  Oral contrast was not given.    COMPARISON:  PET-CT 08/11/2022, CT urogram 04/22/2022    FINDINGS:  Images of the lower thorax are remarkable for bilateral dependent atelectasis.  There is thickening of the distal esophagus, may be on the basis of reflux esophagitis however correlation with direct visualization as warranted.    The liver is cirrhotic in configuration.  The spleen is enlarged.  There is a low attenuating lesion within the posterior aspect of the spleen measuring 2.5 cm, stable.  The gallbladder is grossly unremarkable.  The stomach is decompressed without wall thickening.  There are several scattered abdominal lymph nodes.  There is cavernous transformation of the portal vein.  There are splenic and gabriela hepatic collateral vessels.  There is chronic appearing thrombus with calcification involving the extrahepatic portal vein, similar to the previous examination extending to the proximal aspect of the SMV.  Additional thrombus noted within the splenic vein near the pancreatic tail also containing scattered regions of calcification.  These findings were better demonstrated on previous examination secondary to improved bolus timing at that time.    The kidneys enhance symmetrically without hydronephrosis.  There is right nonobstructive nephrolithiasis.  The bilateral ureters are unable to be followed in their entirety to the urinary bladder, no definite calculi seen.  The urinary  bladder is decompressed noting there may be residual wall thickening.  There is moderate to large volume abdominopelvic ascites, worsened since most recent PET-CT.  The prostate is not enlarged.    There are several scattered colonic diverticula.  There is some indistinctness about the distal descending colon and proximal sigmoid colon.  The terminal ileum is unremarkable.  The appendix is unremarkable.  The small bowel is grossly unremarkable.  There is scattered mesenteric edema.  There are a few scattered shotty periaortic, pericaval, and mesenteric lymph nodes.  No focal organized fluid collection or overt peritoneal thickening to suggest discrete changes of peritonitis.  Peritoneal fluid sampling however would be needed for definitive exclusion of the same.    There is osteopenia.  There are degenerative changes of the spine.  There is bilateral gynecomastia.  No significant inguinal lymphadenopathy.                                       X-Ray Chest AP Portable (Final result)  Result time 10/25/22 14:27:33      Final result by Hubert Dupont MD (10/25/22 14:27:33)                   Impression:      No significant intrathoracic abnormality.  Allowing for differences in projection and a poorer inspiratory depth level on the current examination, there has been no significant detrimental interval change in the appearance of the chest since 01/15/2021.      Electronically signed by: Hubert Dupont MD  Date:    10/25/2022  Time:    14:27               Narrative:    EXAMINATION:  XR CHEST AP PORTABLE    CLINICAL HISTORY:  fever;    TECHNIQUE:  One view    COMPARISON:  Comparison is made to 01/15/2021, the only available prior chest radiograph.    FINDINGS:  Allowing for magnification of the cardiomediastinal silhouette related to projection and inspiratory depth level, the heart size is within normal limits.  Pulmonary vascularity is normal as well.  Lung zones are clear, and are free of significant airspace consolidation  or volume loss.  No pleural fluid.  No abnormal mediastinal widening.  No pneumothorax.                                             Assessment/Plan:     Bacterial peritonitis  Hx of Malt lymphoma. 10/25 seen in Hepatology clinic with fevers and WBC 16.9 sent to ED. Paracentesis 10/10/22 revealed [id number] WBC, repeat paracentesis 10/25/22 with 4 WBC. CT in ED shows stable chronic portal vein & splenic thrombus with evidence of colitits. MELD Na 11 and SAAG of 2.1.    Recommendations:  - Repeat diagnostic paracentesis with cultures/labs given with drastic change in WBC 10/10 to 10/25  - Consider stool studies with colitis seen on CT  - Add Protonix if reflux   - Add home diuretics  - Agree with continuing broad spec antibiotics  - Follow up on ID & Heme recs        Thank you for your consult. I will follow-up with patient. Please contact us if you have any additional questions.    Jr Lilly, DO  Hepatology  [facility] ([facility]

## 2023-04-17 ENCOUNTER — DOCUMENTATION ONLY (OUTPATIENT)
Dept: TRANSPLANT | Facility: CLINIC | Age: 58
End: 2023-04-17
Payer: MEDICARE

## 2023-04-19 ENCOUNTER — TELEPHONE (OUTPATIENT)
Dept: TRANSPLANT | Facility: CLINIC | Age: 58
End: 2023-04-19
Payer: MEDICARE

## 2023-04-19 NOTE — TELEPHONE ENCOUNTER
Called pt to inform him that his case discussion has been pushed to next Wednesday (4/26) due to Dr. Urban needing to clarify Dr. Winter's last note with her as it relates to his transplant case. Understanding expressed.

## 2023-04-24 DIAGNOSIS — Z79.4 TYPE 2 DIABETES MELLITUS WITH HYPERGLYCEMIA, WITH LONG-TERM CURRENT USE OF INSULIN: Primary | ICD-10-CM

## 2023-04-24 DIAGNOSIS — E11.65 TYPE 2 DIABETES MELLITUS WITH HYPERGLYCEMIA, WITH LONG-TERM CURRENT USE OF INSULIN: Primary | ICD-10-CM

## 2023-04-26 ENCOUNTER — DOCUMENTATION ONLY (OUTPATIENT)
Dept: TRANSPLANT | Facility: CLINIC | Age: 58
End: 2023-04-26
Payer: MEDICARE

## 2023-04-26 ENCOUNTER — COMMITTEE REVIEW (OUTPATIENT)
Dept: TRANSPLANT | Facility: CLINIC | Age: 58
End: 2023-04-26
Payer: MEDICARE

## 2023-04-26 NOTE — COMMITTEE REVIEW
Corky Bach's case presented to selection committee.  Patient has been accepted for liver transplant due to Cirrhosis: Fatty Liver (CORDOVA) and complications of end stage liver disease including ascites, esophageal varices, hyperbilirubinemia, hypoalbuminemia, hyponatremia, pancytopenia, splenomegaly, and thrombocytopenia, PVT, sequela of portal venous hypertension, splenomegaly, anemia, fatigue and muscle wasting with a MELD score of 9.  Patient has no absolute contraindications for liver transplant.  Patient will be listed pending financial approval.    Patient will accept HBcAb positive livers.  Patient will accept HCVAB positive livers.  Patient will accept DCD livers.  Patient will accept HCV ROSE positive livers  Patient will not accept HBV ROSE positive livers  I was present and agree with the committee's conclusions.

## 2023-05-05 ENCOUNTER — TELEPHONE (OUTPATIENT)
Dept: TRANSPLANT | Facility: CLINIC | Age: 58
End: 2023-05-05
Payer: MEDICARE

## 2023-05-05 NOTE — TELEPHONE ENCOUNTER
Spoke to patient about the followin.) Clinicals send to insurance company for listing  2.) Lamas with MELD 9. Patient to consider LDLT  3.) Patient has low vitamin A. Patient's wife will order 10,000 IU vitamin A for him to take daily.  4.) Has f/u scheduled with Dr. rUban on 23

## 2023-05-09 ENCOUNTER — TREATMENT PLANNING (OUTPATIENT)
Dept: TRANSPLANT | Facility: CLINIC | Age: 58
End: 2023-05-09
Payer: MEDICARE

## 2023-05-09 NOTE — PROGRESS NOTES
Patient chart reviewed.    Current functional capacity is 50%.    He is a great candidate for liver transplant..    He requires paracentesis intermittently.

## 2023-05-23 ENCOUNTER — OFFICE VISIT (OUTPATIENT)
Dept: HEPATOLOGY | Facility: CLINIC | Age: 58
End: 2023-05-23
Payer: MEDICARE

## 2023-05-23 VITALS
TEMPERATURE: 99 F | HEART RATE: 82 BPM | DIASTOLIC BLOOD PRESSURE: 65 MMHG | SYSTOLIC BLOOD PRESSURE: 104 MMHG | HEIGHT: 74 IN | RESPIRATION RATE: 18 BRPM | OXYGEN SATURATION: 98 % | WEIGHT: 220.56 LBS | BODY MASS INDEX: 28.3 KG/M2

## 2023-05-23 DIAGNOSIS — K76.82 HEPATIC ENCEPHALOPATHY: ICD-10-CM

## 2023-05-23 DIAGNOSIS — I81 PVT (PORTAL VEIN THROMBOSIS): ICD-10-CM

## 2023-05-23 DIAGNOSIS — I81 PORTAL VEIN THROMBOSIS: ICD-10-CM

## 2023-05-23 DIAGNOSIS — K74.60 LIVER CIRRHOSIS SECONDARY TO NASH: Primary | ICD-10-CM

## 2023-05-23 DIAGNOSIS — K76.0 FATTY LIVER: ICD-10-CM

## 2023-05-23 DIAGNOSIS — K75.81 LIVER CIRRHOSIS SECONDARY TO NASH: Primary | ICD-10-CM

## 2023-05-23 DIAGNOSIS — C88.4 MALT LYMPHOMA: ICD-10-CM

## 2023-05-23 DIAGNOSIS — R18.8 OTHER ASCITES: ICD-10-CM

## 2023-05-23 DIAGNOSIS — I85.10 SECONDARY ESOPHAGEAL VARICES WITHOUT BLEEDING: ICD-10-CM

## 2023-05-23 PROCEDURE — 3078F PR MOST RECENT DIASTOLIC BLOOD PRESSURE < 80 MM HG: ICD-10-PCS | Mod: CPTII,S$GLB,TXP, | Performed by: INTERNAL MEDICINE

## 2023-05-23 PROCEDURE — 1159F MED LIST DOCD IN RCRD: CPT | Mod: CPTII,S$GLB,TXP, | Performed by: INTERNAL MEDICINE

## 2023-05-23 PROCEDURE — 99215 PR OFFICE/OUTPT VISIT, EST, LEVL V, 40-54 MIN: ICD-10-PCS | Mod: S$GLB,TXP,, | Performed by: INTERNAL MEDICINE

## 2023-05-23 PROCEDURE — 1160F RVW MEDS BY RX/DR IN RCRD: CPT | Mod: CPTII,S$GLB,TXP, | Performed by: INTERNAL MEDICINE

## 2023-05-23 PROCEDURE — 99215 OFFICE O/P EST HI 40 MIN: CPT | Mod: S$GLB,TXP,, | Performed by: INTERNAL MEDICINE

## 2023-05-23 PROCEDURE — 1160F PR REVIEW ALL MEDS BY PRESCRIBER/CLIN PHARMACIST DOCUMENTED: ICD-10-PCS | Mod: CPTII,S$GLB,TXP, | Performed by: INTERNAL MEDICINE

## 2023-05-23 PROCEDURE — 3074F PR MOST RECENT SYSTOLIC BLOOD PRESSURE < 130 MM HG: ICD-10-PCS | Mod: CPTII,S$GLB,TXP, | Performed by: INTERNAL MEDICINE

## 2023-05-23 PROCEDURE — 3078F DIAST BP <80 MM HG: CPT | Mod: CPTII,S$GLB,TXP, | Performed by: INTERNAL MEDICINE

## 2023-05-23 PROCEDURE — 3061F PR NEG MICROALBUMINURIA RESULT DOCUMENTED/REVIEW: ICD-10-PCS | Mod: CPTII,S$GLB,TXP, | Performed by: INTERNAL MEDICINE

## 2023-05-23 PROCEDURE — 3066F PR DOCUMENTATION OF TREATMENT FOR NEPHROPATHY: ICD-10-PCS | Mod: CPTII,S$GLB,TXP, | Performed by: INTERNAL MEDICINE

## 2023-05-23 PROCEDURE — 3008F PR BODY MASS INDEX (BMI) DOCUMENTED: ICD-10-PCS | Mod: CPTII,S$GLB,TXP, | Performed by: INTERNAL MEDICINE

## 2023-05-23 PROCEDURE — 99999 PR PBB SHADOW E&M-EST. PATIENT-LVL V: ICD-10-PCS | Mod: PBBFAC,TXP,, | Performed by: INTERNAL MEDICINE

## 2023-05-23 PROCEDURE — 3074F SYST BP LT 130 MM HG: CPT | Mod: CPTII,S$GLB,TXP, | Performed by: INTERNAL MEDICINE

## 2023-05-23 PROCEDURE — 1159F PR MEDICATION LIST DOCUMENTED IN MEDICAL RECORD: ICD-10-PCS | Mod: CPTII,S$GLB,TXP, | Performed by: INTERNAL MEDICINE

## 2023-05-23 PROCEDURE — 3061F NEG MICROALBUMINURIA REV: CPT | Mod: CPTII,S$GLB,TXP, | Performed by: INTERNAL MEDICINE

## 2023-05-23 PROCEDURE — 3008F BODY MASS INDEX DOCD: CPT | Mod: CPTII,S$GLB,TXP, | Performed by: INTERNAL MEDICINE

## 2023-05-23 PROCEDURE — 99999 PR PBB SHADOW E&M-EST. PATIENT-LVL V: CPT | Mod: PBBFAC,TXP,, | Performed by: INTERNAL MEDICINE

## 2023-05-23 PROCEDURE — 3046F PR MOST RECENT HEMOGLOBIN A1C LEVEL > 9.0%: ICD-10-PCS | Mod: CPTII,S$GLB,TXP, | Performed by: INTERNAL MEDICINE

## 2023-05-23 PROCEDURE — 3046F HEMOGLOBIN A1C LEVEL >9.0%: CPT | Mod: CPTII,S$GLB,TXP, | Performed by: INTERNAL MEDICINE

## 2023-05-23 PROCEDURE — 3066F NEPHROPATHY DOC TX: CPT | Mod: CPTII,S$GLB,TXP, | Performed by: INTERNAL MEDICINE

## 2023-05-23 NOTE — PATIENT INSTRUCTIONS
Continue taps as needed  Another ct scan at 6 months - end of sept   List when have insurance clearance   Labs every 4 weeks  Continue lactulose and rifaximin  Return 3 months

## 2023-05-23 NOTE — PROGRESS NOTES
HEPATOLOGY FOLLOW UP    Referring Physician: FERNANDO Baum  Current Corresponding Physician: FERNANDO Baum, Sarmad Estrella MD, Vale Covarrubias MD, Tank Curtis MD    Corky Bach is here for follow up of decompensated cirrhosis    HPI   Corky Bach is a 57 y.o. 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     -dx few years ago  --ascites-started on diuretics lasix 40 mg and aldactone 100 mg daily; noted to have distended abdomen when seen in consultation  --HE- on rifaximin; no longer on lactulose  -EGD-03/22 and 07/22/20- small varices; on propranolol     MELD-Na score: 11 at 6/13/2022 10:35 AM  MELD score: 11 at 6/13/2022 10:35 AM  Calculated from:  Serum Creatinine: 0.9 mg/dL (Using min of 1 mg/dL) at 6/13/2022 10:35 AM  Serum Sodium: 133 mmol/L at 6/13/2022 10:35 AM  Total Bilirubin: 1.6 mg/dL at 6/13/2022 10:35 AM  INR(ratio): 1.3 at 6/13/2022 10:35 AM    Interval History  Since Corky Bach last few visits with me:    Approved for liver transplant listing; awaiting insurance approval    Continues to have large ascites. Having paracenteses every 2 weeks  Ascites analysis: SAAG >1.1; protein <1.0  Ascites: lasix 40 mg and spironolactone 100 mg daily, metolazone 5.0 mg daily  HE, ongoing; on rifaximin and lactulose  EGD 3/22: small varices    CT abdo pelvis w contrast 3/23/23: Morphological changes cirrhosis.  No focal hepatic lesion. Sequelae of portal venous hypertension, including splenomegaly, varices, and large volume ascites. Similar degree of thrombus within main portal vein and splenic vein.    MELD-Na: 17 at 5/12/2023  2:33 PM  MELD: 13 at 5/12/2023  2:33 PM  Calculated from:  Serum Creatinine: 0.9 mg/dL (Using min of 1 mg/dL) at 5/12/2023  2:33 PM  Serum Sodium: 133 mmol/L at 5/12/2023  2:33  "PM  Total Bilirubin: 2.3 mg/dL at 5/12/2023  2:33 PM  INR(ratio): 1.3 at 5/12/2023  2:33 PM     Outpatient Encounter Medications as of 5/23/2023   Medication Sig Dispense Refill    acetaminophen (TYLENOL) 500 MG tablet Take 1,000 mg by mouth 2 (two) times daily as needed for Pain.      BD ULTRA-FINE ORIG PEN NEEDLE 29 gauge x 1/2" Ndle USE PEN NEEDLE TO INJECT INSULIN EVERY EVENING 100 each 11    ferrous sulfate 325 (65 FE) MG EC tablet Take 1 tablet (325 mg total) by mouth once daily. Take with vitamin C (Patient taking differently: Take 325 mg by mouth every other day. Take with vitamin C) 90 tablet 3    finasteride (PROSCAR) 5 mg tablet Take 1 tablet (5 mg total) by mouth once daily. 90 tablet 3    furosemide (LASIX) 40 MG tablet Take 1 tablet (40 mg total) by mouth once daily. New dose as of 07/11/2022- for fluid management / ascites 90 tablet 3    insulin detemir U-100, Levemir, (LEVEMIR FLEXPEN) 100 unit/mL (3 mL) InPn pen Inject 60 Units into the skin every evening. 18 mL 11    insulin lispro (HUMALOG KWIKPEN INSULIN) 100 unit/mL pen Inject 20 Units into the skin 3 (three) times daily with meals. 18 mL 11    lactulose (CHRONULAC) 20 gram/30 mL Soln Take 20 grams by mouth 3 times daily as needed and titrate use / amount to 3 to 5 soft brown bowel movements per day.  Decrease amount / frequency if getting diarrhea 1800 mL 5    linaGLIPtin (TRADJENTA) 5 mg Tab tablet TAKE 1 TABLET (5 MG TOTAL) BY MOUTH ONCE DAILY. 90 tablet 3    metOLazone (ZAROXOLYN) 2.5 MG tablet Take 2 tablets (5 mg total) by mouth once daily. Take 30 minutes before other water pills 60 tablet 11    oxybutynin (DITROPAN-XL) 5 MG TR24 Take 1 tablet (5 mg total) by mouth once daily. 30 tablet 11    propranoloL (INDERAL) 10 MG tablet TAKE 1 TABLET (10 MG TOTAL) BY MOUTH 3 (THREE) TIMES DAILY. 90 tablet 0    rifAXIMin (XIFAXAN) 550 mg Tab Take 1 tablet (550 mg total) by mouth 2 (two) times daily. 60 tablet 11    spironolactone (ALDACTONE) 100 MG " tablet Take 1 tablet (100 mg total) by mouth once daily. For fluid management / ascites- new as of 07/11/2022 90 tablet 3    tamsulosin (FLOMAX) 0.4 mg Cap Take 1 capsule (0.4 mg total) by mouth every evening. 90 capsule 3    venlafaxine (EFFEXOR) 75 MG tablet TAKE 1 TABLET (75 MG TOTAL) BY MOUTH 2 (TWO) TIMES DAILY. 180 tablet 3    [DISCONTINUED] insulin detemir (LEVEMIR FLEXPEN SUBQ) Inject 60 Units into the skin every evening.      [DISCONTINUED] insulin lispro (HUMALOG KWIKPEN INSULIN SUBQ) Inject 20 Units into the skin 3 (three) times daily with meals.       No facility-administered encounter medications on file as of 5/23/2023.     Review of patient's allergies indicates:  No Known Allergies  Past Medical History:   Diagnosis Date    Anemia     Bacterial peritonitis 10/25/2022    Celiac disease     Cervicalgia     Cirrhosis     Colon polyp     Depression     Diabetes mellitus, type 2     Elevated LFTs     Esophageal varices     Gout, unspecified     History of COVID-19 01/15/2021    Hyperlipidemia     Mood disorder in conditions classified elsewhere     Obesity, unspecified     Other chronic nonalcoholic liver disease     Portal hypertensive gastropathy     Thyroid disease     Unspecified psychosis        Review of Systems   Constitutional:  Positive for fever.   HENT: Negative.     Eyes: Negative.    Respiratory: Negative.     Cardiovascular: Negative.    Gastrointestinal:  Positive for abdominal distention.   Genitourinary: Negative.    Musculoskeletal: Negative.    Skin: Negative.    Neurological: Negative.    Psychiatric/Behavioral: Negative.     Vitals:    05/23/23 1439   BP: 104/65   Pulse: 82   Resp: 18   Temp: 98.7 °F (37.1 °C)       Physical Exam  Vitals reviewed.   Constitutional:       Appearance: He is well-developed.   HENT:      Head: Normocephalic and atraumatic.   Eyes:      General: No scleral icterus.     Conjunctiva/sclera: Conjunctivae normal.      Pupils: Pupils are equal, round, and  reactive to light.   Neck:      Thyroid: No thyromegaly.   Cardiovascular:      Rate and Rhythm: Normal rate and regular rhythm.      Heart sounds: Normal heart sounds.   Pulmonary:      Effort: Pulmonary effort is normal.      Breath sounds: Normal breath sounds. No rales.   Abdominal:      General: Bowel sounds are normal. There is distension.      Palpations: Abdomen is soft. There is no mass.      Tenderness: There is no abdominal tenderness.   Musculoskeletal:         General: Normal range of motion.      Cervical back: Normal range of motion and neck supple.   Skin:     General: Skin is warm and dry.      Findings: No rash.   Neurological:      Mental Status: He is alert and oriented to person, place, and time.       MELD-Na: 17 at 5/12/2023  2:33 PM  MELD: 13 at 5/12/2023  2:33 PM  Calculated from:  Serum Creatinine: 0.9 mg/dL (Using min of 1 mg/dL) at 5/12/2023  2:33 PM  Serum Sodium: 133 mmol/L at 5/12/2023  2:33 PM  Total Bilirubin: 2.3 mg/dL at 5/12/2023  2:33 PM  INR(ratio): 1.3 at 5/12/2023  2:33 PM    Lab Results   Component Value Date     (H) 05/12/2023    BUN 15 05/12/2023    CREATININE 0.9 05/12/2023    CALCIUM 8.6 (L) 05/12/2023     (L) 05/12/2023    K 4.1 05/12/2023    CL 97 05/12/2023    PROT 6.5 05/12/2023    CO2 26 05/12/2023    ANIONGAP 10 05/12/2023    WBC 4.28 05/12/2023    RBC 4.49 (L) 05/12/2023    HGB 11.2 (L) 05/12/2023    HCT 34.1 (L) 05/12/2023    MCV 76 (L) 05/12/2023    MCH 24.9 (L) 05/12/2023    MCHC 32.8 05/12/2023     Lab Results   Component Value Date    RDW 16.8 (H) 05/12/2023    PLT 47 (L) 05/12/2023    MPV 10.1 05/12/2023    GRAN 3.1 05/12/2023    GRAN 73.4 (H) 05/12/2023    LYMPH 0.5 (L) 05/12/2023    LYMPH 10.5 (L) 05/12/2023    MONO 0.5 05/12/2023    MONO 11.4 05/12/2023    EOSINOPHIL 4.0 05/12/2023    BASOPHIL 0.5 05/12/2023    EOS 0.2 05/12/2023    BASO 0.02 05/12/2023    APTT 24.1 10/10/2022    GROUPTRH O POS 03/24/2023    CHOL 100 (L) 01/19/2023    TRIG 64  01/19/2023    HDL 42 01/19/2023    CHOLHDL 42.0 01/19/2023    TOTALCHOLEST 2.4 01/19/2023    ALBUMIN 3.1 (L) 05/12/2023    BILIDIR 0.7 (H) 05/12/2023    AST 21 05/12/2023    ALT 20 05/12/2023    ALKPHOS 128 05/12/2023    MG 1.5 (L) 03/30/2023    LABPROT 13.3 (H) 05/12/2023    INR 1.3 (H) 05/12/2023    PSA 0.46 10/17/2022       Assessment and Plan:  Corky Bach is a 57 y.o. male with decompensated CORDOVA Cirrhosis  Current recommendations:  Decompensated cirrhosis, MELD 17 with refractory ascites; now approved for liver transplant listing-list when have insurance auth; recommend liver tx evaluation; monitor meld labs monthly;  Hx MALT lymphoma: f/u with oncology  Refractory ascites:continue diuretics and prn paracentesis every 2 weeks  HE, ongoing: continue rifaximin; restart lactulose  PVT: repeat TP ct abdo/pelvis 6 months after last ct (endo of sept 2023)  Esophageal varices: repeat EGD now    Return 16 weeks

## 2023-06-02 ENCOUNTER — TELEPHONE (OUTPATIENT)
Dept: TRANSPLANT | Facility: CLINIC | Age: 58
End: 2023-06-02
Payer: MEDICARE

## 2023-06-02 DIAGNOSIS — Z76.82 ORGAN TRANSPLANT CANDIDATE: Primary | ICD-10-CM

## 2023-06-02 NOTE — TELEPHONE ENCOUNTER
"  LIVER WAIT LISTING NOTE    **NOTE:   IF ANY EXTERNAL LABS ARE USED FOR LISTING THE VALUES AND DATES MUST BE ENTERED IN EPIC TO GENERATE THIS NOTE**    Date of Financial clearance to list: 23    [facility]:        Organ: Liver    Last Name: Isreal  First Name: Corky     : 1965      Gender:     male         MRN#: [id number]                                   State of Permanent Residence:  [address] [place] [id number]    Ethnicity: Not  or /a   Race:      White    CLINICAL INFORMATION       ABO  ABO Blood Group:   O POS     ABO Confirmation: (THESE DATES MUST BE PRIOR TO THE LIST DATE AND SUPPORTED BY SEPARATE LAB REPORTS)    Internal Results    Lab Results   Component Value Date    GROUPTRH O POS 2023    GROUPTRH O POS 2023     No results found for: ABO    External Results    ABO Date 1:  ABO Result 1:  ABO Date 2:  ABO Result 2:     Are either of these ABO results based on External Labs? No  (If Yes, STOP and go to source document in Media Tab for verification).    VITALS  Height:    Ht Readings from Last 1 Encounters:   23 6' 2" (1.88 m)     Weight:    Wt Readings from Last 1 Encounters:   23 101.6 kg (224 lb)       LIVER ORGAN INFORMATION  Candidate Medical Urgency Status: ACTIVE    Number of Previous Transplants: none    MELD/PELD Data Collection:  Had dialysis twice, or 24 hours of CVVHD, within 1 week prior to the serum creatinine test: N/A  Encephalopathy: 1 - 2 Date: 23  Ascites: moderate Date: 23          MELD Score:  MELD-Na: 9 at 2023  9:33 AM  MELD: 9 at 2023  9:33 AM  Calculated from:  Serum Creatinine: 1.0 mg/dL at 2023  9:33 AM  Serum Sodium: 136 mmol/L at 2023  9:33 AM  Total Bilirubin: 1.3 mg/dL at 2023  9:33 AM  INR(ratio): 1.2 at 2023  9:33 AM    Lab Results   Component Value Date    ALBUMIN 3.0 (L) 2023     No results found for: EXTINR, EXTCREATININ, EXTSODIUM, EXTBILITOTAL, " "EXTALBUMIN    Additional Organs: none  Kidney: No    If Kidney is "Yes" above, check Diagnosis and enter the medical eligibility below for a simultaneous liver/kidney:    Diagnosis: Chronic kidney disease (CKD) with measured or calculated GFR less than or equal to 60 ml/min for greater than 90 consecutive days. At least one of the following must qualify for CKD:  Date Begun Dialysis CrCl (ml/min)  Must be < or = 30 eGFR (ml/min) Must be < or = 30    Yes/no:                    Diagnosis: Sustained acute kidney injury (must be confirmed at least once every 7 days). Please select at least one of the following criteria:  Date of test or treatment Dialysis received CrCl (ml/min) Must be < or = 30 eGFR (ml/min) Must be < or = 25 Number of days since previous test or treatment (must be less than or equal to 7 days)    Yes/No:                  Diagnosis: Metabolic disease, Check all diagnosis that apply:     Hyperoxaluria    Atypical hemolytic uremic syndrome (HUS) from mutations in factor H or factor I    Familial non-neuropathic systemic amyloidosis    Methylmalonic aciduria     Transplant nephrologist confirming candidate's most recent diagnosis for SLK: N/A               ## Please submit a separate Kidney Listing note for combined Liver/Kidney patients. ##    Will Recipient Accept?   Accept HBcAB Positive Organ:  Yes  Accept HBV ROSE Positive Organ:  No  Accept HCV Antibody Positive Organ: Yes   Accept HCV ROSE Positive Organ:  Yes                        Local: No                           Import: No  Accept DCD Organ:    Yes  Minimum acceptable donor age:  5 years  Maximum acceptable donor age:  99 years  Minimum acceptable donor weight:  40 lbs    Maximum acceptable donor weight:  440 lb  Maximum miles the organ or  Recovery team will travel:   5000 miles    TCR Information    Citizenship: [place] Citizen   Country of permanent residence:   Year of entry to the [place]:   Highest education level: High School (9-12) or GED  "   Patient on Life Support: No  Functional Status: 50% - requires considerable assistance and frequent medical care  Working for income: No  If no, reason not working: Disability  Previous Pancreas Islet Infusion - No  Source of payment: Public Insurance - Medicare & Choice  Diabetes: Type II  Any previous malignancy: Yes, Leukemia/Lymphoma  Neoadjuvant Therapy: Yes  Has candidate ever had a diagnosis of HCC: No    Liver Medical Factors  Previous abdominal surgery: Yes - hernia repair  Spontaneous Bacterial Peritonitis: No  History of Portal Vein Thrombosis: Yes  Transjugular Intrahepatic Portosystemic Shunt: No    Blood Type x2 was verified by myself and (Trev Resendiz RN).  Blood type determination and reporting was completed according to the programs protocols and OPTN requirements.

## 2023-06-03 NOTE — TELEPHONE ENCOUNTER
Patient informed that he has been listed for liver transplant with a MELD score of 9. Patient had no further questions

## 2023-06-09 ENCOUNTER — TELEPHONE (OUTPATIENT)
Dept: TRANSPLANT | Facility: CLINIC | Age: 58
End: 2023-06-09
Payer: MEDICARE

## 2023-06-09 NOTE — TELEPHONE ENCOUNTER
Sri Urban MD  [facility] Pre-Liver Transplant Clinical  The Labs are stable; except blood sugars high - please let patient know.     ________________  Patient notified. He states he will get with his PCP to manage blood sugar.

## 2023-07-13 ENCOUNTER — DOCUMENTATION ONLY (OUTPATIENT)
Dept: TRANSPLANT | Facility: CLINIC | Age: 58
End: 2023-07-13
Payer: MEDICARE

## 2023-07-25 ENCOUNTER — OFFICE VISIT (OUTPATIENT)
Dept: HEPATOLOGY | Facility: CLINIC | Age: 58
End: 2023-07-25
Payer: MEDICARE

## 2023-07-25 VITALS
OXYGEN SATURATION: 96 % | HEIGHT: 74 IN | WEIGHT: 218.25 LBS | SYSTOLIC BLOOD PRESSURE: 104 MMHG | BODY MASS INDEX: 28.01 KG/M2 | RESPIRATION RATE: 19 BRPM | TEMPERATURE: 98 F | HEART RATE: 80 BPM | DIASTOLIC BLOOD PRESSURE: 67 MMHG

## 2023-07-25 DIAGNOSIS — C88.4 MALT LYMPHOMA: ICD-10-CM

## 2023-07-25 DIAGNOSIS — K76.82 HEPATIC ENCEPHALOPATHY: ICD-10-CM

## 2023-07-25 DIAGNOSIS — R18.8 OTHER ASCITES: ICD-10-CM

## 2023-07-25 DIAGNOSIS — R18.8 ASCITES OF LIVER: Primary | ICD-10-CM

## 2023-07-25 DIAGNOSIS — K74.60 LIVER CIRRHOSIS SECONDARY TO NASH: ICD-10-CM

## 2023-07-25 DIAGNOSIS — I85.10 SECONDARY ESOPHAGEAL VARICES WITHOUT BLEEDING: ICD-10-CM

## 2023-07-25 DIAGNOSIS — I81 PORTAL VEIN THROMBOSIS: ICD-10-CM

## 2023-07-25 DIAGNOSIS — K75.81 LIVER CIRRHOSIS SECONDARY TO NASH: ICD-10-CM

## 2023-07-25 PROCEDURE — 99999 PR PBB SHADOW E&M-EST. PATIENT-LVL V: ICD-10-PCS | Mod: PBBFAC,TXP,, | Performed by: INTERNAL MEDICINE

## 2023-07-25 PROCEDURE — 99215 PR OFFICE/OUTPT VISIT, EST, LEVL V, 40-54 MIN: ICD-10-PCS | Mod: S$GLB,TXP,, | Performed by: INTERNAL MEDICINE

## 2023-07-25 PROCEDURE — 3066F NEPHROPATHY DOC TX: CPT | Mod: CPTII,S$GLB,TXP, | Performed by: INTERNAL MEDICINE

## 2023-07-25 PROCEDURE — 3008F PR BODY MASS INDEX (BMI) DOCUMENTED: ICD-10-PCS | Mod: CPTII,S$GLB,TXP, | Performed by: INTERNAL MEDICINE

## 2023-07-25 PROCEDURE — 3074F PR MOST RECENT SYSTOLIC BLOOD PRESSURE < 130 MM HG: ICD-10-PCS | Mod: CPTII,S$GLB,TXP, | Performed by: INTERNAL MEDICINE

## 2023-07-25 PROCEDURE — 1159F PR MEDICATION LIST DOCUMENTED IN MEDICAL RECORD: ICD-10-PCS | Mod: CPTII,S$GLB,TXP, | Performed by: INTERNAL MEDICINE

## 2023-07-25 PROCEDURE — 3008F BODY MASS INDEX DOCD: CPT | Mod: CPTII,S$GLB,TXP, | Performed by: INTERNAL MEDICINE

## 2023-07-25 PROCEDURE — 3046F PR MOST RECENT HEMOGLOBIN A1C LEVEL > 9.0%: ICD-10-PCS | Mod: CPTII,S$GLB,TXP, | Performed by: INTERNAL MEDICINE

## 2023-07-25 PROCEDURE — 3078F PR MOST RECENT DIASTOLIC BLOOD PRESSURE < 80 MM HG: ICD-10-PCS | Mod: CPTII,S$GLB,TXP, | Performed by: INTERNAL MEDICINE

## 2023-07-25 PROCEDURE — 3078F DIAST BP <80 MM HG: CPT | Mod: CPTII,S$GLB,TXP, | Performed by: INTERNAL MEDICINE

## 2023-07-25 PROCEDURE — 1159F MED LIST DOCD IN RCRD: CPT | Mod: CPTII,S$GLB,TXP, | Performed by: INTERNAL MEDICINE

## 2023-07-25 PROCEDURE — 99999 PR PBB SHADOW E&M-EST. PATIENT-LVL V: CPT | Mod: PBBFAC,TXP,, | Performed by: INTERNAL MEDICINE

## 2023-07-25 PROCEDURE — 99215 OFFICE O/P EST HI 40 MIN: CPT | Mod: S$GLB,TXP,, | Performed by: INTERNAL MEDICINE

## 2023-07-25 PROCEDURE — 3066F PR DOCUMENTATION OF TREATMENT FOR NEPHROPATHY: ICD-10-PCS | Mod: CPTII,S$GLB,TXP, | Performed by: INTERNAL MEDICINE

## 2023-07-25 PROCEDURE — 1160F PR REVIEW ALL MEDS BY PRESCRIBER/CLIN PHARMACIST DOCUMENTED: ICD-10-PCS | Mod: CPTII,S$GLB,TXP, | Performed by: INTERNAL MEDICINE

## 2023-07-25 PROCEDURE — 3046F HEMOGLOBIN A1C LEVEL >9.0%: CPT | Mod: CPTII,S$GLB,TXP, | Performed by: INTERNAL MEDICINE

## 2023-07-25 PROCEDURE — 3074F SYST BP LT 130 MM HG: CPT | Mod: CPTII,S$GLB,TXP, | Performed by: INTERNAL MEDICINE

## 2023-07-25 PROCEDURE — 1160F RVW MEDS BY RX/DR IN RCRD: CPT | Mod: CPTII,S$GLB,TXP, | Performed by: INTERNAL MEDICINE

## 2023-07-25 PROCEDURE — 3061F NEG MICROALBUMINURIA REV: CPT | Mod: CPTII,S$GLB,TXP, | Performed by: INTERNAL MEDICINE

## 2023-07-25 PROCEDURE — 3061F PR NEG MICROALBUMINURIA RESULT DOCUMENTED/REVIEW: ICD-10-PCS | Mod: CPTII,S$GLB,TXP, | Performed by: INTERNAL MEDICINE

## 2023-07-25 RX ORDER — EPLERENONE 50 MG/1
50 TABLET, FILM COATED ORAL 2 TIMES DAILY
Qty: 60 TABLET | Refills: 11 | Status: SHIPPED | OUTPATIENT
Start: 2023-07-25 | End: 2023-10-23 | Stop reason: SDUPTHER

## 2023-07-25 NOTE — PATIENT INSTRUCTIONS
Change spironolactone to eplerenone due breast tenderness  Continue paracenteses as needed  Ct 09/23 as scheduled to f/u on the clot  MELD labs monthly  2 tx for lymphoma left  EGD overdue- will have coordinator call to expedite  Drink supplements- 3 per day

## 2023-07-25 NOTE — PROGRESS NOTES
HEPATOLOGY FOLLOW UP    Referring Physician: FERNANDO Baum  Current Corresponding Physician: FERNANDO Baum, Sarmad Estrella MD, Vale Covarrubias MD, Tank Curtis MD    Corky Bach is here for follow up of decompensated cirrhosis    HPI   Corky Bach is a 57 y.o. 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     -dx few years ago  --ascites-started on diuretics lasix 40 mg and aldactone 100 mg daily; noted to have distended abdomen when seen in consultation  --HE- on rifaximin; no longer on lactulose  -EGD-03/22 and 07/22/20- small varices; on propranolol     MELD-Na score: 11 at 6/13/2022 10:35 AM  MELD score: 11 at 6/13/2022 10:35 AM  Calculated from:  Serum Creatinine: 0.9 mg/dL (Using min of 1 mg/dL) at 6/13/2022 10:35 AM  Serum Sodium: 133 mmol/L at 6/13/2022 10:35 AM  Total Bilirubin: 1.6 mg/dL at 6/13/2022 10:35 AM  INR(ratio): 1.3 at 6/13/2022 10:35 AM    Interval History  Since Corky Bach last few visits with me:    Now listed for liver transplant listing. Has only 2 more infusions and he will have completed his tx for lymphoma.    Continues to have large ascites. Having paracenteses every 2 weeks  Ascites analysis: SAAG >1.1; protein <1.0  Ascites: lasix 40 mg and spironolactone 100 mg daily, metolazone 5.0 mg daily  C/o breast tenderness  HE, ongoing; on rifaximin and lactulose  EGD 3/22: small varices; repeat pending    CT abdo pelvis w contrast 3/23/23: Morphological changes cirrhosis.  No focal hepatic lesion. Sequelae of portal venous hypertension, including splenomegaly, varices, and large volume ascites. Similar degree of thrombus within main portal vein and splenic vein.    MELD 3.0: 12 at 7/20/2023  9:13 AM  MELD-Na: 10 at 7/20/2023  9:13 AM  Calculated from:  Serum Creatinine: 0.9 mg/dL  "(Using min of 1 mg/dL) at 2023  9:13 AM  Serum Sodium: 136 mmol/L at 2023  9:13 AM  Total Bilirubin: 1.3 mg/dL at 2023  9:13 AM  Serum Albumin: 2.7 g/dL at 2023  9:13 AM  INR(ratio): 1.3 at 2023  9:13 AM  Age at listin years  Sex: Male at 2023  9:13 AM       Outpatient Encounter Medications as of 2023   Medication Sig Dispense Refill    acetaminophen (TYLENOL) 500 MG tablet Take 1,000 mg by mouth 2 (two) times daily as needed for Pain.      BD ULTRA-FINE ORIG PEN NEEDLE 29 gauge x 1/2" Ndle USE PEN NEEDLE TO INJECT INSULIN EVERY EVENING 100 each 11    ferrous sulfate 325 (65 FE) MG EC tablet Take 1 tablet (325 mg total) by mouth once daily. Take with vitamin C (Patient taking differently: Take 325 mg by mouth every other day. Take with vitamin C) 90 tablet 3    finasteride (PROSCAR) 5 mg tablet Take 1 tablet (5 mg total) by mouth once daily. 90 tablet 3    furosemide (LASIX) 40 MG tablet Take 1 tablet (40 mg total) by mouth once daily. New dose as of 2022- for fluid management / ascites 90 tablet 3    insulin detemir U-100, Levemir, (LEVEMIR FLEXPEN) 100 unit/mL (3 mL) InPn pen Inject 60 Units into the skin every evening. 18 mL 11    insulin lispro (HUMALOG KWIKPEN INSULIN) 100 unit/mL pen Inject 20 Units into the skin 3 (three) times daily with meals. 18 mL 11    lactulose (CHRONULAC) 20 gram/30 mL Soln Take 20 grams by mouth 3 times daily as needed and titrate use / amount to 3 to 5 soft brown bowel movements per day.  Decrease amount / frequency if getting diarrhea 1800 mL 5    linaGLIPtin (TRADJENTA) 5 mg Tab tablet TAKE 1 TABLET (5 MG TOTAL) BY MOUTH ONCE DAILY. 90 tablet 3    metOLazone (ZAROXOLYN) 2.5 MG tablet Take 2 tablets (5 mg total) by mouth once daily. Take 30 minutes before other water pills 60 tablet 11    oxybutynin (DITROPAN-XL) 5 MG TR24 Take 1 tablet (5 mg total) by mouth once daily. 30 tablet 11    propranoloL (INDERAL) 10 MG tablet TAKE 1 TABLET (10 " MG TOTAL) BY MOUTH 3 (THREE) TIMES DAILY. 90 tablet 0    tamsulosin (FLOMAX) 0.4 mg Cap Take 1 capsule (0.4 mg total) by mouth every evening. 90 capsule 3    venlafaxine (EFFEXOR) 75 MG tablet TAKE 1 TABLET (75 MG TOTAL) BY MOUTH 2 (TWO) TIMES DAILY. 180 tablet 3    [DISCONTINUED] spironolactone (ALDACTONE) 100 MG tablet Take 1 tablet (100 mg total) by mouth once daily. For fluid management / ascites- new as of 07/11/2022 90 tablet 3    eplerenone (INSPRA) 50 MG Tab Take 1 tablet (50 mg total) by mouth 2 (two) times daily. 60 tablet 11     No facility-administered encounter medications on file as of 7/25/2023.     Review of patient's allergies indicates:  No Known Allergies  Past Medical History:   Diagnosis Date    Anemia     Bacterial peritonitis 10/25/2022    Celiac disease     Cervicalgia     Cirrhosis     Colon polyp     Depression     Diabetes mellitus, type 2     Elevated LFTs     Esophageal varices     Gout, unspecified     History of COVID-19 01/15/2021    Hyperlipidemia     Mood disorder in conditions classified elsewhere     Obesity, unspecified     Other chronic nonalcoholic liver disease     Portal hypertensive gastropathy     Thyroid disease     Unspecified psychosis        Review of Systems   Constitutional:  Positive for fever.   HENT: Negative.     Eyes: Negative.    Respiratory: Negative.     Cardiovascular: Negative.    Gastrointestinal:  Positive for abdominal distention.   Genitourinary: Negative.    Musculoskeletal: Negative.    Skin: Negative.    Neurological: Negative.    Psychiatric/Behavioral: Negative.     Vitals:    07/25/23 1322   BP: 104/67   Pulse: 80   Resp: 19   Temp: 98 °F (36.7 °C)       Physical Exam  Vitals reviewed.   Constitutional:       Appearance: He is well-developed.   HENT:      Head: Normocephalic and atraumatic.   Eyes:      General: No scleral icterus.     Conjunctiva/sclera: Conjunctivae normal.      Pupils: Pupils are equal, round, and reactive to light.   Neck:       Thyroid: No thyromegaly.   Cardiovascular:      Rate and Rhythm: Normal rate and regular rhythm.      Heart sounds: Normal heart sounds.   Pulmonary:      Effort: Pulmonary effort is normal.      Breath sounds: Normal breath sounds. No rales.   Abdominal:      General: Bowel sounds are normal. There is distension.      Palpations: Abdomen is soft. There is no mass.      Tenderness: There is no abdominal tenderness.   Musculoskeletal:         General: Normal range of motion.      Cervical back: Normal range of motion and neck supple.   Skin:     General: Skin is warm and dry.      Findings: No rash.   Neurological:      Mental Status: He is alert and oriented to person, place, and time.       MELD 3.0: 12 at 2023  9:13 AM  MELD-Na: 10 at 2023  9:13 AM  Calculated from:  Serum Creatinine: 0.9 mg/dL (Using min of 1 mg/dL) at 2023  9:13 AM  Serum Sodium: 136 mmol/L at 2023  9:13 AM  Total Bilirubin: 1.3 mg/dL at 2023  9:13 AM  Serum Albumin: 2.7 g/dL at 2023  9:13 AM  INR(ratio): 1.3 at 2023  9:13 AM  Age at listin years  Sex: Male at 2023  9:13 AM      Lab Results   Component Value Date     (H) 2023    BUN 16 2023    CREATININE 0.9 2023    CALCIUM 8.7 2023     2023    K 4.0 2023     2023    PROT 6.4 2023    CO2 28 2023    ANIONGAP 8 2023    WBC 3.97 2023    RBC 3.90 (L) 2023    HGB 9.9 (L) 2023    HCT 30.3 (L) 2023    MCV 78 (L) 2023    MCH 25.4 (L) 2023    MCHC 32.7 2023     Lab Results   Component Value Date    RDW 15.9 (H) 2023    PLT 55 (L) 2023    MPV 9.3 2023    GRAN 2.9 2023    GRAN 73.2 (H) 2023    LYMPH 0.4 (L) 2023    LYMPH 10.6 (L) 2023    MONO 0.5 2023    MONO 11.6 2023    EOSINOPHIL 3.8 2023    BASOPHIL 0.5 2023    EOS 0.2 2023    BASO 0.02 2023    APTT 24.1  10/10/2022    GROUPTRH O POS 03/24/2023    CHOL 100 (L) 01/19/2023    TRIG 64 01/19/2023    HDL 42 01/19/2023    CHOLHDL 42.0 01/19/2023    TOTALCHOLEST 2.4 01/19/2023    ALBUMIN 2.7 (L) 07/20/2023    BILIDIR 0.6 (H) 07/12/2023    AST 18 07/20/2023    ALT 15 07/20/2023    ALKPHOS 154 (H) 07/20/2023    MG 1.4 (L) 07/20/2023    LABPROT 13.2 (H) 07/20/2023    INR 1.3 (H) 07/20/2023    PSA 0.46 10/17/2022       Assessment and Plan:  Corky Bach is a 57 y.o. male with decompensated CORDOVA Cirrhosis  Current recommendations:  Decompensated cirrhosis, MELD 12 with refractory ascites; now listed for liver transplant listing; monitor meld labs monthly;  Hx MALT lymphoma: f/u with oncology; complete rituxan 12/23  Refractory ascites:continue diuretics and prn paracentesis every 2 weeks; continue lasix, metolazone; stop aldactone due to breast tenderness and replace with eplerenone  HE, ongoing: continue rifaximin; restart lactulose  PVT: repeat TP ct abdo/pelvis 6 months after last ct (end of sept 2023)  Esophageal varices: repeat EGD now (overdue)  Muscle loss: drink 3 protein drinks per day    Return 12-16 weeks

## 2023-07-29 ENCOUNTER — TELEPHONE (OUTPATIENT)
Dept: TRANSPLANT | Facility: CLINIC | Age: 58
End: 2023-07-29
Payer: MEDICARE

## 2023-07-29 NOTE — TELEPHONE ENCOUNTER
PATIENT NAME: Corky COLLINS [facility] #: [id number]    Lab Results   Component Value Date    CREATININE 0.9 07/20/2023     07/20/2023    BILITOT 1.3 (H) 07/20/2023    ALBUMIN 2.7 (L) 07/20/2023    INR 1.3 (H) 07/20/2023     MELD 12  Encephalopathy: 1 - 2  Ascites: moderate  Dialysis: no     Recertification requestor: Arturo Regalado

## 2023-08-04 ENCOUNTER — TELEPHONE (OUTPATIENT)
Dept: TRANSPLANT | Facility: CLINIC | Age: 58
End: 2023-08-04
Payer: MEDICARE

## 2023-08-04 NOTE — TELEPHONE ENCOUNTER
Returned phone call to patient's wife.  Wife states that later this year she is planning to go visit her sister for a few days in [place] however patient's adult son will be available to bring Mr. Bach to [facility] if he gets an organ offer. She will fly back if he gets an organ offer during the week she is in [place].

## 2023-08-09 DIAGNOSIS — E11.9 TYPE 2 DIABETES MELLITUS WITHOUT COMPLICATION: ICD-10-CM

## 2023-08-18 ENCOUNTER — TELEPHONE (OUTPATIENT)
Dept: TRANSPLANT | Facility: CLINIC | Age: 58
End: 2023-08-18
Payer: MEDICARE

## 2023-08-18 NOTE — TELEPHONE ENCOUNTER
PATIENT NAME: Corky COLLINS [facility] #: [id number]    Lab Results   Component Value Date    CREATININE 0.9 08/18/2023     (L) 08/18/2023    BILITOT 1.9 (H) 08/18/2023    ALBUMIN 2.7 (L) 08/18/2023    INR 1.3 (H) 08/18/2023     MELD 15  Encephalopathy: 1 - 2  Ascites: moderate  Dialysis: no     Recertification requestor: Arturo Regalado

## 2023-10-06 ENCOUNTER — TELEPHONE (OUTPATIENT)
Dept: HEPATOLOGY | Facility: CLINIC | Age: 58
End: 2023-10-06
Payer: MEDICARE

## 2023-10-06 ENCOUNTER — TELEPHONE (OUTPATIENT)
Dept: TRANSPLANT | Facility: CLINIC | Age: 58
End: 2023-10-06
Payer: MEDICARE

## 2023-10-06 NOTE — TELEPHONE ENCOUNTER
Liver Transplant TUESDAY Committee Discussion     Patient Name: Corky Bach   : 1965  MRN: [id number]    Requested by: Sri Urban MD    Day to be discussed: Liver discussion days: Tuesday    Transplant Coordinator: Liver Coordinators: Arturo Regalado    Patient Status: outpatient    Transplant Status: transplant status: Pre-liver    Reason for Discussion: He had a CT on 23. It showed partially occlusive and calcified thrombus in the portal and splenic veins. Need to discuss with transplant surgeons    Plan:    Route to:

## 2023-10-06 NOTE — TELEPHONE ENCOUNTER
Patient: Corky Bach       MRN: [id number]      : 1965     Age: 57 y.o.  [address] [place] [place] [address]    Presenting Radiologists:     Providers: Sri Urban MD    Priority of review: Transplant related    Patient Transplant Status: Listed    Reason for presentation: Other assess PVT    Clinical Summary: Corky Bach is a 57 y.o. male with a past medical history of MALT lymphoma, on maintenance Rituxan every 4 weeks (for >2 years; due to complete ) who presented 22 for evaluation of decompensated cirrhosis secondary to CORDOVA.     -dx few years ago  --ascites-started on diuretics lasix 40 mg and aldactone 100 mg daily; noted to have distended abdomen when seen in consultation  --HE- on rifaximin; no longer on lactulose  -EGD- and 20- small varices; on propranolol     MELD-Na score: 11 at 2022 10:35 AM  MELD score: 11 at 2022 10:35 AM  Calculated from:  Serum Creatinine: 0.9 mg/dL (Using min of 1 mg/dL) at 2022 10:35 AM  Serum Sodium: 133 mmol/L at 2022 10:35 AM  Total Bilirubin: 1.6 mg/dL at 2022 10:35 AM  INR(ratio): 1.3 at 2022 10:35 AM     Interval History  Since Corky Bach last few visits with me:     Now listed for liver transplant listing. Has only 2 more infusions and he will have completed his tx for lymphoma.     Continues to have large ascites. Having paracenteses every 2 weeks  Ascites analysis: SAAG >1.1; protein <1.0  Ascites: lasix 40 mg and spironolactone 100 mg daily, metolazone 5.0 mg daily  C/o breast tenderness  HE, ongoing; on rifaximin and lactulose  EGD 3/22: small varices; repeat pending     CT abdo pelvis w contrast 3/23/23: Morphological changes cirrhosis.  No focal hepatic lesion. Sequelae of portal venous hypertension, including splenomegaly, varices, and large volume ascites. Similar degree of thrombus within main portal vein and splenic vein.     MELD 3.0: 12 at 2023  9:13 AM  MELD-Na: 10 at  2023  9:13 AM  Calculated from:  Serum Creatinine: 0.9 mg/dL (Using min of 1 mg/dL) at 2023  9:13 AM  Serum Sodium: 136 mmol/L at 2023  9:13 AM  Total Bilirubin: 1.3 mg/dL at 2023  9:13 AM  Serum Albumin: 2.7 g/dL at 2023  9:13 AM  INR(ratio): 1.3 at 2023  9:13 AM  Age at listin years  Sex: Male at 2023  9:13 AM    Imaging to be reviewed: CT abdo pelvis 23    HCC Treatment History: n/a    Platelets:   Lab Results   Component Value Date/Time    PLT 51 (L) 2023 11:36 AM     Creatinine:   Lab Results   Component Value Date/Time    CREATININE 0.9 2023 11:36 AM     Bilirubin:   Lab Results   Component Value Date/Time    BILITOT 1.7 (H) 2023 11:36 AM     AFP Last 3 each if available:   Lab Results   Component Value Date/Time    AFP 2.4 2023 07:43 AM    AFP 2.4 2022 10:35 AM    AFP 3.1 02/10/2022 08:02 AM       MELD: MELD 3.0: 14 at 2023 11:36 AM  MELD-Na: 11 at 2023 11:36 AM  Calculated from:  Serum Creatinine: 0.9 mg/dL (Using min of 1 mg/dL) at 2023 11:36 AM  Serum Sodium: 134 mmol/L at 2023 11:36 AM  Total Bilirubin: 1.7 mg/dL at 2023 11:36 AM  Serum Albumin: 2.9 g/dL at 2023 11:36 AM  INR(ratio): 1.3 at 2023 11:36 AM  Age at listin years  Sex: Male at 2023 11:36 AM      Plan:     Follow-up Provider:

## 2023-10-10 ENCOUNTER — CONFERENCE (OUTPATIENT)
Dept: TRANSPLANT | Facility: CLINIC | Age: 58
End: 2023-10-10
Payer: MEDICARE

## 2023-10-12 NOTE — TELEPHONE ENCOUNTER
Patient: Corky Bach       MRN: [id number]      : 1965     Age: 57 y.o.  [address] [place] [address]    Presenting Radiologists: Loida Milton MD    Providers: Sri Urban MD    Priority of review: Transplant related    Patient Transplant Status: Listed    Reason for presentation: Other assess PVT    Clinical Summary: Corky Bach is a 57 y.o. male with a past medical history of MALT lymphoma, on maintenance Rituxan every 4 weeks (for >2 years; due to complete ) who presented 22 for evaluation of decompensated cirrhosis secondary to CORDOVA.     -dx few years ago  --ascites-started on diuretics lasix 40 mg and aldactone 100 mg daily; noted to have distended abdomen when seen in consultation  --HE- on rifaximin; no longer on lactulose  -EGD- and 20- small varices; on propranolol     MELD-Na score: 11 at 2022 10:35 AM  MELD score: 11 at 2022 10:35 AM  Calculated from:  Serum Creatinine: 0.9 mg/dL (Using min of 1 mg/dL) at 2022 10:35 AM  Serum Sodium: 133 mmol/L at 2022 10:35 AM  Total Bilirubin: 1.6 mg/dL at 2022 10:35 AM  INR(ratio): 1.3 at 2022 10:35 AM     Interval History  Since Corky Bach last few visits with me:     Now listed for liver transplant listing. Has only 2 more infusions and he will have completed his tx for lymphoma.     Continues to have large ascites. Having paracenteses every 2 weeks  Ascites analysis: SAAG >1.1; protein <1.0  Ascites: lasix 40 mg and spironolactone 100 mg daily, metolazone 5.0 mg daily  C/o breast tenderness  HE, ongoing; on rifaximin and lactulose  EGD 3/22: small varices; repeat pending     CT abdo pelvis w contrast 3/23/23: Morphological changes cirrhosis.  No focal hepatic lesion. Sequelae of portal venous hypertension, including splenomegaly, varices, and large volume ascites. Similar degree of thrombus within main portal vein and splenic vein.     MELD 3.0: 12 at 2023  9:13  AM  MELD-Na: 10 at 2023  9:13 AM  Calculated from:  Serum Creatinine: 0.9 mg/dL (Using min of 1 mg/dL) at 2023  9:13 AM  Serum Sodium: 136 mmol/L at 2023  9:13 AM  Total Bilirubin: 1.3 mg/dL at 2023  9:13 AM  Serum Albumin: 2.7 g/dL at 2023  9:13 AM  INR(ratio): 1.3 at 2023  9:13 AM  Age at listin years  Sex: Male at 2023  9:13 AM    Imaging to be reviewed: CT abdo pelvis 23    HCC Treatment History: n/a    Platelets:   Lab Results   Component Value Date/Time    PLT 51 (L) 2023 11:36 AM     Creatinine:   Lab Results   Component Value Date/Time    CREATININE 0.9 2023 11:36 AM     Bilirubin:   Lab Results   Component Value Date/Time    BILITOT 1.7 (H) 2023 11:36 AM     AFP Last 3 each if available:   Lab Results   Component Value Date/Time    AFP 2.4 2023 07:43 AM    AFP 2.4 2022 10:35 AM    AFP 3.1 02/10/2022 08:02 AM       MELD: MELD 3.0: 14 at 2023 11:36 AM  MELD-Na: 11 at 2023 11:36 AM  Calculated from:  Serum Creatinine: 0.9 mg/dL (Using min of 1 mg/dL) at 2023 11:36 AM  Serum Sodium: 134 mmol/L at 2023 11:36 AM  Total Bilirubin: 1.7 mg/dL at 2023 11:36 AM  Serum Albumin: 2.9 g/dL at 2023 11:36 AM  INR(ratio): 1.3 at 2023 11:36 AM  Age at listin years  Sex: Male at 2023 11:36 AM      Plan: Cirrhotic liver morphology with sequalae of portal hypertension. Similar degree of non-occlusive partially calcified main portal venous clot.      Committee Discussion: Partially calcified MPV thrombus. Looks very similar to prior imaging; non-occlusive. No progression or new clot.      Plan: Continue surveillance    Note forwarded to Arturo Regalado RN to coordinate follow-up.     Follow-up Provider: Sri Urban MD

## 2023-11-14 ENCOUNTER — OFFICE VISIT (OUTPATIENT)
Dept: HEPATOLOGY | Facility: CLINIC | Age: 58
End: 2023-11-14
Payer: MEDICARE

## 2023-11-14 VITALS
HEIGHT: 74 IN | RESPIRATION RATE: 19 BRPM | OXYGEN SATURATION: 98 % | BODY MASS INDEX: 28.35 KG/M2 | TEMPERATURE: 98 F | HEART RATE: 85 BPM | SYSTOLIC BLOOD PRESSURE: 117 MMHG | DIASTOLIC BLOOD PRESSURE: 69 MMHG | WEIGHT: 220.88 LBS

## 2023-11-14 DIAGNOSIS — Z98.890 S/P ABDOMINAL PARACENTESIS: ICD-10-CM

## 2023-11-14 DIAGNOSIS — I81 PORTAL VEIN THROMBOSIS: ICD-10-CM

## 2023-11-14 DIAGNOSIS — K75.81 LIVER CIRRHOSIS SECONDARY TO NASH: ICD-10-CM

## 2023-11-14 DIAGNOSIS — K31.89 PORTAL HYPERTENSIVE GASTROPATHY: ICD-10-CM

## 2023-11-14 DIAGNOSIS — K76.6 PORTAL HYPERTENSIVE GASTROPATHY: ICD-10-CM

## 2023-11-14 DIAGNOSIS — I85.10 SECONDARY ESOPHAGEAL VARICES WITHOUT BLEEDING: ICD-10-CM

## 2023-11-14 DIAGNOSIS — K74.60 LIVER CIRRHOSIS SECONDARY TO NASH: ICD-10-CM

## 2023-11-14 DIAGNOSIS — Z76.82 ORGAN TRANSPLANT CANDIDATE: Primary | ICD-10-CM

## 2023-11-14 DIAGNOSIS — K74.69 OTHER CIRRHOSIS OF LIVER: Primary | ICD-10-CM

## 2023-11-14 DIAGNOSIS — R18.8 OTHER ASCITES: ICD-10-CM

## 2023-11-14 PROCEDURE — 3074F PR MOST RECENT SYSTOLIC BLOOD PRESSURE < 130 MM HG: ICD-10-PCS | Mod: CPTII,S$GLB,TXP, | Performed by: INTERNAL MEDICINE

## 2023-11-14 PROCEDURE — 3078F DIAST BP <80 MM HG: CPT | Mod: CPTII,S$GLB,TXP, | Performed by: INTERNAL MEDICINE

## 2023-11-14 PROCEDURE — 3061F PR NEG MICROALBUMINURIA RESULT DOCUMENTED/REVIEW: ICD-10-PCS | Mod: CPTII,S$GLB,TXP, | Performed by: INTERNAL MEDICINE

## 2023-11-14 PROCEDURE — 3046F PR MOST RECENT HEMOGLOBIN A1C LEVEL > 9.0%: ICD-10-PCS | Mod: CPTII,S$GLB,TXP, | Performed by: INTERNAL MEDICINE

## 2023-11-14 PROCEDURE — 99999 PR PBB SHADOW E&M-EST. PATIENT-LVL V: CPT | Mod: PBBFAC,TXP,, | Performed by: INTERNAL MEDICINE

## 2023-11-14 PROCEDURE — 3066F NEPHROPATHY DOC TX: CPT | Mod: CPTII,S$GLB,TXP, | Performed by: INTERNAL MEDICINE

## 2023-11-14 PROCEDURE — 3074F SYST BP LT 130 MM HG: CPT | Mod: CPTII,S$GLB,TXP, | Performed by: INTERNAL MEDICINE

## 2023-11-14 PROCEDURE — 3046F HEMOGLOBIN A1C LEVEL >9.0%: CPT | Mod: CPTII,S$GLB,TXP, | Performed by: INTERNAL MEDICINE

## 2023-11-14 PROCEDURE — 99214 OFFICE O/P EST MOD 30 MIN: CPT | Mod: S$GLB,TXP,, | Performed by: INTERNAL MEDICINE

## 2023-11-14 PROCEDURE — 99214 PR OFFICE/OUTPT VISIT, EST, LEVL IV, 30-39 MIN: ICD-10-PCS | Mod: S$GLB,TXP,, | Performed by: INTERNAL MEDICINE

## 2023-11-14 PROCEDURE — 3066F PR DOCUMENTATION OF TREATMENT FOR NEPHROPATHY: ICD-10-PCS | Mod: CPTII,S$GLB,TXP, | Performed by: INTERNAL MEDICINE

## 2023-11-14 PROCEDURE — 3008F BODY MASS INDEX DOCD: CPT | Mod: CPTII,S$GLB,TXP, | Performed by: INTERNAL MEDICINE

## 2023-11-14 PROCEDURE — 3008F PR BODY MASS INDEX (BMI) DOCUMENTED: ICD-10-PCS | Mod: CPTII,S$GLB,TXP, | Performed by: INTERNAL MEDICINE

## 2023-11-14 PROCEDURE — 3061F NEG MICROALBUMINURIA REV: CPT | Mod: CPTII,S$GLB,TXP, | Performed by: INTERNAL MEDICINE

## 2023-11-14 PROCEDURE — 99999 PR PBB SHADOW E&M-EST. PATIENT-LVL V: ICD-10-PCS | Mod: PBBFAC,TXP,, | Performed by: INTERNAL MEDICINE

## 2023-11-14 PROCEDURE — 3078F PR MOST RECENT DIASTOLIC BLOOD PRESSURE < 80 MM HG: ICD-10-PCS | Mod: CPTII,S$GLB,TXP, | Performed by: INTERNAL MEDICINE

## 2023-11-14 NOTE — PATIENT INSTRUCTIONS
Do cell count every time you do the taps-check for infection each time- our tx coord will try to get the records  Meld labs monthly because you are getting taps every 2 weeks  MUST get blood sugars under control  Need to eat and start PT-have your pcp make the referral  Agree with [facility] meds and counseling  Eat more/supplements; night time snack and see dietician  Arturo to help organize an EGD  Return 3 months

## 2023-11-14 NOTE — PROGRESS NOTES
HEPATOLOGY FOLLOW UP    Referring Physician: FERNANDO Baum  Current Corresponding Physician: FERNANDO Baum, Sarmad Estrella MD, Vale Covarrubias MD, Tank Curtis MD    Corky Bach is here for follow up of decompensated cirrhosis    HPI   Corky Bach is a 57 y.o. 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     -dx few years ago  --ascites-started on diuretics lasix 40 mg and aldactone 100 mg daily; noted to have distended abdomen when seen in consultation  --HE- on rifaximin; no longer on lactulose  -EGD-03/22 and 07/22/20- small varices; on propranolol     MELD-Na score: 11 at 6/13/2022 10:35 AM  MELD score: 11 at 6/13/2022 10:35 AM  Calculated from:  Serum Creatinine: 0.9 mg/dL (Using min of 1 mg/dL) at 6/13/2022 10:35 AM  Serum Sodium: 133 mmol/L at 6/13/2022 10:35 AM  Total Bilirubin: 1.6 mg/dL at 6/13/2022 10:35 AM  INR(ratio): 1.3 at 6/13/2022 10:35 AM    Interval History  Since Corky Bach last few visits with me:    Now listed for liver transplant listing. Listed with a meld of 15. Has completed tx for lymphoma.    Continues to have large ascites. Having paracenteses every 2 weeks  Ascites analysis: SAAG >1.1; protein <1.0  Ascites: lasix 40 mg and spironolactone 100 mg daily, metolazone 5.0 mg daily  C/o breast tenderness  HE, ongoing; on rifaximin and lactulose  EGD 3/22: small varices; repeat pending    CT abdo pelvis w contrast 3/23/23: Morphological changes cirrhosis.  No focal hepatic lesion. Sequelae of portal venous hypertension, including splenomegaly, varices, and large volume ascites. Similar degree of thrombus within main portal vein and splenic vein.    MELD 3.0: 13 at 11/14/2023  1:50 PM  MELD-Na: 10 at 11/14/2023  1:50 PM  Calculated from:  Serum Creatinine: 0.69 mg/dL (Using min of 1  "mg/dL) at 2023  1:50 PM  Serum Sodium: 133 mmol/L at 2023  1:50 PM  Total Bilirubin: 1.5 mg/dL at 2023  1:50 PM  Serum Albumin: 3.1 g/dL at 2023  1:50 PM  INR(ratio): 1.2 at 2023  1:50 PM  Age at listin years  Sex: Male at 2023  1:50 PM       Outpatient Encounter Medications as of 2023   Medication Sig Dispense Refill    acetaminophen (TYLENOL) 500 MG tablet Take 1,000 mg by mouth 2 (two) times daily as needed for Pain.      BD ULTRA-FINE ORIG PEN NEEDLE 29 gauge x 1/2" Ndle USE PEN NEEDLE TO INJECT INSULIN EVERY EVENING 100 each 11    eplerenone (INSPRA) 50 MG Tab Take 1 tablet (50 mg total) by mouth 2 (two) times daily. 180 tablet 3    finasteride (PROSCAR) 5 mg tablet Take 1 tablet (5 mg total) by mouth once daily. 90 tablet 3    furosemide (LASIX) 40 MG tablet Take 1 tablet (40 mg total) by mouth once daily. For fluid management / ascites 90 tablet 3    insulin detemir U-100, Levemir, (LEVEMIR FLEXPEN) 100 unit/mL (3 mL) InPn pen Inject 60 Units into the skin every evening. 18 mL 11    insulin lispro (HUMALOG KWIKPEN INSULIN) 100 unit/mL pen Inject 20 Units into the skin 3 (three) times daily with meals. 18 mL 11    lactulose (CHRONULAC) 20 gram/30 mL Soln Take 20 grams by mouth 3 times daily as needed and titrate use / amount to 3 to 5 soft brown bowel movements per day.  Decrease amount / frequency if getting diarrhea 1800 mL 5    linaGLIPtin (TRADJENTA) 5 mg Tab tablet TAKE 1 TABLET (5 MG TOTAL) BY MOUTH ONCE DAILY. 90 tablet 3    metOLazone (ZAROXOLYN) 2.5 MG tablet Take 2 tablets (5 mg total) by mouth once daily. Take 30 minutes before other water pills 180 tablet 3    oxybutynin (DITROPAN-XL) 5 MG TR24 Take 1 tablet (5 mg total) by mouth once daily. 30 tablet 11    rifAXIMin (XIFAXAN) 550 mg Tab Take 1 tablet (550 mg total) by mouth 2 (two) times daily. 180 tablet 3    tamsulosin (FLOMAX) 0.4 mg Cap Take 1 capsule (0.4 mg total) by mouth every evening. 90 " capsule 3    venlafaxine (EFFEXOR) 75 MG tablet TAKE 1 TABLET (75 MG TOTAL) BY MOUTH 2 (TWO) TIMES DAILY. 180 tablet 3    [] phenazopyridine (PYRIDIUM) 100 MG tablet Take 1 tablet (100 mg total) by mouth 2 (two) times daily as needed for Pain (dysuria). 6 tablet 0    [] sulfamethoxazole-trimethoprim 800-160mg (BACTRIM DS) 800-160 mg Tab Take 1 tablet by mouth 2 (two) times daily. for 5 days 10 tablet 0    [DISCONTINUED] eplerenone (INSPRA) 50 MG Tab Take 1 tablet (50 mg total) by mouth 2 (two) times daily. 60 tablet 11    [DISCONTINUED] ferrous sulfate 325 (65 FE) MG EC tablet Take 1 tablet (325 mg total) by mouth once daily. Take with vitamin C (Patient taking differently: Take 325 mg by mouth every other day. Take with vitamin C) 90 tablet 3    [DISCONTINUED] finasteride (PROSCAR) 5 mg tablet Take 1 tablet (5 mg total) by mouth once daily. 90 tablet 3    [DISCONTINUED] furosemide (LASIX) 40 MG tablet Take 1 tablet (40 mg total) by mouth once daily. New dose as of 2022- for fluid management / ascites 90 tablet 3    [DISCONTINUED] metOLazone (ZAROXOLYN) 2.5 MG tablet Take 2 tablets (5 mg total) by mouth once daily. Take 30 minutes before other water pills 60 tablet 11    [DISCONTINUED] oxybutynin (DITROPAN-XL) 5 MG TR24 Take 1 tablet (5 mg total) by mouth once daily. 30 tablet 11    [DISCONTINUED] propranoloL (INDERAL) 10 MG tablet TAKE 1 TABLET (10 MG TOTAL) BY MOUTH 3 (THREE) TIMES DAILY. 90 tablet 0    [DISCONTINUED] tamsulosin (FLOMAX) 0.4 mg Cap Take 1 capsule (0.4 mg total) by mouth every evening. 90 capsule 3    [DISCONTINUED] venlafaxine (EFFEXOR) 75 MG tablet TAKE 1 TABLET (75 MG TOTAL) BY MOUTH 2 (TWO) TIMES DAILY. 180 tablet 3     No facility-administered encounter medications on file as of 2023.     Review of patient's allergies indicates:  No Known Allergies  Past Medical History:   Diagnosis Date    Anemia     Bacterial peritonitis 10/25/2022    Celiac disease     Cervicalgia      Cirrhosis     Colon polyp     Depression     Diabetes mellitus, type 2     Elevated LFTs     Esophageal varices     Gout, unspecified     History of COVID-19 01/15/2021    Hyperlipidemia     Mood disorder in conditions classified elsewhere     Obesity, unspecified     Other chronic nonalcoholic liver disease     Portal hypertensive gastropathy     Thyroid disease     Unspecified psychosis        Review of Systems   Constitutional:  Positive for fever.   HENT: Negative.     Eyes: Negative.    Respiratory: Negative.     Cardiovascular: Negative.    Gastrointestinal:  Positive for abdominal distention.   Genitourinary: Negative.    Musculoskeletal: Negative.    Skin: Negative.    Neurological: Negative.    Psychiatric/Behavioral: Negative.       Vitals:    11/14/23 1428   BP: 117/69   Pulse: 85   Resp: 19   Temp: 98.1 °F (36.7 °C)       Physical Exam  Vitals reviewed.   Constitutional:       Appearance: He is well-developed.   HENT:      Head: Normocephalic and atraumatic.   Eyes:      General: No scleral icterus.     Conjunctiva/sclera: Conjunctivae normal.      Pupils: Pupils are equal, round, and reactive to light.   Neck:      Thyroid: No thyromegaly.   Cardiovascular:      Rate and Rhythm: Normal rate and regular rhythm.      Heart sounds: Normal heart sounds.   Pulmonary:      Effort: Pulmonary effort is normal.      Breath sounds: Normal breath sounds. No rales.   Abdominal:      General: Bowel sounds are normal. There is distension.      Palpations: Abdomen is soft. There is no mass.      Tenderness: There is no abdominal tenderness.   Musculoskeletal:         General: Normal range of motion.      Cervical back: Normal range of motion and neck supple.   Skin:     General: Skin is warm and dry.      Findings: No rash.   Neurological:      Mental Status: He is alert and oriented to person, place, and time.         MELD 3.0: 13 at 11/14/2023  1:50 PM  MELD-Na: 10 at 11/14/2023  1:50 PM  Calculated from:  Serum  Creatinine: 0.69 mg/dL (Using min of 1 mg/dL) at 2023  1:50 PM  Serum Sodium: 133 mmol/L at 2023  1:50 PM  Total Bilirubin: 1.5 mg/dL at 2023  1:50 PM  Serum Albumin: 3.1 g/dL at 2023  1:50 PM  INR(ratio): 1.2 at 2023  1:50 PM  Age at listin years  Sex: Male at 2023  1:50 PM      Lab Results   Component Value Date     (H) 2023    BUN 13 2023    CREATININE 0.69 2023    CALCIUM 8.4 (L) 2023     (L) 2023    K 3.4 (L) 2023    CL 96 2023    PROT 6.8 2023    CO2 29 2023    ANIONGAP 8 2023    WBC 4.42 2023    RBC 4.10 (L) 2023    HGB 9.4 (L) 2023    HCT 29.8 (L) 2023    MCV 73 (L) 2023    MCH 22.9 (L) 2023    MCHC 31.5 (L) 2023     Lab Results   Component Value Date    RDW 16.3 (H) 2023    PLT 64 (L) 2023    MPV 9.2 2023    GRAN 3.4 2023    GRAN 76.8 (H) 2023    LYMPH 0.4 (L) 2023    LYMPH 8.6 (L) 2023    MONO 0.4 2023    MONO 10.0 2023    EOSINOPHIL 3.6 2023    BASOPHIL 0.5 2023    EOS 0.2 2023    BASO 0.02 2023    APTT 24.1 10/10/2022    GROUPTRH O POS 2023    CHOL 100 (L) 2023    TRIG 64 2023    HDL 42 2023    CHOLHDL 42.0 2023    TOTALCHOLEST 2.4 2023    ALBUMIN 3.1 (L) 2023    BILIDIR 0.8 (H) 2023    AST 29 2023    ALT 24 2023    ALKPHOS 208 (H) 2023    MG 1.5 (L) 2023    LABPROT 13.3 (H) 2023    INR 1.2 2023    PSA 0.46 10/17/2022       Assessment and Plan:  Corky Bach is a 57 y.o. male with decompensated CORDOVA Cirrhosis  Current recommendations:  Decompensated cirrhosis, MELD 12 with refractory ascites; now listed for liver transplant listing; monitor meld labs monthly;  Hx MALT lymphoma: f/u with oncology; complete rituxan   Refractory ascites:continue diuretics and prn paracentesis every 2  weeks; continue lasix, metolazone; stop aldactone due to breast tenderness and replace with eplerenone  HE, ongoing: continue rifaximin; restart lactulose  PVT: repeat TP ct abdo/pelvis 6 months after last ct (end of sept 2023)  Esophageal varices: repeat EGD now (overdue)  Muscle loss: drink 3 protein drinks per day    Return 12-16 weeks

## 2023-11-15 PROBLEM — Z98.890 S/P ABDOMINAL PARACENTESIS: Status: ACTIVE | Noted: 2023-11-15

## 2023-11-16 ENCOUNTER — TELEPHONE (OUTPATIENT)
Dept: TRANSPLANT | Facility: CLINIC | Age: 58
End: 2023-11-16
Payer: MEDICARE

## 2023-11-17 NOTE — TELEPHONE ENCOUNTER
PATIENT NAME: Corky COLLINS [facility] [place] #: [id number]    Lab Results   Component Value Date    CREATININE 0.69 11/14/2023     (L) 11/14/2023    BILITOT 1.5 (H) 11/14/2023    ALBUMIN 3.1 (L) 11/14/2023    INR 1.2 11/14/2023     MELD 13  Encephalopathy: 1 - 2  Ascites: moderate  Dialysis: no     Recertification requestor: Lissette Oneill

## 2023-12-08 ENCOUNTER — TELEPHONE (OUTPATIENT)
Dept: TRANSPLANT | Facility: CLINIC | Age: 58
End: 2023-12-08
Payer: MEDICARE

## 2023-12-08 NOTE — TELEPHONE ENCOUNTER
Called patient to follow-up on him needing an EGD.  Offered to schedule EGD at [facility].  Patient declined. States he wants to have it done by his local GI doctor.  I reminded patient that it is important to get his EGD done to check for varices.  Patient verbalized understanding.

## 2024-01-11 ENCOUNTER — TELEPHONE (OUTPATIENT)
Dept: TRANSPLANT | Facility: CLINIC | Age: 59
End: 2024-01-11
Payer: MEDICARE

## 2024-01-11 DIAGNOSIS — Z76.82 ORGAN TRANSPLANT CANDIDATE: Primary | ICD-10-CM

## 2024-01-11 DIAGNOSIS — I81 PORTAL VEIN THROMBOSIS: ICD-10-CM

## 2024-02-07 ENCOUNTER — TELEPHONE (OUTPATIENT)
Dept: TRANSPLANT | Facility: CLINIC | Age: 59
End: 2024-02-07
Payer: MEDICARE

## 2024-02-07 DIAGNOSIS — Z76.82 ORGAN TRANSPLANT CANDIDATE: Primary | ICD-10-CM

## 2024-02-07 DIAGNOSIS — K76.0 FATTY LIVER: ICD-10-CM

## 2024-02-07 DIAGNOSIS — K75.81 LIVER CIRRHOSIS SECONDARY TO NASH: ICD-10-CM

## 2024-02-07 DIAGNOSIS — K74.60 LIVER CIRRHOSIS SECONDARY TO NASH: ICD-10-CM

## 2024-02-15 ENCOUNTER — TELEPHONE (OUTPATIENT)
Dept: TRANSPLANT | Facility: CLINIC | Age: 59
End: 2024-02-15
Payer: MEDICARE

## 2024-02-15 NOTE — TELEPHONE ENCOUNTER
PATIENT NAME: Corky COLLINS [facility] #: [id number]    Lab Results   Component Value Date    CREATININE 0.8 02/08/2024     (L) 02/08/2024    BILITOT 1.2 (H) 02/08/2024    ALBUMIN 2.5 (L) 02/08/2024    INR 1.3 (H) 02/08/2024     MELD 13  Encephalopathy: 1 - 2  Ascites: moderate  Dialysis: no     Recertification requestor: Lissette Oneill

## 2024-03-12 ENCOUNTER — OFFICE VISIT (OUTPATIENT)
Dept: HEPATOLOGY | Facility: CLINIC | Age: 59
End: 2024-03-12
Payer: MEDICARE

## 2024-03-12 VITALS
OXYGEN SATURATION: 98 % | TEMPERATURE: 98 F | HEIGHT: 74 IN | DIASTOLIC BLOOD PRESSURE: 64 MMHG | WEIGHT: 197.06 LBS | HEART RATE: 88 BPM | BODY MASS INDEX: 25.29 KG/M2 | SYSTOLIC BLOOD PRESSURE: 107 MMHG

## 2024-03-12 DIAGNOSIS — K76.0 FATTY LIVER: ICD-10-CM

## 2024-03-12 DIAGNOSIS — K76.82 HEPATIC ENCEPHALOPATHY: ICD-10-CM

## 2024-03-12 DIAGNOSIS — Z98.890 S/P ABDOMINAL PARACENTESIS: ICD-10-CM

## 2024-03-12 DIAGNOSIS — I85.10 SECONDARY ESOPHAGEAL VARICES WITHOUT BLEEDING: ICD-10-CM

## 2024-03-12 DIAGNOSIS — K74.60 LIVER CIRRHOSIS SECONDARY TO NASH: ICD-10-CM

## 2024-03-12 DIAGNOSIS — C88.4 MALT LYMPHOMA: Primary | ICD-10-CM

## 2024-03-12 DIAGNOSIS — I81 PORTAL VEIN THROMBOSIS: ICD-10-CM

## 2024-03-12 DIAGNOSIS — K76.6 PORTAL HYPERTENSIVE GASTROPATHY: ICD-10-CM

## 2024-03-12 DIAGNOSIS — K75.81 LIVER CIRRHOSIS SECONDARY TO NASH: ICD-10-CM

## 2024-03-12 DIAGNOSIS — K31.89 PORTAL HYPERTENSIVE GASTROPATHY: ICD-10-CM

## 2024-03-12 PROCEDURE — 99215 OFFICE O/P EST HI 40 MIN: CPT | Mod: S$GLB,TXP,, | Performed by: INTERNAL MEDICINE

## 2024-03-12 PROCEDURE — 99999 PR PBB SHADOW E&M-EST. PATIENT-LVL V: CPT | Mod: PBBFAC,TXP,, | Performed by: INTERNAL MEDICINE

## 2024-03-12 NOTE — PROGRESS NOTES
HEPATOLOGY FOLLOW UP    Referring Physician: FERNANDO Baum  Current Corresponding Physician: FERNANDO Baum, Sarmad Estrella MD, Vale Covarrubias MD, Tank Curtis MD    Corky Bach is here for follow up of decompensated cirrhosis    HPI   Corky Bach is a 57 y.o. 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     -dx few years ago  --ascites-started on diuretics lasix 40 mg and aldactone 100 mg daily; noted to have distended abdomen when seen in consultation  --HE- on rifaximin; on lactulose  -EGD-03/22 and 07/22/20- small varices; on propranolol     MELD-Na score: 11 at 6/13/2022 10:35 AM  MELD score: 11 at 6/13/2022 10:35 AM  Calculated from:  Serum Creatinine: 0.9 mg/dL (Using min of 1 mg/dL) at 6/13/2022 10:35 AM  Serum Sodium: 133 mmol/L at 6/13/2022 10:35 AM  Total Bilirubin: 1.6 mg/dL at 6/13/2022 10:35 AM  INR(ratio): 1.3 at 6/13/2022 10:35 AM    Interval History  Since Corky Bach last few visits with me:    Now listed for liver transplant listing. Was listed with a meld of 15-now 13. Has completed tx for lymphoma 12/23. Due for PET-CT now.    --Continues to have large ascites. Having paracenteses every 2-3 weeks at [facility]- unclear if being checked for infections  --Ascites analysis: SAAG >1.1; protein <1.0  --Ascites: lasix 40 mg and eplerenone 100 mg daily, metolazone 5.0 mg daily  Off spironolactone due to C/o breast tenderness  --HE, ongoing; on rifaximin and lactulose prn  --EGD 3/22: small varices; repeat 12/28/23: small EV and PHG; repeat recommedned 12/24    CT abdo pelvis w contrast 9/23/23: Partially occlusive and calcified thrombus in the portal and splenic veins; varices     MELD 3.0: 13 at 2/8/2024 10:10 AM  MELD-Na: 10 at 2/8/2024 10:10 AM  Calculated from:  Serum Creatinine: 0.8 mg/dL  "(Using min of 1 mg/dL) at 2024 10:10 AM  Serum Sodium: 134 mmol/L at 2024 10:10 AM  Total Bilirubin: 1.2 mg/dL at 2024 10:10 AM  Serum Albumin: 2.5 g/dL at 2024 10:10 AM  INR(ratio): 1.3 at 2024 10:10 AM  Age at listin years  Sex: Male at 2024 10:10 AM       Outpatient Encounter Medications as of 3/12/2024   Medication Sig Dispense Refill    acetaminophen (TYLENOL) 500 MG tablet Take 1,000 mg by mouth 2 (two) times daily as needed for Pain.      BD ULTRA-FINE ORIG PEN NEEDLE 29 gauge x 1/2" Ndle USE PEN NEEDLE TO INJECT INSULIN EVERY EVENING 100 each 11    eplerenone (INSPRA) 50 MG Tab Take 1 tablet (50 mg total) by mouth 2 (two) times daily. 180 tablet 3    finasteride (PROSCAR) 5 mg tablet Take 1 tablet (5 mg total) by mouth once daily. 90 tablet 3    furosemide (LASIX) 40 MG tablet Take 1 tablet (40 mg total) by mouth once daily. For fluid management / ascites 90 tablet 3    insulin detemir U-100, Levemir, (LEVEMIR FLEXPEN) 100 unit/mL (3 mL) InPn pen Inject 60 Units into the skin every evening. 18 mL 11    insulin lispro (HUMALOG KWIKPEN INSULIN) 100 unit/mL pen Inject 20 Units into the skin 3 (three) times daily with meals. 18 mL 11    lactulose (CHRONULAC) 20 gram/30 mL Soln Take 20 grams by mouth 3 times daily as needed and titrate use / amount to 3 to 5 soft brown bowel movements per day.  Decrease amount / frequency if getting diarrhea 1800 mL 5    linaGLIPtin (TRADJENTA) 5 mg Tab tablet TAKE 1 TABLET (5 MG TOTAL) BY MOUTH ONCE DAILY. 90 tablet 3    metOLazone (ZAROXOLYN) 2.5 MG tablet Take 2 tablets (5 mg total) by mouth once daily. Take 30 minutes before other water pills 180 tablet 3    oxybutynin (DITROPAN-XL) 5 MG TR24 Take 1 tablet (5 mg total) by mouth once daily. 30 tablet 11    rifAXIMin (XIFAXAN) 550 mg Tab Take 1 tablet (550 mg total) by mouth 2 (two) times daily. 180 tablet 3    tamsulosin (FLOMAX) 0.4 mg Cap Take 1 capsule (0.4 mg total) by mouth every evening. 90 " capsule 3    venlafaxine (EFFEXOR) 75 MG tablet TAKE 1 TABLET (75 MG TOTAL) BY MOUTH 2 (TWO) TIMES DAILY. 180 tablet 3     No facility-administered encounter medications on file as of 3/12/2024.     Review of patient's allergies indicates:  No Known Allergies  Past Medical History:   Diagnosis Date    Anemia     Bacterial peritonitis 10/25/2022    Celiac disease     Cervicalgia     Cirrhosis     Colon polyp     Depression     Diabetes mellitus, type 2     Elevated LFTs     Esophageal varices     Gout, unspecified     History of COVID-19 01/15/2021    Hyperlipidemia     Mood disorder in conditions classified elsewhere     Obesity, unspecified     Other chronic nonalcoholic liver disease     Portal hypertensive gastropathy     Thyroid disease     Unspecified psychosis        Review of Systems   Constitutional:  Positive for fever.   HENT: Negative.     Eyes: Negative.    Respiratory: Negative.     Cardiovascular: Negative.    Gastrointestinal:  Positive for abdominal distention.   Genitourinary: Negative.    Musculoskeletal: Negative.    Skin: Negative.    Neurological: Negative.    Psychiatric/Behavioral: Negative.       Vitals:    03/12/24 1245   BP: 107/64   Pulse: 88   Temp: 97.9 °F (36.6 °C)       Physical Exam  Vitals reviewed.   Constitutional:       Appearance: He is well-developed.   HENT:      Head: Normocephalic and atraumatic.   Eyes:      General: No scleral icterus.     Conjunctiva/sclera: Conjunctivae normal.      Pupils: Pupils are equal, round, and reactive to light.   Neck:      Thyroid: No thyromegaly.   Cardiovascular:      Rate and Rhythm: Normal rate and regular rhythm.      Heart sounds: Normal heart sounds.   Pulmonary:      Effort: Pulmonary effort is normal.      Breath sounds: Normal breath sounds. No rales.   Abdominal:      General: Bowel sounds are normal. There is distension.      Palpations: Abdomen is soft. There is no mass.      Tenderness: There is no abdominal tenderness.    Musculoskeletal:         General: Normal range of motion.      Cervical back: Normal range of motion and neck supple.   Skin:     General: Skin is warm and dry.      Findings: No rash.   Neurological:      Mental Status: He is alert and oriented to person, place, and time.         MELD 3.0: 13 at 2024 10:10 AM  MELD-Na: 10 at 2024 10:10 AM  Calculated from:  Serum Creatinine: 0.8 mg/dL (Using min of 1 mg/dL) at 2024 10:10 AM  Serum Sodium: 134 mmol/L at 2024 10:10 AM  Total Bilirubin: 1.2 mg/dL at 2024 10:10 AM  Serum Albumin: 2.5 g/dL at 2024 10:10 AM  INR(ratio): 1.3 at 2024 10:10 AM  Age at listin years  Sex: Male at 2024 10:10 AM      Lab Results   Component Value Date     (H) 2024    BUN 20 2024    CREATININE 0.8 2024    CALCIUM 8.4 (L) 2024     (L) 2024    K 3.5 2024    CL 98 2024    PROT 6.5 2024    CO2 28 2024    ANIONGAP 8 2024    WBC 3.68 (L) 2024    RBC 3.76 (L) 2024    HGB 8.0 (L) 2024    HCT 25.9 (L) 2024    MCV 69 (L) 2024    MCH 21.3 (L) 2024    MCHC 30.9 (L) 2024     Lab Results   Component Value Date    RDW 16.6 (H) 2024    PLT 55 (L) 2024    MPV 10.4 2024    GRAN 2.8 2024    GRAN 75.3 (H) 2024    LYMPH 0.3 (L) 2024    LYMPH 9.2 (L) 2024    MONO 0.4 2024    MONO 10.6 2024    EOSINOPHIL 4.1 2024    BASOPHIL 0.5 2024    EOS 0.2 2024    BASO 0.02 2024    APTT 24.1 10/10/2022    GROUPTRH O POS 2023    CHOL 100 (L) 2023    TRIG 64 2023    HDL 42 2023    CHOLHDL 42.0 2023    TOTALCHOLEST 2.4 2023    ALBUMIN 2.5 (L) 2024    BILIDIR 0.8 (H) 2023    AST 24 2024    ALT 20 2024    ALKPHOS 144 (H) 2024    MG 1.5 (L) 2023    LABPROT 13.9 (H) 2024    INR 1.3 (H) 2024    PSA 0.46 10/17/2022        Assessment and Plan:  Corky Bach is a 58 y.o. male with decompensated CORDOVA Cirrhosis  Current recommendations:  Decompensated cirrhosis, MELD 13 with refractory ascites; now listed for liver transplant listing; monitor meld labs monthly since getting LVP every 2 weeks (8-10 liters)  Hx MALT lymphoma: f/u with oncology; completed rituxan 12/23  Refractory ascites:continue diuretics and prn paracentesis every 2-3 weeks; continue lasix, metolazone; eplerenone  HE, ongoing: continue rifaximin; restart lactulose  PVT: repeat TP ct abdo/pelvis 6 months after last ct (due to have PET-ct scan 3/23/24); will put on for discussion next week- can pt be considered as a back up in selected cases  Esophageal varices: repeat EGD12/24  Muscle loss: drink 3 protein drinks per day    Return 16 weeks  A total of 60 minutes was spent reviewing the patient's chart, examining the patient, reviewing labs and imaging and coordinating care with the patient's care team.

## 2024-03-12 NOTE — PATIENT INSTRUCTIONS
Arturo Regalado: [phone]   Labs monthly  Will review petct when it is done  EGD 12/24  Continue diuretics and LVPs  Return 4 months

## 2024-03-13 ENCOUNTER — TELEPHONE (OUTPATIENT)
Dept: TRANSPLANT | Facility: CLINIC | Age: 59
End: 2024-03-13
Payer: MEDICARE

## 2024-03-13 ENCOUNTER — CONFERENCE (OUTPATIENT)
Dept: TRANSPLANT | Facility: CLINIC | Age: 59
End: 2024-03-13
Payer: MEDICARE

## 2024-03-13 NOTE — TELEPHONE ENCOUNTER
Liver Transplant Committee Discussion      Patient Name: Corky Bach   : 1965  MRN: [id number]     Requested by: Sri Urban MD     Day to be discussed: Liver discussion days: Wednesday      Transplant Coordinator: Liver Coordinators: Arturo Regalado     Patient Status: outpatient     Transplant Status: transplant status: Pre-liver     Reason for Discussion: Dr. Urban wants to discuss if the patient would be considered for a back-up in selected cases. He has a partial PVT.       Plan:  Imaging reviewed.  Patient IS NOT a back-up candidate.    Route to:  Arturo Regalado RN

## 2024-03-13 NOTE — TELEPHONE ENCOUNTER
Call to pt to explain that liver transplant surgeons did not feel that because of partial, calcified portal vein thrombus he would be a good back up candidate. Could revisit TIPS procedure, realizing that it could debilitate him with encephalopathy. Pt will think about it.

## 2024-03-13 NOTE — TELEPHONE ENCOUNTER
Liver Transplant Committee Discussion     Patient Name: Corky Bach   : 1965  MRN: [id number]    Requested by: Sri Urban MD    Day to be discussed: Liver discussion days: Wednesday     Transplant Coordinator: Liver Coordinators: Arturo Regalado    Patient Status: outpatient    Transplant Status: transplant status: Pre-liver    Reason for Discussion: Dr. Urban wants to discuss if the patient would be considered for a back-up in selected cases. He has a partial PVT.      Plan:    Route to:

## 2024-04-22 ENCOUNTER — TELEPHONE (OUTPATIENT)
Dept: TRANSPLANT | Facility: CLINIC | Age: 59
End: 2024-04-22
Payer: MEDICARE

## 2024-04-22 NOTE — TELEPHONE ENCOUNTER
Patient has several missed appointments including his CT abdomen/pelvis for PVT.  I called the patient's number, the wife's number and the adult son's number. No answer. LVM asking them to call me back.

## 2024-04-23 ENCOUNTER — PATIENT OUTREACH (OUTPATIENT)
Dept: ADMINISTRATIVE | Facility: HOSPITAL | Age: 59
End: 2024-04-23
Payer: MEDICARE

## 2024-05-15 ENCOUNTER — TELEPHONE (OUTPATIENT)
Dept: TRANSPLANT | Facility: CLINIC | Age: 59
End: 2024-05-15
Payer: MEDICARE

## 2024-05-15 NOTE — TELEPHONE ENCOUNTER
Patient called to schedule an appointment to have labs one to update his MELD score.  Patient did not answer message left to call the clinic.

## 2024-05-30 ENCOUNTER — TELEPHONE (OUTPATIENT)
Dept: TRANSPLANT | Facility: CLINIC | Age: 59
End: 2024-05-30
Payer: MEDICARE

## 2024-05-30 DIAGNOSIS — K74.60 LIVER CIRRHOSIS SECONDARY TO NASH: ICD-10-CM

## 2024-05-30 DIAGNOSIS — Z76.82 ORGAN TRANSPLANT CANDIDATE: Primary | ICD-10-CM

## 2024-05-30 DIAGNOSIS — K75.81 LIVER CIRRHOSIS SECONDARY TO NASH: ICD-10-CM

## 2024-05-30 DIAGNOSIS — R18.8 OTHER ASCITES: ICD-10-CM

## 2024-05-30 NOTE — TELEPHONE ENCOUNTER
Patient called to schedule an appointment to have labs done to update his MELD score.    Patient's wife reported that she and her  are now  and she is not his caregiver.   Spoke with Mr Isreal who reported that he was not with his wife so he does not have transportation  to go to any appointments.  Patient informed that he will need to have a new primary care giver.    Patient also instructed to call his insurance company to see if they provided rides to appointments.  Patient scheduled to have labs done as requested.

## 2024-06-03 ENCOUNTER — TELEPHONE (OUTPATIENT)
Dept: TRANSPLANT | Facility: CLINIC | Age: 59
End: 2024-06-03
Payer: MEDICARE

## 2024-06-03 NOTE — TELEPHONE ENCOUNTER
Made Mr. Bach STATUS 7 for liver transplant. He and his wife have . Wife states the divorce will be final in August. He has NO transportation to get to any appts. He has SEVERAL missed or cancelled apps. Wife states she has told him that she can still provide transportation to appts but he has not asked her. She was not aware that he had so many missed, cancelled or no show appts. I have MELD labs set up for this Wednesday at [facility]. Called patient to inform his that he is inactive on the liver transplant list.

## 2024-08-01 ENCOUNTER — TELEPHONE (OUTPATIENT)
Dept: TRANSPLANT | Facility: CLINIC | Age: 59
End: 2024-08-01
Payer: MEDICARE

## 2024-08-01 NOTE — TELEPHONE ENCOUNTER
Called Mr. Bach to discuss his upcoming appt with Dr. Urban as well as him still being inactive on the liver transplant list.   No answer. LVM    Called the other number on the file.   His spouse answered.  She states that he now has a car and should be able to make it to his appt.  I asked her to have him call me back to verify.

## 2024-08-15 DIAGNOSIS — E11.9 TYPE 2 DIABETES MELLITUS WITHOUT OPHTHALMIC MANIFESTATIONS: Primary | ICD-10-CM

## 2024-08-21 PROBLEM — D50.0 IRON DEFICIENCY ANEMIA DUE TO CHRONIC BLOOD LOSS: Status: ACTIVE | Noted: 2024-08-21

## 2024-08-26 ENCOUNTER — OFFICE VISIT (OUTPATIENT)
Dept: TRANSPLANT | Facility: CLINIC | Age: 59
End: 2024-08-26
Payer: MEDICARE

## 2024-08-26 VITALS
RESPIRATION RATE: 17 BRPM | SYSTOLIC BLOOD PRESSURE: 114 MMHG | WEIGHT: 213.5 LBS | HEART RATE: 89 BPM | HEIGHT: 74 IN | TEMPERATURE: 98 F | BODY MASS INDEX: 27.4 KG/M2 | OXYGEN SATURATION: 99 % | DIASTOLIC BLOOD PRESSURE: 70 MMHG

## 2024-08-26 DIAGNOSIS — K74.60 HEPATIC CIRRHOSIS, UNSPECIFIED HEPATIC CIRRHOSIS TYPE, UNSPECIFIED WHETHER ASCITES PRESENT: Primary | ICD-10-CM

## 2024-08-26 PROCEDURE — 3008F BODY MASS INDEX DOCD: CPT | Mod: CPTII,S$GLB,TXP, | Performed by: INTERNAL MEDICINE

## 2024-08-26 PROCEDURE — 3072F LOW RISK FOR RETINOPATHY: CPT | Mod: CPTII,S$GLB,TXP, | Performed by: INTERNAL MEDICINE

## 2024-08-26 PROCEDURE — 1111F DSCHRG MED/CURRENT MED MERGE: CPT | Mod: CPTII,S$GLB,TXP, | Performed by: INTERNAL MEDICINE

## 2024-08-26 PROCEDURE — 99999 PR PBB SHADOW E&M-EST. PATIENT-LVL V: CPT | Mod: PBBFAC,TXP,, | Performed by: INTERNAL MEDICINE

## 2024-08-26 PROCEDURE — 3046F HEMOGLOBIN A1C LEVEL >9.0%: CPT | Mod: CPTII,S$GLB,TXP, | Performed by: INTERNAL MEDICINE

## 2024-08-26 PROCEDURE — 3074F SYST BP LT 130 MM HG: CPT | Mod: CPTII,S$GLB,TXP, | Performed by: INTERNAL MEDICINE

## 2024-08-26 PROCEDURE — 1160F RVW MEDS BY RX/DR IN RCRD: CPT | Mod: CPTII,S$GLB,TXP, | Performed by: INTERNAL MEDICINE

## 2024-08-26 PROCEDURE — 99215 OFFICE O/P EST HI 40 MIN: CPT | Mod: S$GLB,TXP,, | Performed by: INTERNAL MEDICINE

## 2024-08-26 PROCEDURE — 1159F MED LIST DOCD IN RCRD: CPT | Mod: CPTII,S$GLB,TXP, | Performed by: INTERNAL MEDICINE

## 2024-08-26 PROCEDURE — 3078F DIAST BP <80 MM HG: CPT | Mod: CPTII,S$GLB,TXP, | Performed by: INTERNAL MEDICINE

## 2024-08-26 NOTE — PROGRESS NOTES
HEPATOLOGY FOLLOW UP    Referring Physician: FERNANDO Baum  Current Corresponding Physician: FERNANDO Baum, Sarmad Estrella MD, Vale Covarrubias MD, Tank Curtis MD    Corky Bach is here for follow up of decompensated cirrhosis    HPI  Corky Bach is a 58 y.o. 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     --dx few years ago  --ascites-started on diuretics lasix 40 mg and aldactone 100 mg daily; noted to have distended abdomen when seen in consultation  --HE- on rifaximin; on lactulose  -EGD-03/22 and 07/22/20- small varices; on propranolol  MELD-Na score: 11 at 6/13/2022 10:35 AM    Interval History  Since Corky Bach last few visits:    Now listed for liver transplant listing. However, made inactive on the list due to lack of support. He and his wife are now  and are in the midst of getting . Completed rituxan 12/23. Was supposed to have a pet-scan - no f/u    Admission to [facility] 8/22/24 for anemia- hemoglobin 4.7- transfused 4 units of PRBC; EGD/colonoscopy scheduled 9/6/24; had LVP 6 L  --Continues to have large ascites but not requiring LVP very often- last one 6 months prior to this admission  --Ascites analysis: SAAG >1.1; protein <1.0  --Ascites: lasix 40 mg and eplerenone 100 mg daily, metolazone 5.0 mg daily; Off spironolactone due to C/o breast tenderness  --HE, ongoing; on rifaximin and lactulose prn  --EGD 3/22: small varices; repeat 12/28/23: small EV and PHG; repeat recommedned 12/24    CT abdo pelvis w contrast 9/18/23: Partially occlusive and calcified thrombus in the portal and splenic veins; varices cirrhosis; no HCC    MELD 3.0: 13 at 8/22/2024  4:14 AM  MELD-Na: 11 at 8/22/2024  4:14 AM  Calculated from:  Serum Creatinine: 1.0 mg/dL at 8/22/2024  4:14 AM  Serum Sodium: 136  "mmol/L at 2024  4:14 AM  Total Bilirubin: 1.2 mg/dL at 2024  3:19 PM  Serum Albumin: 2.2 g/dL at 2024  3:19 PM  INR(ratio): 1.4 at 2024  3:19 PM  Age at listin years  Sex: Male at 2024  4:14 AM       Outpatient Encounter Medications as of 2024   Medication Sig Dispense Refill    acetaminophen (TYLENOL) 500 MG tablet Take 1,000 mg by mouth 2 (two) times daily as needed for Pain.      BD ULTRA-FINE ORIG PEN NEEDLE 29 gauge x 1/2" Ndle USE PEN NEEDLE TO INJECT INSULIN EVERY EVENING 100 each 11    eplerenone (INSPRA) 50 MG Tab Take 1 tablet (50 mg total) by mouth 2 (two) times daily. 180 tablet 3    finasteride (PROSCAR) 5 mg tablet Take 1 tablet (5 mg total) by mouth once daily. 90 tablet 3    furosemide (LASIX) 40 MG tablet Take 1 tablet (40 mg total) by mouth once daily. For fluid management / ascites 90 tablet 3    insulin detemir U-100, Levemir, (LEVEMIR FLEXPEN) 100 unit/mL (3 mL) InPn pen Inject 60 Units into the skin every evening. 18 mL 11    insulin lispro (HUMALOG KWIKPEN INSULIN) 100 unit/mL pen Inject 20 Units into the skin 3 (three) times daily with meals. 18 mL 11    lactulose (CHRONULAC) 20 gram/30 mL Soln Take 20 grams by mouth 3 times daily as needed and titrate use / amount to 3 to 5 soft brown bowel movements per day.  Decrease amount / frequency if getting diarrhea 1800 mL 5    linaGLIPtin (TRADJENTA) 5 mg Tab tablet TAKE 1 TABLET (5 MG TOTAL) BY MOUTH ONCE DAILY. 90 tablet 3    metOLazone (ZAROXOLYN) 2.5 MG tablet Take 2 tablets (5 mg total) by mouth once daily. Take 30 minutes before other water pills 180 tablet 3    oxybutynin (DITROPAN-XL) 5 MG TR24 Take 1 tablet (5 mg total) by mouth once daily. 30 tablet 11    rifAXIMin (XIFAXAN) 550 mg Tab Take 1 tablet (550 mg total) by mouth 2 (two) times daily. 180 tablet 3    tamsulosin (FLOMAX) 0.4 mg Cap Take 1 capsule (0.4 mg total) by mouth every evening. 90 capsule 3    venlafaxine (EFFEXOR) 75 MG tablet TAKE 1 " TABLET (75 MG TOTAL) BY MOUTH 2 (TWO) TIMES DAILY. 180 tablet 3    [] magnesium oxide (MAG-OX) 400 mg (241.3 mg magnesium) tablet Take 1 tablet (400 mg total) by mouth every 4 (four) hours. for 4 doses 4 tablet 0    [DISCONTINUED] amoxicillin (AMOXIL) 500 MG capsule TAKE 1 CAPSULE BY MOUTH EVERY 8 HOURS UNTIL ALL TAKEN (Patient not taking: Reported on 2024)      [DISCONTINUED] bisacodyL suppository 10 mg       [DISCONTINUED] dextrose 10% bolus 125 mL 125 mL       [DISCONTINUED] dextrose 10% bolus 250 mL 250 mL       [DISCONTINUED] finasteride tablet 5 mg       [DISCONTINUED] furosemide tablet 40 mg       [DISCONTINUED] glucagon (human recombinant) injection 1 mg       [DISCONTINUED] glucose chewable tablet 16 g       [DISCONTINUED] glucose chewable tablet 24 g       [DISCONTINUED] insulin aspart U-100 pen 0-5 Units       [DISCONTINUED] insulin aspart U-100 pen 16 Units       [DISCONTINUED] insulin glargine U-100 (Lantus) pen 48 Units       [DISCONTINUED] metOLazone tablet 5 mg       [DISCONTINUED] naloxone 0.4 mg/mL injection 0.02 mg       [DISCONTINUED] ondansetron injection 4 mg       [DISCONTINUED] oxybutynin 24 hr tablet 5 mg       [DISCONTINUED] pantoprazole injection 40 mg       [DISCONTINUED] prochlorperazine injection Soln 5 mg       [DISCONTINUED] propranoloL tablet 20 mg       [DISCONTINUED] rifAXIMin tablet 550 mg       [DISCONTINUED] sodium chloride 0.9% flush 10 mL       [DISCONTINUED] spironolactone tablet 50 mg       [DISCONTINUED] tamsulosin 24 hr capsule 0.4 mg       [DISCONTINUED] venlafaxine tablet 75 mg        No facility-administered encounter medications on file as of 2024.     Review of patient's allergies indicates:  No Known Allergies  Past Medical History:   Diagnosis Date    Anemia     Bacterial peritonitis 10/25/2022    Celiac disease     Cervicalgia     Cirrhosis     Colon polyp     Depression     Diabetes mellitus, type 2     Elevated LFTs     Esophageal varices      Gout, unspecified     History of COVID-19 01/15/2021    Hyperlipidemia     Mood disorder in conditions classified elsewhere     Obesity, unspecified     Other chronic nonalcoholic liver disease     Portal hypertensive gastropathy     Thyroid disease     Unspecified psychosis        Review of Systems   Constitutional:  Positive for fever.   HENT: Negative.     Eyes: Negative.    Respiratory: Negative.     Cardiovascular: Negative.    Gastrointestinal:  Positive for abdominal distention.   Genitourinary: Negative.    Musculoskeletal: Negative.    Skin: Negative.    Neurological: Negative.    Psychiatric/Behavioral: Negative.       Vitals:    08/26/24 1456   BP: 114/70   Pulse: 89   Resp: 17   Temp: 98.2 °F (36.8 °C)       Physical Exam  Vitals reviewed.   Constitutional:       Appearance: He is well-developed.   HENT:      Head: Normocephalic and atraumatic.   Eyes:      General: No scleral icterus.     Conjunctiva/sclera: Conjunctivae normal.      Pupils: Pupils are equal, round, and reactive to light.   Neck:      Thyroid: No thyromegaly.   Cardiovascular:      Rate and Rhythm: Normal rate and regular rhythm.      Heart sounds: Normal heart sounds.   Pulmonary:      Effort: Pulmonary effort is normal.      Breath sounds: Normal breath sounds. No rales.   Abdominal:      General: Bowel sounds are normal. There is distension.      Palpations: Abdomen is soft. There is no mass.      Tenderness: There is no abdominal tenderness.   Musculoskeletal:         General: Normal range of motion.      Cervical back: Normal range of motion and neck supple.   Skin:     General: Skin is warm and dry.      Findings: No rash.   Neurological:      Mental Status: He is alert and oriented to person, place, and time.         MELD 3.0: 13 at 8/22/2024  4:14 AM  MELD-Na: 11 at 8/22/2024  4:14 AM  Calculated from:  Serum Creatinine: 1.0 mg/dL at 8/22/2024  4:14 AM  Serum Sodium: 136 mmol/L at 8/22/2024  4:14 AM  Total Bilirubin: 1.2 mg/dL  at 2024  3:19 PM  Serum Albumin: 2.2 g/dL at 2024  3:19 PM  INR(ratio): 1.4 at 2024  3:19 PM  Age at listin years  Sex: Male at 2024  4:14 AM      Lab Results   Component Value Date     (H) 2024    BUN 21 (H) 2024    CREATININE 1.0 2024    CALCIUM 7.6 (L) 2024     2024    K 3.6 2024     2024    PROT 6.5 2024    CO2 25 2024    ANIONGAP 9 2024    WBC 3.25 (L) 2024    RBC 3.04 (L) 2024    HGB 8.0 (L) 2024    HCT 26.1 (L) 2024    MCV 63 (L) 2024    MCH 18.4 (L) 2024    MCHC 29.2 (L) 2024     Lab Results   Component Value Date    RDW 21.5 (H) 2024    PLT 40 (L) 2024    MPV SEE COMMENT 2024    GRAN 2.4 2024    GRAN 73.9 (H) 2024    LYMPH 0.4 (L) 2024    LYMPH 10.8 (L) 2024    MONO 0.3 2024    MONO 8.9 2024    EOSINOPHIL 5.8 2024    BASOPHIL 0.3 2024    EOS 0.2 2024    BASO 0.01 2024    APTT 24.1 10/10/2022    GROUPTRH O POS 2024    CHOL 66 (L) 2024    TRIG 43 2024    HDL 30 (L) 2024    CHOLHDL 45.5 2024    TOTALCHOLEST 2.2 2024    ALBUMIN 2.2 (L) 2024    BILIDIR 0.8 (H) 2023    AST 28 2024    ALT 18 2024    ALKPHOS 111 2024    MG 1.5 (L) 2024    LABPROT 15.0 (H) 2024    INR 1.4 (H) 2024    PSA 0.46 10/17/2022       Assessment and Plan:  Corky Bach is a 58 y.o. male with decompensated CORDOVA Cirrhosis  Current recommendations:  Decompensated cirrhosis, MELD < 15 with refractory ascites that appears to have improved; now listed for liver transplant but inactive due to lack of support; monitor meld labs every 4 weeks; spoke to his soon to be ex-wife- will work with pt to find additional support;   Hx MALT lymphoma: f/u with oncology; completed rituxan -needs f/u [facility] oncology including PET-CT  Refractory ascites,  improved: continue diuretics and prn paracentesis; continue lasix, metolazone; eplerenone; await next labs to see if need K supplement  HE, ongoing: continue rifaximin; lactulose; counseled not to drive  PVT: repeat TP ct abdo/pelvis every 6 months-due to have PET-ct scan with oncology-will review;   Esophageal varices: repeat EGD12/24    Return 12 weeks  A total of 60 minutes was spent reviewing the patient's chart, examining the patient, reviewing labs and imaging and coordinating care with the patient's care team.

## 2024-08-26 NOTE — PATIENT INSTRUCTIONS
Labs now and monthly  Will see if you need a potassium supplement  Agree with EGD and colonoscopy  Overdue for liver cancer screening  Need pet scan - needs to see Dr Curtis

## 2024-08-26 NOTE — Clinical Note
1. Labs now and monthly 2. Will see if you need a potassium supplement 3. Agree with EGD and colonoscopy 4. Overdue for liver cancer screening 5. Need pet scan - needs to see Dr Curtis

## 2024-08-26 NOTE — LETTER
August 26, 2024        Melvina Irving  [address]  [place] [address]  Phone: [phone]  Fax: [phone]             [facility]  [address] [address]  Phone: [phone]  Fax: [phone]   Patient: Corky Bach   MR Number: [id number]   YOB: 1965   Date of Visit: 8/26/2024       Dear Dr. Melvina Irving    Thank you for referring Corky Bach to me for evaluation. Attached you will find relevant portions of my assessment and plan of care.    If you have questions, please do not hesitate to call me. I look forward to following Corky Bach along with you.    Sincerely,    Sri Urban MD    Enclosure    If you would like to receive this communication electronically, please contact [email or web address] or [phone] to request Secret Link access.    Secret Link is a tool which provides read-only access to select patient information with whom you have a relationship. Its easy to use and provides real time access to review your patients record including encounter summaries, notes, results, and demographic information.    If you feel you have received this communication in error or would no longer like to receive these types of communications, please e-mail [email or web address]

## 2024-08-28 DIAGNOSIS — E11.9 TYPE 2 DIABETES MELLITUS WITHOUT COMPLICATION: ICD-10-CM

## 2024-09-18 PROBLEM — R18.8 CIRRHOSIS OF LIVER WITH ASCITES: Status: ACTIVE | Noted: 2017-11-20

## 2024-10-16 PROBLEM — K74.60 LIVER CIRRHOSIS SECONDARY TO NASH: Status: ACTIVE | Noted: 2024-10-16

## 2024-10-16 PROBLEM — K75.81 LIVER CIRRHOSIS SECONDARY TO NASH: Status: ACTIVE | Noted: 2024-10-16

## 2024-10-21 ENCOUNTER — TELEPHONE (OUTPATIENT)
Dept: TRANSPLANT | Facility: CLINIC | Age: 59
End: 2024-10-21
Payer: MEDICARE

## 2024-10-21 NOTE — TELEPHONE ENCOUNTER
Patient has been inactive in [employer] [facility] for caregiver issues.  I called patient for update.  He states that he and his wife are divorsed and he has no caregivers.  I will get him scheduled for a follow-up with Dr. Urban, US and labs.  Pt verbalized understanding.

## 2024-10-25 ENCOUNTER — PATIENT MESSAGE (OUTPATIENT)
Dept: ENDOSCOPY | Facility: HOSPITAL | Age: 59
End: 2024-10-25
Payer: MEDICARE

## 2024-10-25 ENCOUNTER — TELEPHONE (OUTPATIENT)
Dept: ENDOSCOPY | Facility: HOSPITAL | Age: 59
End: 2024-10-25
Payer: MEDICARE

## 2024-10-25 DIAGNOSIS — K31.819 GAVE (GASTRIC ANTRAL VASCULAR ECTASIA): Primary | ICD-10-CM

## 2024-10-25 PROBLEM — K31.811 GASTRIC HEMORRHAGE DUE TO GASTRIC ANTRAL VASCULAR ECTASIA (GAVE): Status: ACTIVE | Noted: 2024-10-25

## 2024-10-25 NOTE — TELEPHONE ENCOUNTER
Spoke to pt to schedule procedure(s) Upper Endoscopy (EGD)       Physician to perform procedure(s) Dr. ERICA Alvarado  Date of Procedure (s) 11/8/24  Arrival Time 12:30 PM  Time of Procedure(s) 1:30 PM   Location of Procedure(s) [place] [address]nd Floor  Type of Rx Prep sent to patient: N/A  Instructions provided to patient via [facility]    Patient was informed on the following information and verbalized understanding. Screening questionnaire reviewed with patient and complete. If procedure requires anesthesia, a responsible adult needs to be present to accompany the patient home, patient cannot drive after receiving anesthesia. Appointment details are tentative, especially check-in time. Patient will receive a prep-op call 7 days prior to confirm check-in time for procedure. If applicable the patient should contact their pharmacy to verify Rx for procedure prep is ready for pick-up. Patient was advised to call the scheduling department at [phone] if pharmacy states no Rx is available. Patient was advised to call the endoscopy scheduling department if any questions or concerns arise.      [facility] Scheduling Department

## 2024-10-31 ENCOUNTER — TELEPHONE (OUTPATIENT)
Dept: ENDOSCOPY | Facility: HOSPITAL | Age: 59
End: 2024-10-31
Payer: MEDICARE

## 2024-11-06 ENCOUNTER — TELEPHONE (OUTPATIENT)
Dept: ENDOSCOPY | Facility: HOSPITAL | Age: 59
End: 2024-11-06
Payer: MEDICARE

## 2024-11-08 ENCOUNTER — ANESTHESIA EVENT (OUTPATIENT)
Dept: ENDOSCOPY | Facility: HOSPITAL | Age: 59
End: 2024-11-08
Payer: MEDICARE

## 2024-11-08 ENCOUNTER — HOSPITAL ENCOUNTER (OUTPATIENT)
Facility: HOSPITAL | Age: 59
Discharge: HOME | End: 2024-11-08
Attending: INTERNAL MEDICINE | Admitting: INTERNAL MEDICINE
Payer: MEDICARE

## 2024-11-08 ENCOUNTER — ANESTHESIA (OUTPATIENT)
Dept: ENDOSCOPY | Facility: HOSPITAL | Age: 59
End: 2024-11-08
Payer: MEDICARE

## 2024-11-08 VITALS
DIASTOLIC BLOOD PRESSURE: 52 MMHG | WEIGHT: 220 LBS | SYSTOLIC BLOOD PRESSURE: 95 MMHG | HEIGHT: 74 IN | RESPIRATION RATE: 17 BRPM | HEART RATE: 87 BPM | BODY MASS INDEX: 28.23 KG/M2 | TEMPERATURE: 98 F | OXYGEN SATURATION: 95 %

## 2024-11-08 DIAGNOSIS — K31.811 GASTRIC HEMORRHAGE DUE TO GASTRIC ANTRAL VASCULAR ECTASIA (GAVE): Primary | ICD-10-CM

## 2024-11-08 LAB — POCT GLUCOSE: 288 MG/DL (ref 70–110)

## 2024-11-08 PROCEDURE — 25000003 PHARM REV CODE 250: Mod: TXP | Performed by: NURSE ANESTHETIST, CERTIFIED REGISTERED

## 2024-11-08 PROCEDURE — C1886 CATHETER, ABLATION: HCPCS | Mod: TXP | Performed by: INTERNAL MEDICINE

## 2024-11-08 PROCEDURE — 43270 EGD LESION ABLATION: CPT | Mod: NTX,,, | Performed by: INTERNAL MEDICINE

## 2024-11-08 PROCEDURE — 37000009 HC ANESTHESIA EA ADD 15 MINS: Mod: NTX | Performed by: INTERNAL MEDICINE

## 2024-11-08 PROCEDURE — 37000008 HC ANESTHESIA 1ST 15 MINUTES: Mod: TXP | Performed by: INTERNAL MEDICINE

## 2024-11-08 PROCEDURE — 63600175 PHARM REV CODE 636 W HCPCS: Mod: TXP | Performed by: NURSE ANESTHETIST, CERTIFIED REGISTERED

## 2024-11-08 PROCEDURE — 43270 EGD LESION ABLATION: CPT | Mod: TXP | Performed by: INTERNAL MEDICINE

## 2024-11-08 RX ORDER — TOPICAL ANESTHETIC 200 MG/ML
SPRAY DENTAL; PERIODONTAL
Status: DISCONTINUED | OUTPATIENT
Start: 2024-11-08 | End: 2024-11-08

## 2024-11-08 RX ORDER — DEXMEDETOMIDINE HYDROCHLORIDE 100 UG/ML
INJECTION, SOLUTION INTRAVENOUS
Status: DISCONTINUED | OUTPATIENT
Start: 2024-11-08 | End: 2024-11-08

## 2024-11-08 RX ORDER — PROPOFOL 10 MG/ML
VIAL (ML) INTRAVENOUS
Status: DISCONTINUED | OUTPATIENT
Start: 2024-11-08 | End: 2024-11-08

## 2024-11-08 RX ORDER — PROPOFOL 10 MG/ML
VIAL (ML) INTRAVENOUS CONTINUOUS PRN
Status: DISCONTINUED | OUTPATIENT
Start: 2024-11-08 | End: 2024-11-08

## 2024-11-08 RX ORDER — LIDOCAINE HYDROCHLORIDE 20 MG/ML
INJECTION, SOLUTION EPIDURAL; INFILTRATION; INTRACAUDAL; PERINEURAL
Status: DISCONTINUED | OUTPATIENT
Start: 2024-11-08 | End: 2024-11-08

## 2024-11-08 RX ADMIN — LIDOCAINE HYDROCHLORIDE 100 MG: 20 INJECTION, SOLUTION EPIDURAL; INFILTRATION; INTRACAUDAL; PERINEURAL at 01:11

## 2024-11-08 RX ADMIN — PROPOFOL 130 MG: 10 INJECTION, EMULSION INTRAVENOUS at 01:11

## 2024-11-08 RX ADMIN — DEXMEDETOMIDINE HYDROCHLORIDE 12 MCG: 100 INJECTION, SOLUTION, CONCENTRATE INTRAVENOUS at 01:11

## 2024-11-08 RX ADMIN — TOPICAL ANESTHETIC 1 EACH: 200 SPRAY DENTAL; PERIODONTAL at 01:11

## 2024-11-08 RX ADMIN — PROPOFOL 40 MG: 10 INJECTION, EMULSION INTRAVENOUS at 01:11

## 2024-11-08 RX ADMIN — GLYCOPYRROLATE 0.2 MG: 0.2 INJECTION, SOLUTION INTRAMUSCULAR; INTRAVITREAL at 01:11

## 2024-11-08 RX ADMIN — PROPOFOL 175 MCG/KG/MIN: 10 INJECTION, EMULSION INTRAVENOUS at 01:11

## 2024-11-08 RX ADMIN — PROPOFOL 30 MG: 10 INJECTION, EMULSION INTRAVENOUS at 01:11

## 2024-11-08 RX ADMIN — SODIUM CHLORIDE: 0.9 INJECTION, SOLUTION INTRAVENOUS at 01:11

## 2024-11-08 NOTE — TRANSFER OF CARE
"Anesthesia Transfer of Care Note    Patient: Corky Bach    Procedure(s) Performed: Procedure(s) (LRB):  EGD (ESOPHAGOGASTRODUODENOSCOPY) (N/A)    Patient location: GI    Anesthesia Type: MAC    Transport from OR: Transported from OR on room air with adequate spontaneous ventilation    Post pain: adequate analgesia    Post assessment: no apparent anesthetic complications and tolerated procedure well    Post vital signs: stable    Level of consciousness: awake    Nausea/Vomiting: no nausea/vomiting    Complications: none    Transfer of care protocol was followed      Last vitals: Visit Vitals  BP (!) 102/57   Pulse 79   Temp 37.6 °C (99.7 °F)   Resp 18   Ht 6' 2" (1.88 m)   Wt 99.8 kg (220 lb)   SpO2 99%   BMI 28.25 kg/m²     "

## 2024-11-08 NOTE — H&P
Short Stay Endoscopy History and Physical    PCP - Sarmad Estrella MD  Referring Physician - Vale Covarrubias MD  [address] [place] [address]  LEXUS Salgado [id number]    Procedure - egd   ASA - per anesthesia  Mallampati - per anesthesia  History of Anesthesia problems - no  Family history Anesthesia problems -  no   Plan of anesthesia - General    HPI:  This is a 58 y.o. male here for evaluation of: gave    Reflux - no  Dysphagia - no  Abdominal pain - no  Diarrhea - no    ROS:  Constitutional: No fevers, chills, No weight loss  CV: No chest pain  Pulm: No cough, No shortness of breath  Ophtho: No vision changes  GI: see HPI  Derm: No rash    Medical History:  has a past medical history of Anemia, Bacterial peritonitis (10/25/2022), Celiac disease, Cervicalgia, Cirrhosis, Colon polyp, Depression, Diabetes mellitus, type 2, Elevated LFTs, Esophageal varices, Gout, unspecified, History of COVID-19 (01/15/2021), Hyperlipidemia, Mood disorder in conditions classified elsewhere, Obesity, unspecified, Other chronic nonalcoholic liver disease, Portal hypertensive gastropathy, Thyroid disease, and Unspecified psychosis.    Surgical History:  has a past surgical history that includes Tonsillectomy; Hernia repair; Colonoscopy (N/A, 6/7/2017); Upper gastrointestinal endoscopy; Esophagogastroduodenoscopy (N/A, 7/16/2018); Esophagogastroduodenoscopy (N/A, 7/22/2020); Esophagogastroduodenoscopy (N/A, 3/22/2022); Repeat abdominal paracentesis (N/A, 10/20/2022); Repeat abdominal paracentesis (N/A, 11/9/2022); Repeat abdominal paracentesis (N/A, 11/17/2022); Repeat abdominal paracentesis (N/A, 12/2/2022); Repeat abdominal paracentesis (N/A, 11/25/2022); Repeat abdominal paracentesis (N/A, 12/9/2022); Repeat abdominal paracentesis (N/A, 12/20/2022); Repeat abdominal paracentesis (N/A, 12/30/2022); Repeat abdominal paracentesis (N/A, 1/12/2023); Repeat abdominal paracentesis (N/A, 1/19/2023); Repeat abdominal paracentesis (N/A, 2/1/2023);  LOV 12/1/2022  NV 12/4/2023    RX req:  Disp Refills Start End     gabapentin (NEURONTIN) 300 MG capsule 90 capsule 1 7/31/2023 --    Sig: TAKE ONE CAPSULE BY MOUTH EVERY MORNING AND 2 CAPSULES EVERY EVENING      PDMP:  Prescribed: 7/31/2023  Dispensed: 9/11/2023  Sold: 9/12/2023 DUE October 12, 2023    RX loaded for signature if appropriate      Repeat abdominal paracentesis (N/A, 2/17/2023); Repeat abdominal paracentesis (N/A, 3/1/2023); Repeat abdominal paracentesis (N/A, 3/21/2023); Repeat abdominal paracentesis (N/A, 3/9/2023); Repeat abdominal paracentesis (N/A, 4/5/2023); Repeat abdominal paracentesis (N/A, 4/13/2023); Repeat abdominal paracentesis (N/A, 4/27/2023); Repeat abdominal paracentesis (N/A, 5/11/2023); Repeat abdominal paracentesis (N/A, 5/18/2023); Repeat abdominal paracentesis (N/A, 5/26/2023); Repeat abdominal paracentesis (N/A, 6/9/2023); Repeat abdominal paracentesis (N/A, 6/26/2023); Repeat abdominal paracentesis (N/A, 7/10/2023); Repeat abdominal paracentesis (N/A, 7/31/2023); Repeat abdominal paracentesis (N/A, 8/16/2023); Repeat abdominal paracentesis (N/A, 8/28/2023); Repeat abdominal paracentesis (N/A, 9/7/2023); Repeat abdominal paracentesis (N/A, 9/15/2023); Repeat abdominal paracentesis (N/A, 9/25/2023); Repeat abdominal paracentesis (N/A, 10/6/2023); Repeat abdominal paracentesis (N/A, 10/18/2023); Repeat abdominal paracentesis (N/A, 10/30/2023); Repeat abdominal paracentesis (N/A, 11/15/2023); Repeat abdominal paracentesis (N/A, 12/12/2023); Repeat abdominal paracentesis (N/A, 12/26/2023); Esophagogastroduodenoscopy (N/A, 12/28/2023); Repeat abdominal paracentesis (N/A, 1/11/2024); Repeat abdominal paracentesis (N/A, 1/22/2024); Repeat abdominal paracentesis (N/A, 2/14/2024); Repeat abdominal paracentesis (N/A, 9/9/2024); Repeat abdominal paracentesis (Left, 10/11/2024); esophagogastroduodenoscopy (egd)- device assisted (N/A, 10/22/2024); Colonoscopy (N/A, 10/22/2024); and Repeat abdominal paracentesis (N/A, 11/6/2024).    Family History: family history includes Diabetes in his father and mother; Heart disease in his father; Hypertension in his mother; Ovarian cancer in his maternal grandmother..    Social History:  reports that he has never smoked. He has never used smokeless tobacco. He reports that he does not drink  "alcohol and does not use drugs.    Review of patient's allergies indicates:  No Known Allergies    Medications:   Medications Prior to Admission   Medication Sig Dispense Refill Last Dose/Taking    ferrous sulfate (FEOSOL) 325 mg (65 mg iron) Tab tablet Take 1 tablet (325 mg total) by mouth every other day. 45 tablet 3 11/7/2024    finasteride (PROSCAR) 5 mg tablet Take 1 tablet (5 mg total) by mouth once daily. 90 tablet 3 11/7/2024    insulin detemir U-100, Levemir, (LEVEMIR FLEXPEN) 100 unit/mL (3 mL) InPn pen Inject 60 Units into the skin every evening. 18 mL 11 11/7/2024    insulin lispro (HUMALOG KWIKPEN INSULIN) 100 unit/mL pen Inject 20 Units into the skin 3 (three) times daily with meals. 18 mL 11 11/7/2024    lactulose (CHRONULAC) 20 gram/30 mL Soln Take 20 grams by mouth 3 times daily as needed and titrate use / amount to 3 to 5 soft brown bowel movements per day.  Decrease amount / frequency if getting diarrhea 1800 mL 5 11/7/2024    linaGLIPtin (TRADJENTA) 5 mg Tab tablet TAKE 1 TABLET (5 MG TOTAL) BY MOUTH ONCE DAILY. 90 tablet 3 11/7/2024    oxybutynin (DITROPAN-XL) 5 MG TR24 Take 1 tablet (5 mg total) by mouth once daily. 90 tablet 3 11/7/2024    rifAXIMin (XIFAXAN) 550 mg Tab Take 1 tablet (550 mg total) by mouth 2 (two) times daily. 180 tablet 3 11/7/2024    acetaminophen (TYLENOL) 500 MG tablet Take 1,000 mg by mouth 2 (two) times daily as needed for Pain.       BD ULTRA-FINE ORIG PEN NEEDLE 29 gauge x 1/2" Ndle USE PEN NEEDLE TO INJECT INSULIN EVERY EVENING 100 each 11     eplerenone (INSPRA) 50 MG Tab Take 1 tablet (50 mg total) by mouth 2 (two) times daily. 180 tablet 3     furosemide (LASIX) 40 MG tablet Take 1 tablet (40 mg total) by mouth once daily. For fluid management / ascites 90 tablet 3     metOLazone (ZAROXOLYN) 2.5 MG tablet Take 2 tablets (5 mg total) by mouth once daily. Take 30 minutes before other water pills 180 tablet 3     tamsulosin (FLOMAX) 0.4 mg Cap Take 1 capsule (0.4 mg " total) by mouth every evening. 90 capsule 3     venlafaxine (EFFEXOR) 75 MG tablet TAKE 1 TABLET (75 MG TOTAL) BY MOUTH 2 (TWO) TIMES DAILY. 180 tablet 3        Physical Exam:    Vital Signs:   Vitals:    11/08/24 1226   BP: (!) 102/57   Pulse: 79   Resp: 18   Temp: 99.7 °F (37.6 °C)       General Appearance: Well appearing in no acute distress    Labs:  Lab Results   Component Value Date    WBC 3.48 (L) 10/17/2024    HGB 7.0 (L) 10/18/2024    HCT 23.2 (L) 10/18/2024    PLT 54 (L) 10/17/2024    CHOL 66 (L) 08/21/2024    TRIG 43 08/21/2024    HDL 30 (L) 08/21/2024    ALT 11 10/17/2024    AST 22 10/17/2024     (L) 10/17/2024    K 2.9 (L) 10/17/2024    CL 99 10/17/2024    CREATININE 0.9 10/17/2024    BUN 15 10/17/2024    CO2 27 10/17/2024    TSH 3.682 08/21/2024    PSA 0.46 10/17/2022    INR 1.4 (H) 10/18/2024    GLUF 381 (H) 07/22/2022    HGBA1C 9.2 (H) 10/09/2024       I have explained the risks and benefits of this endoscopic procedure to the patient including but not limited to bleeding, inflammation, infection, perforation, and death.      Hubert Alvarado MD

## 2024-11-08 NOTE — [employer] PATIENT INSTRUCTIONS
Discharge Summary/Instructions after an Endoscopic Procedure  Patient Name: Corky Bach  Patient MRN: [id number]  Patient YOB: 1965 Friday, November 8, 2024  Hubert Alvarado MD  Dear patient,  As a result of recent federal legislation (The Federal Cures Act), you may   receive lab or pathology results from your procedure in your [facility]   account before your physician is able to contact you. Your physician or   their representative will relay the results to you with their   recommendations at their soonest availability.  Thank you,  Your health is very important to us during the Covid Crisis. Following your   procedure today, you will receive a daily text for 2 weeks asking about   signs or symptoms of Covid 19.  Please respond to this text when you   receive it so we can follow up and keep you as safe as possible.   RESTRICTIONS:  During your procedure today, you received medications for sedation.  These   medications may affect your judgment, balance and coordination.  Therefore,   for 24 hours, you have the following restrictions:   - DO NOT drive a car, operate machinery, make legal/financial decisions,   sign important papers or drink alcohol.    ACTIVITY:  Today: no heavy lifting, straining or running due to procedural   sedation/anesthesia.  The following day: return to full activity including work.  DIET:  Eat and drink normally unless instructed otherwise.     TREATMENT FOR COMMON SIDE EFFECTS:  - Mild abdominal pain, nausea, belching, bloating or excessive gas:  rest,   eat lightly and use a heating pad.  - Sore Throat: treat with throat lozenges and/or gargle with warm salt   water.  - Because air was used during the procedure, expelling large amounts of air   from your rectum or belching is normal.  - If a bowel prep was taken, you may not have a bowel movement for 1-3 days.    This is normal.  SYMPTOMS TO WATCH FOR AND REPORT TO YOUR PHYSICIAN:  1. Abdominal pain or bloating, other  than gas cramps.  2. Chest pain.  3. Back pain.  4. Signs of infection such as: chills or fever occurring within 24 hours   after the procedure.  5. Rectal bleeding, which would show as bright red, maroon, or black stools.   (A tablespoon of blood from the rectum is not serious, especially if   hemorrhoids are present.)  6. Vomiting.  7. Weakness or dizziness.  GO DIRECTLY TO THE NEAREST EMERGENCY ROOM IF YOU HAVE ANY OF THE FOLLOWING:      Difficulty breathing              Chills and/or fever over 101 F   Persistent vomiting and/or vomiting blood   Severe abdominal pain   Severe chest pain   Black, tarry stools   Bleeding- more than one tablespoon   Any other symptom or condition that you feel may need urgent attention  Your doctor recommends these additional instructions:  If any biopsies were taken, your doctors clinic will contact you in 1 to 2   weeks with any results.  - Discharge patient to home.   - Resume previous diet.   - Observe patient's clinical course.   - Return to referring physician at appointment to be scheduled.   - Repeat upper endoscopy PRN for retreatment.  For questions, problems or results please call your physician - Hubert Alvarado MD.  EMERGENCY PHONE NUMBER: [phone],  LAB RESULTS: [phone]  IF A COMPLICATION OR EMERGENCY SITUATION ARISES AND YOU ARE UNABLE TO REACH   YOUR PHYSICIAN - GO DIRECTLY TO THE EMERGENCY ROOM.  Hubert Alvarado MD  11/8/2024 1:41:59 PM  This report has been verified and signed electronically.  Dear patient,  As a result of recent federal legislation (The Federal Cures Act), you may   receive lab or pathology results from your procedure in your [facility]   account before your physician is able to contact you. Your physician or   their representative will relay the results to you with their   recommendations at their soonest availability.  Thank you,  [employer]

## 2024-11-08 NOTE — ANESTHESIA PREPROCEDURE EVALUATION
11/08/2024  Corky Bach is a 58 y.o., male.  Patient Active Problem List   Diagnosis    Cervical disc disorder    Type 2 diabetes mellitus without complication, with long-term current use of insulin    Mixed hyperlipidemia    Mood disorder    Obesity (BMI 30-39.9)    Nuclear sclerosis of both eyes    Type 2 diabetes mellitus without ophthalmic manifestations    Essential hypertension    Fatty liver    Thrombocytopenia    Anemia    Subclinical hypothyroidism    Transaminitis    Rectal bleeding    Cirrhosis of liver with ascites    Imaging of gastrointestinal tract abnormal    Portal hypertensive gastropathy    Urine culture positive    MRSA (methicillin resistant staph aureus) culture positive    Benign prostatic hyperplasia with incomplete bladder emptying    Chronic bilateral low back pain with left-sided sciatica    Cervicalgia    Epistaxis    Dyspnea on exertion    MALT lymphoma    Portal vein thrombosis    Splenomegaly    Refractory ascites    Secondary esophageal varices without bleeding    Hepatic encephalopathy    Pancytopenia    Iron deficiency    Hypomagnesemia    Type 2 diabetes mellitus with hyperglycemia, with long-term current use of insulin    Influenza A    Fever    S/P abdominal paracentesis    Iron deficiency anemia due to chronic blood loss    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)     Past Medical History:   Diagnosis Date    Anemia     Bacterial peritonitis 10/25/2022    Celiac disease     Cervicalgia     Cirrhosis     Colon polyp     Depression     Diabetes mellitus, type 2     Elevated LFTs     Esophageal varices     Gout, unspecified     History of COVID-19 01/15/2021    Hyperlipidemia     Mood disorder in conditions classified elsewhere     Obesity, unspecified     Other chronic nonalcoholic liver disease     Portal  hypertensive gastropathy     Thyroid disease     Unspecified psychosis      Past Surgical History:   Procedure Laterality Date    COLONOSCOPY N/A 6/7/2017    Procedure: COLONOSCOPY;  Surgeon: Placido Green MD;  Location: [facility];  Service: Endoscopy;  Laterality: N/A;    COLONOSCOPY N/A 10/22/2024    Procedure: COLONOSCOPY;  Surgeon: Vale Covarrubias MD;  Location: [facility];  Service: Endoscopy;  Laterality: N/A;    ESOPHAGOGASTRODUODENOSCOPY N/A 7/16/2018    Procedure: ESOPHAGOGASTRODUODENOSCOPY (EGD);  Surgeon: Mitch Pan MD;  Location: [facility];  Service: Endoscopy;  Laterality: N/A;    ESOPHAGOGASTRODUODENOSCOPY N/A 7/22/2020    Procedure: ESOPHAGOGASTRODUODENOSCOPY (EGD);  Surgeon: Mitch Pan MD;  Location: [facility];  Service: Endoscopy;  Laterality: N/A;    ESOPHAGOGASTRODUODENOSCOPY N/A 3/22/2022    Procedure: EGD (ESOPHAGOGASTRODUODENOSCOPY);  Surgeon: Mitch Pan MD;  Location: [facility];  Service: Endoscopy;  Laterality: N/A;    ESOPHAGOGASTRODUODENOSCOPY N/A 12/28/2023    Procedure: EGD (ESOPHAGOGASTRODUODENOSCOPY);  Surgeon: Vale Covarrubias MD;  Location: [facility];  Service: Endoscopy;  Laterality: N/A;    ESOPHAGOGASTRODUODENOSCOPY (EGD)- DEVICE ASSISTED N/A 10/22/2024    Procedure: ESOPHAGOGASTRODUODENOSCOPY (EGD)- DEVICE ASSISTED;  Surgeon: Vale Covarrubias MD;  Location: [facility];  Service: Endoscopy;  Laterality: N/A;    HERNIA REPAIR      REPEAT ABDOMINAL PARACENTESIS N/A 10/20/2022    Procedure: PARACENTESIS, ABDOMINAL, REPEAT;  Surgeon: Ruddy Salomon MD;  Location: [facility];  Service: Endoscopy;  Laterality: N/A;    REPEAT ABDOMINAL PARACENTESIS N/A 11/9/2022    Procedure: PARACENTESIS, ABDOMINAL, REPEAT;  Surgeon: Ruddy Salomon MD;  Location: [facility];  Service: Endoscopy;  Laterality: N/A;    REPEAT ABDOMINAL PARACENTESIS N/A 11/17/2022    Procedure: PARACENTESIS, ABDOMINAL, REPEAT;  Surgeon: Ruddy Salomon MD;  Location: [facility]  ENDO;  Service: Endoscopy;  Laterality: N/A;    REPEAT ABDOMINAL PARACENTESIS N/A 12/2/2022    Procedure: PARACENTESIS, ABDOMINAL, REPEAT;  Surgeon: Ruddy Salomon MD;  Location: [facility] ENDO;  Service: Endoscopy;  Laterality: N/A;    REPEAT ABDOMINAL PARACENTESIS N/A 11/25/2022    Procedure: PARACENTESIS, ABDOMINAL, REPEAT;  Surgeon: Mitch Pan MD;  Location: [facility] CATH LAB;  Service: Endoscopy;  Laterality: N/A;    REPEAT ABDOMINAL PARACENTESIS N/A 12/9/2022    Procedure: PARACENTESIS, ABDOMINAL, REPEAT;  Surgeon: Mitch Pan MD;  Location: [facility] CATH LAB;  Service: Endoscopy;  Laterality: N/A;    REPEAT ABDOMINAL PARACENTESIS N/A 12/20/2022    Procedure: PARACENTESIS, ABDOMINAL, REPEAT;  Surgeon: Ruddy Salomon MD;  Location: [facility] ENDO;  Service: Endoscopy;  Laterality: N/A;    REPEAT ABDOMINAL PARACENTESIS N/A 12/30/2022    Procedure: PARACENTESIS, ABDOMINAL, REPEAT;  Surgeon: Deon Kathleen II, MD;  Location: [facility];  Service: Endoscopy;  Laterality: N/A;    REPEAT ABDOMINAL PARACENTESIS N/A 1/12/2023    Procedure: PARACENTESIS, ABDOMINAL, REPEAT;  Surgeon: Ruddy Salomon MD;  Location: [facility];  Service: Endoscopy;  Laterality: N/A;    REPEAT ABDOMINAL PARACENTESIS N/A 1/19/2023    Procedure: PARACENTESIS, ABDOMINAL, REPEAT;  Surgeon: Vale Covarrubias MD;  Location: [facility];  Service: Endoscopy;  Laterality: N/A;    REPEAT ABDOMINAL PARACENTESIS N/A 2/1/2023    Procedure: PARACENTESIS, ABDOMINAL, REPEAT;  Surgeon: Ruddy Salomon MD;  Location: [facility] ENDO;  Service: Endoscopy;  Laterality: N/A;    REPEAT ABDOMINAL PARACENTESIS N/A 2/17/2023    Procedure: PARACENTESIS, ABDOMINAL, REPEAT;  Surgeon: Ruddy Salomon MD;  Location: [facility] ENDO;  Service: Endoscopy;  Laterality: N/A;    REPEAT ABDOMINAL PARACENTESIS N/A 3/1/2023    Procedure: PARACENTESIS, ABDOMINAL, REPEAT;  Surgeon: Ruddy Salomon MD;  Location: [facility];  Service: Endoscopy;  Laterality: N/A;    REPEAT  ABDOMINAL PARACENTESIS N/A 3/21/2023    Procedure: PARACENTESIS, ABDOMINAL, REPEAT;  Surgeon: Ruddy Salomon MD;  Location: [facility] ENDO;  Service: Endoscopy;  Laterality: N/A;    REPEAT ABDOMINAL PARACENTESIS N/A 3/9/2023    Procedure: PARACENTESIS, ABDOMINAL, REPEAT;  Surgeon: Ruddy Salomon MD;  Location: [facility] ENDO;  Service: Endoscopy;  Laterality: N/A;    REPEAT ABDOMINAL PARACENTESIS N/A 4/5/2023    Procedure: PARACENTESIS, ABDOMINAL, REPEAT;  Surgeon: Ruddy Salomon MD;  Location: [facility] ENDO;  Service: Endoscopy;  Laterality: N/A;    REPEAT ABDOMINAL PARACENTESIS N/A 4/13/2023    Procedure: PARACENTESIS, ABDOMINAL, REPEAT;  Surgeon: Ruddy Salomon MD;  Location: [facility] ENDO;  Service: Endoscopy;  Laterality: N/A;    REPEAT ABDOMINAL PARACENTESIS N/A 4/27/2023    Procedure: PARACENTESIS, ABDOMINAL, REPEAT;  Surgeon: Ruddy Salomon MD;  Location: [facility] ENDO;  Service: Endoscopy;  Laterality: N/A;    REPEAT ABDOMINAL PARACENTESIS N/A 5/11/2023    Procedure: PARACENTESIS, ABDOMINAL, REPEAT;  Surgeon: Ruddy Salomon MD;  Location: [facility] ENDO;  Service: Endoscopy;  Laterality: N/A;    REPEAT ABDOMINAL PARACENTESIS N/A 5/18/2023    Procedure: PARACENTESIS, ABDOMINAL, REPEAT;  Surgeon: Ruddy Salomon MD;  Location: [facility] ENDO;  Service: Endoscopy;  Laterality: N/A;    REPEAT ABDOMINAL PARACENTESIS N/A 5/26/2023    Procedure: PARACENTESIS, ABDOMINAL, REPEAT;  Surgeon: Ruddy Salomon MD;  Location: [facility] ENDO;  Service: Endoscopy;  Laterality: N/A;    REPEAT ABDOMINAL PARACENTESIS N/A 6/9/2023    Procedure: PARACENTESIS, ABDOMINAL, REPEAT;  Surgeon: Ruddy Salomon MD;  Location: [facility] ENDO;  Service: Endoscopy;  Laterality: N/A;    REPEAT ABDOMINAL PARACENTESIS N/A 6/26/2023    Procedure: PARACENTESIS, ABDOMINAL, REPEAT;  Surgeon: Ruddy Salomon MD;  Location: [facility] ENDO;  Service: Endoscopy;  Laterality: N/A;    REPEAT ABDOMINAL PARACENTESIS N/A 7/10/2023    Procedure: PARACENTESIS, ABDOMINAL, REPEAT;   Surgeon: Ruddy Salomon MD;  Location: [facility] ENDO;  Service: Endoscopy;  Laterality: N/A;    REPEAT ABDOMINAL PARACENTESIS N/A 7/31/2023    Procedure: PARACENTESIS, ABDOMINAL, REPEAT;  Surgeon: Ruddy Salomon MD;  Location: [facility] ENDO;  Service: Endoscopy;  Laterality: N/A;    REPEAT ABDOMINAL PARACENTESIS N/A 8/16/2023    Procedure: PARACENTESIS, ABDOMINAL, REPEAT;  Surgeon: Ruddy Salomon MD;  Location: [facility] ENDO;  Service: Endoscopy;  Laterality: N/A;    REPEAT ABDOMINAL PARACENTESIS N/A 8/28/2023    Procedure: PARACENTESIS, ABDOMINAL, REPEAT;  Surgeon: Ruddy Salomon MD;  Location: [facility] ENDO;  Service: Endoscopy;  Laterality: N/A;    REPEAT ABDOMINAL PARACENTESIS N/A 9/7/2023    Procedure: PARACENTESIS, ABDOMINAL, REPEAT;  Surgeon: Ruddy Salomon MD;  Location: [facility] ENDO;  Service: Endoscopy;  Laterality: N/A;    REPEAT ABDOMINAL PARACENTESIS N/A 9/15/2023    Procedure: PARACENTESIS, ABDOMINAL, REPEAT;  Surgeon: Ruddy Salomon MD;  Location: [facility] ENDO;  Service: Endoscopy;  Laterality: N/A;    REPEAT ABDOMINAL PARACENTESIS N/A 9/25/2023    Procedure: PARACENTESIS, ABDOMINAL, REPEAT;  Surgeon: Ruddy Salomon MD;  Location: [facility] ENDO;  Service: Endoscopy;  Laterality: N/A;    REPEAT ABDOMINAL PARACENTESIS N/A 10/6/2023    Procedure: PARACENTESIS, ABDOMINAL, REPEAT;  Surgeon: Ruddy Salomon MD;  Location: [facility] ENDO;  Service: Endoscopy;  Laterality: N/A;    REPEAT ABDOMINAL PARACENTESIS N/A 10/18/2023    Procedure: PARACENTESIS, ABDOMINAL, REPEAT;  Surgeon: Ruddy Salomon MD;  Location: [facility] ENDO;  Service: Endoscopy;  Laterality: N/A;    REPEAT ABDOMINAL PARACENTESIS N/A 10/30/2023    Procedure: PARACENTESIS, ABDOMINAL, REPEAT;  Surgeon: Ruddy Salomon MD;  Location: [facility] ENDO;  Service: Endoscopy;  Laterality: N/A;    REPEAT ABDOMINAL PARACENTESIS N/A 11/15/2023    Procedure: PARACENTESIS, ABDOMINAL, REPEAT;  Surgeon: Ruddy Salomon MD;  Location: [facility];  Service: Endoscopy;   Laterality: N/A;    REPEAT ABDOMINAL PARACENTESIS N/A 12/12/2023    Procedure: PARACENTESIS, ABDOMINAL, REPEAT;  Surgeon: Ruddy Salomon MD;  Location: [facility] ENDO;  Service: Endoscopy;  Laterality: N/A;    REPEAT ABDOMINAL PARACENTESIS N/A 12/26/2023    Procedure: PARACENTESIS, ABDOMINAL, REPEAT;  Surgeon: Ruddy Salomon MD;  Location: [facility] ENDO;  Service: Endoscopy;  Laterality: N/A;    REPEAT ABDOMINAL PARACENTESIS N/A 1/11/2024    Procedure: PARACENTESIS, ABDOMINAL, REPEAT;  Surgeon: Ruddy Salomon MD;  Location: [facility] ENDO;  Service: Endoscopy;  Laterality: N/A;    REPEAT ABDOMINAL PARACENTESIS N/A 1/22/2024    Procedure: PARACENTESIS, ABDOMINAL, REPEAT;  Surgeon: Ruddy Salomon MD;  Location: [facility] ENDO;  Service: Endoscopy;  Laterality: N/A;    REPEAT ABDOMINAL PARACENTESIS N/A 2/14/2024    Procedure: PARACENTESIS, ABDOMINAL, REPEAT;  Surgeon: Ruddy Salomon MD;  Location: [facility] ENDO;  Service: Endoscopy;  Laterality: N/A;    REPEAT ABDOMINAL PARACENTESIS N/A 9/9/2024    Procedure: PARACENTESIS, ABDOMINAL, REPEAT;  Surgeon: Ruddy Salomon MD;  Location: [facility] ENDO;  Service: Endoscopy;  Laterality: N/A;    REPEAT ABDOMINAL PARACENTESIS Left 10/11/2024    Procedure: PARACENTESIS, ABDOMINAL, REPEAT;  Surgeon: Ruddy Salomon MD;  Location: [facility] ENDO;  Service: Endoscopy;  Laterality: Left;    REPEAT ABDOMINAL PARACENTESIS N/A 11/6/2024    Procedure: PARACENTESIS, ABDOMINAL, REPEAT;  Surgeon: Ruddy Salomon MD;  Location: [facility] ENDO;  Service: Endoscopy;  Laterality: N/A;    TONSILLECTOMY      UPPER GASTROINTESTINAL ENDOSCOPY       Summary    The left ventricle is normal in size with concentric remodeling and normal systolic function.  The patient reached the end of the protocol.  The estimated ejection fraction is 65%.  Normal left ventricular diastolic function.  Normal right ventricular size with normal right ventricular systolic function.  Mild right atrial enlargement.  Normal central venous  pressure (3 mmHg).  The estimated PA systolic pressure is 20 mmHg.  There may be some bilateral pleural effusions.  There is ascites present.  The stress echo portion of this study is negative for myocardial ischemia.     Pre-op Assessment       I have reviewed the Medications.     Review of Systems  Anesthesia Hx:             Denies Family Hx of Anesthesia complications.     Cardiovascular:     Hypertension                                          Pulmonary:      Shortness of breath                  Hepatic/GI:      Liver Disease, Hepatitis              Neurological:    Neuromuscular Disease,                                   Endocrine:  Diabetes, type 2 Hypothyroidism          Psych:  Psychiatric History                  Physical Exam  General: Well nourished    Airway:  Mallampati: II   TM Distance: Normal  Neck ROM: Normal ROM    Dental:  Intact    Chest/Lungs:  Clear to auscultation    Heart:  Rate: Normal  Rhythm: Regular Rhythm        Anesthesia Plan  Type of Anesthesia, risks & benefits discussed:    Anesthesia Type: Gen Natural Airway  Intra-op Monitoring Plan: Standard ASA Monitors  Post Op Pain Control Plan: multimodal analgesia  Informed Consent: Informed consent signed with the Patient and all parties understand the risks and agree with anesthesia plan.  All questions answered.   ASA Score: 3    Ready For Surgery From Anesthesia Perspective.     .

## 2024-11-09 NOTE — ANESTHESIA POSTPROCEDURE EVALUATION
Anesthesia Post Evaluation    Patient: Corky Bach    Procedure(s) Performed: Procedure(s) (LRB):  EGD (ESOPHAGOGASTRODUODENOSCOPY) (N/A)    Final Anesthesia Type: general      Patient location during evaluation: PACU  Patient participation: Yes- Able to Participate  Level of consciousness: awake and alert  Post-procedure vital signs: reviewed and stable  Pain management: adequate  Airway patency: patent    PONV status at discharge: No PONV  Anesthetic complications: no      Cardiovascular status: blood pressure returned to baseline  Respiratory status: unassisted  Hydration status: euvolemic  Follow-up not needed.          Vitals Value Taken Time   BP 95/52 11/08/24 1410   Temp 36.8 °C (98.2 °F) 11/08/24 1340   Pulse 87 11/08/24 1410   Resp 17 11/08/24 1410   SpO2 95 % 11/08/24 1410         Event Time   Out of Recovery 14:13:03         Pain/Sammi Score: No data recorded

## 2024-11-21 ENCOUNTER — TELEPHONE (OUTPATIENT)
Dept: TRANSPLANT | Facility: CLINIC | Age: 59
End: 2024-11-21
Payer: MEDICARE

## 2024-11-21 NOTE — TELEPHONE ENCOUNTER
Mr. Bach cancelled his last appts with Dr. Urban.  I called and left a voice message asking him to call back to discuss getting rescheduled for labs, US and f/u with Dr. Urban.

## 2024-11-25 ENCOUNTER — TELEPHONE (OUTPATIENT)
Dept: TRANSPLANT | Facility: CLINIC | Age: 59
End: 2024-11-25
Payer: MEDICARE

## 2024-11-25 DIAGNOSIS — Z76.82 ORGAN TRANSPLANT CANDIDATE: Primary | ICD-10-CM

## 2024-11-25 DIAGNOSIS — I81 PORTAL VEIN THROMBOSIS: ICD-10-CM

## 2024-11-25 DIAGNOSIS — K74.69 OTHER CIRRHOSIS OF LIVER: ICD-10-CM

## 2024-11-25 NOTE — TELEPHONE ENCOUNTER
Spoke to Mr. Bach today. Asked him why he cancelled his previous appointments.  He states he has no caregivers and he did not have transportation.  He states he now has transportation but still no caregivers.  Patient will remain inactive on liver tx wait list.   will make appts for labs, US and f/u with Dr. Urban.

## 2024-12-05 PROBLEM — D50.9 IDA (IRON DEFICIENCY ANEMIA): Status: ACTIVE | Noted: 2024-08-21

## 2024-12-18 ENCOUNTER — HOSPITAL ENCOUNTER (OUTPATIENT)
Dept: RADIOLOGY | Facility: HOSPITAL | Age: 59
Discharge: HOME | End: 2024-12-18
Attending: INTERNAL MEDICINE
Payer: MEDICARE

## 2024-12-18 DIAGNOSIS — K74.69 OTHER CIRRHOSIS OF LIVER: ICD-10-CM

## 2024-12-18 DIAGNOSIS — I81 PORTAL VEIN THROMBOSIS: ICD-10-CM

## 2024-12-18 DIAGNOSIS — Z76.82 ORGAN TRANSPLANT CANDIDATE: ICD-10-CM

## 2024-12-18 PROCEDURE — 76700 US EXAM ABDOM COMPLETE: CPT | Mod: TC,TXP

## 2025-01-14 ENCOUNTER — TELEPHONE (OUTPATIENT)
Dept: TRANSPLANT | Facility: CLINIC | Age: 60
End: 2025-01-14
Payer: MEDICARE

## 2025-01-14 NOTE — TELEPHONE ENCOUNTER
Received phone call from  Isreal that he is cancelling this appt tomorrow with Dr. Urban.  He has to provide his transportation to his son for work.  He also said that he still has no caregivers.

## 2025-03-20 ENCOUNTER — TELEPHONE (OUTPATIENT)
Dept: TRANSPLANT | Facility: CLINIC | Age: 60
End: 2025-03-20
Payer: MEDICARE

## 2025-03-20 NOTE — TELEPHONE ENCOUNTER
UPDATE:  Mr. Fried has been status 7 on liver transplant list since June 2024 for caregiver issues and transportation issues.  He cancelled his f/u appt with Dr Urban January due to only having one car in the family and his son had to get to work that day. I asked him if his caregiver issues have improved he said no. He said he does not have any caregivers. I asked again about his ex wife. He said she can not be my caregiver because she has mental issues and does not want to be involved in his care. I asked about setting up another appt with  Dr. Urban in person, he said due to only having one car he probably can't come see her in [place]. I asked if he is willing to try a video visit. He said he will  try.  In December his meld was 11 and in February his meld was 24. I am going to set up labs for him this Monday at [facility]. He told me he can drive to [facility].  Problem is, he has zero caregivers and he said he has no one else willing to be a caregiver. With transportation issues he will not be able to show up for organ offers.    I will schedule labs this Monday. I can set him up with a virtual visit with Dr. Urban.  He said he has not done virtual before so I will educate him on how to do one. I explained to him that he will most likely get delisted but if his caregiver and transportation issues improve we can always discuss opening up a transplant episode again. His waitlist maintenance tests are out of date. He has not had a stress test since 2023.     Dr. Urban notified.

## 2025-03-24 ENCOUNTER — RESULTS FOLLOW-UP (OUTPATIENT)
Dept: TRANSPLANT | Facility: CLINIC | Age: 60
End: 2025-03-24

## 2025-05-09 ENCOUNTER — PATIENT MESSAGE (OUTPATIENT)
Dept: TRANSPLANT | Facility: CLINIC | Age: 60
End: 2025-05-09

## 2025-05-09 ENCOUNTER — OFFICE VISIT (OUTPATIENT)
Dept: TRANSPLANT | Facility: CLINIC | Age: 60
End: 2025-05-09
Payer: MEDICARE

## 2025-05-09 DIAGNOSIS — R18.8 CIRRHOSIS OF LIVER WITH ASCITES, UNSPECIFIED HEPATIC CIRRHOSIS TYPE: Primary | ICD-10-CM

## 2025-05-09 DIAGNOSIS — K74.60 CIRRHOSIS OF LIVER WITH ASCITES, UNSPECIFIED HEPATIC CIRRHOSIS TYPE: Primary | ICD-10-CM

## 2025-05-09 NOTE — LETTER
May 9, 2025        Melvina Irving  [facility] [address]  [place] [address]  Phone: [phone]  Fax: [phone]             [facility]  [address]  [facility] [address]  Phone: [phone]   Patient: Corky Bach   MR Number: [id number]   YOB: 1965   Date of Visit: 5/9/2025       Dear Dr. Melvina Irving    Thank you for referring Corky Bach to me for evaluation. Attached you will find relevant portions of my assessment and plan of care.    If you have questions, please do not hesitate to call me. I look forward to following Corky Bach along with you.    Sincerely,    Sri Urban MD    Enclosure    If you would like to receive this communication electronically, please contact [email or web address] or [phone] to request [employer] access.    [employer] is a tool which provides read-only access to select patient information with whom you have a relationship. Its easy to use and provides real time access to review your patients record including encounter summaries, notes, results, and demographic information.    If you feel you have received this communication in error or would no longer like to receive these types of communications, please e-mail [email or web address]

## 2025-05-14 DIAGNOSIS — E11.9 TYPE 2 DIABETES MELLITUS WITHOUT COMPLICATION: ICD-10-CM

## 2025-06-06 ENCOUNTER — OFFICE VISIT (OUTPATIENT)
Dept: TRANSPLANT | Facility: CLINIC | Age: 60
End: 2025-06-06
Payer: MEDICARE

## 2025-06-06 ENCOUNTER — EPISODE CHANGES (OUTPATIENT)
Dept: TRANSPLANT | Facility: CLINIC | Age: 60
End: 2025-06-06

## 2025-06-06 ENCOUNTER — PATIENT MESSAGE (OUTPATIENT)
Dept: TRANSPLANT | Facility: CLINIC | Age: 60
End: 2025-06-06

## 2025-06-06 DIAGNOSIS — I81 PORTAL VEIN THROMBOSIS: ICD-10-CM

## 2025-06-06 DIAGNOSIS — K75.81 LIVER CIRRHOSIS SECONDARY TO NASH: ICD-10-CM

## 2025-06-06 DIAGNOSIS — K76.82 HEPATIC ENCEPHALOPATHY: ICD-10-CM

## 2025-06-06 DIAGNOSIS — R18.8 CIRRHOSIS OF LIVER WITH ASCITES, UNSPECIFIED HEPATIC CIRRHOSIS TYPE: Primary | ICD-10-CM

## 2025-06-06 DIAGNOSIS — K74.60 LIVER CIRRHOSIS SECONDARY TO NASH: ICD-10-CM

## 2025-06-06 DIAGNOSIS — K74.60 CIRRHOSIS OF LIVER WITH ASCITES, UNSPECIFIED HEPATIC CIRRHOSIS TYPE: Primary | ICD-10-CM

## 2025-07-11 ENCOUNTER — OFFICE VISIT (OUTPATIENT)
Dept: TRANSPLANT | Facility: CLINIC | Age: 60
End: 2025-07-11
Payer: MEDICARE

## 2025-07-11 DIAGNOSIS — K76.82 HEPATIC ENCEPHALOPATHY: ICD-10-CM

## 2025-07-11 DIAGNOSIS — R18.8 CIRRHOSIS OF LIVER WITH ASCITES, UNSPECIFIED HEPATIC CIRRHOSIS TYPE: Primary | ICD-10-CM

## 2025-07-11 DIAGNOSIS — K75.81 LIVER CIRRHOSIS SECONDARY TO NASH: ICD-10-CM

## 2025-07-11 DIAGNOSIS — K74.60 LIVER CIRRHOSIS SECONDARY TO NASH: ICD-10-CM

## 2025-07-11 DIAGNOSIS — R18.8 OTHER ASCITES: ICD-10-CM

## 2025-07-11 DIAGNOSIS — K74.60 CIRRHOSIS OF LIVER WITH ASCITES, UNSPECIFIED HEPATIC CIRRHOSIS TYPE: Primary | ICD-10-CM

## 2025-07-11 NOTE — PROGRESS NOTES
--pt again in the waiting room of a doctors office  --wife helping his mother at the appt  --pt could barely hear me on the VV  --appeared quite cachectic with temporal wasting    --will reschedule to an in-person visit

## 2025-07-11 NOTE — LETTER
July 11, 2025        Melvina Irving  [address]  [place] [address]  Phone: [phone]  Fax: [phone]             [facility]  [address]  [facility] [address]  Phone: [phone]   Patient: Corky Bach   MR Number: [id number]   YOB: 1965   Date of Visit: 7/11/2025       Dear Dr. Melvina Irving    Thank you for referring Corky Bach to me for evaluation. Attached you will find relevant portions of my assessment and plan of care.    If you have questions, please do not hesitate to call me. I look forward to following Corky Bach along with you.    Sincerely,    Sri Urban MD    Enclosure    If you would like to receive this communication electronically, please contact [email or web address] or [phone] to request [email or web address] access.    [email or web address] is a tool which provides read-only access to select patient information with whom you have a relationship. Its easy to use and provides real time access to review your patients record including encounter summaries, notes, results, and demographic information.    If you feel you have received this communication in error or would no longer like to receive these types of communications, please e-mail [email or web address]

## 2025-07-20 ENCOUNTER — HOSPITAL ENCOUNTER (INPATIENT)
Facility: HOSPITAL | Age: 60
LOS: 14 days | Discharge: ANOTHER HOSPITAL | DRG: 432 | End: 2025-08-03
Attending: HOSPITALIST | Admitting: STUDENT IN AN ORGANIZED HEALTH CARE EDUCATION/TRAINING PROGRAM
Payer: MEDICARE

## 2025-07-20 DIAGNOSIS — R07.9 CHEST PAIN: ICD-10-CM

## 2025-07-20 DIAGNOSIS — K75.81 LIVER CIRRHOSIS SECONDARY TO NASH: ICD-10-CM

## 2025-07-20 DIAGNOSIS — I85.10 ESOPHAGEAL VARICES IN CIRRHOSIS: Primary | ICD-10-CM

## 2025-07-20 DIAGNOSIS — K65.2 SPONTANEOUS BACTERIAL PERITONITIS: ICD-10-CM

## 2025-07-20 DIAGNOSIS — I85.10 SECONDARY ESOPHAGEAL VARICES WITHOUT BLEEDING: ICD-10-CM

## 2025-07-20 DIAGNOSIS — K74.60 CIRRHOSIS: ICD-10-CM

## 2025-07-20 DIAGNOSIS — K76.82 HEPATIC ENCEPHALOPATHY: ICD-10-CM

## 2025-07-20 DIAGNOSIS — K74.60 ESOPHAGEAL VARICES IN CIRRHOSIS: Primary | ICD-10-CM

## 2025-07-20 DIAGNOSIS — K74.60 LIVER CIRRHOSIS SECONDARY TO NASH: ICD-10-CM

## 2025-07-20 PROBLEM — N17.9 AKI (ACUTE KIDNEY INJURY): Status: ACTIVE | Noted: 2025-07-20

## 2025-07-20 PROBLEM — E87.1 HYPONATREMIA: Status: ACTIVE | Noted: 2025-07-20

## 2025-07-20 LAB
ABSOLUTE EOSINOPHIL (OHS): 0.05 K/UL
ABSOLUTE MONOCYTE (OHS): 0.57 K/UL (ref 0.3–1)
ABSOLUTE NEUTROPHIL COUNT (OHS): 6.07 K/UL (ref 1.8–7.7)
ALBUMIN SERPL BCP-MCNC: 1.5 G/DL (ref 3.5–5.2)
ALP SERPL-CCNC: 134 UNIT/L (ref 40–150)
ALT SERPL W/O P-5'-P-CCNC: 6 UNIT/L (ref 10–44)
ANION GAP (OHS): 11 MMOL/L (ref 8–16)
AST SERPL-CCNC: 14 UNIT/L (ref 11–45)
BASOPHILS # BLD AUTO: 0.01 K/UL
BASOPHILS NFR BLD AUTO: 0.1 %
BILIRUB SERPL-MCNC: 1.3 MG/DL (ref 0.1–1)
BUN SERPL-MCNC: 31 MG/DL (ref 6–20)
CALCIUM SERPL-MCNC: 7.4 MG/DL (ref 8.7–10.5)
CHLORIDE SERPL-SCNC: 99 MMOL/L (ref 95–110)
CO2 SERPL-SCNC: 23 MMOL/L (ref 23–29)
CREAT SERPL-MCNC: 1.3 MG/DL (ref 0.5–1.4)
EAG (OHS): 206 MG/DL (ref 68–131)
ERYTHROCYTE [DISTWIDTH] IN BLOOD BY AUTOMATED COUNT: 19.8 % (ref 11.5–14.5)
GFR SERPLBLD CREATININE-BSD FMLA CKD-EPI: >60 ML/MIN/1.73/M2
GLUCOSE SERPL-MCNC: 278 MG/DL (ref 70–110)
HBA1C MFR BLD: 8.8 % (ref 4–5.6)
HCT VFR BLD AUTO: 24 % (ref 40–54)
HGB BLD-MCNC: 7.4 GM/DL (ref 14–18)
IMM GRANULOCYTES # BLD AUTO: 0.05 K/UL (ref 0–0.04)
IMM GRANULOCYTES NFR BLD AUTO: 0.7 % (ref 0–0.5)
INR PPP: 1.5 (ref 0.8–1.2)
LIPASE SERPL-CCNC: 7 U/L (ref 4–60)
LYMPHOCYTES # BLD AUTO: 0.44 K/UL (ref 1–4.8)
MAGNESIUM SERPL-MCNC: 1.7 MG/DL (ref 1.6–2.6)
MCH RBC QN AUTO: 21.3 PG (ref 27–31)
MCHC RBC AUTO-ENTMCNC: 30.8 G/DL (ref 32–36)
MCV RBC AUTO: 69 FL (ref 82–98)
NUCLEATED RBC (/100WBC) (OHS): 0 /100 WBC
PHOSPHATE SERPL-MCNC: 3.3 MG/DL (ref 2.7–4.5)
PLATELET # BLD AUTO: 61 K/UL (ref 150–450)
PMV BLD AUTO: 9.9 FL (ref 9.2–12.9)
POCT GLUCOSE: 320 MG/DL (ref 70–110)
POCT GLUCOSE: 345 MG/DL (ref 70–110)
POTASSIUM SERPL-SCNC: 3.6 MMOL/L (ref 3.5–5.1)
PROT SERPL-MCNC: 7.1 GM/DL (ref 6–8.4)
PROTHROMBIN TIME: 15.9 SECONDS (ref 9–12.5)
RBC # BLD AUTO: 3.48 M/UL (ref 4.6–6.2)
RELATIVE EOSINOPHIL (OHS): 0.7 %
RELATIVE LYMPHOCYTE (OHS): 6.1 % (ref 18–48)
RELATIVE MONOCYTE (OHS): 7.9 % (ref 4–15)
RELATIVE NEUTROPHIL (OHS): 84.5 % (ref 38–73)
SODIUM SERPL-SCNC: 133 MMOL/L (ref 136–145)
WBC # BLD AUTO: 7.19 K/UL (ref 3.9–12.7)

## 2025-07-20 PROCEDURE — 83690 ASSAY OF LIPASE: CPT | Mod: NTX

## 2025-07-20 PROCEDURE — 83735 ASSAY OF MAGNESIUM: CPT | Mod: NTX

## 2025-07-20 PROCEDURE — 86901 BLOOD TYPING SEROLOGIC RH(D): CPT | Mod: NTX

## 2025-07-20 PROCEDURE — 84100 ASSAY OF PHOSPHORUS: CPT | Mod: NTX

## 2025-07-20 PROCEDURE — 30233N1 TRANSFUSION OF NONAUTOLOGOUS RED BLOOD CELLS INTO PERIPHERAL VEIN, PERCUTANEOUS APPROACH: ICD-10-PCS | Performed by: HOSPITALIST

## 2025-07-20 PROCEDURE — 63600175 PHARM REV CODE 636 W HCPCS: Mod: NTX

## 2025-07-20 PROCEDURE — 85025 COMPLETE CBC W/AUTO DIFF WBC: CPT | Mod: NTX

## 2025-07-20 PROCEDURE — 25000003 PHARM REV CODE 250: Mod: NTX

## 2025-07-20 PROCEDURE — 83605 ASSAY OF LACTIC ACID: CPT | Mod: NTX

## 2025-07-20 PROCEDURE — 84520 ASSAY OF UREA NITROGEN: CPT | Mod: NTX

## 2025-07-20 PROCEDURE — 20600001 HC STEP DOWN PRIVATE ROOM: Mod: NTX

## 2025-07-20 PROCEDURE — 85610 PROTHROMBIN TIME: CPT | Mod: NTX

## 2025-07-20 PROCEDURE — 36415 COLL VENOUS BLD VENIPUNCTURE: CPT | Mod: NTX

## 2025-07-20 PROCEDURE — 94761 N-INVAS EAR/PLS OXIMETRY MLT: CPT | Mod: NTX

## 2025-07-20 PROCEDURE — 83036 HEMOGLOBIN GLYCOSYLATED A1C: CPT | Mod: NTX

## 2025-07-20 RX ORDER — GLUCAGON 1 MG
1 KIT INJECTION
Status: DISCONTINUED | OUTPATIENT
Start: 2025-07-20 | End: 2025-08-03 | Stop reason: HOSPADM

## 2025-07-20 RX ORDER — INSULIN ASPART 100 [IU]/ML
10 INJECTION, SOLUTION INTRAVENOUS; SUBCUTANEOUS
Status: DISCONTINUED | OUTPATIENT
Start: 2025-07-21 | End: 2025-07-23

## 2025-07-20 RX ORDER — OXYBUTYNIN CHLORIDE 5 MG/1
5 TABLET, EXTENDED RELEASE ORAL DAILY
Status: DISCONTINUED | OUTPATIENT
Start: 2025-07-21 | End: 2025-08-03 | Stop reason: HOSPADM

## 2025-07-20 RX ORDER — INSULIN GLARGINE 100 [IU]/ML
30 INJECTION, SOLUTION SUBCUTANEOUS NIGHTLY
Status: DISCONTINUED | OUTPATIENT
Start: 2025-07-20 | End: 2025-07-22

## 2025-07-20 RX ORDER — CEFTRIAXONE 2 G/1
2 INJECTION, POWDER, FOR SOLUTION INTRAMUSCULAR; INTRAVENOUS
Status: DISCONTINUED | OUTPATIENT
Start: 2025-07-20 | End: 2025-07-21

## 2025-07-20 RX ORDER — PANTOPRAZOLE SODIUM 40 MG/10ML
80 INJECTION, POWDER, LYOPHILIZED, FOR SOLUTION INTRAVENOUS ONCE
Status: COMPLETED | OUTPATIENT
Start: 2025-07-21 | End: 2025-07-20

## 2025-07-20 RX ORDER — ACETAMINOPHEN 500 MG
500 TABLET ORAL EVERY 6 HOURS PRN
Status: DISCONTINUED | OUTPATIENT
Start: 2025-07-20 | End: 2025-08-03 | Stop reason: HOSPADM

## 2025-07-20 RX ORDER — INSULIN ASPART 100 [IU]/ML
0-5 INJECTION, SOLUTION INTRAVENOUS; SUBCUTANEOUS
Status: DISCONTINUED | OUTPATIENT
Start: 2025-07-20 | End: 2025-08-03 | Stop reason: HOSPADM

## 2025-07-20 RX ORDER — TAMSULOSIN HYDROCHLORIDE 0.4 MG/1
0.4 CAPSULE ORAL NIGHTLY
Status: DISCONTINUED | OUTPATIENT
Start: 2025-07-20 | End: 2025-08-03 | Stop reason: HOSPADM

## 2025-07-20 RX ORDER — OCTREOTIDE ACETATE 50 UG/ML
50 INJECTION, SOLUTION INTRAVENOUS; SUBCUTANEOUS ONCE
Status: COMPLETED | OUTPATIENT
Start: 2025-07-21 | End: 2025-07-20

## 2025-07-20 RX ORDER — NALOXONE HCL 0.4 MG/ML
0.02 VIAL (ML) INJECTION
Status: DISCONTINUED | OUTPATIENT
Start: 2025-07-20 | End: 2025-08-03 | Stop reason: HOSPADM

## 2025-07-20 RX ORDER — PANTOPRAZOLE SODIUM 40 MG/10ML
40 INJECTION, POWDER, LYOPHILIZED, FOR SOLUTION INTRAVENOUS 2 TIMES DAILY
Status: DISCONTINUED | OUTPATIENT
Start: 2025-07-21 | End: 2025-07-21

## 2025-07-20 RX ORDER — IBUPROFEN 200 MG
16 TABLET ORAL
Status: DISCONTINUED | OUTPATIENT
Start: 2025-07-20 | End: 2025-08-03 | Stop reason: HOSPADM

## 2025-07-20 RX ORDER — TALC
6 POWDER (GRAM) TOPICAL NIGHTLY PRN
Status: DISCONTINUED | OUTPATIENT
Start: 2025-07-20 | End: 2025-08-03 | Stop reason: HOSPADM

## 2025-07-20 RX ORDER — SODIUM CHLORIDE 0.9 % (FLUSH) 0.9 %
10 SYRINGE (ML) INJECTION EVERY 12 HOURS PRN
Status: DISCONTINUED | OUTPATIENT
Start: 2025-07-20 | End: 2025-08-03 | Stop reason: HOSPADM

## 2025-07-20 RX ORDER — IBUPROFEN 200 MG
24 TABLET ORAL
Status: DISCONTINUED | OUTPATIENT
Start: 2025-07-20 | End: 2025-08-03 | Stop reason: HOSPADM

## 2025-07-20 RX ORDER — VENLAFAXINE 75 MG/1
75 TABLET ORAL 2 TIMES DAILY
Status: DISCONTINUED | OUTPATIENT
Start: 2025-07-20 | End: 2025-08-03 | Stop reason: HOSPADM

## 2025-07-20 RX ADMIN — ACETAMINOPHEN 500 MG: 500 TABLET ORAL at 11:07

## 2025-07-20 RX ADMIN — INSULIN GLARGINE 30 UNITS: 100 INJECTION, SOLUTION SUBCUTANEOUS at 11:07

## 2025-07-20 RX ADMIN — TAMSULOSIN HYDROCHLORIDE 0.4 MG: 0.4 CAPSULE ORAL at 11:07

## 2025-07-20 RX ADMIN — OCTREOTIDE ACETATE 50 MCG: 50 INJECTION, SOLUTION INTRAVENOUS; SUBCUTANEOUS at 11:07

## 2025-07-20 RX ADMIN — PANTOPRAZOLE SODIUM 80 MG: 40 INJECTION, POWDER, LYOPHILIZED, FOR SOLUTION INTRAVENOUS at 11:07

## 2025-07-20 RX ADMIN — CEFTRIAXONE SODIUM 2 G: 2 INJECTION, POWDER, FOR SOLUTION INTRAMUSCULAR; INTRAVENOUS at 11:07

## 2025-07-20 NOTE — PROVIDER TRANSFER
Outside Transfer Acceptance Note / Regional Referral Center    Referring facility:  [facility]   Referring provider: ASHOK PHILLIPS  Accepting facility: [facility]  Accepting provider: BRIDGETTE GALINDO  Admitting provider: Bridgette Galindo   Reason for transfer: Higher Level of Care   Transfer diagnosis: Cirrhosis [349083]  ascites  Transfer specialty requested: Hepatology  Transfer specialty notified: No  Transfer level: NUMBER 1-5: 2  Bed type requested: Stepdown   Isolation status: No active isolations   Admission class or status: IP- Inpatient      Narrative     The patient is a 58 y/o male with PMH of CORDOVA cirrhosis, T2DM, MALT lymphoma, HLP, gout, and depression.patient followed by hepatology at [facility]. He presented to the referring facility with back pain and pain across the upper abdomen. Pain has been present over the past 3 weeks. He has not taken any medications for his pain. Patient has had paracentesis done in the past 3 months. No nausea or vomiting reported. In the ER, BPs were in the low 100s. He was found to be heme occult positive. Hb 6.9 and platelets 67. AST 13, ALT 6, , bili 1.2, INR 1.8, lactic 2.9. Patient receiving 1 unit PRBC. Transferring for GI/hepatology evaluation. Mental status stable. Transferring to stepdown.     Objective     Vitals:    Recent Labs: All pertinent labs within the past 24 hours have been reviewed.  Recent imaging:    Airway:     Vent settings:         IV access:    Infusions:   Allergies: Review of patient's allergies indicates:  No Known Allergies   NPO: No    Anticoagulation:   Anticoagulants       None             Instructions      Brian Frances-  Admit to Hospital Medicine  Upon arrival to the floor, please send a SecureChat to [facility]. If there are emergent issues, or if there is no response to the SecureChat within 15 minutes, call extension [phone] (if no answer, do NOT leave a callback number after the beep, rather  please call the  to connect you with the  Hospital Medicine admit line on-call physician), for Hospital Medicine admit team assignment and for additional admit orders for the patient. Do not page the attending physician associated with the patient on arrival (this physician may not be on duty at the time of arrival). Rather, always send a SecureChat to [facility] (subsequently call [phone] if needed) to reach the triage physician for orders and team assignment.

## 2025-07-21 PROBLEM — R10.9 ABDOMINAL PAIN: Status: ACTIVE | Noted: 2025-07-21

## 2025-07-21 LAB
ABSOLUTE EOSINOPHIL (OHS): 0.06 K/UL
ABSOLUTE MONOCYTE (OHS): 0.59 K/UL (ref 0.3–1)
ABSOLUTE NEUTROPHIL COUNT (OHS): 6.22 K/UL (ref 1.8–7.7)
ALBUMIN FLD-MCNC: 0.8 G/DL
ALBUMIN SERPL BCP-MCNC: 1.5 G/DL (ref 3.5–5.2)
ALP SERPL-CCNC: 129 UNIT/L (ref 40–150)
ALT SERPL W/O P-5'-P-CCNC: <5 UNIT/L (ref 10–44)
AMMONIA PLAS-SCNC: 22 UMOL/L (ref 10–50)
ANION GAP (OHS): 8 MMOL/L (ref 8–16)
APPEARANCE FLD: NORMAL
AST SERPL-CCNC: 15 UNIT/L (ref 11–45)
BASOPHILS # BLD AUTO: 0.01 K/UL
BASOPHILS NFR BLD AUTO: 0.1 %
BILIRUB SERPL-MCNC: 1.1 MG/DL (ref 0.1–1)
BUN SERPL-MCNC: 32 MG/DL (ref 6–20)
CALCIUM SERPL-MCNC: 7.6 MG/DL (ref 8.7–10.5)
CHLORIDE SERPL-SCNC: 100 MMOL/L (ref 95–110)
CO2 SERPL-SCNC: 26 MMOL/L (ref 23–29)
COLOR FLD: NORMAL
CREAT SERPL-MCNC: 1.5 MG/DL (ref 0.5–1.4)
CREAT UR-MCNC: 146 MG/DL (ref 23–375)
CREAT UR-MCNC: 147 MG/DL (ref 23–375)
ERYTHROCYTE [DISTWIDTH] IN BLOOD BY AUTOMATED COUNT: 19.8 % (ref 11.5–14.5)
GFR SERPLBLD CREATININE-BSD FMLA CKD-EPI: 54 ML/MIN/1.73/M2
GLUCOSE SERPL-MCNC: 246 MG/DL (ref 70–110)
HCT VFR BLD AUTO: 24.1 % (ref 40–54)
HGB BLD-MCNC: 7.3 GM/DL (ref 14–18)
IMM GRANULOCYTES # BLD AUTO: 0.03 K/UL (ref 0–0.04)
IMM GRANULOCYTES NFR BLD AUTO: 0.4 % (ref 0–0.5)
INDIRECT COOMBS: NORMAL
INR PPP: 1.6 (ref 0.8–1.2)
LACTATE SERPL-SCNC: 2.2 MMOL/L (ref 0.5–2.2)
LACTATE SERPL-SCNC: 2.3 MMOL/L (ref 0.5–2.2)
LDH FLD L TO P-CCNC: 2156 U/L
LYMPHOCYTES # BLD AUTO: 0.44 K/UL (ref 1–4.8)
LYMPHOCYTES NFR FLD MANUAL: 1 %
MAGNESIUM SERPL-MCNC: 1.7 MG/DL (ref 1.6–2.6)
MCH RBC QN AUTO: 21.2 PG (ref 27–31)
MCHC RBC AUTO-ENTMCNC: 30.3 G/DL (ref 32–36)
MCV RBC AUTO: 70 FL (ref 82–98)
MONOS+MACROS NFR FLD MANUAL: 3 %
NEUTROPHILS NFR FLD MANUAL: 96 %
NUCLEATED RBC (/100WBC) (OHS): 0 /100 WBC
PHOSPHATE SERPL-MCNC: 3.5 MG/DL (ref 2.7–4.5)
PLATELET # BLD AUTO: 47 K/UL (ref 150–450)
PMV BLD AUTO: 8.4 FL (ref 9.2–12.9)
POCT GLUCOSE: 104 MG/DL (ref 70–110)
POCT GLUCOSE: 139 MG/DL (ref 70–110)
POCT GLUCOSE: 177 MG/DL (ref 70–110)
POCT GLUCOSE: 207 MG/DL (ref 70–110)
POTASSIUM SERPL-SCNC: 4.4 MMOL/L (ref 3.5–5.1)
PROT FLD-MCNC: 3.7 G/DL
PROT SERPL-MCNC: 7.1 GM/DL (ref 6–8.4)
PROT UR-MCNC: 59 MG/DL
PROT/CREAT UR: 0.4 MG/G{CREAT}
PROTHROMBIN TIME: 16.5 SECONDS (ref 9–12.5)
RBC # BLD AUTO: 3.45 M/UL (ref 4.6–6.2)
RELATIVE EOSINOPHIL (OHS): 0.8 %
RELATIVE LYMPHOCYTE (OHS): 6 % (ref 18–48)
RELATIVE MONOCYTE (OHS): 8 % (ref 4–15)
RELATIVE NEUTROPHIL (OHS): 84.7 % (ref 38–73)
RH BLD: NORMAL
SODIUM SERPL-SCNC: 134 MMOL/L (ref 136–145)
SODIUM UR-SCNC: 20 MMOL/L (ref 20–250)
SPECIMEN OUTDATE: NORMAL
UUN UR-MCNC: 676 MG/DL (ref 140–1050)
WBC # BLD AUTO: 7.35 K/UL (ref 3.9–12.7)
WBC # FLD: NORMAL /CU MM

## 2025-07-21 PROCEDURE — 25000003 PHARM REV CODE 250: Mod: NTX

## 2025-07-21 PROCEDURE — 49082 ABD PARACENTESIS: CPT | Mod: NTX

## 2025-07-21 PROCEDURE — 36415 COLL VENOUS BLD VENIPUNCTURE: CPT | Mod: NTX

## 2025-07-21 PROCEDURE — 82140 ASSAY OF AMMONIA: CPT | Mod: NTX

## 2025-07-21 PROCEDURE — 84157 ASSAY OF PROTEIN OTHER: CPT | Mod: NTX

## 2025-07-21 PROCEDURE — 87075 CULTR BACTERIA EXCEPT BLOOD: CPT | Mod: NTX

## 2025-07-21 PROCEDURE — 84300 ASSAY OF URINE SODIUM: CPT | Mod: NTX

## 2025-07-21 PROCEDURE — 83735 ASSAY OF MAGNESIUM: CPT | Mod: NTX

## 2025-07-21 PROCEDURE — 82570 ASSAY OF URINE CREATININE: CPT | Mod: NTX

## 2025-07-21 PROCEDURE — 87077 CULTURE AEROBIC IDENTIFY: CPT | Mod: NTX

## 2025-07-21 PROCEDURE — 99232 SBSQ HOSP IP/OBS MODERATE 35: CPT | Mod: GC,NTX,, | Performed by: INTERNAL MEDICINE

## 2025-07-21 PROCEDURE — P9047 ALBUMIN (HUMAN), 25%, 50ML: HCPCS | Mod: NTX | Performed by: STUDENT IN AN ORGANIZED HEALTH CARE EDUCATION/TRAINING PROGRAM

## 2025-07-21 PROCEDURE — 63600175 PHARM REV CODE 636 W HCPCS: Mod: NTX

## 2025-07-21 PROCEDURE — 84540 ASSAY OF URINE/UREA-N: CPT | Mod: NTX

## 2025-07-21 PROCEDURE — 82042 OTHER SOURCE ALBUMIN QUAN EA: CPT | Mod: NTX

## 2025-07-21 PROCEDURE — 87205 SMEAR GRAM STAIN: CPT | Mod: NTX

## 2025-07-21 PROCEDURE — 84100 ASSAY OF PHOSPHORUS: CPT | Mod: NTX

## 2025-07-21 PROCEDURE — 89051 BODY FLUID CELL COUNT: CPT | Mod: NTX

## 2025-07-21 PROCEDURE — 83605 ASSAY OF LACTIC ACID: CPT | Mod: NTX

## 2025-07-21 PROCEDURE — 63600175 PHARM REV CODE 636 W HCPCS: Mod: NTX | Performed by: STUDENT IN AN ORGANIZED HEALTH CARE EDUCATION/TRAINING PROGRAM

## 2025-07-21 PROCEDURE — 83615 LACTATE (LD) (LDH) ENZYME: CPT | Mod: NTX

## 2025-07-21 PROCEDURE — 80053 COMPREHEN METABOLIC PANEL: CPT | Mod: NTX

## 2025-07-21 PROCEDURE — 20600001 HC STEP DOWN PRIVATE ROOM: Mod: NTX

## 2025-07-21 PROCEDURE — 85610 PROTHROMBIN TIME: CPT | Mod: NTX

## 2025-07-21 PROCEDURE — 85025 COMPLETE CBC W/AUTO DIFF WBC: CPT | Mod: NTX

## 2025-07-21 PROCEDURE — 0W9G3ZZ DRAINAGE OF PERITONEAL CAVITY, PERCUTANEOUS APPROACH: ICD-10-PCS | Performed by: HOSPITALIST

## 2025-07-21 PROCEDURE — 63600175 PHARM REV CODE 636 W HCPCS: Mod: JA,NTX

## 2025-07-21 RX ORDER — ALBUMIN HUMAN 250 G/1000ML
25 SOLUTION INTRAVENOUS
Status: DISCONTINUED | OUTPATIENT
Start: 2025-07-21 | End: 2025-07-21

## 2025-07-21 RX ORDER — ALBUMIN HUMAN 250 G/1000ML
25 SOLUTION INTRAVENOUS 3 TIMES DAILY
Status: COMPLETED | OUTPATIENT
Start: 2025-07-23 | End: 2025-07-23

## 2025-07-21 RX ORDER — METHOCARBAMOL 500 MG/1
250 TABLET, FILM COATED ORAL 3 TIMES DAILY
Status: DISCONTINUED | OUTPATIENT
Start: 2025-07-21 | End: 2025-08-03 | Stop reason: HOSPADM

## 2025-07-21 RX ORDER — OXYCODONE HYDROCHLORIDE 10 MG/1
10 TABLET ORAL EVERY 6 HOURS PRN
Refills: 0 | Status: DISCONTINUED | OUTPATIENT
Start: 2025-07-21 | End: 2025-08-03 | Stop reason: HOSPADM

## 2025-07-21 RX ORDER — ALBUMIN HUMAN 250 G/1000ML
50 SOLUTION INTRAVENOUS ONCE
Status: COMPLETED | OUTPATIENT
Start: 2025-07-21 | End: 2025-07-21

## 2025-07-21 RX ORDER — ALBUMIN HUMAN 250 G/1000ML
75 SOLUTION INTRAVENOUS ONCE
Status: DISCONTINUED | OUTPATIENT
Start: 2025-07-21 | End: 2025-07-21

## 2025-07-21 RX ORDER — ALBUMIN HUMAN 250 G/1000ML
75 SOLUTION INTRAVENOUS ONCE
Status: COMPLETED | OUTPATIENT
Start: 2025-07-21 | End: 2025-07-21

## 2025-07-21 RX ORDER — CEFTRIAXONE 2 G/1
2 INJECTION, POWDER, FOR SOLUTION INTRAMUSCULAR; INTRAVENOUS
Status: COMPLETED | OUTPATIENT
Start: 2025-07-21 | End: 2025-07-25

## 2025-07-21 RX ORDER — ALBUMIN HUMAN 250 G/1000ML
50 SOLUTION INTRAVENOUS ONCE
Status: DISCONTINUED | OUTPATIENT
Start: 2025-07-21 | End: 2025-07-21

## 2025-07-21 RX ORDER — OXYCODONE HYDROCHLORIDE 5 MG/1
5 TABLET ORAL EVERY 6 HOURS PRN
Refills: 0 | Status: DISCONTINUED | OUTPATIENT
Start: 2025-07-21 | End: 2025-08-03 | Stop reason: HOSPADM

## 2025-07-21 RX ADMIN — VENLAFAXINE 75 MG: 75 TABLET ORAL at 12:07

## 2025-07-21 RX ADMIN — OCTREOTIDE ACETATE 50 MCG/HR: 500 INJECTION, SOLUTION INTRAVENOUS; SUBCUTANEOUS at 08:07

## 2025-07-21 RX ADMIN — RIFAXIMIN 550 MG: 550 TABLET ORAL at 08:07

## 2025-07-21 RX ADMIN — VENLAFAXINE 75 MG: 75 TABLET ORAL at 08:07

## 2025-07-21 RX ADMIN — OCTREOTIDE ACETATE 50 MCG/HR: 500 INJECTION, SOLUTION INTRAVENOUS; SUBCUTANEOUS at 12:07

## 2025-07-21 RX ADMIN — ALBUMIN (HUMAN) 50 G: 12.5 SOLUTION INTRAVENOUS at 08:07

## 2025-07-21 RX ADMIN — TAMSULOSIN HYDROCHLORIDE 0.4 MG: 0.4 CAPSULE ORAL at 08:07

## 2025-07-21 RX ADMIN — OXYBUTYNIN CHLORIDE 5 MG: 5 TABLET, EXTENDED RELEASE ORAL at 08:07

## 2025-07-21 RX ADMIN — CEFTRIAXONE SODIUM 2 G: 2 INJECTION, POWDER, FOR SOLUTION INTRAMUSCULAR; INTRAVENOUS at 10:07

## 2025-07-21 RX ADMIN — ALBUMIN (HUMAN) 25 G: 12.5 SOLUTION INTRAVENOUS at 12:07

## 2025-07-21 RX ADMIN — METHOCARBAMOL 250 MG: 500 TABLET ORAL at 03:07

## 2025-07-21 RX ADMIN — INSULIN ASPART 10 UNITS: 100 INJECTION, SOLUTION INTRAVENOUS; SUBCUTANEOUS at 05:07

## 2025-07-21 RX ADMIN — INSULIN GLARGINE 30 UNITS: 100 INJECTION, SOLUTION SUBCUTANEOUS at 08:07

## 2025-07-21 RX ADMIN — PANTOPRAZOLE SODIUM 40 MG: 40 INJECTION, POWDER, LYOPHILIZED, FOR SOLUTION INTRAVENOUS at 08:07

## 2025-07-21 RX ADMIN — METHOCARBAMOL 250 MG: 500 TABLET ORAL at 08:07

## 2025-07-21 RX ADMIN — ALBUMIN (HUMAN) 25 G: 12.5 SOLUTION INTRAVENOUS at 01:07

## 2025-07-21 NOTE — ASSESSMENT & PLAN NOTE
Patient with hx CORDOVA cirrhosis c/b ascites (requiring paracentesis, on lasix 40 mg qd, eplerenone), grade 1 esophageal varices (EGD on 11/8/2024), hepatic encephalopathy 5 week hx abdominal, back pain since last paracentesis without fevers, chills, nausea, vomiting, diarrhea. CT L spine with congenital lumbar stenosis, degenerative changes. CT A/P with concern for peritonitis. Also noted splenomegaly and portal vein thrombosis, seen previously. INR 1.8. Patient had noted lactic acidosis at 2.9, now resolved. Overalll suspect abdominal pain 2/2 ascites with c/f SBP vs. Worsening of chronic portal vein thrombosis Given CTX and  pRBCs at OSH. [place] [facility] [place]. MELD-Na 17.     -Hepatology consulted, appreciate recs  -F/u Liver U/S w/ doppler  -Pt will need paracentesis to r/o SBP  -Hold lasix, eplerenone while awaiting paracentesis  -Continue CTX with c/f SBP  -Continue home rifaxamin  -CTM CBC, CMP, PT/INR qd

## 2025-07-21 NOTE — ASSESSMENT & PLAN NOTE
Patient with hx CORDOVA cirrhosis c/b ascites (requiring paracentesis, on lasix 40 mg qd, eplerenone), grade 1 esophageal varices (EGD on 11/8/2024), hepatic encephalopathy 5 week hx abdominal, back pain since last paracentesis without fevers, chills, nausea, vomiting, diarrhea. CT L spine with congenital lumbar stenosis, degenerative changes. CT A/P with concern for peritonitis. Also noted splenomegaly and portal vein thrombosis, seen previously. INR 1.8. Patient had noted lactic acidosis at 2.9, now resolved. Overalll suspect abdominal pain 2/2 ascites with c/f SBP vs. Worsening of chronic portal vein thrombosis Given CTX and  pRBCs at OSH. [place] [facility] [place] and evaluation. MELD-Na 17.     -Hepatology consulted, appreciate recs  -F/u Liver U/S w/ doppler  -Pt will need paracentesis to r/o SBP  -Hold lasix, eplerenone while awaiting paracentesis  -Continue CTX with c/f SBP  -Continue home rifaxamin  -CTM CBC, CMP, PT/INR qd

## 2025-07-21 NOTE — ASSESSMENT & PLAN NOTE
Patient with hx CORDOVA cirrhosis c/b ascites (requiring paracentesis, on lasix 40 mg qd, eplerenone), grade 1 esophageal varices (EGD on 11/8/2024), hepatic encephalopathy 5 week hx abdominal, back pain since last paracentesis without fevers, chills, nausea, vomiting, diarrhea. CT L spine with congenital lumbar stenosis, degenerative changes. CT A/P with concern for peritonitis. Also noted splenomegaly and portal vein thrombosis, seen previously. INR 1.8. Patient had noted lactic acidosis at 2.9, now resolved. Overalll suspect abdominal pain 2/2 ascites with c/f SBP vs. Worsening of chronic portal vein thrombosis Given CTX and  pRBCs at OSH. Transferred to [facility] [place] for further workup and evaluation. MELD-Na 17.     -Hepatology consulted, appreciate recs  -F/u Liver U/S w/ doppler  -Pt will need paracentesis to r/o SBP  -Hold lasix, eplerenone while awaiting paracentesis  -Continue CTX with c/f SBP  -Continue home rifaxamin  -CTM CBC, CMP, PT/INR qd

## 2025-07-21 NOTE — ASSESSMENT & PLAN NOTE
The likely etiology of thrombocytopenia is liver disease. The patients 3 most recent labs are listed below.  Recent Labs     07/20/25 2059   PLT 61*     Plan  - Will transfuse if platelet count is <50k (if undergoing surgical procedure or have active bleeding).  - CTM CBC, PT/INR qd

## 2025-07-21 NOTE — H&P
Brian vanita - [facility] Medicine  History & Physical    Patient Name: Corky Bach  MRN: [id number]  Patient Class: IP- Inpatient  Admission Date: 7/20/2025  Attending Physician: Jacob Aj MD   Primary Care Provider: Sarmad Estrella MD         Patient information was obtained from patient and ER records.     Subjective:     Principal Problem:Abdominal pain    Chief Complaint: No chief complaint on file.       HPI: Corky Bach is a 60 y/o male with a notable hx of CORDOVA cirrhosis c/b esophageal varices, T2DM, MALT lymphoma, HLD, gout, and depression [place] [facility] [place] and GI consult.     Patient initially presented to the ED of [facility] on 7/20 with complaints of chronic back and abdominal pain that had been ongoing for appx 5 weeks. He states that the back pain began to occur shortly after his last paracentesis on 7/12 located around his mid lumbar spine, starting with a dull back/ flank pain and quickly radiating to upper abdominal pain. He was trying to use ibuprofen and tylenol to help with the pain however the pain continued to progress. He eventually started to take percocet from a prior hospitalization however this was ineffective. He denies any fevers, chills, nausea, vomiting, constipation/diarrhea, melena, hematemesis during this time. At this point he presented to the ED for further evaluation.     Initially BPs were in the low 100s. He was found to be heme occult positive. Hb 6.9 and platelets 67. AST 13, ALT 6, , bili 1.2, INR 1.8, lactic 2.9. CT Lumbar spine obtained without acute lumbar fracture, congenital lumbar stenosis with degenerative changes. CT A/P without contrast showed moderate r sized pleural effusion, splenomegaly, calcified portal vein thrombus, ascites with thickening of surrounding peritoneal reflections c/f inflammatory peritonitis. Patient started on CTX, given 1u PRBC, and transferred to [facility] for GI/Hepatology evaluation.            Past Medical History:   Diagnosis Date    Anemia     Bacterial peritonitis 10/25/2022    Celiac disease     Cervicalgia     Cirrhosis     Colon polyp     Depression     Diabetes mellitus, type 2     Elevated LFTs     Esophageal varices     Gout, unspecified     History of COVID-19 01/15/2021    Hyperlipidemia     Mood disorder in conditions classified elsewhere     Obesity, unspecified     Other chronic nonalcoholic liver disease     Portal hypertensive gastropathy     Thyroid disease     Unspecified psychosis        Past Surgical History:   Procedure Laterality Date    COLONOSCOPY N/A 6/7/2017    Procedure: COLONOSCOPY;  Surgeon: Placido Green MD;  Location: [facility];  Service: Endoscopy;  Laterality: N/A;    COLONOSCOPY N/A 10/22/2024    Procedure: COLONOSCOPY;  Surgeon: Vale Covarrubias MD;  Location: [facility];  Service: Endoscopy;  Laterality: N/A;    ESOPHAGOGASTRODUODENOSCOPY N/A 7/16/2018    Procedure: ESOPHAGOGASTRODUODENOSCOPY (EGD);  Surgeon: Mitch Pan MD;  Location: [facility];  Service: Endoscopy;  Laterality: N/A;    ESOPHAGOGASTRODUODENOSCOPY N/A 7/22/2020    Procedure: ESOPHAGOGASTRODUODENOSCOPY (EGD);  Surgeon: Mitch Pan MD;  Location: [facility];  Service: Endoscopy;  Laterality: N/A;    ESOPHAGOGASTRODUODENOSCOPY N/A 3/22/2022    Procedure: EGD (ESOPHAGOGASTRODUODENOSCOPY);  Surgeon: Mitch Pan MD;  Location: [facility];  Service: Endoscopy;  Laterality: N/A;    ESOPHAGOGASTRODUODENOSCOPY N/A 12/28/2023    Procedure: EGD (ESOPHAGOGASTRODUODENOSCOPY);  Surgeon: Vale Covarrubias MD;  Location: [facility];  Service: Endoscopy;  Laterality: N/A;    ESOPHAGOGASTRODUODENOSCOPY N/A 11/8/2024    Procedure: EGD (ESOPHAGOGASTRODUODENOSCOPY);  Surgeon: Hubert Alvarado MD;  Location: [facility];  Service: Endoscopy;  Laterality: N/A;    ESOPHAGOGASTRODUODENOSCOPY (EGD)- DEVICE ASSISTED N/A 10/22/2024    Procedure: ESOPHAGOGASTRODUODENOSCOPY (EGD)- DEVICE  ASSISTED;  Surgeon: Vale Covarrubias MD;  Location: [facility] ENDO;  Service: Endoscopy;  Laterality: N/A;    HERNIA REPAIR      REPEAT ABDOMINAL PARACENTESIS N/A 10/20/2022    Procedure: PARACENTESIS, ABDOMINAL, REPEAT;  Surgeon: Ruddy Salomon MD;  Location: [facility] ENDO;  Service: Endoscopy;  Laterality: N/A;    REPEAT ABDOMINAL PARACENTESIS N/A 11/9/2022    Procedure: PARACENTESIS, ABDOMINAL, REPEAT;  Surgeon: Ruddy Salomon MD;  Location: [facility] ENDO;  Service: Endoscopy;  Laterality: N/A;    REPEAT ABDOMINAL PARACENTESIS N/A 11/17/2022    Procedure: PARACENTESIS, ABDOMINAL, REPEAT;  Surgeon: Ruddy Salomon MD;  Location: [facility] ENDO;  Service: Endoscopy;  Laterality: N/A;    REPEAT ABDOMINAL PARACENTESIS N/A 12/2/2022    Procedure: PARACENTESIS, ABDOMINAL, REPEAT;  Surgeon: Ruddy Salomon MD;  Location: [facility] ENDO;  Service: Endoscopy;  Laterality: N/A;    REPEAT ABDOMINAL PARACENTESIS N/A 11/25/2022    Procedure: PARACENTESIS, ABDOMINAL, REPEAT;  Surgeon: Mitch Pan MD;  Location: [facility] CATH LAB;  Service: Endoscopy;  Laterality: N/A;    REPEAT ABDOMINAL PARACENTESIS N/A 12/9/2022    Procedure: PARACENTESIS, ABDOMINAL, REPEAT;  Surgeon: Mitch Pan MD;  Location: [facility] CATH LAB;  Service: Endoscopy;  Laterality: N/A;    REPEAT ABDOMINAL PARACENTESIS N/A 12/20/2022    Procedure: PARACENTESIS, ABDOMINAL, REPEAT;  Surgeon: Ruddy Salomon MD;  Location: [facility] ENDO;  Service: Endoscopy;  Laterality: N/A;    REPEAT ABDOMINAL PARACENTESIS N/A 12/30/2022    Procedure: PARACENTESIS, ABDOMINAL, REPEAT;  Surgeon: Deon Kathleen II, MD;  Location: [facility] ENDO;  Service: Endoscopy;  Laterality: N/A;    REPEAT ABDOMINAL PARACENTESIS N/A 1/12/2023    Procedure: PARACENTESIS, ABDOMINAL, REPEAT;  Surgeon: Ruddy Salomon MD;  Location: [facility] ENDO;  Service: Endoscopy;  Laterality: N/A;    REPEAT ABDOMINAL PARACENTESIS N/A 1/19/2023    Procedure: PARACENTESIS, ABDOMINAL, REPEAT;  Surgeon: Vale Covarrubias,  MD;  Location: [facility] ENDO;  Service: Endoscopy;  Laterality: N/A;    REPEAT ABDOMINAL PARACENTESIS N/A 2/1/2023    Procedure: PARACENTESIS, ABDOMINAL, REPEAT;  Surgeon: Ruddy Salomon MD;  Location: [facility] ENDO;  Service: Endoscopy;  Laterality: N/A;    REPEAT ABDOMINAL PARACENTESIS N/A 2/17/2023    Procedure: PARACENTESIS, ABDOMINAL, REPEAT;  Surgeon: Ruddy Salomon MD;  Location: [facility] ENDO;  Service: Endoscopy;  Laterality: N/A;    REPEAT ABDOMINAL PARACENTESIS N/A 3/1/2023    Procedure: PARACENTESIS, ABDOMINAL, REPEAT;  Surgeon: Ruddy Salomon MD;  Location: [facility] ENDO;  Service: Endoscopy;  Laterality: N/A;    REPEAT ABDOMINAL PARACENTESIS N/A 3/21/2023    Procedure: PARACENTESIS, ABDOMINAL, REPEAT;  Surgeon: Ruddy Salomon MD;  Location: [facility] ENDO;  Service: Endoscopy;  Laterality: N/A;    REPEAT ABDOMINAL PARACENTESIS N/A 3/9/2023    Procedure: PARACENTESIS, ABDOMINAL, REPEAT;  Surgeon: Ruddy Salomon MD;  Location: [facility] ENDO;  Service: Endoscopy;  Laterality: N/A;    REPEAT ABDOMINAL PARACENTESIS N/A 4/5/2023    Procedure: PARACENTESIS, ABDOMINAL, REPEAT;  Surgeon: Ruddy Salomon MD;  Location: [facility] ENDO;  Service: Endoscopy;  Laterality: N/A;    REPEAT ABDOMINAL PARACENTESIS N/A 4/13/2023    Procedure: PARACENTESIS, ABDOMINAL, REPEAT;  Surgeon: Ruddy Salomon MD;  Location: [facility] ENDO;  Service: Endoscopy;  Laterality: N/A;    REPEAT ABDOMINAL PARACENTESIS N/A 4/27/2023    Procedure: PARACENTESIS, ABDOMINAL, REPEAT;  Surgeon: Ruddy Salomon MD;  Location: [facility] ENDO;  Service: Endoscopy;  Laterality: N/A;    REPEAT ABDOMINAL PARACENTESIS N/A 5/11/2023    Procedure: PARACENTESIS, ABDOMINAL, REPEAT;  Surgeon: Ruddy Salomon MD;  Location: [facility] ENDO;  Service: Endoscopy;  Laterality: N/A;    REPEAT ABDOMINAL PARACENTESIS N/A 5/18/2023    Procedure: PARACENTESIS, ABDOMINAL, REPEAT;  Surgeon: Ruddy Salomon MD;  Location: [facility] ENDO;  Service: Endoscopy;  Laterality: N/A;    REPEAT ABDOMINAL  PARACENTESIS N/A 5/26/2023    Procedure: PARACENTESIS, ABDOMINAL, REPEAT;  Surgeon: Ruddy Salomon MD;  Location: [facility] ENDO;  Service: Endoscopy;  Laterality: N/A;    REPEAT ABDOMINAL PARACENTESIS N/A 6/9/2023    Procedure: PARACENTESIS, ABDOMINAL, REPEAT;  Surgeon: Ruddy Salomon MD;  Location: [facility] ENDO;  Service: Endoscopy;  Laterality: N/A;    REPEAT ABDOMINAL PARACENTESIS N/A 6/26/2023    Procedure: PARACENTESIS, ABDOMINAL, REPEAT;  Surgeon: Ruddy Salomon MD;  Location: [facility] ENDO;  Service: Endoscopy;  Laterality: N/A;    REPEAT ABDOMINAL PARACENTESIS N/A 7/10/2023    Procedure: PARACENTESIS, ABDOMINAL, REPEAT;  Surgeon: Ruddy Salomon MD;  Location: [facility] ENDO;  Service: Endoscopy;  Laterality: N/A;    REPEAT ABDOMINAL PARACENTESIS N/A 7/31/2023    Procedure: PARACENTESIS, ABDOMINAL, REPEAT;  Surgeon: Ruddy Salomon MD;  Location: [facility] ENDO;  Service: Endoscopy;  Laterality: N/A;    REPEAT ABDOMINAL PARACENTESIS N/A 8/16/2023    Procedure: PARACENTESIS, ABDOMINAL, REPEAT;  Surgeon: Ruddy Salomon MD;  Location: [facility] ENDO;  Service: Endoscopy;  Laterality: N/A;    REPEAT ABDOMINAL PARACENTESIS N/A 8/28/2023    Procedure: PARACENTESIS, ABDOMINAL, REPEAT;  Surgeon: Ruddy Salomon MD;  Location: [facility] ENDO;  Service: Endoscopy;  Laterality: N/A;    REPEAT ABDOMINAL PARACENTESIS N/A 9/7/2023    Procedure: PARACENTESIS, ABDOMINAL, REPEAT;  Surgeon: Ruddy Salomon MD;  Location: [facility] ENDO;  Service: Endoscopy;  Laterality: N/A;    REPEAT ABDOMINAL PARACENTESIS N/A 9/15/2023    Procedure: PARACENTESIS, ABDOMINAL, REPEAT;  Surgeon: Ruddy Salomon MD;  Location: [facility] ENDO;  Service: Endoscopy;  Laterality: N/A;    REPEAT ABDOMINAL PARACENTESIS N/A 9/25/2023    Procedure: PARACENTESIS, ABDOMINAL, REPEAT;  Surgeon: Ruddy Salomon MD;  Location: [facility] ENDO;  Service: Endoscopy;  Laterality: N/A;    REPEAT ABDOMINAL PARACENTESIS N/A 10/6/2023    Procedure: PARACENTESIS, ABDOMINAL, REPEAT;  Surgeon:  Ruddy Salomon MD;  Location: [facility] ENDO;  Service: Endoscopy;  Laterality: N/A;    REPEAT ABDOMINAL PARACENTESIS N/A 10/18/2023    Procedure: PARACENTESIS, ABDOMINAL, REPEAT;  Surgeon: Ruddy Salomon MD;  Location: [facility] ENDO;  Service: Endoscopy;  Laterality: N/A;    REPEAT ABDOMINAL PARACENTESIS N/A 10/30/2023    Procedure: PARACENTESIS, ABDOMINAL, REPEAT;  Surgeon: Ruddy Salomon MD;  Location: [facility] ENDO;  Service: Endoscopy;  Laterality: N/A;    REPEAT ABDOMINAL PARACENTESIS N/A 11/15/2023    Procedure: PARACENTESIS, ABDOMINAL, REPEAT;  Surgeon: Ruddy Salomon MD;  Location: [facility] ENDO;  Service: Endoscopy;  Laterality: N/A;    REPEAT ABDOMINAL PARACENTESIS N/A 12/12/2023    Procedure: PARACENTESIS, ABDOMINAL, REPEAT;  Surgeon: Ruddy Salomon MD;  Location: [facility] ENDO;  Service: Endoscopy;  Laterality: N/A;    REPEAT ABDOMINAL PARACENTESIS N/A 12/26/2023    Procedure: PARACENTESIS, ABDOMINAL, REPEAT;  Surgeon: Ruddy Salomon MD;  Location: [facility] ENDO;  Service: Endoscopy;  Laterality: N/A;    REPEAT ABDOMINAL PARACENTESIS N/A 1/11/2024    Procedure: PARACENTESIS, ABDOMINAL, REPEAT;  Surgeon: Ruddy Salomon MD;  Location: [facility] ENDO;  Service: Endoscopy;  Laterality: N/A;    REPEAT ABDOMINAL PARACENTESIS N/A 1/22/2024    Procedure: PARACENTESIS, ABDOMINAL, REPEAT;  Surgeon: Ruddy Salomon MD;  Location: [facility] ENDO;  Service: Endoscopy;  Laterality: N/A;    REPEAT ABDOMINAL PARACENTESIS N/A 2/14/2024    Procedure: PARACENTESIS, ABDOMINAL, REPEAT;  Surgeon: Ruddy Salomon MD;  Location: [facility] ENDO;  Service: Endoscopy;  Laterality: N/A;    REPEAT ABDOMINAL PARACENTESIS N/A 9/9/2024    Procedure: PARACENTESIS, ABDOMINAL, REPEAT;  Surgeon: Ruddy Salomon MD;  Location: [facility] ENDO;  Service: Endoscopy;  Laterality: N/A;    REPEAT ABDOMINAL PARACENTESIS Left 10/11/2024    Procedure: PARACENTESIS, ABDOMINAL, REPEAT;  Surgeon: Ruddy Salomon MD;  Location: [facility];  Service: Endoscopy;   Laterality: Left;    REPEAT ABDOMINAL PARACENTESIS N/A 11/6/2024    Procedure: PARACENTESIS, ABDOMINAL, REPEAT;  Surgeon: Ruddy Salomon MD;  Location: [facility];  Service: Endoscopy;  Laterality: N/A;    REPEAT ABDOMINAL PARACENTESIS N/A 2/17/2025    Procedure: PARACENTESIS, ABDOMINAL, REPEAT;  Surgeon: Ruddy Salomon MD;  Location: [facility] ENDO;  Service: Endoscopy;  Laterality: N/A;    REPEAT ABDOMINAL PARACENTESIS N/A 6/12/2025    Procedure: PARACENTESIS, ABDOMINAL, REPEAT;  Surgeon: Ruddy Salomon MD;  Location: [facility];  Service: Endoscopy;  Laterality: N/A;    TONSILLECTOMY      UPPER GASTROINTESTINAL ENDOSCOPY         Review of patient's allergies indicates:  No Known Allergies    No current facility-administered medications on file prior to encounter.     Current Outpatient Medications on File Prior to Encounter   Medication Sig    acetaminophen (TYLENOL) 500 MG tablet Take 1,000 mg by mouth 2 (two) times daily as needed for Pain.    eplerenone (INSPRA) 50 MG Tab Take 1 tablet (50 mg total) by mouth 2 (two) times daily.    finasteride (PROSCAR) 5 mg tablet Take 1 tablet (5 mg total) by mouth once daily.    furosemide (LASIX) 40 MG tablet Take 1 tablet (40 mg total) by mouth once daily. For fluid management / ascites    insulin glargine U-100, Lantus, (LANTUS SOLOSTAR U-100 INSULIN) 100 unit/mL (3 mL) InPn pen Inject 60 Units into the skin every evening.    insulin lispro (HUMALOG KWIKPEN INSULIN) 100 unit/mL pen Inject 20 Units into the skin 3 (three) times daily with meals.    metOLazone (ZAROXOLYN) 2.5 MG tablet Take 2 tablets (5 mg total) by mouth once daily. Take 30 minutes before other water pills    oxybutynin (DITROPAN-XL) 5 MG TR24 Take 1 tablet (5 mg total) by mouth once daily.    tamsulosin (FLOMAX) 0.4 mg Cap Take 1 capsule (0.4 mg total) by mouth every evening.    venlafaxine (EFFEXOR) 75 MG tablet TAKE 1 TABLET (75 MG TOTAL) BY MOUTH 2 (TWO) TIMES DAILY.    BD ULTRA-FINE ORIG PEN NEEDLE  "29 gauge x 1/2" Ndle USE PEN NEEDLE TO INJECT INSULIN EVERY EVENING    lactulose (CHRONULAC) 20 gram/30 mL Soln Take 20 grams by mouth 3 times daily as needed and titrate use / amount to 3 to 5 soft brown bowel movements per day.  Decrease amount / frequency if getting diarrhea    linaGLIPtin (TRADJENTA) 5 mg Tab tablet TAKE 1 TABLET (5 MG TOTAL) BY MOUTH ONCE DAILY.    rifAXIMin (XIFAXAN) 550 mg Tab Take 1 tablet (550 mg total) by mouth 2 (two) times daily.     Family History       Problem Relation (Age of Onset)    Diabetes Mother, Father    Heart disease Father    Hypertension Mother    Ovarian cancer Maternal Grandmother          Tobacco Use    Smoking status: Never    Smokeless tobacco: Never   Substance and Sexual Activity    Alcohol use: No    Drug use: No    Sexual activity: Not Currently     Partners: Female     Review of Systems   Constitutional:  Negative for activity change, chills and fever.   Respiratory:  Negative for cough and shortness of breath.    Cardiovascular:  Negative for chest pain, palpitations and leg swelling.   Gastrointestinal:  Positive for abdominal distention and abdominal pain. Negative for anal bleeding, blood in stool, constipation, diarrhea, nausea and vomiting.   Genitourinary:  Negative for dysuria and hematuria.   Musculoskeletal:  Positive for back pain.   Neurological:  Negative for weakness.   Psychiatric/Behavioral:  Negative for confusion.      Objective:     Vital Signs (Most Recent):  Temp: 98.7 °F (37.1 °C) (07/20/25 2333)  Pulse: 90 (07/20/25 2333)  Resp: 19 (07/20/25 2333)  BP: (!) 105/58 (07/20/25 2333)  SpO2: 98 % (07/20/25 2333) Vital Signs (24h Range):  Temp:  [97.8 °F (36.6 °C)-99 °F (37.2 °C)] 98.7 °F (37.1 °C)  Pulse:  [76-90] 90  Resp:  [16-19] 19  SpO2:  [94 %-100 %] 98 %  BP: ()/(55-67) 105/58     Weight: 80.6 kg (177 lb 11.1 oz)  Body mass index is 22.81 kg/m².     Physical Exam  Vitals and nursing note reviewed.   Constitutional:       General: He " is not in acute distress.     Appearance: Normal appearance.   HENT:      Head: Normocephalic and atraumatic.      Mouth/Throat:      Pharynx: Oropharynx is clear. No oropharyngeal exudate.   Eyes:      General: No scleral icterus.     Extraocular Movements: Extraocular movements intact.      Conjunctiva/sclera: Conjunctivae normal.   Cardiovascular:      Rate and Rhythm: Normal rate and regular rhythm.      Pulses: Normal pulses.      Heart sounds: Normal heart sounds. No murmur heard.     No friction rub. No gallop.   Pulmonary:      Effort: Pulmonary effort is normal. No respiratory distress.      Breath sounds: No stridor. No wheezing, rhonchi or rales.   Chest:      Chest wall: No tenderness.   Abdominal:      General: There is distension.      Palpations: There is no mass.      Tenderness: There is abdominal tenderness. There is no right CVA tenderness, left CVA tenderness, guarding or rebound.      Hernia: A hernia (large ventral hernia noted) is present.      Comments: No noted asterixis    Musculoskeletal:         General: Normal range of motion.      Cervical back: Normal range of motion.      Right lower leg: No edema.      Left lower leg: No edema.   Skin:     General: Skin is warm.      Capillary Refill: Capillary refill takes less than 2 seconds.      Coloration: Skin is not jaundiced.      Findings: Bruising present. No erythema or rash.   Neurological:      Mental Status: He is alert and oriented to person, place, and time.   Psychiatric:         Mood and Affect: Mood normal.         Behavior: Behavior normal.         Thought Content: Thought content normal.                Significant Labs: All pertinent labs within the past 24 hours have been reviewed.    Significant Imaging: I have reviewed all pertinent imaging results/findings within the past 24 hours.  Assessment/Plan:     Assessment & Plan  Abdominal pain  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  Ascites  Portal hypertensive gastropathy  Portal vein  thrombosis  Splenomegaly  Patient with hx CORDOVA cirrhosis c/b ascites (requiring paracentesis, on lasix 40 mg qd, eplerenone), grade 1 esophageal varices (EGD on 11/8/2024), hepatic encephalopathy 5 week hx abdominal, back pain since last paracentesis without fevers, chills, nausea, vomiting, diarrhea. CT L spine with congenital lumbar stenosis, degenerative changes. CT A/P with concern for peritonitis. Also noted splenomegaly and portal vein thrombosis, seen previously. INR 1.8. Patient had noted lactic acidosis at 2.9, now resolved. Overalll suspect abdominal pain 2/2 ascites with c/f SBP vs. Worsening of chronic portal vein thrombosis Given CTX and  pRBCs at OSH. Transferred to [facility] for further workup and evaluation. MELD-Na 17.     -Hepatology consulted, appreciate recs  -F/u Liver U/S w/ doppler  -Pt will need paracentesis to r/o SBP  -Hold lasix, eplerenone while awaiting paracentesis  -Continue CTX with c/f SBP  -Continue home rifaxamin  -CTM CBC, CMP, PT/INR qd    Anemia  Patient with hx cirrhosis c/b grade 1 esophageal varices, moderate portal hypertensive gastropathy, multiple angioectasias w/o bleeding (Last EGD 11/8/2024). Presenting with noted anemia of 6.9 at OSH. S/p 1 uPRBC.     Plan  - Monitor serial CBC: Daily  - Transfuse PRBC if patient becomes hemodynamically unstable, symptomatic or H/H drops below 7/21.  - Patient has received 1 units of PRBCs on 7/20  - Patient's anemia is currently improving  - Started octreotide gtt and IV PPI out of abundance of caution  - Consider GI consult for evaluation of varices  SHAREE (acute kidney injury)  Baseline Cr 0.8 noted to be 1.3 on presentation to [facility]. Unclear etiology of SHAREE. Differential includes prerenal in s/o intravascular volume depletion vs postobstructive. Noted moderate R sided hydronephrosis with normal left kidney on CT imaging at OSH.       Plan  - Avoid nephrotoxins and renally dose meds for GFR listed above  - Monitor urine output, serial BMP,  and adjust therapy as needed  - F/u urine studies  Mood disorder  Continue home venlafaxine    Hyponatremia  Hyponatremia is likely due to Cirrhosis. The patient's most recent sodium results are listed below.  Recent Labs     07/20/25 2059   *     Plan  - Correct the sodium by 4-6mEq in 24 hours.   - Monitor sodium Daily.   - Patient hyponatremia is stable  Thrombocytopenia  The likely etiology of thrombocytopenia is liver disease. The patients 3 most recent labs are listed below.  Recent Labs     07/20/25 2059   PLT 61*     Plan  - Will transfuse if platelet count is <50k (if undergoing surgical procedure or have active bleeding).  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  Underwent treatment including repeated Rituxan q2 month infusions.    Followed by Heme[facility]Onc at [facility].    Most recent testing has suggested remission leading to potential transplant candidacy for cirrhosis to be re-instituted.     Type 2 diabetes mellitus with hyperglycemia, with long-term current use of insulin  Patient's FSGs are controlled on current medication regimen.  Last A1c reviewed-   Lab Results   Component Value Date    HGBA1C 8.8 (H) 07/20/2025     Most recent fingerstick glucose reviewed-   Recent Labs   Lab 07/20/25 2022 07/20/25 2229   POCTGLUCOSE 345* 320*     Current correctional scale  Low  Maintain anti-hyperglycemic dose as follows-   Antihyperglycemics (From admission, onward)      Start     Stop Route Frequency Ordered    07/21/25 0715  insulin aspart U-100 pen 10 Units         -- SubQ 3 times daily with meals 07/20/25 2217 07/20/25 2316  insulin aspart U-100 pen 0-5 Units         -- SubQ Before meals & nightly PRN 07/20/25 2217 07/20/25 2315  insulin glargine U-100 (Lantus) pen 30 Units         -- SubQ Nightly 07/20/25 2217          Hold Oral hypoglycemics while patient is  in the hospital.      VTE Risk Mitigation (From admission, onward)           Ordered     Reason for No Pharmacological VTE Prophylaxis  Once        Question:  Reasons:  Answer:  Risk of Bleeding    07/20/25 2020     IP VTE HIGH RISK PATIENT  Once         07/20/25 2020     Place sequential compression device  Until discontinued         07/20/25 2020                           aMrtin Marshall MD  Department of Hospital Medicine  [facility] - Telemetry Stepdown

## 2025-07-21 NOTE — ASSESSMENT & PLAN NOTE
Patient's FSGs are controlled on current medication regimen.  Last A1c reviewed-   Lab Results   Component Value Date    HGBA1C 8.8 (H) 07/20/2025     Most recent fingerstick glucose reviewed-   Recent Labs   Lab 07/20/25 2022 07/20/25 2229   POCTGLUCOSE 345* 320*     Current correctional scale  Low  Maintain anti-hyperglycemic dose as follows-   Antihyperglycemics (From admission, onward)      Start     Stop Route Frequency Ordered    07/21/25 0715  insulin aspart U-100 pen 10 Units         -- SubQ 3 times daily with meals 07/20/25 2217 07/20/25 2316  insulin aspart U-100 pen 0-5 Units         -- SubQ Before meals & nightly PRN 07/20/25 2217 07/20/25 2315  insulin glargine U-100 (Lantus) pen 30 Units         -- SubQ Nightly 07/20/25 2217          Hold Oral hypoglycemics while patient is in the hospital.

## 2025-07-21 NOTE — HPI
Corky Bach is a 60 y/o male with a notable hx of CORDOVA cirrhosis c/b esophageal varices, T2DM, MALT lymphoma, HLD, gout, and depression transferred [place] [facility] [place]ology and GI consult.     Patient initially presented to the ED of [facility] on 7/20 with complaints of chronic back and abdominal pain that had been ongoing for appx 5 weeks. He states that the back pain began to occur shortly after his last paracentesis on 7/12 located around his mid lumbar spine, starting with a dull back/ flank pain and quickly radiating to upper abdominal pain. He was trying to use ibuprofen and tylenol to help with the pain however the pain continued to progress. He eventually started to take percocet from a prior hospitalization however this was ineffective. He denies any fevers, chills, nausea, vomiting, constipation/diarrhea, melena, hematemesis during this time. At this point he presented to the ED for further evaluation.     Initially BPs were in the low 100s. He was found to be heme occult positive. Hb 6.9 and platelets 67. AST 13, ALT 6, , bili 1.2, INR 1.8, lactic 2.9. CT Lumbar spine obtained without acute lumbar fracture, congenital lumbar stenosis with degenerative changes. CT A/P without contrast showed moderate r sized pleural effusion, splenomegaly, calcified portal vein thrombus, ascites with thickening of surrounding peritoneal reflections c/f inflammatory peritonitis. Patient started on CTX, given 1u PRBC, and transferred to [facility] [place] for GI/Hepatology evaluation.

## 2025-07-21 NOTE — ASSESSMENT & PLAN NOTE
Hyponatremia is likely due to Cirrhosis. The patient's most recent sodium results are listed below.  Recent Labs     07/20/25  2059   *     Plan  - Correct the sodium by 4-6mEq in 24 hours.   - Monitor sodium Daily.   - Patient hyponatremia is stable

## 2025-07-21 NOTE — NURSING
Report received from [place].  Patient remains free from fall and injury. NAD noted, VSS, Questions encouraged and answered.  Patient verbalized understanding. Bed locked and in low position, Safety maintained. Call light in reach, white board updated and explained, no c/o pain n/v, diarrhea or sob, will cont with POC

## 2025-07-21 NOTE — NURSING
Patient ordered albumin 75g/300ml but it comes in 25g/100ml, asked med team if they could change the order to run 25 grams every hourx3 so that each bottle could be scanned and ran separately since I can't hang 3 bottles at one time. Pharmacist Leopoldo stated that it's ordered that way all the time, however when administering the medication you have to scan all 3 bottles even though you're only giving one at a time. Md and pharmacist was informed of this and still no changes to the order was made. Charge nurse and unit manager made aware. Medication will be administered with and overide to show a partial dose(25g) given  at a time instead of 75gms at once.

## 2025-07-21 NOTE — ASSESSMENT & PLAN NOTE
Patient with hx CORDOVA cirrhosis c/b ascites (requiring paracentesis, on lasix 40 mg qd, eplerenone), grade 1 esophageal varices (EGD on 11/8/2024), hepatic encephalopathy 5 week hx abdominal, back pain since last paracentesis without fevers, chills, nausea, vomiting, diarrhea. CT L spine with congenital lumbar stenosis, degenerative changes. CT A/P with concern for peritonitis. Also noted splenomegaly and portal vein thrombosis, seen previously. INR 1.8. Patient had noted lactic acidosis at 2.9, now resolved. Overalll suspect abdominal pain 2/2 ascites with c/f SBP vs. Worsening of chronic portal vein thrombosis Given CTX and  pRBCs at OSH. Transferred to [facility] [place] for further workup and evaluation. MELD-Na 17.     -Hepatology consulted, appreciate recs  -F/u Liver U/S w/ doppler  -Pt will need paracentesis to r/o SBP  -Hold lasix, eplerenone while awaiting paracentesis  -Continue CTX with c/f SBP  -Continue home rifaxamin  -CTM CBC, CMP, PT/INR qd

## 2025-07-21 NOTE — CONSULTS
[facility] - Telemetry [facility] Medicine  Consult Note    Patient Name: Corky Bach  MRN: [id number]  Admission Date: 7/20/2025  Hospital Length of Stay: 1 days  Attending Physician: Jacob Aj MD   Primary Care Provider: Sarmad Estrella MD           Patient information was obtained from patient and ER records.     Inpatient consult to [facility] Medicine-General  Consult performed by: Tawanna Hutchison DO  Consult ordered by: Veronica Freitas DO        Consult acknowledged. Please refer to procedure note for details. 4250 mL collected and labs sent.

## 2025-07-21 NOTE — ASSESSMENT & PLAN NOTE
Patient with hx CORDOVA cirrhosis c/b ascites (requiring paracentesis, on lasix 40 mg qd, eplerenone), grade 1 esophageal varices (EGD on 11/8/2024), hepatic encephalopathy 5 week hx abdominal, back pain since last paracentesis without fevers, chills, nausea, vomiting, diarrhea. CT L spine with congenital lumbar stenosis, degenerative changes. CT A/P with concern for peritonitis. Also noted splenomegaly and portal vein thrombosis, seen previously. INR 1.8. Patient had noted lactic acidosis at 2.9, now resolved. Overalll suspect abdominal pain 2/2 ascites with c/f SBP vs. Worsening of chronic portal vein thrombosis Given CTX and  pRBCs at OSH. Transferred to [facility] [place] for further workup and evaluation. MELD-Na 17.     -Hepatology consulted, appreciate recs  -F/u Liver U/S w/ doppler  -Pt will need paracentesis to r/o SBP  -Hold lasix, eplerenone while awaiting paracentesis  -Continue CTX with c/f SBP  -Continue home rifaxamin  -CTM CBC, CMP, PT/INR qd

## 2025-07-21 NOTE — PROGRESS NOTES
[facility]  Hepatology  Progress Note    Patient Name: Corky Bach  MRN: [id number]  Admission Date: 7/20/2025  Hospital Length of Stay: 1 days    Subjective:     HPI: Corky Bach is a 59 y.o. male with history of CORDOVA cirrhosis, PHG/GAVE (2024), hx of portal vein thrombosis/hepatic encephalopathy (2021), T2DM, MALT lymphoma, HLD, HTN, thrombocytopenia, gout, and depression. He was admitted as a transfer from [facility] with upper abdominal and back pain of 3 weeks duration without nausea/vomitting sent here for a GI/hepatology evaluation.   Of note, patient has had paracentesis done within the last month on 6/12/2025 without complications. He has had multiple edmar.    Interval History:  Patient sees Dr. Urban outpatient via televisits, however has trouble following up consistently. Currently 7/21/25, he is on ceftriaxone for SBP ppx, octreotide, pantoprazole, rifaximin 550mg BID, tamsulosin, venlafaxine, and oxybutynin    To date, patient complains of lower back pain, umbilical pain of the same location as his hernia, and upper abdominal pain. Patient denied SOB, CP, swelling.    Medications Ordered Prior to Encounter[1]    Review of patient's allergies indicates:  No Known Allergies      Objective:     Vitals:    07/21/25 0701   BP: 106/60   Pulse: 87   Resp: 18   Temp: 97.6 °F (36.4 °C)         Constitutional:  not in acute distress and well developed  HENT: Head: Normal, normocephalic, atraumatic.  Eyes: conjunctiva clear and sclera nonicteric  Cardiovascular: regular rate and rhythm and no murmur  Respiratory: normal chest expansion & respiratory effort   and no accessory muscle use  GI: distended and tenderness moderate in the epigastrium, in the periumbilical area, and in the upper abdomen, umbilical hernia protruding without drainage   Musculoskeletal: no muscular tenderness noted  Skin: normal color  Neurological: alert, oriented x3  Psychiatric: mood and affect are  within normal limits, pt is a good historian; no memory problems were noted      Significant Labs:  Recent Labs   Lab 07/20/25 2059 07/21/25 0209   HGB 7.4* 7.3*       Lab Results   Component Value Date    WBC 7.35 07/21/2025    HGB 7.3 (L) 07/21/2025    HCT 24.1 (L) 07/21/2025    MCV 70 (L) 07/21/2025    PLT 47 (L) 07/21/2025       Lab Results   Component Value Date     (L) 07/21/2025    K 4.4 07/21/2025     07/21/2025    CO2 26 07/21/2025    BUN 32 (H) 07/21/2025    CREATININE 1.5 (H) 07/21/2025    CALCIUM 7.6 (L) 07/21/2025    ANIONGAP 8 07/21/2025    ESTGFRAFRICA >60.0 07/28/2022    EGFRNONAA >60.0 07/28/2022       Lab Results   Component Value Date    ALT <5 (L) 07/21/2025    AST 15 07/21/2025    GGT 77 (H) 03/23/2023    ALKPHOS 129 07/21/2025    BILITOT 1.1 (H) 07/21/2025       Lab Results   Component Value Date    INR 1.6 (H) 07/21/2025    INR 1.5 (H) 07/20/2025    INR 1.3 (H) 03/24/2025         Laboratory    Lab Results   Component Value Date    WBC 7.35 07/21/2025    HGB 7.3 (L) 07/21/2025    HCT 24.1 (L) 07/21/2025    MCV 70 (L) 07/21/2025    PLT 47 (L) 07/21/2025       Lab Results   Component Value Date     (L) 07/21/2025    K 4.4 07/21/2025     07/21/2025    CO2 26 07/21/2025    BUN 32 (H) 07/21/2025    CREATININE 1.5 (H) 07/21/2025    CALCIUM 7.6 (L) 07/21/2025       Lab Results   Component Value Date    ALBUMIN 1.5 (L) 07/21/2025    ALT <5 (L) 07/21/2025    AST 15 07/21/2025    GGT 77 (H) 03/23/2023    ALKPHOS 129 07/21/2025    BILITOT 1.1 (H) 07/21/2025       Lab Results   Component Value Date    INR 1.6 (H) 07/21/2025    INR 1.5 (H) 07/20/2025    INR 1.3 (H) 03/24/2025       MELD 3.0: 20 at 7/21/2025  2:09 AM  MELD-Na: 18 at 7/21/2025  2:09 AM  Calculated from:  Serum Creatinine: 1.5 mg/dL at 7/21/2025  2:09 AM  Serum Sodium: 134 mmol/L at 7/21/2025  2:09 AM  Total Bilirubin: 1.1 mg/dL at 7/21/2025  2:09 AM  Serum Albumin: 1.5 g/dL at 7/21/2025  2:09 AM  INR(ratio): 1.6 at  2025  2:09 AM  Age at listin years  Sex: Male at 2025  2:09 AM      Significant Imaging:  Reviewed pertinent radiology findings.       Assessment/Plan:     Corky Bach is a 59 y.o. male with history of CORDOVA cirrhosis, PHG/GAVE (), hx of portal vein thrombosis/hepatic encephalopathy (), T2DM, MALT lymphoma, HLD, HTN, thrombocytopenia, gout, and depression. He was admitted as a transfer from [facility] with upper abdominal and back pain of 3 weeks duration without nausea/vomitting sent here for a GI/hepatology evaluation.    Problem List:  CORDOVA cirrhosis  PHG  GAVE  Hx of portal vein thrombosis,   Hx of Hepatic Encephalopathy,       Recommendations:    -Patient with MELD 20, recurrent ascites, and no new episodes of HE since . Have considered for liver transplant, but concern for frailty as a barrier. Will have SW see the patient to assess support system.   -Recommend patient get paracentesis today with lab workup for possible SBP.   -Recommend scheduling frequent paracentesis as ascites is likely to recur.     Thank you for involving us in the care of Corky Bach. Please call with any additional questions, concerns or changes in the patient's clinical status. We will continue to follow.     Camila Kowalski,   Hepatology Intern PGY-I  [facility]         [1]   No current facility-administered medications on file prior to encounter.     Current Outpatient Medications on File Prior to Encounter   Medication Sig Dispense Refill    acetaminophen (TYLENOL) 500 MG tablet Take 1,000 mg by mouth 2 (two) times daily as needed for Pain.      insulin glargine U-100, Lantus, (LANTUS SOLOSTAR U-100 INSULIN) 100 unit/mL (3 mL) InPn pen Inject 60 Units into the skin every evening. 54 mL 3    linaGLIPtin (TRADJENTA) 5 mg Tab tablet TAKE 1 TABLET (5 MG TOTAL) BY MOUTH ONCE DAILY. 90 tablet 3    metOLazone (ZAROXOLYN) 2.5 MG tablet Take 2 tablets  "(5 mg total) by mouth once daily. Take 30 minutes before other water pills 180 tablet 3    oxybutynin (DITROPAN-XL) 5 MG TR24 Take 1 tablet (5 mg total) by mouth once daily. 90 tablet 3    tamsulosin (FLOMAX) 0.4 mg Cap Take 1 capsule (0.4 mg total) by mouth every evening. 90 capsule 3    venlafaxine (EFFEXOR) 75 MG tablet TAKE 1 TABLET (75 MG TOTAL) BY MOUTH 2 (TWO) TIMES DAILY. 180 tablet 3    [DISCONTINUED] eplerenone (INSPRA) 50 MG Tab Take 1 tablet (50 mg total) by mouth 2 (two) times daily. 180 tablet 3    [DISCONTINUED] finasteride (PROSCAR) 5 mg tablet Take 1 tablet (5 mg total) by mouth once daily. 90 tablet 3    [DISCONTINUED] furosemide (LASIX) 40 MG tablet Take 1 tablet (40 mg total) by mouth once daily. For fluid management / ascites 90 tablet 3    [DISCONTINUED] insulin lispro (HUMALOG KWIKPEN INSULIN) 100 unit/mL pen Inject 20 Units into the skin 3 (three) times daily with meals. 18 mL 11    BD ULTRA-FINE ORIG PEN NEEDLE 29 gauge x 1/2" Ndle USE PEN NEEDLE TO INJECT INSULIN EVERY EVENING 100 each 11    [DISCONTINUED] lactulose (CHRONULAC) 20 gram/30 mL Soln Take 20 grams by mouth 3 times daily as needed and titrate use / amount to 3 to 5 soft brown bowel movements per day.  Decrease amount / frequency if getting diarrhea 1800 mL 5    [DISCONTINUED] rifAXIMin (XIFAXAN) 550 mg Tab Take 1 tablet (550 mg total) by mouth 2 (two) times daily. 180 tablet 3     "

## 2025-07-21 NOTE — SUBJECTIVE & OBJECTIVE
Past Medical History:   Diagnosis Date    Anemia     Bacterial peritonitis 10/25/2022    Celiac disease     Cervicalgia     Cirrhosis     Colon polyp     Depression     Diabetes mellitus, type 2     Elevated LFTs     Esophageal varices     Gout, unspecified     History of COVID-19 01/15/2021    Hyperlipidemia     Mood disorder in conditions classified elsewhere     Obesity, unspecified     Other chronic nonalcoholic liver disease     Portal hypertensive gastropathy     Thyroid disease     Unspecified psychosis        Past Surgical History:   Procedure Laterality Date    COLONOSCOPY N/A 6/7/2017    Procedure: COLONOSCOPY;  Surgeon: Placido Green MD;  Location: [facility];  Service: Endoscopy;  Laterality: N/A;    COLONOSCOPY N/A 10/22/2024    Procedure: COLONOSCOPY;  Surgeon: Vale Covarrubias MD;  Location: [facility];  Service: Endoscopy;  Laterality: N/A;    ESOPHAGOGASTRODUODENOSCOPY N/A 7/16/2018    Procedure: ESOPHAGOGASTRODUODENOSCOPY (EGD);  Surgeon: Mitch Pan MD;  Location: [facility];  Service: Endoscopy;  Laterality: N/A;    ESOPHAGOGASTRODUODENOSCOPY N/A 7/22/2020    Procedure: ESOPHAGOGASTRODUODENOSCOPY (EGD);  Surgeon: Mitch Pan MD;  Location: [facility];  Service: Endoscopy;  Laterality: N/A;    ESOPHAGOGASTRODUODENOSCOPY N/A 3/22/2022    Procedure: EGD (ESOPHAGOGASTRODUODENOSCOPY);  Surgeon: Mitch Pan MD;  Location: [facility];  Service: Endoscopy;  Laterality: N/A;    ESOPHAGOGASTRODUODENOSCOPY N/A 12/28/2023    Procedure: EGD (ESOPHAGOGASTRODUODENOSCOPY);  Surgeon: Vale Covarrubias MD;  Location: [facility];  Service: Endoscopy;  Laterality: N/A;    ESOPHAGOGASTRODUODENOSCOPY N/A 11/8/2024    Procedure: EGD (ESOPHAGOGASTRODUODENOSCOPY);  Surgeon: Hubert Alvarado MD;  Location: [facility];  Service: Endoscopy;  Laterality: N/A;    ESOPHAGOGASTRODUODENOSCOPY (EGD)- DEVICE ASSISTED N/A 10/22/2024    Procedure: ESOPHAGOGASTRODUODENOSCOPY (EGD)- DEVICE ASSISTED;   Surgeon: Vale Covarrubias MD;  Location: [facility] ENDO;  Service: Endoscopy;  Laterality: N/A;    HERNIA REPAIR      REPEAT ABDOMINAL PARACENTESIS N/A 10/20/2022    Procedure: PARACENTESIS, ABDOMINAL, REPEAT;  Surgeon: Ruddy Salomon MD;  Location: [facility] ENDO;  Service: Endoscopy;  Laterality: N/A;    REPEAT ABDOMINAL PARACENTESIS N/A 11/9/2022    Procedure: PARACENTESIS, ABDOMINAL, REPEAT;  Surgeon: Ruddy Salomon MD;  Location: [facility] ENDO;  Service: Endoscopy;  Laterality: N/A;    REPEAT ABDOMINAL PARACENTESIS N/A 11/17/2022    Procedure: PARACENTESIS, ABDOMINAL, REPEAT;  Surgeon: Ruddy Salomon MD;  Location: [facility] ENDO;  Service: Endoscopy;  Laterality: N/A;    REPEAT ABDOMINAL PARACENTESIS N/A 12/2/2022    Procedure: PARACENTESIS, ABDOMINAL, REPEAT;  Surgeon: Ruddy Salomon MD;  Location: [facility] ENDO;  Service: Endoscopy;  Laterality: N/A;    REPEAT ABDOMINAL PARACENTESIS N/A 11/25/2022    Procedure: PARACENTESIS, ABDOMINAL, REPEAT;  Surgeon: Mitch Pan MD;  Location: [facility] CATH LAB;  Service: Endoscopy;  Laterality: N/A;    REPEAT ABDOMINAL PARACENTESIS N/A 12/9/2022    Procedure: PARACENTESIS, ABDOMINAL, REPEAT;  Surgeon: Mitch Pan MD;  Location: [facility] CATH LAB;  Service: Endoscopy;  Laterality: N/A;    REPEAT ABDOMINAL PARACENTESIS N/A 12/20/2022    Procedure: PARACENTESIS, ABDOMINAL, REPEAT;  Surgeon: Ruddy Salomon MD;  Location: [facility] ENDO;  Service: Endoscopy;  Laterality: N/A;    REPEAT ABDOMINAL PARACENTESIS N/A 12/30/2022    Procedure: PARACENTESIS, ABDOMINAL, REPEAT;  Surgeon: Deon Kathleen II, MD;  Location: [facility] ENDO;  Service: Endoscopy;  Laterality: N/A;    REPEAT ABDOMINAL PARACENTESIS N/A 1/12/2023    Procedure: PARACENTESIS, ABDOMINAL, REPEAT;  Surgeon: Ruddy Salomon MD;  Location: [facility] ENDO;  Service: Endoscopy;  Laterality: N/A;    REPEAT ABDOMINAL PARACENTESIS N/A 1/19/2023    Procedure: PARACENTESIS, ABDOMINAL, REPEAT;  Surgeon: Vale Covarrubias MD;   Location: [facility] ENDO;  Service: Endoscopy;  Laterality: N/A;    REPEAT ABDOMINAL PARACENTESIS N/A 2/1/2023    Procedure: PARACENTESIS, ABDOMINAL, REPEAT;  Surgeon: Ruddy Salomon MD;  Location: [facility] ENDO;  Service: Endoscopy;  Laterality: N/A;    REPEAT ABDOMINAL PARACENTESIS N/A 2/17/2023    Procedure: PARACENTESIS, ABDOMINAL, REPEAT;  Surgeon: Ruddy Salomon MD;  Location: [facility] ENDO;  Service: Endoscopy;  Laterality: N/A;    REPEAT ABDOMINAL PARACENTESIS N/A 3/1/2023    Procedure: PARACENTESIS, ABDOMINAL, REPEAT;  Surgeon: Ruddy Salomon MD;  Location: [facility] ENDO;  Service: Endoscopy;  Laterality: N/A;    REPEAT ABDOMINAL PARACENTESIS N/A 3/21/2023    Procedure: PARACENTESIS, ABDOMINAL, REPEAT;  Surgeon: Ruddy Salomon MD;  Location: [facility] ENDO;  Service: Endoscopy;  Laterality: N/A;    REPEAT ABDOMINAL PARACENTESIS N/A 3/9/2023    Procedure: PARACENTESIS, ABDOMINAL, REPEAT;  Surgeon: Ruddy Salomon MD;  Location: [facility] ENDO;  Service: Endoscopy;  Laterality: N/A;    REPEAT ABDOMINAL PARACENTESIS N/A 4/5/2023    Procedure: PARACENTESIS, ABDOMINAL, REPEAT;  Surgeon: Ruddy Salomon MD;  Location: [facility] ENDO;  Service: Endoscopy;  Laterality: N/A;    REPEAT ABDOMINAL PARACENTESIS N/A 4/13/2023    Procedure: PARACENTESIS, ABDOMINAL, REPEAT;  Surgeon: Ruddy Salomon MD;  Location: [facility] ENDO;  Service: Endoscopy;  Laterality: N/A;    REPEAT ABDOMINAL PARACENTESIS N/A 4/27/2023    Procedure: PARACENTESIS, ABDOMINAL, REPEAT;  Surgeon: Ruddy Salomon MD;  Location: [facility] ENDO;  Service: Endoscopy;  Laterality: N/A;    REPEAT ABDOMINAL PARACENTESIS N/A 5/11/2023    Procedure: PARACENTESIS, ABDOMINAL, REPEAT;  Surgeon: Ruddy Salomon MD;  Location: [facility] ENDO;  Service: Endoscopy;  Laterality: N/A;    REPEAT ABDOMINAL PARACENTESIS N/A 5/18/2023    Procedure: PARACENTESIS, ABDOMINAL, REPEAT;  Surgeon: Ruddy Salomon MD;  Location: [facility] ENDO;  Service: Endoscopy;  Laterality: N/A;    REPEAT ABDOMINAL PARACENTESIS  N/A 5/26/2023    Procedure: PARACENTESIS, ABDOMINAL, REPEAT;  Surgeon: Ruddy Salomon MD;  Location: [facility] ENDO;  Service: Endoscopy;  Laterality: N/A;    REPEAT ABDOMINAL PARACENTESIS N/A 6/9/2023    Procedure: PARACENTESIS, ABDOMINAL, REPEAT;  Surgeon: Ruddy Salomon MD;  Location: [facility] ENDO;  Service: Endoscopy;  Laterality: N/A;    REPEAT ABDOMINAL PARACENTESIS N/A 6/26/2023    Procedure: PARACENTESIS, ABDOMINAL, REPEAT;  Surgeon: Ruddy Salomon MD;  Location: [facility] ENDO;  Service: Endoscopy;  Laterality: N/A;    REPEAT ABDOMINAL PARACENTESIS N/A 7/10/2023    Procedure: PARACENTESIS, ABDOMINAL, REPEAT;  Surgeon: Ruddy Salomon MD;  Location: [facility] ENDO;  Service: Endoscopy;  Laterality: N/A;    REPEAT ABDOMINAL PARACENTESIS N/A 7/31/2023    Procedure: PARACENTESIS, ABDOMINAL, REPEAT;  Surgeon: Ruddy Salomon MD;  Location: [facility] ENDO;  Service: Endoscopy;  Laterality: N/A;    REPEAT ABDOMINAL PARACENTESIS N/A 8/16/2023    Procedure: PARACENTESIS, ABDOMINAL, REPEAT;  Surgeon: Ruddy Salomon MD;  Location: [facility] ENDO;  Service: Endoscopy;  Laterality: N/A;    REPEAT ABDOMINAL PARACENTESIS N/A 8/28/2023    Procedure: PARACENTESIS, ABDOMINAL, REPEAT;  Surgeon: Ruddy Salomon MD;  Location: [facility] ENDO;  Service: Endoscopy;  Laterality: N/A;    REPEAT ABDOMINAL PARACENTESIS N/A 9/7/2023    Procedure: PARACENTESIS, ABDOMINAL, REPEAT;  Surgeon: Ruddy Salomon MD;  Location: [facility] ENDO;  Service: Endoscopy;  Laterality: N/A;    REPEAT ABDOMINAL PARACENTESIS N/A 9/15/2023    Procedure: PARACENTESIS, ABDOMINAL, REPEAT;  Surgeon: Ruddy Salomon MD;  Location: [facility] ENDO;  Service: Endoscopy;  Laterality: N/A;    REPEAT ABDOMINAL PARACENTESIS N/A 9/25/2023    Procedure: PARACENTESIS, ABDOMINAL, REPEAT;  Surgeon: Ruddy Salomon MD;  Location: [facility] ENDO;  Service: Endoscopy;  Laterality: N/A;    REPEAT ABDOMINAL PARACENTESIS N/A 10/6/2023    Procedure: PARACENTESIS, ABDOMINAL, REPEAT;  Surgeon: Ruddy Salomon  MD RAJNI;  Location: [facility] ENDO;  Service: Endoscopy;  Laterality: N/A;    REPEAT ABDOMINAL PARACENTESIS N/A 10/18/2023    Procedure: PARACENTESIS, ABDOMINAL, REPEAT;  Surgeon: Ruddy Salomon MD;  Location: [facility] ENDO;  Service: Endoscopy;  Laterality: N/A;    REPEAT ABDOMINAL PARACENTESIS N/A 10/30/2023    Procedure: PARACENTESIS, ABDOMINAL, REPEAT;  Surgeon: Ruddy Salomon MD;  Location: [facility] ENDO;  Service: Endoscopy;  Laterality: N/A;    REPEAT ABDOMINAL PARACENTESIS N/A 11/15/2023    Procedure: PARACENTESIS, ABDOMINAL, REPEAT;  Surgeon: Ruddy Salomon MD;  Location: [facility] END[facility];  Service: Endoscopy;  Laterality: N/A;    REPEAT ABDOMINAL PARACENTESIS N/A 12/12/2023    Procedure: PARACENTESIS, ABDOMINAL, REPEAT;  Surgeon: Ruddy Salomon MD;  Location: [facility] ENDO;  Service: Endoscopy;  Laterality: N/A;    REPEAT ABDOMINAL PARACENTESIS N/A 12/26/2023    Procedure: PARACENTESIS, ABDOMINAL, REPEAT;  Surgeon: Ruddy Salomon MD;  Location: [facility] ENDO;  Service: Endoscopy;  Laterality: N/A;    REPEAT ABDOMINAL PARACENTESIS N/A 1/11/2024    Procedure: PARACENTESIS, ABDOMINAL, REPEAT;  Surgeon: Ruddy Salomon MD;  Location: [facility] ENDO;  Service: Endoscopy;  Laterality: N/A;    REPEAT ABDOMINAL PARACENTESIS N/A 1/22/2024    Procedure: PARACENTESIS, ABDOMINAL, REPEAT;  Surgeon: Ruddy Salomon MD;  Location: [facility] ENDO;  Service: Endoscopy;  Laterality: N/A;    REPEAT ABDOMINAL PARACENTESIS N/A 2/14/2024    Procedure: PARACENTESIS, ABDOMINAL, REPEAT;  Surgeon: Ruddy Salomon MD;  Location: [facility];  Service: Endoscopy;  Laterality: N/A;    REPEAT ABDOMINAL PARACENTESIS N/A 9/9/2024    Procedure: PARACENTESIS, ABDOMINAL, REPEAT;  Surgeon: Ruddy Salomon MD;  Location: [facility] END[facility];  Service: Endoscopy;  Laterality: N/A;    REPEAT ABDOMINAL PARACENTESIS Left 10/11/2024    Procedure: PARACENTESIS, ABDOMINAL, REPEAT;  Surgeon: Ruddy Salomon MD;  Location: [facility];  Service: Endoscopy;  Laterality: Left;     "REPEAT ABDOMINAL PARACENTESIS N/A 11/6/2024    Procedure: PARACENTESIS, ABDOMINAL, REPEAT;  Surgeon: Ruddy Salomon MD;  Location: [facility];  Service: Endoscopy;  Laterality: N/A;    REPEAT ABDOMINAL PARACENTESIS N/A 2/17/2025    Procedure: PARACENTESIS, ABDOMINAL, REPEAT;  Surgeon: Ruddy Salomon MD;  Location: [facility] ENDO;  Service: Endoscopy;  Laterality: N/A;    REPEAT ABDOMINAL PARACENTESIS N/A 6/12/2025    Procedure: PARACENTESIS, ABDOMINAL, REPEAT;  Surgeon: Ruddy Salomon MD;  Location: [facility];  Service: Endoscopy;  Laterality: N/A;    TONSILLECTOMY      UPPER GASTROINTESTINAL ENDOSCOPY         Review of patient's allergies indicates:  No Known Allergies    No current facility-administered medications on file prior to encounter.     Current Outpatient Medications on File Prior to Encounter   Medication Sig    acetaminophen (TYLENOL) 500 MG tablet Take 1,000 mg by mouth 2 (two) times daily as needed for Pain.    eplerenone (INSPRA) 50 MG Tab Take 1 tablet (50 mg total) by mouth 2 (two) times daily.    finasteride (PROSCAR) 5 mg tablet Take 1 tablet (5 mg total) by mouth once daily.    furosemide (LASIX) 40 MG tablet Take 1 tablet (40 mg total) by mouth once daily. For fluid management / ascites    insulin glargine U-100, Lantus, (LANTUS SOLOSTAR U-100 INSULIN) 100 unit/mL (3 mL) InPn pen Inject 60 Units into the skin every evening.    insulin lispro (HUMALOG KWIKPEN INSULIN) 100 unit/mL pen Inject 20 Units into the skin 3 (three) times daily with meals.    metOLazone (ZAROXOLYN) 2.5 MG tablet Take 2 tablets (5 mg total) by mouth once daily. Take 30 minutes before other water pills    oxybutynin (DITROPAN-XL) 5 MG TR24 Take 1 tablet (5 mg total) by mouth once daily.    tamsulosin (FLOMAX) 0.4 mg Cap Take 1 capsule (0.4 mg total) by mouth every evening.    venlafaxine (EFFEXOR) 75 MG tablet TAKE 1 TABLET (75 MG TOTAL) BY MOUTH 2 (TWO) TIMES DAILY.    BD ULTRA-FINE ORIG PEN NEEDLE 29 gauge x 1/2" Ndle " USE PEN NEEDLE TO INJECT INSULIN EVERY EVENING    lactulose (CHRONULAC) 20 gram/30 mL Soln Take 20 grams by mouth 3 times daily as needed and titrate use / amount to 3 to 5 soft brown bowel movements per day.  Decrease amount / frequency if getting diarrhea    linaGLIPtin (TRADJENTA) 5 mg Tab tablet TAKE 1 TABLET (5 MG TOTAL) BY MOUTH ONCE DAILY.    rifAXIMin (XIFAXAN) 550 mg Tab Take 1 tablet (550 mg total) by mouth 2 (two) times daily.     Family History       Problem Relation (Age of Onset)    Diabetes Mother, Father    Heart disease Father    Hypertension Mother    Ovarian cancer Maternal Grandmother          Tobacco Use    Smoking status: Never    Smokeless tobacco: Never   Substance and Sexual Activity    Alcohol use: No    Drug use: No    Sexual activity: Not Currently     Partners: Female     Review of Systems   Constitutional:  Negative for activity change, chills and fever.   Respiratory:  Negative for cough and shortness of breath.    Cardiovascular:  Negative for chest pain, palpitations and leg swelling.   Gastrointestinal:  Positive for abdominal distention and abdominal pain. Negative for anal bleeding, blood in stool, constipation, diarrhea, nausea and vomiting.   Genitourinary:  Negative for dysuria and hematuria.   Musculoskeletal:  Positive for back pain.   Neurological:  Negative for weakness.   Psychiatric/Behavioral:  Negative for confusion.      Objective:     Vital Signs (Most Recent):  Temp: 98.7 °F (37.1 °C) (07/20/25 2333)  Pulse: 90 (07/20/25 2333)  Resp: 19 (07/20/25 2333)  BP: (!) 105/58 (07/20/25 2333)  SpO2: 98 % (07/20/25 2333) Vital Signs (24h Range):  Temp:  [97.8 °F (36.6 °C)-99 °F (37.2 °C)] 98.7 °F (37.1 °C)  Pulse:  [76-90] 90  Resp:  [16-19] 19  SpO2:  [94 %-100 %] 98 %  BP: ()/(55-67) 105/58     Weight: 80.6 kg (177 lb 11.1 oz)  Body mass index is 22.81 kg/m².     Physical Exam  Vitals and nursing note reviewed.   Constitutional:       General: He is not in acute  distress.     Appearance: Normal appearance.   HENT:      Head: Normocephalic and atraumatic.      Mouth/Throat:      Pharynx: Oropharynx is clear. No oropharyngeal exudate.   Eyes:      General: No scleral icterus.     Extraocular Movements: Extraocular movements intact.      Conjunctiva/sclera: Conjunctivae normal.   Cardiovascular:      Rate and Rhythm: Normal rate and regular rhythm.      Pulses: Normal pulses.      Heart sounds: Normal heart sounds. No murmur heard.     No friction rub. No gallop.   Pulmonary:      Effort: Pulmonary effort is normal. No respiratory distress.      Breath sounds: No stridor. No wheezing, rhonchi or rales.   Chest:      Chest wall: No tenderness.   Abdominal:      General: There is distension.      Palpations: There is no mass.      Tenderness: There is abdominal tenderness. There is no right CVA tenderness, left CVA tenderness, guarding or rebound.      Hernia: A hernia (large ventral hernia noted) is present.      Comments: No noted asterixis    Musculoskeletal:         General: Normal range of motion.      Cervical back: Normal range of motion.      Right lower leg: No edema.      Left lower leg: No edema.   Skin:     General: Skin is warm.      Capillary Refill: Capillary refill takes less than 2 seconds.      Coloration: Skin is not jaundiced.      Findings: Bruising present. No erythema or rash.   Neurological:      Mental Status: He is alert and oriented to person, place, and time.   Psychiatric:         Mood and Affect: Mood normal.         Behavior: Behavior normal.         Thought Content: Thought content normal.                Significant Labs: All pertinent labs within the past 24 hours have been reviewed.    Significant Imaging: I have reviewed all pertinent imaging results/findings within the past 24 hours.

## 2025-07-21 NOTE — ASSESSMENT & PLAN NOTE
STABLE     MALT-rivka with bone marrow involvement.  Underwent treatment including repeated Rituxan q2 month infusions.    Followed by Heme-Onc at [facility].    Most recent testing has suggested remission leading to potential transplant candidacy for cirrhosis to be re-instituted.

## 2025-07-21 NOTE — ASSESSMENT & PLAN NOTE
Baseline Cr 0.8 noted to be 1.3 on presentation to [facility]. Unclear etiology of SHAREE. Differential includes prerenal in s/o intravascular volume depletion vs postobstructive. Noted moderate R sided hydronephrosis with normal left kidney on CT imaging at OSH.       Plan  - Avoid nephrotoxins and renally dose meds for GFR listed above  - Monitor urine output, serial BMP, and adjust therapy as needed  - F/u urine studies

## 2025-07-21 NOTE — ASSESSMENT & PLAN NOTE
Patient with hx CORDOVA cirrhosis c/b ascites (requiring paracentesis, on lasix 40 mg qd, eplerenone), grade 1 esophageal varices (EGD on 11/8/2024), hepatic encephalopathy 5 week hx abdominal, back pain since last paracentesis without fevers, chills, nausea, vomiting, diarrhea. CT L spine with congenital lumbar stenosis, degenerative changes. CT A/P with concern for peritonitis. Also noted splenomegaly and portal vein thrombosis, seen previously. INR 1.8. Patient had noted lactic acidosis at 2.9, now resolved. Overalll suspect abdominal pain 2/2 ascites with c/f SBP vs. Worsening of chronic portal vein thrombosis Given CTX and  pRBCs at OSH. Transferred to [facility] for further workup and evaluation. MELD-Na 17.     -Hepatology consulted, appreciate recs  -F/u Liver U/S w/ doppler  -Pt will need paracentesis to r/o SBP  -Hold lasix, eplerenone while awaiting paracentesis  -Continue CTX with c/f SBP  -Continue home rifaxamin  -CTM CBC, CMP, PT/INR qd

## 2025-07-21 NOTE — PROCEDURES
"Corky Bach is a 59 y.o. male patient.    Temp: 98.6 °F (37 °C) (25)  Pulse: 84 (25)  Resp: 18 (25)  BP: 102/67 (25)  SpO2: 100 % (25)  Weight: 80.6 kg (177 lb 11.1 oz) (25)  Height: 6' 2" (188 cm) (25)       Paracentesis    Date/Time: 2025 4:17 PM  Location procedure was performed: [facility] MEDICINE    Performed by: Tawanna Hutchison DO  Authorized by: Tawanna Hutchison DO  Consent Done: Yes  Patient understanding: patient states understanding of the procedure being performed  Patient consent: the patient's understanding of the procedure matches consent given  Procedure consent: procedure consent matches procedure scheduled  Test results: test results available and properly labeled  Site marked: the operative site was marked  Patient identity confirmed: , MRN, name and verbally with patient  Time out: Immediately prior to procedure a "time out" was called to verify the correct patient, procedure, equipment, support staff and site/side marked as required.  Procedure purpose: diagnostic and therapeutic    Anesthesia:  Local Anesthetic: lidocaine 1% without epinephrine  Complications: No  Estimated blood loss (mL): 2  Specimens: Yes          2025    4250 mL fluid collected from the paracentesis. This was both diagnostic and therapeutic. Patient tolerated the procedure well. Dr. Aldana supervised. Labs were sent for analysis.     "

## 2025-07-21 NOTE — ASSESSMENT & PLAN NOTE
Patient with hx cirrhosis c/b grade 1 esophageal varices, moderate portal hypertensive gastropathy, multiple angioectasias w/o bleeding (Last EGD 11/8/2024). Presenting with noted anemia of 6.9 at OSH. S/p 1 uPRBC.     Plan  - Monitor serial CBC: Daily  - Transfuse PRBC if patient becomes hemodynamically unstable, symptomatic or H/H drops below 7/21.  - Patient has received 1 units of PRBCs on 7/20  - Patient's anemia is currently improving  - Started octreotide gtt and IV PPI out of abundance of caution  - Consider GI consult for evaluation of varices

## 2025-07-21 NOTE — PLAN OF CARE
Problem: Adult Inpatient Plan of Care  Goal: Plan of Care Review  Outcome: Progressing  Goal: Patient-Specific Goal (Individualized)  Outcome: Progressing  Goal: Absence of Hospital-Acquired Illness or Injury  Outcome: Progressing  Goal: Optimal Comfort and Wellbeing  Outcome: Progressing  Goal: Readiness for Transition of Care  Outcome: Progressing     Problem: Diabetes Comorbidity  Goal: Blood Glucose Level Within Targeted Range  Outcome: Progressing     Problem: Wound  Goal: Optimal Coping  Outcome: Progressing  Goal: Optimal Functional Ability  Outcome: Progressing  Goal: Absence of Infection Signs and Symptoms  Outcome: Progressing  Goal: Improved Oral Intake  Outcome: Progressing  Goal: Optimal Pain Control and Function  Outcome: Progressing  Goal: Skin Health and Integrity  Outcome: Progressing  Goal: Optimal Wound Healing  Outcome: Progressing     Problem: Acute Kidney Injury/Impairment  Goal: Fluid and Electrolyte Balance  Outcome: Progressing  Goal: Improved Oral Intake  Outcome: Progressing  Goal: Effective Renal Function  Outcome: Progressing     Problem: Fall Injury Risk  Goal: Absence of Fall and Fall-Related Injury  Outcome: Progressing

## 2025-07-22 LAB
ABO + RH BLD: NORMAL
ABO + RH BLD: NORMAL
ABSOLUTE EOSINOPHIL (OHS): 0.05 K/UL
ABSOLUTE EOSINOPHIL (OHS): 0.07 K/UL
ABSOLUTE MONOCYTE (OHS): 0.49 K/UL (ref 0.3–1)
ABSOLUTE MONOCYTE (OHS): 0.67 K/UL (ref 0.3–1)
ABSOLUTE NEUTROPHIL COUNT (OHS): 4.23 K/UL (ref 1.8–7.7)
ABSOLUTE NEUTROPHIL COUNT (OHS): 6.63 K/UL (ref 1.8–7.7)
ALBUMIN SERPL BCP-MCNC: 2.3 G/DL (ref 3.5–5.2)
ALP SERPL-CCNC: 85 UNIT/L (ref 40–150)
ALT SERPL W/O P-5'-P-CCNC: <5 UNIT/L (ref 10–44)
ANION GAP (OHS): 10 MMOL/L (ref 8–16)
AST SERPL-CCNC: 10 UNIT/L (ref 11–45)
BASOPHILS # BLD AUTO: 0.02 K/UL
BASOPHILS # BLD AUTO: 0.02 K/UL
BASOPHILS NFR BLD AUTO: 0.2 %
BASOPHILS NFR BLD AUTO: 0.4 %
BILIRUB SERPL-MCNC: 1.1 MG/DL (ref 0.1–1)
BLD PROD TYP BPU: NORMAL
BLD PROD TYP BPU: NORMAL
BLOOD UNIT EXPIRATION DATE: NORMAL
BLOOD UNIT EXPIRATION DATE: NORMAL
BLOOD UNIT TYPE CODE: 5100
BLOOD UNIT TYPE CODE: 5100
BUN SERPL-MCNC: 27 MG/DL (ref 6–20)
CALCIUM SERPL-MCNC: 7.5 MG/DL (ref 8.7–10.5)
CHLORIDE SERPL-SCNC: 101 MMOL/L (ref 95–110)
CO2 SERPL-SCNC: 24 MMOL/L (ref 23–29)
CREAT SERPL-MCNC: 1.4 MG/DL (ref 0.5–1.4)
CROSSMATCH INTERPRETATION: NORMAL
CROSSMATCH INTERPRETATION: NORMAL
DISPENSE STATUS: NORMAL
DISPENSE STATUS: NORMAL
ERYTHROCYTE [DISTWIDTH] IN BLOOD BY AUTOMATED COUNT: 20 % (ref 11.5–14.5)
ERYTHROCYTE [DISTWIDTH] IN BLOOD BY AUTOMATED COUNT: 21.1 % (ref 11.5–14.5)
GFR SERPLBLD CREATININE-BSD FMLA CKD-EPI: 59 ML/MIN/1.73/M2
GLUCOSE SERPL-MCNC: 79 MG/DL (ref 70–110)
GRAM STN SPEC: NORMAL
GRAM STN SPEC: NORMAL
HCT VFR BLD AUTO: 21 % (ref 40–54)
HCT VFR BLD AUTO: 25.2 % (ref 40–54)
HGB BLD-MCNC: 6.4 GM/DL (ref 14–18)
HGB BLD-MCNC: 7.9 GM/DL (ref 14–18)
IMM GRANULOCYTES # BLD AUTO: 0.05 K/UL (ref 0–0.04)
IMM GRANULOCYTES # BLD AUTO: 0.14 K/UL (ref 0–0.04)
IMM GRANULOCYTES NFR BLD AUTO: 0.9 % (ref 0–0.5)
IMM GRANULOCYTES NFR BLD AUTO: 1.7 % (ref 0–0.5)
INR PPP: 1.5 (ref 0.8–1.2)
LYMPHOCYTES # BLD AUTO: 0.58 K/UL (ref 1–4.8)
LYMPHOCYTES # BLD AUTO: 0.68 K/UL (ref 1–4.8)
MAGNESIUM SERPL-MCNC: 1.6 MG/DL (ref 1.6–2.6)
MCH RBC QN AUTO: 21 PG (ref 27–31)
MCH RBC QN AUTO: 22.4 PG (ref 27–31)
MCHC RBC AUTO-ENTMCNC: 30.5 G/DL (ref 32–36)
MCHC RBC AUTO-ENTMCNC: 31.3 G/DL (ref 32–36)
MCV RBC AUTO: 69 FL (ref 82–98)
MCV RBC AUTO: 71 FL (ref 82–98)
NUCLEATED RBC (/100WBC) (OHS): 0 /100 WBC
NUCLEATED RBC (/100WBC) (OHS): 0 /100 WBC
PHOSPHATE SERPL-MCNC: 3.8 MG/DL (ref 2.7–4.5)
PLATELET # BLD AUTO: 40 K/UL (ref 150–450)
PLATELET # BLD AUTO: 58 K/UL (ref 150–450)
PLATELET BLD QL SMEAR: ABNORMAL
PMV BLD AUTO: 9.1 FL (ref 9.2–12.9)
PMV BLD AUTO: 9.4 FL (ref 9.2–12.9)
POCT GLUCOSE: 106 MG/DL (ref 70–110)
POCT GLUCOSE: 116 MG/DL (ref 70–110)
POCT GLUCOSE: 199 MG/DL (ref 70–110)
POCT GLUCOSE: 84 MG/DL (ref 70–110)
POCT GLUCOSE: 94 MG/DL (ref 70–110)
POTASSIUM SERPL-SCNC: 3.1 MMOL/L (ref 3.5–5.1)
PROT SERPL-MCNC: 6.5 GM/DL (ref 6–8.4)
PROTHROMBIN TIME: 16.4 SECONDS (ref 9–12.5)
RBC # BLD AUTO: 3.05 M/UL (ref 4.6–6.2)
RBC # BLD AUTO: 3.53 M/UL (ref 4.6–6.2)
RELATIVE EOSINOPHIL (OHS): 0.9 %
RELATIVE EOSINOPHIL (OHS): 0.9 %
RELATIVE LYMPHOCYTE (OHS): 10.7 % (ref 18–48)
RELATIVE LYMPHOCYTE (OHS): 8.3 % (ref 18–48)
RELATIVE MONOCYTE (OHS): 8.2 % (ref 4–15)
RELATIVE MONOCYTE (OHS): 9 % (ref 4–15)
RELATIVE NEUTROPHIL (OHS): 78.1 % (ref 38–73)
RELATIVE NEUTROPHIL (OHS): 80.7 % (ref 38–73)
SODIUM SERPL-SCNC: 135 MMOL/L (ref 136–145)
UNIT NUMBER: NORMAL
UNIT NUMBER: NORMAL
WBC # BLD AUTO: 5.42 K/UL (ref 3.9–12.7)
WBC # BLD AUTO: 8.21 K/UL (ref 3.9–12.7)

## 2025-07-22 PROCEDURE — 25000003 PHARM REV CODE 250: Mod: NTX

## 2025-07-22 PROCEDURE — 84100 ASSAY OF PHOSPHORUS: CPT | Mod: NTX

## 2025-07-22 PROCEDURE — 85025 COMPLETE CBC W/AUTO DIFF WBC: CPT | Mod: NTX

## 2025-07-22 PROCEDURE — 36430 TRANSFUSION BLD/BLD COMPNT: CPT | Mod: NTX

## 2025-07-22 PROCEDURE — 63600175 PHARM REV CODE 636 W HCPCS: Mod: NTX | Performed by: STUDENT IN AN ORGANIZED HEALTH CARE EDUCATION/TRAINING PROGRAM

## 2025-07-22 PROCEDURE — 86920 COMPATIBILITY TEST SPIN: CPT | Mod: NTX

## 2025-07-22 PROCEDURE — 83735 ASSAY OF MAGNESIUM: CPT | Mod: NTX

## 2025-07-22 PROCEDURE — 63600175 PHARM REV CODE 636 W HCPCS: Mod: NTX

## 2025-07-22 PROCEDURE — P9035 PLATELET PHERES LEUKOREDUCED: HCPCS | Mod: NTX

## 2025-07-22 PROCEDURE — 30233R1 TRANSFUSION OF NONAUTOLOGOUS PLATELETS INTO PERIPHERAL VEIN, PERCUTANEOUS APPROACH: ICD-10-PCS | Performed by: HOSPITALIST

## 2025-07-22 PROCEDURE — 80053 COMPREHEN METABOLIC PANEL: CPT | Mod: NTX

## 2025-07-22 PROCEDURE — 20600001 HC STEP DOWN PRIVATE ROOM: Mod: NTX

## 2025-07-22 PROCEDURE — P9016 RBC LEUKOCYTES REDUCED: HCPCS | Mod: NTX

## 2025-07-22 PROCEDURE — 25000003 PHARM REV CODE 250: Mod: NTX | Performed by: STUDENT IN AN ORGANIZED HEALTH CARE EDUCATION/TRAINING PROGRAM

## 2025-07-22 PROCEDURE — 36415 COLL VENOUS BLD VENIPUNCTURE: CPT | Mod: NTX

## 2025-07-22 PROCEDURE — 30233N1 TRANSFUSION OF NONAUTOLOGOUS RED BLOOD CELLS INTO PERIPHERAL VEIN, PERCUTANEOUS APPROACH: ICD-10-PCS | Performed by: HOSPITALIST

## 2025-07-22 PROCEDURE — 85610 PROTHROMBIN TIME: CPT | Mod: NTX

## 2025-07-22 RX ORDER — POLYETHYLENE GLYCOL 3350 17 G/17G
17 POWDER, FOR SOLUTION ORAL DAILY
Status: DISCONTINUED | OUTPATIENT
Start: 2025-07-22 | End: 2025-07-28

## 2025-07-22 RX ORDER — POTASSIUM CHLORIDE 750 MG/1
50 CAPSULE, EXTENDED RELEASE ORAL
Status: COMPLETED | OUTPATIENT
Start: 2025-07-22 | End: 2025-07-22

## 2025-07-22 RX ORDER — HYDROCODONE BITARTRATE AND ACETAMINOPHEN 500; 5 MG/1; MG/1
TABLET ORAL
Status: DISCONTINUED | OUTPATIENT
Start: 2025-07-22 | End: 2025-07-23

## 2025-07-22 RX ORDER — AMOXICILLIN 250 MG
1 CAPSULE ORAL DAILY
Status: DISCONTINUED | OUTPATIENT
Start: 2025-07-22 | End: 2025-07-28

## 2025-07-22 RX ORDER — INSULIN GLARGINE 100 [IU]/ML
22 INJECTION, SOLUTION SUBCUTANEOUS NIGHTLY
Status: DISCONTINUED | OUTPATIENT
Start: 2025-07-22 | End: 2025-07-23

## 2025-07-22 RX ADMIN — OXYBUTYNIN CHLORIDE 5 MG: 5 TABLET, EXTENDED RELEASE ORAL at 08:07

## 2025-07-22 RX ADMIN — RIFAXIMIN 550 MG: 550 TABLET ORAL at 08:07

## 2025-07-22 RX ADMIN — POTASSIUM CHLORIDE 50 MEQ: 750 CAPSULE, EXTENDED RELEASE ORAL at 08:07

## 2025-07-22 RX ADMIN — POTASSIUM CHLORIDE 50 MEQ: 750 CAPSULE, EXTENDED RELEASE ORAL at 10:07

## 2025-07-22 RX ADMIN — POLYETHYLENE GLYCOL 3350 17 G: 17 POWDER, FOR SOLUTION ORAL at 01:07

## 2025-07-22 RX ADMIN — SENNOSIDES AND DOCUSATE SODIUM 1 TABLET: 50; 8.6 TABLET ORAL at 01:07

## 2025-07-22 RX ADMIN — METHOCARBAMOL 250 MG: 500 TABLET ORAL at 03:07

## 2025-07-22 RX ADMIN — VANCOMYCIN HYDROCHLORIDE 1500 MG: 1.5 INJECTION, POWDER, LYOPHILIZED, FOR SOLUTION INTRAVENOUS at 10:07

## 2025-07-22 RX ADMIN — VENLAFAXINE 75 MG: 75 TABLET ORAL at 08:07

## 2025-07-22 RX ADMIN — Medication 16 G: at 08:07

## 2025-07-22 RX ADMIN — METHOCARBAMOL 250 MG: 500 TABLET ORAL at 08:07

## 2025-07-22 RX ADMIN — INSULIN GLARGINE 22 UNITS: 100 INJECTION, SOLUTION SUBCUTANEOUS at 09:07

## 2025-07-22 RX ADMIN — INSULIN ASPART 10 UNITS: 100 INJECTION, SOLUTION INTRAVENOUS; SUBCUTANEOUS at 05:07

## 2025-07-22 RX ADMIN — ACETAMINOPHEN 500 MG: 500 TABLET ORAL at 11:07

## 2025-07-22 RX ADMIN — INSULIN ASPART 10 UNITS: 100 INJECTION, SOLUTION INTRAVENOUS; SUBCUTANEOUS at 01:07

## 2025-07-22 RX ADMIN — METHOCARBAMOL 250 MG: 500 TABLET ORAL at 09:07

## 2025-07-22 RX ADMIN — TAMSULOSIN HYDROCHLORIDE 0.4 MG: 0.4 CAPSULE ORAL at 08:07

## 2025-07-22 RX ADMIN — INSULIN ASPART 10 UNITS: 100 INJECTION, SOLUTION INTRAVENOUS; SUBCUTANEOUS at 08:07

## 2025-07-22 NOTE — ASSESSMENT & PLAN NOTE
Patient with hx CORDOVA cirrhosis c/b ascites (requiring paracentesis, on lasix 40 mg qd, eplerenone), grade 1 esophageal varices (EGD on 11/8/2024), hepatic encephalopathy 5 week hx abdominal, back pain since last paracentesis without fevers, chills, nausea, vomiting, diarrhea. CT L spine with congenital lumbar stenosis, degenerative changes. CT A/P with concern for peritonitis. Also noted splenomegaly and portal vein thrombosis, seen previously. INR 1.8. Patient had noted lactic acidosis at 2.9, now resolved. Overalll suspect abdominal pain 2/2 ascites with c/f SBP vs. Worsening of chronic portal vein thrombosis Given CTX and  pRBCs at OSH. Transferred to [facility] [place] for further workup and evaluation. MELD-Na 17.     - Hold lasix and eplerenone   - continue home rifaxamin   - CTM CBC, CMP, PT/INR daily   - Hepatology consulted, appreciate recs  - Liver US showed cirrhotic morphology of the liver, sequela of portal hypertension and large volume of complicated ascites   - paracentesis pulled 4250mL, labs positive for SBP waiting for cultures   - continue ceftriaxone and albumin

## 2025-07-22 NOTE — ASSESSMENT & PLAN NOTE
Patient with hx CORDOVA cirrhosis c/b ascites (requiring paracentesis, on lasix 40 mg qd, eplerenone), grade 1 esophageal varices (EGD on 11/8/2024), hepatic encephalopathy 5 week hx abdominal, back pain since last paracentesis without fevers, chills, nausea, vomiting, diarrhea. CT L spine with congenital lumbar stenosis, degenerative changes. CT A/P with concern for peritonitis. Also noted splenomegaly and portal vein thrombosis, seen previously. INR 1.8. Patient had noted lactic acidosis at 2.9, now resolved. Overalll suspect abdominal pain 2/2 ascites with c/f SBP vs. Worsening of chronic portal vein thrombosis Given CTX and  pRBCs at OSH. Transferred to [facility] for further workup and evaluation. MELD-Na 17.     - Hold lasix and eplerenone   - continue home rifaxamin   - CTM CBC, CMP, PT/INR daily   - Hepatology consulted, appreciate recs  - Liver US showed cirrhotic morphology of the liver, sequela of portal hypertension and large volume of complicated ascites   - paracentesis pulled 4250mL, labs positive for SBP waiting for cultures   - continue ceftriaxone and albumin

## 2025-07-22 NOTE — PROGRESS NOTES
Transplant Recipient Adult Psychosocial Assessment  SW met with patient in hospital to update his 2023 evaluation and assess caregiver support. Patient is currently inactive on list due to caregiver issues. Patient did not have a caregiver present during evaluation.    Corky Bach  [address]  [place] [address]  Telephone Information:   Mobile [phone]   Home  [phone] (home)  Work  There is no work phone number on file.  E-mail  [email or web address]    Sex: male  YOB: 1965  Age: 59 y.o.    Encounter Date: 7/20/2025  U[place] Citizen: yes  Primary Language: English   Needed: no    Emergency Contact:  Name: Suzie aBch  Relationship: ex wife  Address: [place]  Phone Numbers:  [phone] (mobile)    Family/Social Support:   Number of dependents/: Patient lives with his mother, Mary West (age 83), and provides care for her.   Marital history: Patient is  x2  Other family dynamics: Patient's adult son, Jr, also lives with patient and patient's mother. Patient stated he has some support from his ex wife but SW will need to verify. Patient had two step children through his now ex wife, Rajat Martín Mack And Maraynn Alexandre. Rajat was initially listed as a backup caregiver, patient reports that his former step son has a job and just got , will not be able to assist with caregiving. Patient has a sister, nieces, and nephews who he keeps in touch with.    Household Composition:  Name: Jr Bach  Age: 23  Relationship: son  Does person drive? no, patient's son does not have his license    Name: Mary West   Age: 83  Relationship: mother  Does person drive? no    Do you and your caregivers have access to reliable transportation? Patient reports that he has a reliable car. Son and mother do not drive, patient's ex wife does drive but does not have a car.  PRIMARY CAREGIVER: Suzie will be primary caregiver, phone number [phone].    LORENA  called patient's caregiver, Suzie. She is currently recovering from foot surgery, meets with her surgeon next Tuesday to have staples removed and get an idea of how long she will be recovering. Suzie stated that she is willing to be patient's caregiver as he has no one else. She stated that she left patient a year ago due to issues caused by patient's sister and son. She stated that currently, patient and his mother do not communicate with his sister. SW inquired about care of patient's mother, as patient stated that he cares for her. Marielena stated that patient's hospitalization this week will be a test of whether patient's son is able to care for patient's mother, but caregiver stated that they will likely need to reach out to patient's sister despite family not communicating with her currently.     If patient is transplanted, Marielena will use his car to get him to and from appointments. Patient's son will not have transportation while patient is at main campus as he does not have a license or a car. Marielena reported that she has no other responsibilities and is willing to be caregiver, stated that she and patient have tried marriage twice and other family dynamics have created the issues that caused them to separate.      Able to take time off work without financial concerns: patient reports his ex-wife is on disability.     Additional Significant Others who will Assist with Transplant:  Patient reports he does not know who could be a backup. Patient's son works 2-10pm 5 days a week at [employer] stocking shelves. Patient does not want him to quit/take time away from his job to care for him. Additionally, if patient was recovering locally with primary caregiver, unclear who will be assisting patient's elderly mother while patient's son is at work. Patient's son does not drive, patient brings him to and from work.    Living Will: no .  Healthcare Power of : no  Advance Directives on file: <<no  information> per medical record.  Verbally reviewed LW/HCPA information.   provided patient with copy of LW/HCPA documents and provided education on completion of forms    Living Donors: Education and resource information given to patient.    Highest Education Level: High School (9-12) or GED  Reading Ability: 12th grade  Reports difficulty with: seeing and memory (wears prescription glasses)  Learns Best By:  visual information     Status: no  VA Benefits: no     Working for Income: No  If no, reason not working: Disability  Spouse/Significant Other Employment: n/a    Disabled: yes: date disability began: , due to: ESLD, cancer, type 2 diabetes.    Monthly Income:  Patient reports he gets ~1500, mother gets ~1700, son contributes as needed    Insurance:   Payer/Plan Subscr  Sex Relation Sub. Ins. ID Effective Group Num   1. [employer]* ADRIANE FERNANDO* 1965 Male Self [id number]                                   [address] [address]   2. [place]* ADRIANE FERNANDO* 1965 Male Self [id number]* 3/1/25                                    [address] [address]     Primary Insurance (for UNOS reporting): Public Insurance - Medicare FFS (Fee For Service)  Secondary Insurance (for UNOS reporting): Public Insurance - Medicaid    Dialysis Adherence: none  Infusion Service: patient utilizing? no  Home Health: patient utilizing? no  DME: patient reports he has a bedside commode and shower chair, and occasionally uses a cane  Pulmonary/Cardiac Rehab: none   ADLS:  fully independent and driving    Adherence:   Patient reports high adhreence.  Adherence education and counseling provided.     Per History Section:  Past Medical History:   Diagnosis Date    Anemia     Bacterial peritonitis 10/25/2022    Celiac disease     Cervicalgia     Cirrhosis     Colon polyp     Depression     Diabetes mellitus, type 2     Elevated LFTs     Esophageal varices     Gout, unspecified     History of  "COVID-19 01/15/2021    Hyperlipidemia     Mood disorder in conditions classified elsewhere     Obesity, unspecified     Other chronic nonalcoholic liver disease     Portal hypertensive gastropathy     Thyroid disease     Unspecified psychosis      Social History     Tobacco Use    Smoking status: Never    Smokeless tobacco: Never   Substance Use Topics    Alcohol use: No     Social History     Substance and Sexual Activity   Drug Use No     Social History     Substance and Sexual Activity   Sexual Activity Not Currently    Partners: Female       Per Today's Psychosocial:  Tobacco: none, patient denies any use.  Alcohol: none, patient denies any use.  Illicit Drugs/Non-prescribed Medications: none, patient denies any use.    Arrests/DWI/Treatment/Rehab: patient denies    Psychiatric History:    Mental Health: depression and anxiety  Psychiatrist/Counselor: patient previously seeing Dr. Reza Diop with [facility], every other week. Patient reports it has been many months since he was seen as the doctor left the practice. Patient would like referral information.  Medications:  Patient reports he continues to take Effexor twice per day, stated that if he doesn't take it he gets "really angry"  Suicide/Homicide Issues: patient has distance history of SI, no attempts or thoughts in "many years"     Knowledge: Patient states having clear understanding and realistic expectations regarding the potential risks and potential benefits of organ transplantation and organ donation and agrees to discuss with health care team members and support system members, as well as to utilize available resources and express questions and/or concerns in order to further facilitate the pt informed decision-making.  Resources and information provided and reviewed.     Patient is aware of [facility]'s affiliation and/or partnership with agencies in home health care, LTAC, SNF, [facility], and other hospitals and clinics.    Understanding: " Patient reports having a clear understanding of the many lifetime commitments involved with being a transplant recipient, including costs, compliance, medications, lab work, procedures, appointments, concrete and financial planning, preparedness, timely and appropriate communication of concerns, abstinence (ETOH, tobacco, illicit non-prescribed drugs), adherence to all health care team recommendations, support system and caregiver involvement, appropriate and timely resource utilization and follow-through, mental health counseling as needed/recommended, and patient and caregiver responsibilities.  Social Service Handbook, resources and detailed educational information provided and reviewed.  Educational information provided.    Patient also reports current and expected compliance with health care regime, and patient states having a clear understanding of the importance of compliance.  Patient reports a clear understanding that risks and benefits may be involved with organ transplantation and with organ donation.  Patient also reports clear understanding that psychosocial risk factors may affect patient, and include but are not limited to feelings of depression, generalized anxiety, anxiety regarding dependence on others, post traumatic stress disorder, feelings of guilt and other emotional and/or mental concerns, and/or exacerbation of existing mental health concerns.  Detailed resources provided and discussed.  Patient agrees to access appropriate resources in a timely manner as needed and/or as recommended, and to communicate concerns appropriately.  Patient also reports a clear understanding of treatment options available.      Patient and caregiver received education in a group setting.   reviewed education, provided additional information, and answered questions.    Feelings or Concerns: Patient stated he is unsure how transplant could move forward due to his caregiver issues.    Coping: Identify  Patient & Caregiver Strategies to [place]:   1. In the past, coping with major surgery and/or related stress - TV, video games   2. Currently & Pre-transplant - TV, video games   3. At the time of surgery - support from family   4. During post-Transplant & Recovery Period - support from family, spirituality    Goals: Patient would like to have a better quality of life, stated his energy is very low.      Interview Behavior: Patient presents as alert and oriented x 4, pleasant, good eye contact, calm, communicative, cooperative, and asking and answering questions appropriately.         Transplant Social Work - Candidacy  Assessment/Plan:     Psychosocial Suitability: Based on psychosocial risk factors, patient presents as high risk, due to having no backup caregiver. Patient's son and mother are both unable to assist.    Strengths and protective factors include insurance, stable finances, no alcohol, tobacco, or drug use issues, mental health is well managed, patient would like a referral for therapist.    Final determination of transplant candidacy will be made once evaluation is complete and reviewed by Liver Transplant Selection Committee     Recommendations/Additional Comments:   - Patient needs a backup caregiver  - Patient needs to discuss with mother and son: who will care for mother, how will son get to work while patient is not available  -  Local lodging  - Continue to abstain from alcohol, tobacco, and drugs    Laura Kim LCSW

## 2025-07-22 NOTE — CONSULTS
"See note from day prior.     Hepatology consulted for "pt with CORDOVA cirrhosis w/ ascites, presenting as a transfer with abdominal pain, back pain with c/f sbp".   "

## 2025-07-22 NOTE — ASSESSMENT & PLAN NOTE
The likely etiology of thrombocytopenia is liver disease. The patients 3 most recent labs are listed below.  Recent Labs     07/20/25 2059 07/21/25  0209 07/22/25  0304   PLT 61* 47* 40*     Plan  - Will transfuse if platelet count is <50k (if undergoing surgical procedure or have active bleeding).  - CTM CBC, PT/INR qd  - transfusing 1U platelets on 7/22

## 2025-07-22 NOTE — ASSESSMENT & PLAN NOTE
Hyponatremia is likely due to Cirrhosis. The patient's most recent sodium results are listed below.  Recent Labs     07/20/25 2059 07/21/25  0209 07/22/25  0304   * 134* 135*     Plan  - Correct the sodium by 4-6mEq in 24 hours.   - Monitor sodium Daily.   - Patient hyponatremia is stable

## 2025-07-22 NOTE — PLAN OF CARE
[facility] - Telemetry Stepdown  Initial Discharge Assessment       Primary Care Provider: Sarmad Estrella MD    Admission Diagnosis: Cirrhosis [K74.60]  Ascites [R18.8]    Admission Date: 7/20/2025  Expected Discharge Date: 7/24/2025    Transition of Care Barriers: None    Payor: [email or web address] MGD [facility] / Plan: [employer] / Product Type: Medicare Advantage /     No emergency contact information on file.    Discharge Plan A: Home, Home with family  Discharge Plan B: Home      [facility] - [id number]  [place] [id number]  Phone: [phone] Fax: [phone]      Initial Assessment (most recent)       Adult Discharge Assessment - 07/22/25 1441          Discharge Assessment    Assessment Type Discharge Planning Assessment     Confirmed/corrected address, phone number and insurance Yes     Confirmed Demographics Correct on Facesheet     Source of Information patient     Communicated GARY with patient/caregiver Date not available/Unable to determine     People in Home alone     Do you expect to return to your current living situation? Yes     Do you have help at home or someone to help you manage your care at home? No     Prior to hospitilization cognitive status: Alert/Oriented     Current cognitive status: Alert/Oriented     Walking or Climbing Stairs Difficulty yes     Walking or Climbing Stairs ambulation difficulty, requires equipment     Mobility Management I use a cane     Dressing/Bathing Difficulty no     Number of Stairs, Main Entrance none     Home Layout Able to live on 1st floor     Equipment Currently Used at Home cane, straight     Readmission within 30 days? No     Patient currently being followed by outpatient case management? No     Do you currently have service(s) that help you manage your care at home? No     Do you take prescription medications? Yes     Do you have prescription coverage? Yes     Do you have any problems affording any of  your prescribed medications? No     Is the patient taking medications as prescribed? yes     How do you get to doctors appointments? family or friend will provide     Are you on dialysis? No     Do you take coumadin? No     Discharge Plan A Home;Home with family     Discharge Plan B Home     DME Needed Upon Discharge  none     Discharge Plan discussed with: Patient     Transition of Care Barriers None        Physical Activity    On average, how many days per week do you engage in moderate to strenuous exercise (like a brisk walk)? 0 days     On average, how many minutes do you engage in exercise at this level? 0 min        Financial Resource Strain    How hard is it for you to pay for the very basics like food, housing, medical care, and heating? Not very hard        Housing Stability    In the last 12 months, was there a time when you were not able to pay the mortgage or rent on time? Yes     At any time in the past 12 months, were you homeless or living in a shelter (including now)? No        Transportation Needs    In the past 12 months, has lack of transportation kept you from medical appointments or from getting medications? No     In the past 12 months, has lack of transportation kept you from meetings, work, or from getting things needed for daily living? No        Food Insecurity    Within the past 12 months, you worried that your food would run out before you got the money to buy more. Never true     Within the past 12 months, the food you bought just didn't last and you didn't have money to get more. Never true        Stress    Do you feel stress - tense, restless, nervous, or anxious, or unable to sleep at night because your mind is troubled all the time - these days? Not at all        Social Isolation    How often do you feel lonely or isolated from those around you?  Never        Alcohol Use    Q1: How often do you have a drink containing alcohol? Never     Q2: How many drinks containing alcohol do you  have on a typical day when you are drinking? Patient does not drink     Q3: How often do you have six or more drinks on one occasion? Never        Utilities    In the past 12 months has the electric, gas, oil, or water company threatened to shut off services in your home? No        Health Literacy    How often do you need to have someone help you when you read instructions, pamphlets, or other written material from your doctor or pharmacy? Never                   SW met with Pt to discuss DC plan.  Pt reports that he ambulates with a cane and he does not need assistance with his ADL's. Pt reported he arrived in ambulance and might need assistance getting home after he is medically discharged.     Discharge Plan A and Plan B have been determined by review of patient's clinical status, future medical and therapeutic needs, and coverage/benefits for post-acute care in coordination with multidisciplinary team members.     Star Cruz Jr. RAVI CSNAN CIT  /Case Manger  [phone]

## 2025-07-22 NOTE — TREATMENT PLAN
Hepatology Treatment Plan    Corky Bach is a 59 y.o. 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     Interval History  Yesterday (), therapeutic and diagnostic paracentesis done, took 4.25 L of fluid from paracentesis.      Today, patient has no complaints. Says he feels much better after the para.    Objective  Temp:  [97.4 °F (36.3 °C)-98.6 °F (37 °C)] 97.9 °F (36.6 °C) ( 100)  Pulse:  [63-96] 67 (1006)  BP: ()/(52-67) 108/61 (1006)  Resp:  [17-19] 19 (1006)  SpO2:  [97 %-100 %] 100 % (1006)    General: Alert, Oriented x3, no distress  Neurologic: Asterixis absent  Abdomen: Normoactive bowel sounds. Non-distended. Normal tympany. Soft. Non-tender. No peritoneal signs. Protruding umbilical hernia present, non-draining    Laboratory    Lab Results   Component Value Date    WBC 5.42 2025    HGB 6.4 (L) 2025    HCT 21.0 (L) 2025    MCV 69 (L) 2025    PLT 40 (L) 2025       Lab Results   Component Value Date     (L) 2025    K 3.1 (L) 2025     2025    CO2 24 2025    BUN 27 (H) 2025    CREATININE 1.4 2025    CALCIUM 7.5 (L) 2025       Lab Results   Component Value Date    ALBUMIN 2.3 (L) 2025    ALT <5 (L) 2025    AST 10 (L) 2025    GGT 77 (H) 2023    ALKPHOS 85 2025    BILITOT 1.1 (H) 2025       Lab Results   Component Value Date    INR 1.5 (H) 2025    INR 1.6 (H) 2025    INR 1.5 (H) 2025       MELD 3.0: 17 at 2025  3:04 AM  MELD-Na: 17 at 2025  3:04 AM  Calculated from:  Serum Creatinine: 1.4 mg/dL at 2025  3:04 AM  Serum Sodium: 135 mmol/L at 2025  3:04 AM  Total Bilirubin: 1.1 mg/dL at 2025  3:04 AM  Serum Albumin: 2.3 g/dL at 2025  3:04 AM  INR(ratio): 1.5 at 2025  3:04 AM  Age at listin years  Sex: Male at 2025  3:04  AM      Assessment  Corky Bach is a 59 y.o. male with history of CORDOVA cirrhosis, PHG/GAVE (2024), hx of portal vein thrombosis/hepatic encephalopathy (2021), T2DM, MALT lymphoma, HLD, HTN, thrombocytopenia, gout, and depression. He was admitted as a transfer from [facility] with upper abdominal and back pain of 3 weeks duration without nausea/vomitting sent here for a GI/hepatology evaluation.      Of note, patient has had paracentesis done within the last month on 6/12/2025 without complications. He has had multiple edmar. He also sees Dr. Urban. 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 Have considered for liver transplant, but concern for frailty as a barrier. SW to see the patient to assess support system.  - Ascites now resolved after paracentesis. Awaiting para lab results. -Recommend scheduling frequent paracentesis as ascites is likely to recur.   - Recommend transfusion for Hgb of 6.4        Thank you for involving us in the care of Corky Bach. Please call with any additional concerns or questions.    Camila Kowalski DO  Hepatology Intern, PGY-1  [facility]

## 2025-07-22 NOTE — ASSESSMENT & PLAN NOTE
Patient with hx CORDOVA cirrhosis c/b ascites (requiring paracentesis, on lasix 40 mg qd, eplerenone), grade 1 esophageal varices (EGD on 11/8/2024), hepatic encephalopathy 5 week hx abdominal, back pain since last paracentesis without fevers, chills, nausea, vomiting, diarrhea. CT L spine with congenital lumbar stenosis, degenerative changes. CT A/P with concern for peritonitis. Also noted splenomegaly and portal vein thrombosis, seen previously. INR 1.8. Patient had noted lactic acidosis at 2.9, now resolved. Overalll suspect abdominal pain 2/2 ascites with c/f SBP vs. Worsening of chronic portal vein thrombosis Given CTX and  pRBCs at OSH. Transferred to [facility] for further workup and evaluation. MELD-Na 17.     - Hold lasix and eplerenone   - continue home rifaxamin   - CTM CBC, CMP, PT/INR daily   - Hepatology consulted, appreciate recs  - Liver US showed cirrhotic morphology of the liver, sequela of portal hypertension and large volume of complicated ascites   - paracentesis pulled 4250mL, labs positive for SBP waiting for cultures   - continue ceftriaxone and albumin

## 2025-07-22 NOTE — PLAN OF CARE
Problem: Diabetes Comorbidity  Goal: Blood Glucose Level Within Targeted Range  Outcome: Progressing  Intervention: Monitor and Manage Glycemia  Flowsheets (Taken 7/22/2025 1750)  Glycemic Management: blood glucose monitored   Plan of care review with pt. AAOx4 . PRN meds given for pain. X1 Platelets and x1 RBCs given, pt tolerated well. I&O documented. Bed is locked and in low position, call light within reach.

## 2025-07-22 NOTE — ASSESSMENT & PLAN NOTE
Baseline Cr 0.8 noted to be 1.3 on presentation to [facility]. Unclear etiology of SHAREE. Differential includes prerenal in s/o intravascular volume depletion vs postobstructive. Noted moderate R sided hydronephrosis with normal left kidney on CT imaging at OSH.       Plan  - Avoid nephrotoxins and renally dose meds for GFR listed above  - Monitor urine output, serial BMP, and adjust therapy as needed  - F/u urine studies

## 2025-07-22 NOTE — ASSESSMENT & PLAN NOTE
Patient with hx cirrhosis c/b grade 1 esophageal varices, moderate portal hypertensive gastropathy, multiple angioectasias w/o bleeding (Last EGD 11/8/2024). Presenting with noted anemia of 6.9 at OSH. S/p 1 uPRBC.     Plan  - Monitor serial CBC: Daily  - Transfuse PRBC if patient becomes hemodynamically unstable, symptomatic or H/H drops below 7/21.  - Patient has received 1 units of PRBCs on 7/20  - Hemoglobin dropped to 6.4, platelets dropped to 40  - transfusing 1U PRBCs and 1U platelets on 7/22, repeat CBC at 19:00

## 2025-07-22 NOTE — ASSESSMENT & PLAN NOTE
STABLE     MALT-rivka with bone marrow involvement.  Underwent treatment including repeated Rituxan q2 month infusions.    Followed by Heme-Onc at [facility].    Most recent testing has suggested remission leading to potential transplant candidacy for cirrhosis to be re-instituted.

## 2025-07-22 NOTE — ASSESSMENT & PLAN NOTE
Patient with hx CORDOVA cirrhosis c/b ascites (requiring paracentesis, on lasix 40 mg qd, eplerenone), grade 1 esophageal varices (EGD on 11/8/2024), hepatic encephalopathy 5 week hx abdominal, back pain since last paracentesis without fevers, chills, nausea, vomiting, diarrhea. CT L spine with congenital lumbar stenosis, degenerative changes. CT A/P with concern for peritonitis. Also noted splenomegaly and portal vein thrombosis, seen previously. INR 1.8. Patient had noted lactic acidosis at 2.9, now resolved. Overalll suspect abdominal pain 2/2 ascites with c/f SBP vs. Worsening of chronic portal vein thrombosis Given CTX and  pRBCs at OSH. Transferred to [facility] for further workup and evaluation. MELD-Na 17.     - Hold lasix and eplerenone   - continue home rifaxamin   - CTM CBC, CMP, PT/INR daily   - Hepatology consulted, appreciate recs  - Liver US showed cirrhotic morphology of the liver, sequela of portal hypertension and large volume of complicated ascites   - paracentesis pulled 4250mL, labs positive for SBP waiting for cultures   - continue ceftriaxone and albumin

## 2025-07-22 NOTE — PROGRESS NOTES
[facility] - Telemetry [facility] Medicine  Progress Note    Patient Name: Corky Bach  MRN: [id number]  Patient Class: IP- Inpatient   Admission Date: 7/20/2025  Length of Stay: 2 days  Attending Physician: Jacob Aj MD  Primary Care Provider: Sarmad Estrella MD      Subjective     Principal Problem: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      HPI:  Corky Bach is a 60 y/o male with a notable hx of CORDOVA cirrhosis c/b esophageal varices, T2DM, MALT lymphoma, HLD, gout, and depression transferred to [facility] for hepatology and GI consult.     Patient [facility] on 7/20 with complaints of chronic back and abdominal pain that had been ongoing for appx 5 weeks. He states that the back pain began to occur shortly after his last paracentesis on 7/12 located around his mid lumbar spine, starting with a dull back/ flank pain and quickly radiating to upper abdominal pain. He was trying to use ibuprofen and tylenol to help with the pain however the pain continued to progress. He eventually started to take percocet from a prior hospitalization however this was ineffective. He denies any fevers, chills, nausea, vomiting, constipation/diarrhea, melena, hematemesis during this time. At this point he presented to the ED for further evaluation.     Initially BPs were in the low 100s. He was found to be heme occult positive. Hb 6.9 and platelets 67. AST 13, ALT 6, , bili 1.2, INR 1.8, lactic 2.9. CT Lumbar spine obtained without acute lumbar fracture, congenital lumbar stenosis with degenerative changes. CT A/P without contrast showed moderate r sized pleural effusion, splenomegaly, calcified portal vein thrombus, ascites with thickening of surrounding peritoneal reflections c/f inflammatory peritonitis. Patient started on CTX, given 1u PRBC, and transferred to [facility] for GI/Hepatology evaluation.         Overview/Hospital Course:  Patient is a 59 M w/ hx of CORDOVA cirrhosis, c/b  esophageal varices, T2DM, MALT lymphoma, HLD, gout, admitted for abdminal pain and suspected SBP with decompensated cirrhosis and ascites. Found to have a hemoglobin of 6.9 on presentation, received 1 unit PRBC, no signs of bleeding. Discussed with hepatology and they did not think his presentation was c/f a GIB. Treated empirically for suspected SBP with ceftriaxone. Diagnostic and therapeutic paracentesis drained 4250 mL, lab results positive for SBP. Awaiting cultures. Ascites is now resolved after paracentesis. Hemoglobin and platelets were low today, 6.4 and 40 respectively. Transfused 1U PRBCs and 1U platelets on 7/22. Monitoring hemoglobin. Continuing ceftriaxone and albumin for SBP treatment.     Interval History: NAEON. VSS. Patient reported feeling better since the paracentesis. Back pain is improved today. No concerns or complaints noted at the time.     Objective:     Vital Signs (Most Recent):  Temp: 97.7 °F (36.5 °C) (07/22/25 1123)  Pulse: 73 (07/22/25 1123)  Resp: 20 (07/22/25 1123)  BP: 104/63 (07/22/25 1123)  SpO2: 100 % (07/22/25 1123) Vital Signs (24h Range):  Temp:  [96.6 °F (35.9 °C)-98.3 °F (36.8 °C)] 97.7 °F (36.5 °C)  Pulse:  [63-96] 73  Resp:  [17-20] 20  SpO2:  [97 %-100 %] 100 %  BP: ()/(52-68) 104/63     Weight: 80.6 kg (177 lb 11.1 oz)  Body mass index is 22.81 kg/m².    Intake/Output Summary (Last 24 hours) at 7/22/2025 1507  Last data filed at 7/22/2025 1050  Gross per 24 hour   Intake 732.6 ml   Output 855 ml   Net -122.4 ml         Physical Exam  Constitutional:       General: He is not in acute distress.  HENT:      Head: Normocephalic and atraumatic.      Mouth/Throat:      Mouth: Mucous membranes are moist.   Eyes:      Pupils: Pupils are equal, round, and reactive to light.   Cardiovascular:      Rate and Rhythm: Normal rate and regular rhythm.      Pulses: Normal pulses.      Heart sounds: Normal heart sounds.   Pulmonary:      Effort: Pulmonary effort is normal. No  respiratory distress.      Breath sounds: Normal breath sounds.   Abdominal:      General: Bowel sounds are normal. There is no distension.      Palpations: Abdomen is soft.      Tenderness: There is no abdominal tenderness.      Hernia: A hernia (ventral) is present.   Musculoskeletal:      Right lower leg: No edema.      Left lower leg: No edema.   Skin:     General: Skin is warm and dry.   Neurological:      General: No focal deficit present.      Mental Status: He is alert and oriented to person, place, and time.      Comments: No asterixis present.   Psychiatric:         Mood and Affect: Mood normal.         Behavior: Behavior normal.           Significant Labs: All pertinent labs within the past 24 hours have been reviewed.  CBC:   Recent Labs   Lab 07/20/25 2059 07/21/25 0209 07/22/25  0304   WBC 7.19 7.35 5.42   HGB 7.4* 7.3* 6.4*   HCT 24.0* 24.1* 21.0*   PLT 61* 47* 40*     CMP:   Recent Labs   Lab 07/20/25 2059 07/21/25 0209 07/22/25  0304   * 134* 135*   K 3.6 4.4 3.1*   CL 99 100 101   CO2 23 26 24   * 246* 79   BUN 31* 32* 27*   CREATININE 1.3 1.5* 1.4   CALCIUM 7.4* 7.6* 7.5*   PROT 7.1 7.1 6.5   ALBUMIN 1.5* 1.5* 2.3*   BILITOT 1.3* 1.1* 1.1*   ALKPHOS 134 129 85   AST 14 15 10*   ALT 6* <5* <5*   ANIONGAP 11 8 10       Significant Imaging: I have reviewed all pertinent imaging results/findings within the past 24 hours.      Assessment & Plan  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  Abdominal pain  Ascites  Portal hypertensive gastropathy  Portal vein thrombosis  Splenomegaly  Patient with hx CORDOVA cirrhosis c/b ascites (requiring paracentesis, on lasix 40 mg qd, eplerenone), grade 1 esophageal varices (EGD on 11/8/2024), hepatic encephalopathy 5 week hx abdominal, back pain since last paracentesis without fevers, chills, nausea, vomiting, diarrhea. CT L spine with congenital lumbar stenosis, degenerative changes. CT A/P with concern for peritonitis. Also noted splenomegaly and portal vein  thrombosis, seen previously. INR 1.8. Patient had noted lactic acidosis at 2.9, now resolved. Overalll suspect abdominal pain 2/2 ascites with c/f SBP vs. Worsening of chronic portal vein thrombosis Given CTX and  pRBCs at OSH. Transferred to [facility] [place] workup and evaluation. MELD-Na 17.     - Hold lasix and eplerenone   - continue home rifaxamin   - CTM CBC, CMP, PT/INR daily   - Hepatology consulted, appreciate recs  - Liver US showed cirrhotic morphology of the liver, sequela of portal hypertension and large volume of complicated ascites   - paracentesis pulled 4250mL, labs positive for SBP waiting for cultures   - continue ceftriaxone and albumin   Anemia  Patient with hx cirrhosis c/b grade 1 esophageal varices, moderate portal hypertensive gastropathy, multiple angioectasias w/o bleeding (Last EGD 11/8/2024). Presenting with noted anemia of 6.9 at OSH. S/p 1 uPRBC.     Plan  - Monitor serial CBC: Daily  - Transfuse PRBC if patient becomes hemodynamically unstable, symptomatic or H/H drops below 7/21.  - Patient has received 1 units of PRBCs on 7/20  - Hemoglobin dropped to 6.4, platelets dropped to 40  - transfusing 1U PRBCs and 1U platelets on 7/22, repeat CBC at 19:00  SHAREE (acute kidney injury)  Baseline Cr 0.8 noted to be 1.3 on presentation to [facility]. Unclear etiology of SHAREE. Differential includes prerenal in s/o intravascular volume depletion vs postobstructive. Noted moderate R sided hydronephrosis with normal left kidney on CT imaging at OSH.       Plan  - Avoid nephrotoxins and renally dose meds for GFR listed above  - Monitor urine output, serial BMP, and adjust therapy as needed  - F/u urine studies  Mood disorder  Continue home venlafaxine  Hyponatremia  Hyponatremia is likely due to Cirrhosis. The patient's most recent sodium results are listed below.  Recent Labs     07/20/25  2059 07/21/25  0209 07/22/25  0304   * 134* 135*     Plan  - Correct the sodium by 4-6mEq in 24 hours.   - Monitor  sodium Daily.   - Patient hyponatremia is stable  Thrombocytopenia  The likely etiology of thrombocytopenia is liver disease. The patients 3 most recent labs are listed below.  Recent Labs     07/20/25  2059 07/21/25  0209 07/22/25  0304   PLT 61* 47* 40*     Plan  - Will transfuse if platelet count is <50k (if undergoing surgical procedure or have active bleeding).  - CTM CBC, PT/INR qd  - transfusing 1U platelets on 7/22  Benign prostatic hyperplasia with incomplete bladder emptying  - continue home flomax  MALT lymphoma  STABLE     MALT-rivka with bone marrow involvement.  Underwent treatment including repeated Rituxan q2 month infusions.    Followed by Heme-Onc at [facility].    Most recent testing has suggested remission leading to potential transplant candidacy for cirrhosis to be re-instituted.   Type 2 diabetes mellitus with hyperglycemia, with long-term current use of insulin  Patient's FSGs are controlled on current medication regimen.  Last A1c reviewed-   Lab Results   Component Value Date    HGBA1C 8.8 (H) 07/20/2025     Most recent fingerstick glucose reviewed-   Recent Labs   Lab 07/21/25  1638 07/21/25  2048 07/22/25  0833 07/22/25  1311   POCTGLUCOSE 139* 104 106 116*     Current correctional scale  Low  Maintain anti-hyperglycemic dose as follows-   Antihyperglycemics (From admission, onward)      Start     Stop Route Frequency Ordered    07/22/25 2100  insulin glargine U-100 (Lantus) pen 22 Units         -- SubQ Nightly 07/22/25 1112    07/21/25 0715  insulin aspart U-100 pen 10 Units         -- SubQ 3 times daily with meals 07/20/25 2217    07/20/25 2316  insulin aspart U-100 pen 0-5 Units         -- SubQ Before meals & nightly PRN 07/20/25 2217          Hold Oral hypoglycemics while patient is in the hospital.  - decreased insulin Glargine to 22 units daily   Mixed hyperlipidemia  - not currently on medication     VTE Risk Mitigation (From admission, onward)           Ordered     Reason for No  Pharmacological VTE Prophylaxis  Once        Question:  Reasons:  Answer:  Risk of Bleeding    07/20/25 2020     IP VTE HIGH RISK PATIENT  Once         07/20/25 2020     Place sequential compression device  Until discontinued         07/20/25 2020                    Discharge Planning   GARY: 7/24/2025     Code Status: Full Code   Medical Readiness for Discharge Date:   Discharge Plan A: Home, Home with family        Veronica Freitas DO  Department of Hospital Medicine   [facility] - Telemetry Stepdown

## 2025-07-22 NOTE — ASSESSMENT & PLAN NOTE
Patient with hx CORDOVA cirrhosis c/b ascites (requiring paracentesis, on lasix 40 mg qd, eplerenone), grade 1 esophageal varices (EGD on 11/8/2024), hepatic encephalopathy 5 week hx abdominal, back pain since last paracentesis without fevers, chills, nausea, vomiting, diarrhea. CT L spine with congenital lumbar stenosis, degenerative changes. CT A/P with concern for peritonitis. Also noted splenomegaly and portal vein thrombosis, seen previously. INR 1.8. Patient had noted lactic acidosis at 2.9, now resolved. Overalll suspect abdominal pain 2/2 ascites with c/f SBP vs. Worsening of chronic portal vein thrombosis Given CTX and  pRBCs at OSH. Transferred to [facility] for further workup and evaluation. MELD-Na 17.     - Hold lasix and eplerenone   - continue home rifaxamin   - CTM CBC, CMP, PT/INR daily   - Hepatology consulted, appreciate recs  - Liver US showed cirrhotic morphology of the liver, sequela of portal hypertension and large volume of complicated ascites   - paracentesis pulled 4250mL, labs positive for SBP waiting for cultures   - continue ceftriaxone and albumin

## 2025-07-22 NOTE — SUBJECTIVE & OBJECTIVE
Interval History: NAEON. VSS. Patient reported feeling better since the paracentesis. Back pain is improved today. No concerns or complaints noted at the time.     Objective:     Vital Signs (Most Recent):  Temp: 97.7 °F (36.5 °C) (07/22/25 1123)  Pulse: 73 (07/22/25 1123)  Resp: 20 (07/22/25 1123)  BP: 104/63 (07/22/25 1123)  SpO2: 100 % (07/22/25 1123) Vital Signs (24h Range):  Temp:  [96.6 °F (35.9 °C)-98.3 °F (36.8 °C)] 97.7 °F (36.5 °C)  Pulse:  [63-96] 73  Resp:  [17-20] 20  SpO2:  [97 %-100 %] 100 %  BP: ()/(52-68) 104/63     Weight: 80.6 kg (177 lb 11.1 oz)  Body mass index is 22.81 kg/m².    Intake/Output Summary (Last 24 hours) at 7/22/2025 1507  Last data filed at 7/22/2025 1050  Gross per 24 hour   Intake 732.6 ml   Output 855 ml   Net -122.4 ml         Physical Exam  Constitutional:       General: He is not in acute distress.  HENT:      Head: Normocephalic and atraumatic.      Mouth/Throat:      Mouth: Mucous membranes are moist.   Eyes:      Pupils: Pupils are equal, round, and reactive to light.   Cardiovascular:      Rate and Rhythm: Normal rate and regular rhythm.      Pulses: Normal pulses.      Heart sounds: Normal heart sounds.   Pulmonary:      Effort: Pulmonary effort is normal. No respiratory distress.      Breath sounds: Normal breath sounds.   Abdominal:      General: Bowel sounds are normal. There is no distension.      Palpations: Abdomen is soft.      Tenderness: There is no abdominal tenderness.      Hernia: A hernia (ventral) is present.   Musculoskeletal:      Right lower leg: No edema.      Left lower leg: No edema.   Skin:     General: Skin is warm and dry.   Neurological:      General: No focal deficit present.      Mental Status: He is alert and oriented to person, place, and time.      Comments: No asterixis present.   Psychiatric:         Mood and Affect: Mood normal.         Behavior: Behavior normal.           Significant Labs: All pertinent labs within the past 24 hours  have been reviewed.  CBC:   Recent Labs   Lab 07/20/25 2059 07/21/25  0209 07/22/25  0304   WBC 7.19 7.35 5.42   HGB 7.4* 7.3* 6.4*   HCT 24.0* 24.1* 21.0*   PLT 61* 47* 40*     CMP:   Recent Labs   Lab 07/20/25 2059 07/21/25  0209 07/22/25  0304   * 134* 135*   K 3.6 4.4 3.1*   CL 99 100 101   CO2 23 26 24   * 246* 79   BUN 31* 32* 27*   CREATININE 1.3 1.5* 1.4   CALCIUM 7.4* 7.6* 7.5*   PROT 7.1 7.1 6.5   ALBUMIN 1.5* 1.5* 2.3*   BILITOT 1.3* 1.1* 1.1*   ALKPHOS 134 129 85   AST 14 15 10*   ALT 6* <5* <5*   ANIONGAP 11 8 10       Significant Imaging: I have reviewed all pertinent imaging results/findings within the past 24 hours.

## 2025-07-22 NOTE — PROGRESS NOTES
"Pharmacokinetic Initial Assessment: IV Vancomycin    Assessment/Plan:    Initiate intravenous vancomycin with loading dose of 1500 mg once with subsequent doses when random concentrations are less than 20 mcg/mL  Desired empiric serum trough concentration is 15 to 20 mcg/mL  Draw vancomycin random level on 7/23 at 0700.  Pharmacy will continue to follow and monitor vancomycin.      Thank you for the consult,   Nannette Mina, PharmD, [facility]  [id number]     Patient brief summary:  Corky Bach is a 59 y.o. male initiated on antimicrobial therapy with IV Vancomycin for treatment of suspected SBP    Actual Body Weight:   80 kg    Renal Function:   Estimated Creatinine Clearance: 66.4 mL/min (based on SCr of 1.4 mg/dL).,       CBC (last 72 hours):  Recent Labs   Lab Result Units 07/20/25 2059 07/21/25 0209 07/22/25  0304   WBC K/uL 7.19 7.35 5.42   HGB gm/dL 7.4* 7.3* 6.4*   Hemoglobin A1c % 8.8*  --   --    HCT % 24.0* 24.1* 21.0*   Platelet Count K/uL 61* 47* 40*   Lymph % % 6.1* 6.0* 10.7*   Mono % % 7.9 8.0 9.0   Eos % % 0.7 0.8 0.9   Basophil % % 0.1 0.1 0.4       Metabolic Panel (last 72 hours):  Recent Labs   Lab Result Units 07/20/25 2059 07/21/25 0209 07/21/25  1544 07/22/25  0304   Sodium mmol/L 133* 134*  --  135*   Urine Sodium mmol/L  --   --  20  --    Potassium mmol/L 3.6 4.4  --  3.1*   Chloride mmol/L 99 100  --  101   CO2 mmol/L 23 26  --  24   Glucose mg/dL 278* 246*  --  79   BUN mg/dL 31* 32*  --  27*   Creatinine mg/dL 1.3 1.5*  --  1.4   Urine Creatinine mg/dL  --   --  146.0  147.0  --    Albumin g/dL 1.5* 1.5*  --  2.3*   Bilirubin Total mg/dL 1.3* 1.1*  --  1.1*   ALP unit/L 134 129  --  85   AST unit/L 14 15  --  10*   ALT unit/L 6* <5*  --  <5*   Magnesium  mg/dL 1.7 1.7  --  1.6   Phosphorus Level mg/dL 3.3 3.5  --  3.8       Drug levels (last 3 results):  No results for input(s): "VANCOMYCINRA", "VANCORANDOM", "VANCOMYCINPE", "VANCOPEAK", "VANCOMYCINTR", "VANCOTROUGH" in the last 72 " hours.    Microbiologic Results:  Microbiology Results (last 7 days)       Procedure Component Value Units Date/Time    Gram stain [[id number]] Collected: 07/21/25 1538    Order Status: Completed Specimen: Ascites Updated: 07/22/25 0134     GRAM STAIN Many WBC seen      Rare Gram positive cocci    Culture, Anaerobic [[id number]] Collected: 07/21/25 1538    Order Status: Sent Specimen: Ascites Updated: 07/21/25 2040    Aerobic culture [[id number]] Collected: 07/21/25 1538    Order Status: Sent Specimen: Ascites Updated: 07/21/25 2040

## 2025-07-22 NOTE — ASSESSMENT & PLAN NOTE
Patient with hx CORDOVA cirrhosis c/b ascites (requiring paracentesis, on lasix 40 mg qd, eplerenone), grade 1 esophageal varices (EGD on 11/8/2024), hepatic encephalopathy 5 week hx abdominal, back pain since last paracentesis without fevers, chills, nausea, vomiting, diarrhea. CT L spine with congenital lumbar stenosis, degenerative changes. CT A/P with concern for peritonitis. Also noted splenomegaly and portal vein thrombosis, seen previously. INR 1.8. Patient had noted lactic acidosis at 2.9, now resolved. Overalll suspect abdominal pain 2/2 ascites with c/f SBP vs. Worsening of chronic portal vein thrombosis Given CTX and  pRBCs at OSH. Transferred to [facility] for further workup and evaluation. MELD-Na 17.     - Hold lasix and eplerenone   - continue home rifaxamin   - CTM CBC, CMP, PT/INR daily   - Hepatology consulted, appreciate recs  - Liver US showed cirrhotic morphology of the liver, sequela of portal hypertension and large volume of complicated ascites   - paracentesis pulled 4250mL, labs positive for SBP waiting for cultures   - continue ceftriaxone and albumin

## 2025-07-22 NOTE — ASSESSMENT & PLAN NOTE
Patient's FSGs are controlled on current medication regimen.  Last A1c reviewed-   Lab Results   Component Value Date    HGBA1C 8.8 (H) 07/20/2025     Most recent fingerstick glucose reviewed-   Recent Labs   Lab 07/21/25  1638 07/21/25  2048 07/22/25  0833 07/22/25  1311   POCTGLUCOSE 139* 104 106 116*     Current correctional scale  Low  Maintain anti-hyperglycemic dose as follows-   Antihyperglycemics (From admission, onward)      Start     Stop Route Frequency Ordered    07/22/25 2100  insulin glargine U-100 (Lantus) pen 22 Units         -- SubQ Nightly 07/22/25 1112    07/21/25 0715  insulin aspart U-100 pen 10 Units         -- SubQ 3 times daily with meals 07/20/25 2217    07/20/25 2316  insulin aspart U-100 pen 0-5 Units         -- SubQ Before meals & nightly PRN 07/20/25 2217          Hold Oral hypoglycemics while patient is in the hospital.  - decreased insulin Glargine to 22 units daily

## 2025-07-22 NOTE — HOSPITAL COURSE
Mr. Bach is a 58 y/o male with a notable hx of CORDOVA cirrhosis c/b esophageal varices an routine paracentesis, T2DM, MALT lymphoma, HLD, gout, and depression transferred to [facility] for hepatology and GI consult. He iwas transferred from [facility] on 7/20 with complaints of chronic back and abdominal pain that had been ongoing for appx 5 weeks, he was found to be heme occult positive. Hb 6.9. CT with splenomegaly, calcified portal vein thrombus, ascites with thickening of surrounding peritoneal reflections c/f inflammatory peritonitis. He was given a unit of blood and had diagnostic paracentesis preformed the following day confirming SBP with WBC of 14k. Started on vancomycin and ceftriaxone however culture growing Streptococcus mitis oralis. Repeat paracentesis was done a few days later to reassess, WBC count did not improve so antibiotic regimen switched to IV Meropenem which he completed a 5 day course of with subsequent increase of WBC to 19K on third paracentesis. ID re-started ceftriaxone and vancomycin, added micafungin. He has required 2 additional units of blood over the course of his two week stay, as well as one platelet transfusion. ID has signed off of his care. He has previously undergone routine paracentesis at [facility] in [place], [place] and is stable for transfer there. He will continue his antibiotic regimen of Micafungin, Vancomycin and ceftriaxone. Repeat paracentesis to be done in 5 days to evaluate for improvement in WBC count.

## 2025-07-23 LAB
ABSOLUTE EOSINOPHIL (OHS): 0.05 K/UL
ABSOLUTE EOSINOPHIL (OHS): 0.07 K/UL
ABSOLUTE MONOCYTE (OHS): 0.42 K/UL (ref 0.3–1)
ABSOLUTE MONOCYTE (OHS): 0.58 K/UL (ref 0.3–1)
ABSOLUTE NEUTROPHIL COUNT (OHS): 3.74 K/UL (ref 1.8–7.7)
ABSOLUTE NEUTROPHIL COUNT (OHS): 4.68 K/UL (ref 1.8–7.7)
ALBUMIN SERPL BCP-MCNC: 2.1 G/DL (ref 3.5–5.2)
ALP SERPL-CCNC: 93 UNIT/L (ref 40–150)
ALT SERPL W/O P-5'-P-CCNC: 5 UNIT/L (ref 10–44)
ANION GAP (OHS): 7 MMOL/L (ref 8–16)
AST SERPL-CCNC: 13 UNIT/L (ref 11–45)
BASOPHILS # BLD AUTO: 0.02 K/UL
BASOPHILS # BLD AUTO: 0.02 K/UL
BASOPHILS NFR BLD AUTO: 0.3 %
BASOPHILS NFR BLD AUTO: 0.4 %
BILIRUB SERPL-MCNC: 1.1 MG/DL (ref 0.1–1)
BUN SERPL-MCNC: 22 MG/DL (ref 6–20)
CALCIUM SERPL-MCNC: 7.3 MG/DL (ref 8.7–10.5)
CHLORIDE SERPL-SCNC: 102 MMOL/L (ref 95–110)
CO2 SERPL-SCNC: 25 MMOL/L (ref 23–29)
CREAT SERPL-MCNC: 1 MG/DL (ref 0.5–1.4)
ERYTHROCYTE [DISTWIDTH] IN BLOOD BY AUTOMATED COUNT: 21 % (ref 11.5–14.5)
ERYTHROCYTE [DISTWIDTH] IN BLOOD BY AUTOMATED COUNT: 21.6 % (ref 11.5–14.5)
GFR SERPLBLD CREATININE-BSD FMLA CKD-EPI: >60 ML/MIN/1.73/M2
GLUCOSE SERPL-MCNC: 66 MG/DL (ref 70–110)
HCT VFR BLD AUTO: 22.8 % (ref 40–54)
HCT VFR BLD AUTO: 25 % (ref 40–54)
HGB BLD-MCNC: 7.3 GM/DL (ref 14–18)
HGB BLD-MCNC: 7.7 GM/DL (ref 14–18)
IMM GRANULOCYTES # BLD AUTO: 0.05 K/UL (ref 0–0.04)
IMM GRANULOCYTES # BLD AUTO: 0.07 K/UL (ref 0–0.04)
IMM GRANULOCYTES NFR BLD AUTO: 0.8 % (ref 0–0.5)
IMM GRANULOCYTES NFR BLD AUTO: 1.5 % (ref 0–0.5)
INR PPP: 1.4 (ref 0.8–1.2)
INR PPP: 1.5 (ref 0.8–1.2)
LYMPHOCYTES # BLD AUTO: 0.38 K/UL (ref 1–4.8)
LYMPHOCYTES # BLD AUTO: 0.57 K/UL (ref 1–4.8)
MAGNESIUM SERPL-MCNC: 1.6 MG/DL (ref 1.6–2.6)
MCH RBC QN AUTO: 21.8 PG (ref 27–31)
MCH RBC QN AUTO: 22.3 PG (ref 27–31)
MCHC RBC AUTO-ENTMCNC: 30.8 G/DL (ref 32–36)
MCHC RBC AUTO-ENTMCNC: 32 G/DL (ref 32–36)
MCV RBC AUTO: 70 FL (ref 82–98)
MCV RBC AUTO: 71 FL (ref 82–98)
NUCLEATED RBC (/100WBC) (OHS): 0 /100 WBC
NUCLEATED RBC (/100WBC) (OHS): 0 /100 WBC
PHOSPHATE SERPL-MCNC: 2.6 MG/DL (ref 2.7–4.5)
PLATELET # BLD AUTO: 40 K/UL (ref 150–450)
PLATELET # BLD AUTO: 47 K/UL (ref 150–450)
PLATELET BLD QL SMEAR: ABNORMAL
PMV BLD AUTO: 9.2 FL (ref 9.2–12.9)
PMV BLD AUTO: ABNORMAL FL
POCT GLUCOSE: 138 MG/DL (ref 70–110)
POCT GLUCOSE: 156 MG/DL (ref 70–110)
POCT GLUCOSE: 161 MG/DL (ref 70–110)
POCT GLUCOSE: 66 MG/DL (ref 70–110)
POCT GLUCOSE: 94 MG/DL (ref 70–110)
POTASSIUM SERPL-SCNC: 3.5 MMOL/L (ref 3.5–5.1)
PROT SERPL-MCNC: 6.5 GM/DL (ref 6–8.4)
PROTHROMBIN TIME: 15.4 SECONDS (ref 9–12.5)
PROTHROMBIN TIME: 16.3 SECONDS (ref 9–12.5)
RBC # BLD AUTO: 3.27 M/UL (ref 4.6–6.2)
RBC # BLD AUTO: 3.53 M/UL (ref 4.6–6.2)
RELATIVE EOSINOPHIL (OHS): 1.1 %
RELATIVE EOSINOPHIL (OHS): 1.2 %
RELATIVE LYMPHOCYTE (OHS): 8.1 % (ref 18–48)
RELATIVE LYMPHOCYTE (OHS): 9.5 % (ref 18–48)
RELATIVE MONOCYTE (OHS): 9 % (ref 4–15)
RELATIVE MONOCYTE (OHS): 9.7 % (ref 4–15)
RELATIVE NEUTROPHIL (OHS): 78.5 % (ref 38–73)
RELATIVE NEUTROPHIL (OHS): 79.9 % (ref 38–73)
SODIUM SERPL-SCNC: 134 MMOL/L (ref 136–145)
VANCOMYCIN SERPL-MCNC: 8.9 UG/ML (ref ?–80)
WBC # BLD AUTO: 4.68 K/UL (ref 3.9–12.7)
WBC # BLD AUTO: 5.97 K/UL (ref 3.9–12.7)

## 2025-07-23 PROCEDURE — 84100 ASSAY OF PHOSPHORUS: CPT | Mod: NTX | Performed by: STUDENT IN AN ORGANIZED HEALTH CARE EDUCATION/TRAINING PROGRAM

## 2025-07-23 PROCEDURE — 25000003 PHARM REV CODE 250: Mod: NTX

## 2025-07-23 PROCEDURE — 85610 PROTHROMBIN TIME: CPT | Mod: NTX | Performed by: STUDENT IN AN ORGANIZED HEALTH CARE EDUCATION/TRAINING PROGRAM

## 2025-07-23 PROCEDURE — 85610 PROTHROMBIN TIME: CPT | Mod: NTX

## 2025-07-23 PROCEDURE — P9047 ALBUMIN (HUMAN), 25%, 50ML: HCPCS | Mod: NTX

## 2025-07-23 PROCEDURE — 63600175 PHARM REV CODE 636 W HCPCS: Mod: NTX

## 2025-07-23 PROCEDURE — 25000003 PHARM REV CODE 250: Mod: NTX | Performed by: STUDENT IN AN ORGANIZED HEALTH CARE EDUCATION/TRAINING PROGRAM

## 2025-07-23 PROCEDURE — 80202 ASSAY OF VANCOMYCIN: CPT | Mod: NTX | Performed by: STUDENT IN AN ORGANIZED HEALTH CARE EDUCATION/TRAINING PROGRAM

## 2025-07-23 PROCEDURE — 36415 COLL VENOUS BLD VENIPUNCTURE: CPT | Mod: NTX | Performed by: STUDENT IN AN ORGANIZED HEALTH CARE EDUCATION/TRAINING PROGRAM

## 2025-07-23 PROCEDURE — 83735 ASSAY OF MAGNESIUM: CPT | Mod: NTX | Performed by: STUDENT IN AN ORGANIZED HEALTH CARE EDUCATION/TRAINING PROGRAM

## 2025-07-23 PROCEDURE — 36415 COLL VENOUS BLD VENIPUNCTURE: CPT | Mod: NTX

## 2025-07-23 PROCEDURE — 85025 COMPLETE CBC W/AUTO DIFF WBC: CPT | Mod: NTX | Performed by: STUDENT IN AN ORGANIZED HEALTH CARE EDUCATION/TRAINING PROGRAM

## 2025-07-23 PROCEDURE — 85025 COMPLETE CBC W/AUTO DIFF WBC: CPT | Mod: NTX

## 2025-07-23 PROCEDURE — 63600175 PHARM REV CODE 636 W HCPCS: Mod: NTX | Performed by: STUDENT IN AN ORGANIZED HEALTH CARE EDUCATION/TRAINING PROGRAM

## 2025-07-23 PROCEDURE — 82040 ASSAY OF SERUM ALBUMIN: CPT | Mod: NTX | Performed by: STUDENT IN AN ORGANIZED HEALTH CARE EDUCATION/TRAINING PROGRAM

## 2025-07-23 PROCEDURE — 20600001 HC STEP DOWN PRIVATE ROOM: Mod: NTX

## 2025-07-23 RX ORDER — INSULIN GLARGINE 100 [IU]/ML
18 INJECTION, SOLUTION SUBCUTANEOUS NIGHTLY
Status: DISCONTINUED | OUTPATIENT
Start: 2025-07-23 | End: 2025-08-01

## 2025-07-23 RX ORDER — MAGNESIUM SULFATE HEPTAHYDRATE 40 MG/ML
2 INJECTION, SOLUTION INTRAVENOUS ONCE
Status: COMPLETED | OUTPATIENT
Start: 2025-07-23 | End: 2025-07-23

## 2025-07-23 RX ORDER — INSULIN ASPART 100 [IU]/ML
7 INJECTION, SOLUTION INTRAVENOUS; SUBCUTANEOUS
Status: DISCONTINUED | OUTPATIENT
Start: 2025-07-23 | End: 2025-08-01

## 2025-07-23 RX ORDER — SODIUM,POTASSIUM PHOSPHATES 280-250MG
1 POWDER IN PACKET (EA) ORAL EVERY 4 HOURS
Status: COMPLETED | OUTPATIENT
Start: 2025-07-23 | End: 2025-07-23

## 2025-07-23 RX ORDER — ONDANSETRON HYDROCHLORIDE 2 MG/ML
4 INJECTION, SOLUTION INTRAVENOUS EVERY 6 HOURS PRN
Status: DISCONTINUED | OUTPATIENT
Start: 2025-07-23 | End: 2025-08-03 | Stop reason: HOSPADM

## 2025-07-23 RX ADMIN — POLYETHYLENE GLYCOL 3350 17 G: 17 POWDER, FOR SOLUTION ORAL at 08:07

## 2025-07-23 RX ADMIN — METHOCARBAMOL 250 MG: 500 TABLET ORAL at 08:07

## 2025-07-23 RX ADMIN — RIFAXIMIN 550 MG: 550 TABLET ORAL at 08:07

## 2025-07-23 RX ADMIN — CEFTRIAXONE SODIUM 2 G: 2 INJECTION, POWDER, FOR SOLUTION INTRAMUSCULAR; INTRAVENOUS at 10:07

## 2025-07-23 RX ADMIN — DEXTROSE MONOHYDRATE 12.5 G: 25 INJECTION, SOLUTION INTRAVENOUS at 05:07

## 2025-07-23 RX ADMIN — MAGNESIUM SULFATE HEPTAHYDRATE 2 G: 40 INJECTION, SOLUTION INTRAVENOUS at 03:07

## 2025-07-23 RX ADMIN — VANCOMYCIN HYDROCHLORIDE 1250 MG: 1.25 INJECTION, POWDER, LYOPHILIZED, FOR SOLUTION INTRAVENOUS at 12:07

## 2025-07-23 RX ADMIN — CEFTRIAXONE SODIUM 2 G: 2 INJECTION, POWDER, FOR SOLUTION INTRAMUSCULAR; INTRAVENOUS at 01:07

## 2025-07-23 RX ADMIN — OXYBUTYNIN CHLORIDE 5 MG: 5 TABLET, EXTENDED RELEASE ORAL at 08:07

## 2025-07-23 RX ADMIN — INSULIN GLARGINE 18 UNITS: 100 INJECTION, SOLUTION SUBCUTANEOUS at 10:07

## 2025-07-23 RX ADMIN — ALBUMIN (HUMAN) 25 G: 12.5 SOLUTION INTRAVENOUS at 05:07

## 2025-07-23 RX ADMIN — ONDANSETRON 4 MG: 2 INJECTION INTRAMUSCULAR; INTRAVENOUS at 05:07

## 2025-07-23 RX ADMIN — ALBUMIN (HUMAN) 25 G: 12.5 SOLUTION INTRAVENOUS at 08:07

## 2025-07-23 RX ADMIN — ALBUMIN (HUMAN) 25 G: 12.5 SOLUTION INTRAVENOUS at 10:07

## 2025-07-23 RX ADMIN — RIFAXIMIN 550 MG: 550 TABLET ORAL at 10:07

## 2025-07-23 RX ADMIN — VENLAFAXINE 75 MG: 75 TABLET ORAL at 10:07

## 2025-07-23 RX ADMIN — POTASSIUM & SODIUM PHOSPHATES POWDER PACK 280-160-250 MG 1 PACKET: 280-160-250 PACK at 02:07

## 2025-07-23 RX ADMIN — METHOCARBAMOL 250 MG: 500 TABLET ORAL at 03:07

## 2025-07-23 RX ADMIN — LACTULOSE 10 G: 20 SOLUTION ORAL at 10:07

## 2025-07-23 RX ADMIN — SENNOSIDES AND DOCUSATE SODIUM 1 TABLET: 50; 8.6 TABLET ORAL at 08:07

## 2025-07-23 RX ADMIN — TAMSULOSIN HYDROCHLORIDE 0.4 MG: 0.4 CAPSULE ORAL at 10:07

## 2025-07-23 RX ADMIN — POTASSIUM & SODIUM PHOSPHATES POWDER PACK 280-160-250 MG 1 PACKET: 280-160-250 PACK at 05:07

## 2025-07-23 RX ADMIN — Medication 16 G: at 11:07

## 2025-07-23 RX ADMIN — INSULIN ASPART 10 UNITS: 100 INJECTION, SOLUTION INTRAVENOUS; SUBCUTANEOUS at 08:07

## 2025-07-23 RX ADMIN — METHOCARBAMOL 250 MG: 500 TABLET ORAL at 10:07

## 2025-07-23 RX ADMIN — INSULIN ASPART 7 UNITS: 100 INJECTION, SOLUTION INTRAVENOUS; SUBCUTANEOUS at 01:07

## 2025-07-23 RX ADMIN — VENLAFAXINE 75 MG: 75 TABLET ORAL at 08:07

## 2025-07-23 NOTE — ASSESSMENT & PLAN NOTE
Baseline Cr 0.8 noted to be 1.3 on presentation to [facility]. Unclear etiology of SHAREE. Differential includes prerenal in s/o intravascular volume depletion vs postobstructive. Noted moderate R sided hydronephrosis with normal left kidney on CT imaging at OSH.       Plan  - Avoid nephrotoxins and renally dose meds for GFR listed above  - Monitor urine output, serial BMP, and adjust therapy as needed  - F/u urine studies

## 2025-07-23 NOTE — PLAN OF CARE
Problem: Fall Injury Risk  Goal: Absence of Fall and Fall-Related Injury  7/23/2025 1818 by Cece Dangelo, RN  Outcome: Progressing  7/23/2025 1818 by Cece Dangelo, RN  Outcome: Progressing  Intervention: Identify and Manage Contributors  7/23/2025 1818 by Cece Dangelo, RN  Flowsheets (Taken 7/23/2025 1818)  Medication Review/Management: medications reviewed  7/23/2025 1818 by Cece Dangelo RN  Flowsheets (Taken 7/23/2025 1818)  Self-Care Promotion:   independence encouraged   BADL personal objects within reach   Plan of care review with pt. [id number] . PRN meds given for hypoglycemia, n/v. I&O documented. Bed is locked and in low position, call light within reach.

## 2025-07-23 NOTE — PROGRESS NOTES
[facility] - Telemetry [facility] Medicine  Progress Note    Patient Name: Corky Bach  MRN: [id number]  Patient Class: IP- Inpatient   Admission Date: 7/20/2025  Length of Stay: 3 days  Attending Physician: Jacob Aj MD  Primary Care Provider: Sarmad Estrella MD      Subjective     Principal Problem: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      HPI:  Corky Bach is a 58 y/o male with a notable hx of CORDOVA cirrhosis c/b esophageal varices, T2DM, MALT lymphoma, HLD, gout, and depression transferred to [facility] for hepatology and GI consult.     Patient initially presented to the ED of [facility] on 7/20 with complaints of chronic back and abdominal pain that had been ongoing for appx 5 weeks. He states that the back pain began to occur shortly after his last paracentesis on 7/12 located around his mid lumbar spine, starting with a dull back/ flank pain and quickly radiating to upper abdominal pain. He was trying to use ibuprofen and tylenol to help with the pain however the pain continued to progress. He eventually started to take percocet from a prior hospitalization however this was ineffective. He denies any fevers, chills, nausea, vomiting, constipation/diarrhea, melena, hematemesis during this time. At this point he presented to the ED for further evaluation.     Initially BPs were in the low 100s. He was found to be heme occult positive. Hb 6.9 and platelets 67. AST 13, ALT 6, , bili 1.2, INR 1.8, lactic 2.9. CT Lumbar spine obtained without acute lumbar fracture, congenital lumbar stenosis with degenerative changes. CT A/P without contrast showed moderate r sized pleural effusion, splenomegaly, calcified portal vein thrombus, ascites with thickening of surrounding peritoneal reflections c/f inflammatory peritonitis. Patient started on CTX, given 1u PRBC, and transferred to [facility] for GI/Hepatology evaluation.         Overview/Hospital Course:  Patient is a 59 M w/ hx of CORDOVA cirrhosis, c/b  esophageal varices, T2DM, MALT lymphoma, HLD, gout, admitted for abdminal pain and suspected SBP with decompensated cirrhosis and ascites. Found to have a hemoglobin of 6.9 on presentation, received 1 unit PRBC, no signs of bleeding. Discussed with hepatology and they did not think his presentation was c/f a GIB. Treated empirically for suspected SBP with ceftriaxone. Diagnostic and therapeutic paracentesis drained 4250 mL, lab results positive for SBP. Awaiting cultures. Ascites is now resolved after paracentesis. Hemoglobin and platelets were low today, 6.4 and 40 respectively. Transfused 1U PRBCs and 1U platelets on 7/22. Monitoring hemoglobin. Continuing ceftriaxone and albumin for SBP treatment, added vanc to cover enterococcus while awaiting culture data.     Interval History: NAEON. VSS. Patient reported feeling well, no complaints or concerns noted at the time.     Objective:     Vital Signs (Most Recent):  Temp: 98.3 °F (36.8 °C) (07/23/25 0444)  Pulse: 72 (07/23/25 0444)  Resp: 17 (07/23/25 0444)  BP: (!) 99/55 (07/23/25 0444)  SpO2: 98 % (07/23/25 0444) Vital Signs (24h Range):  Temp:  [96.6 °F (35.9 °C)-98.5 °F (36.9 °C)] 98.3 °F (36.8 °C)  Pulse:  [67-80] 72  Resp:  [17-20] 17  SpO2:  [97 %-100 %] 98 %  BP: ()/(49-68) 99/55     Weight: 80.6 kg (177 lb 11.1 oz)  Body mass index is 22.81 kg/m².    Intake/Output Summary (Last 24 hours) at 7/23/2025 0983  Last data filed at 7/23/2025 0453  Gross per 24 hour   Intake 1205.93 ml   Output 590 ml   Net 615.93 ml         Physical Exam  Constitutional:       General: He is not in acute distress.  HENT:      Head: Normocephalic and atraumatic.      Mouth/Throat:      Mouth: Mucous membranes are moist.   Eyes:      Pupils: Pupils are equal, round, and reactive to light.   Cardiovascular:      Rate and Rhythm: Normal rate and regular rhythm.      Pulses: Normal pulses.      Heart sounds: Normal heart sounds.   Pulmonary:      Effort: Pulmonary effort is  normal. No respiratory distress.      Breath sounds: Normal breath sounds.   Abdominal:      General: Bowel sounds are normal. There is no distension.      Palpations: Abdomen is soft.      Tenderness: There is no abdominal tenderness.      Hernia: A hernia (ventral) is present.   Musculoskeletal:      Right lower leg: No edema.      Left lower leg: No edema.   Neurological:      General: No focal deficit present.      Mental Status: He is alert and oriented to person, place, and time.   Psychiatric:         Mood and Affect: Mood normal.         Behavior: Behavior normal.           Significant Labs: All pertinent labs within the past 24 hours have been reviewed.    Significant Imaging: I have reviewed all pertinent imaging results/findings within the past 24 hours.      Assessment & Plan  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  Abdominal pain  Ascites  Portal hypertensive gastropathy  Portal vein thrombosis  Splenomegaly  Patient with hx CORDOVA cirrhosis c/b ascites (requiring paracentesis, on lasix 40 mg qd, eplerenone), grade 1 esophageal varices (EGD on 11/8/2024), hepatic encephalopathy 5 week hx abdominal, back pain since last paracentesis without fevers, chills, nausea, vomiting, diarrhea. CT L spine with congenital lumbar stenosis, degenerative changes. CT A/P with concern for peritonitis. Also noted splenomegaly and portal vein thrombosis, seen previously. INR 1.8. Patient had noted lactic acidosis at 2.9, now resolved. Overalll suspect abdominal pain 2/2 ascites with c/f SBP vs. Worsening of chronic portal vein thrombosis Given CTX and  pRBCs at OSH. Transferred to [facility] [place] [facility] for further workup and evaluation. MELD-Na 17.     - Hold lasix and eplerenone   - continue home rifaxamin   - CTM CBC, CMP, PT/INR daily   - Hepatology consulted, appreciate recs  - started lactulose 10mg BID for hepatic encephalopathy prophylaxis   - Liver US showed cirrhotic morphology of the liver, sequela of portal hypertension and large volume  of complicated ascites   - paracentesis pulled 4250mL, labs positive for SBP waiting for cultures   - continue ceftriaxone and albumin   - started on vanc to cover for enterococcus while awaiting culture data   Anemia  Patient with hx cirrhosis c/b grade 1 esophageal varices, moderate portal hypertensive gastropathy, multiple angioectasias w/o bleeding (Last EGD 11/8/2024). Presenting with noted anemia of 6.9 at OSH. S/p 1 uPRBC.     Plan  - Monitor serial CBC: Daily  - Transfuse PRBC if patient becomes hemodynamically unstable, symptomatic or H/H drops below 7/21.  - Patient has received 1 units of PRBCs on 7/20  - Hemoglobin dropped to 6.4, platelets dropped to 40  - transfused 1U PRBCs and 1U platelets on 7/22  - hemoglobin stable   SHAREE (acute kidney injury)  Baseline Cr 0.8 noted to be 1.3 on presentation to C. Unclear etiology of SHAREE. Differential includes prerenal in s/o intravascular volume depletion vs postobstructive. Noted moderate R sided hydronephrosis with normal left kidney on CT imaging at OSH.       Plan  - Avoid nephrotoxins and renally dose meds for GFR listed above  - Monitor urine output, serial BMP, and adjust therapy as needed  - F/u urine studies  Mood disorder  Continue home venlafaxine  Hyponatremia  Hyponatremia is likely due to Cirrhosis. The patient's most recent sodium results are listed below.  Recent Labs     07/20/25 2059 07/21/25  0209 07/22/25  0304   * 134* 135*     Plan  - Correct the sodium by 4-6mEq in 24 hours.   - Monitor sodium Daily.   - Patient hyponatremia is stable  Thrombocytopenia  The likely etiology of thrombocytopenia is liver disease. The patients 3 most recent labs are listed below.  Recent Labs     07/21/25  0209 07/22/25  0304 07/22/25  1904   PLT 47* 40* 58*     Plan  - Will transfuse if platelet count is <50k (if undergoing surgical procedure or have active bleeding).  - CTM CBC, PT/INR qd  - transfused 1U platelets on 7/22  Benign prostatic  hyperplasia with incomplete bladder emptying  - continue home flomax  MALT lymphoma  STABLE     MALT-rivka with bone marrow involvement.  Underwent treatment including repeated Rituxan q2 month infusions.    Followed by Heme-Onc at [facility].    Most recent testing has suggested remission leading to potential transplant candidacy for cirrhosis to be re-instituted.   Type 2 diabetes mellitus with hyperglycemia, with long-term current use of insulin  Patient's FSGs are controlled on current medication regimen.  Last A1c reviewed-   Lab Results   Component Value Date    HGBA1C 8.8 (H) 07/20/2025     Most recent fingerstick glucose reviewed-   Recent Labs   Lab 07/22/25  1311 07/22/25  1715 07/22/25 2029 07/22/25 2117   POCTGLUCOSE 116* 94 84 199*     Current correctional scale  Low  Maintain anti-hyperglycemic dose as follows-   Antihyperglycemics (From admission, onward)      Start     Stop Route Frequency Ordered    07/22/25 2100  insulin glargine U-100 (Lantus) pen 22 Units         -- SubQ Nightly 07/22/25 1112    07/21/25 0715  insulin aspart U-100 pen 10 Units         -- SubQ 3 times daily with meals 07/20/25 2217    07/20/25 2316  insulin aspart U-100 pen 0-5 Units         -- SubQ Before meals & nightly PRN 07/20/25 2217          Hold Oral hypoglycemics while patient is in the hospital.  - decreased insulin Glargine to 22 units daily   Mixed hyperlipidemia  - not currently on medication     VTE Risk Mitigation (From admission, onward)           Ordered     Reason for No Pharmacological VTE Prophylaxis  Once        Question:  Reasons:  Answer:  Risk of Bleeding    07/20/25 2020     IP VTE HIGH RISK PATIENT  Once         07/20/25 2020     Place sequential compression device  Until discontinued         07/20/25 2020                    Discharge Planning   GARY: 7/24/2025     Code Status: Full Code   Medical Readiness for Discharge Date:   Discharge Plan A: Home        Veronica Freitas DO  Department of Hospital Medicine    [facility] - Telemetry Stepdown

## 2025-07-23 NOTE — ASSESSMENT & PLAN NOTE
Patient with hx cirrhosis c/b grade 1 esophageal varices, moderate portal hypertensive gastropathy, multiple angioectasias w/o bleeding (Last EGD 11/8/2024). Presenting with noted anemia of 6.9 at OSH. S/p 1 uPRBC.     Plan  - Monitor serial CBC: Daily  - Transfuse PRBC if patient becomes hemodynamically unstable, symptomatic or H/H drops below 7/21.  - Patient has received 1 units of PRBCs on 7/20  - Hemoglobin dropped to 6.4, platelets dropped to 40  - transfused 1U PRBCs and 1U platelets on 7/22  - hemoglobin stable

## 2025-07-23 NOTE — ASSESSMENT & PLAN NOTE
Patient with hx CORDOVA cirrhosis c/b ascites (requiring paracentesis, on lasix 40 mg qd, eplerenone), grade 1 esophageal varices (EGD on 11/8/2024), hepatic encephalopathy 5 week hx abdominal, back pain since last paracentesis without fevers, chills, nausea, vomiting, diarrhea. CT L spine with congenital lumbar stenosis, degenerative changes. CT A/P with concern for peritonitis. Also noted splenomegaly and portal vein thrombosis, seen previously. INR 1.8. Patient had noted lactic acidosis at 2.9, now resolved. Overalll suspect abdominal pain 2/2 ascites with c/f SBP vs. Worsening of chronic portal vein thrombosis Given CTX and  pRBCs at OSH. [place] [facility] [place] and evaluation. MELD-Na 17.     - Hold lasix and eplerenone   - continue home rifaxamin   - CTM CBC, CMP, PT/INR daily   - Hepatology consulted, appreciate recs  - started lactulose 10mg BID for hepatic encephalopathy prophylaxis   - Liver US showed cirrhotic morphology of the liver, sequela of portal hypertension and large volume of complicated ascites   - paracentesis pulled 4250mL, labs positive for SBP waiting for cultures   - continue ceftriaxone and albumin   - started on vanc to cover for enterococcus while awaiting culture data

## 2025-07-23 NOTE — SUBJECTIVE & OBJECTIVE
Interval History: NAEON. VSS. Patient reported feeling well, no complaints or concerns noted at the time.     Objective:     Vital Signs (Most Recent):  Temp: 98.3 °F (36.8 °C) (07/23/25 0444)  Pulse: 72 (07/23/25 0444)  Resp: 17 (07/23/25 0444)  BP: (!) 99/55 (07/23/25 0444)  SpO2: 98 % (07/23/25 0444) Vital Signs (24h Range):  Temp:  [96.6 °F (35.9 °C)-98.5 °F (36.9 °C)] 98.3 °F (36.8 °C)  Pulse:  [67-80] 72  Resp:  [17-20] 17  SpO2:  [97 %-100 %] 98 %  BP: ()/(49-68) 99/55     Weight: 80.6 kg (177 lb 11.1 oz)  Body mass index is 22.81 kg/m².    Intake/Output Summary (Last 24 hours) at 7/23/2025 0931  Last data filed at 7/23/2025 0453  Gross per 24 hour   Intake 1205.93 ml   Output 590 ml   Net 615.93 ml         Physical Exam  Constitutional:       General: He is not in acute distress.  HENT:      Head: Normocephalic and atraumatic.      Mouth/Throat:      Mouth: Mucous membranes are moist.   Eyes:      Pupils: Pupils are equal, round, and reactive to light.   Cardiovascular:      Rate and Rhythm: Normal rate and regular rhythm.      Pulses: Normal pulses.      Heart sounds: Normal heart sounds.   Pulmonary:      Effort: Pulmonary effort is normal. No respiratory distress.      Breath sounds: Normal breath sounds.   Abdominal:      General: Bowel sounds are normal. There is no distension.      Palpations: Abdomen is soft.      Tenderness: There is no abdominal tenderness.      Hernia: A hernia (ventral) is present.   Musculoskeletal:      Right lower leg: No edema.      Left lower leg: No edema.   Neurological:      General: No focal deficit present.      Mental Status: He is alert and oriented to person, place, and time.   Psychiatric:         Mood and Affect: Mood normal.         Behavior: Behavior normal.           Significant Labs: All pertinent labs within the past 24 hours have been reviewed.    Significant Imaging: I have reviewed all pertinent imaging results/findings within the past 24 hours.

## 2025-07-23 NOTE — NURSING
Pt's BG 66, pt reports feel weak and nauseous, scheduled insulin hold, team notified, dextrose injection and zofran given, BG recheck 138

## 2025-07-23 NOTE — TREATMENT PLAN
Hepatology Treatment Plan    Corky Bach is a 59 y.o. 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     Interval History  Patient sees Dr. Urban outpatient via televisits, however has trouble following up consistently.   Paracentesis performed 7/21 with relief of symptoms, para positive for SBP.       To date, patient complaining of sensation of abdominal swelling returning and hernia pain. No other complaints.    Objective  Temp:  [97.8 °F (36.6 °C)-98.5 °F (36.9 °C)] 97.8 °F (36.6 °C) (07/23 1127)  Pulse:  [69-89] 89 (07/23 1127)  BP: ()/(49-72) 118/72 (07/23 1127)  Resp:  [17-20] 18 (07/23 1127)  SpO2:  [96 %-99 %] 96 % (07/23 1127)    General: Alert, Oriented x3, no distress  Neurologic: Asterixis absent  Abdomen: Normoactive bowel sounds. Non-distended. Normal tympany. Soft. Non-tender. No peritoneal signs. Protruding umbilical hernia, non draining    Laboratory    Lab Results   Component Value Date    WBC 4.68 07/23/2025    HGB 7.3 (L) 07/23/2025    HCT 22.8 (L) 07/23/2025    MCV 70 (L) 07/23/2025    PLT 40 (L) 07/23/2025       Lab Results   Component Value Date     (L) 07/23/2025    K 3.5 07/23/2025     07/23/2025    CO2 25 07/23/2025    BUN 22 (H) 07/23/2025    CREATININE 1.0 07/23/2025    CALCIUM 7.3 (L) 07/23/2025       Lab Results   Component Value Date    ALBUMIN 2.1 (L) 07/23/2025    ALT 5 (L) 07/23/2025    AST 13 07/23/2025    GGT 77 (H) 03/23/2023    ALKPHOS 93 07/23/2025    BILITOT 1.1 (H) 07/23/2025       Lab Results   Component Value Date    INR 1.5 (H) 07/23/2025    INR 1.5 (H) 07/22/2025    INR 1.6 (H) 07/21/2025       MELD 3.0: 15 at 7/23/2025 10:09 AM  MELD-Na: 11 at 7/23/2025 10:09 AM  Calculated from:  Serum Creatinine: 1 mg/dL at 7/23/2025 10:09 AM  Serum Sodium: 134 mmol/L at 7/23/2025 10:09 AM  Total Bilirubin: 1.1 mg/dL at 7/23/2025 10:09 AM  Serum Albumin: 2.1 g/dL at 7/23/2025 10:09 AM  INR(ratio): 1.5 at  2025 10:09 AM  Age at listin years  Sex: Male at 2025 10:09 AM    Assessment  Corky Bach is a 59 y.o. male with history of CORDOVA cirrhosis, PHG/GAVE (), hx of portal vein thrombosis/hepatic encephalopathy (), T2DM, MALT lymphoma, HLD, HTN, thrombocytopenia, gout, and depression. Patient admitted as a transfer from [facility] with upper abdominal and back pain of 3 weeks duration without nausea/vomitting sent here for a GI/hepatology evaluation.     Problem List:  CORDOVA cirrhosis complicated by recurrent ascites  PHG/GAVE  Anemia, resolved        Recommendations:  -Recommend a repeat paracentesis sometime before discharge at least by Friday.   -SW to help set up weekly edmar by IR.   -Patient will also need to be on lifelong ciprofloxacin 500mg daily for SBP prophylaxis given history of multiple SBP episodes in the past.  -Anemia now resolved    Thank you for involving us in the care of Corky Bach. Please call with any additional concerns or questions.    Camila Kowalski,   Hepatology Intern, PGY-1

## 2025-07-23 NOTE — ASSESSMENT & PLAN NOTE
Patient with hx CORDOVA cirrhosis c/b ascites (requiring paracentesis, on lasix 40 mg qd, eplerenone), grade 1 esophageal varices (EGD on 11/8/2024), hepatic encephalopathy 5 week hx abdominal, back pain since last paracentesis without fevers, chills, nausea, vomiting, diarrhea. CT L spine with congenital lumbar stenosis, degenerative changes. CT A/P with concern for peritonitis. Also noted splenomegaly and portal vein thrombosis, seen previously. INR 1.8. Patient had noted lactic acidosis at 2.9, now resolved. Overalll suspect abdominal pain 2/2 ascites with c/f SBP vs. Worsening of chronic portal vein thrombosis Given CTX and  pRBCs at OSH. Transferred to [facility] [place] for further workup and evaluation. MELD-Na 17.     - Hold lasix and eplerenone   - continue home rifaxamin   - CTM CBC, CMP, PT/INR daily   - Hepatology consulted, appreciate recs  - started lactulose 10mg BID for hepatic encephalopathy prophylaxis   - Liver US showed cirrhotic morphology of the liver, sequela of portal hypertension and large volume of complicated ascites   - paracentesis pulled 4250mL, labs positive for SBP waiting for cultures   - continue ceftriaxone and albumin   - started on vanc to cover for enterococcus while awaiting culture data

## 2025-07-23 NOTE — ASSESSMENT & PLAN NOTE
Patient with hx CORDOVA cirrhosis c/b ascites (requiring paracentesis, on lasix 40 mg qd, eplerenone), grade 1 esophageal varices (EGD on 11/8/2024), hepatic encephalopathy 5 week hx abdominal, back pain since last paracentesis without fevers, chills, nausea, vomiting, diarrhea. CT L spine with congenital lumbar stenosis, degenerative changes. CT A/P with concern for peritonitis. Also noted splenomegaly and portal vein thrombosis, seen previously. INR 1.8. Patient had noted lactic acidosis at 2.9, now resolved. Overalll suspect abdominal pain 2/2 ascites with c/f SBP vs. Worsening of chronic portal vein thrombosis Given CTX and  pRBCs at OSH. Transferred to [facility] [place] for further workup and evaluation. MELD-Na 17.     - Hold lasix and eplerenone   - continue home rifaxamin   - CTM CBC, CMP, PT/INR daily   - Hepatology consulted, appreciate recs  - started lactulose 10mg BID for hepatic encephalopathy prophylaxis   - Liver US showed cirrhotic morphology of the liver, sequela of portal hypertension and large volume of complicated ascites   - paracentesis pulled 4250mL, labs positive for SBP waiting for cultures   - continue ceftriaxone and albumin   - started on vanc to cover for enterococcus while awaiting culture data

## 2025-07-23 NOTE — ASSESSMENT & PLAN NOTE
STABLE     MALT-rivka with bone marrow involvement.  Underwent treatment including repeated Rituxan q2 month infusions.    Followed by Heme-Onc at [facility].    Most recent testing has suggested remission leading to potential transplant candidacy for cirrhosis to be re-instituted.

## 2025-07-23 NOTE — ASSESSMENT & PLAN NOTE
Patient with hx CORDOVA cirrhosis c/b ascites (requiring paracentesis, on lasix 40 mg qd, eplerenone), grade 1 esophageal varices (EGD on 11/8/2024), hepatic encephalopathy 5 week hx abdominal, back pain since last paracentesis without fevers, chills, nausea, vomiting, diarrhea. CT L spine with congenital lumbar stenosis, degenerative changes. CT A/P with concern for peritonitis. Also noted splenomegaly and portal vein thrombosis, seen previously. INR 1.8. Patient had noted lactic acidosis at 2.9, now resolved. Overalll suspect abdominal pain 2/2 ascites with c/f SBP vs. Worsening of chronic portal vein thrombosis Given CTX and  pRBCs at OSH. Transferred to [facility] [place] for further workup and evaluation. MELD-Na 17.     - Hold lasix and eplerenone   - continue home rifaxamin   - CTM CBC, CMP, PT/INR daily   - Hepatology consulted, appreciate recs  - started lactulose 10mg BID for hepatic encephalopathy prophylaxis   - Liver US showed cirrhotic morphology of the liver, sequela of portal hypertension and large volume of complicated ascites   - paracentesis pulled 4250mL, labs positive for SBP waiting for cultures   - continue ceftriaxone and albumin   - started on vanc to cover for enterococcus while awaiting culture data

## 2025-07-23 NOTE — ASSESSMENT & PLAN NOTE
Patient with hx CORDOVA cirrhosis c/b ascites (requiring paracentesis, on lasix 40 mg qd, eplerenone), grade 1 esophageal varices (EGD on 11/8/2024), hepatic encephalopathy 5 week hx abdominal, back pain since last paracentesis without fevers, chills, nausea, vomiting, diarrhea. CT L spine with congenital lumbar stenosis, degenerative changes. CT A/P with concern for peritonitis. Also noted splenomegaly and portal vein thrombosis, seen previously. INR 1.8. Patient had noted lactic acidosis at 2.9, now resolved. Overalll suspect abdominal pain 2/2 ascites with c/f SBP vs. Worsening of chronic portal vein thrombosis Given CTX and  pRBCs at OSH. [place] [facility]. MELD-Na 17.     - Hold lasix and eplerenone   - continue home rifaxamin   - CTM CBC, CMP, PT/INR daily   - Hepatology consulted, appreciate recs  - started lactulose 10mg BID for hepatic encephalopathy prophylaxis   - Liver US showed cirrhotic morphology of the liver, sequela of portal hypertension and large volume of complicated ascites   - paracentesis pulled 4250mL, labs positive for SBP waiting for cultures   - continue ceftriaxone and albumin   - started on vanc to cover for enterococcus while awaiting culture data

## 2025-07-23 NOTE — PROGRESS NOTES
Pharmacokinetic Assessment Follow Up: IV Vancomycin    Vancomycin serum concentration assessment(s):    Vanc random=8.9 mcg/mL, ~24h level    Vancomycin Regimen Plan:    SCr back to baseline, will start scheduled vanc of 1.25g IV q12h.   Next vanc trough on 7/24 at 2345.  Goal trough 15-20 mcg/mL.     Drug levels (last 3 results):  Recent Labs   Lab Result Units 07/23/25  1009   Vancomycin Random ug/ml 8.9       Pharmacy will continue to follow and monitor vancomycin.    Nannette Mina, PharmD, [facility]  [id number]     Patient brief summary:  Corky Bach is a 59 y.o. male initiated on antimicrobial therapy with IV Vancomycin for treatment of SBP    Actual Body Weight:   80kg    Renal Function:   Estimated Creatinine Clearance: 92.9 mL/min (based on SCr of 1 mg/dL).,       CBC (last 72 hours):  Recent Labs   Lab Result Units 07/20/25 2059 07/21/25 0209 07/22/25  0304 07/22/25  1904 07/23/25  1009   WBC K/uL 7.19 7.35 5.42 8.21 4.68   HGB gm/dL 7.4* 7.3* 6.4* 7.9* 7.3*   Hemoglobin A1c % 8.8*  --   --   --   --    HCT % 24.0* 24.1* 21.0* 25.2* 22.8*   Platelet Count K/uL 61* 47* 40* 58* 40*   Lymph % % 6.1* 6.0* 10.7* 8.3* 8.1*   Mono % % 7.9 8.0 9.0 8.2 9.0   Eos % % 0.7 0.8 0.9 0.9 1.1   Basophil % % 0.1 0.1 0.4 0.2 0.4       Metabolic Panel (last 72 hours):  Recent Labs   Lab Result Units 07/20/25 2059 07/21/25 0209 07/21/25  1544 07/22/25  0304 07/23/25  1009   Sodium mmol/L 133* 134*  --  135* 134*   Urine Sodium mmol/L  --   --  20  --   --    Potassium mmol/L 3.6 4.4  --  3.1* 3.5   Chloride mmol/L 99 100  --  101 102   CO2 mmol/L 23 26  --  24 25   Glucose mg/dL 278* 246*  --  79 66*   BUN mg/dL 31* 32*  --  27* 22*   Creatinine mg/dL 1.3 1.5*  --  1.4 1.0   Urine Creatinine mg/dL  --   --  146.0  147.0  --   --    Albumin g/dL 1.5* 1.5*  --  2.3* 2.1*   Bilirubin Total mg/dL 1.3* 1.1*  --  1.1* 1.1*   ALP unit/L 134 129  --  85 93   AST unit/L 14 15  --  10* 13   ALT unit/L 6* <5*  --  <5* 5*   Magnesium   mg/dL 1.7 1.7  --  1.6 1.6   Phosphorus Level mg/dL 3.3 3.5  --  3.8 2.6*       Vancomycin Administrations:  vancomycin given in the last 96 hours                     vancomycin 1,500 mg in 0.9% NaCl 250 mL IVPB (admixture device) (mg) 1,500 mg New Bag 07/22/25 1034                    Microbiologic Results:  Microbiology Results (last 7 days)       Procedure Component Value Units Date/Time    Culture, Anaerobic [[id number]] Collected: 07/21/25 1538    Order Status: Completed Specimen: Ascites Updated: 07/23/25 0844     Anaerobe Culture Culture In Progress    Aerobic culture [[id number]] Collected: 07/21/25 1538    Order Status: Completed Specimen: Ascites Updated: 07/23/25 0820     CULTURE, AEROBIC Further report to follow    Gram stain [[id number]] Collected: 07/21/25 1538    Order Status: Completed Specimen: Ascites Updated: 07/22/25 0134     GRAM STAIN Many WBC seen      Rare Gram positive cocci

## 2025-07-23 NOTE — ASSESSMENT & PLAN NOTE
Patient with hx CORDOVA cirrhosis c/b ascites (requiring paracentesis, on lasix 40 mg qd, eplerenone), grade 1 esophageal varices (EGD on 11/8/2024), hepatic encephalopathy 5 week hx abdominal, back pain since last paracentesis without fevers, chills, nausea, vomiting, diarrhea. CT L spine with congenital lumbar stenosis, degenerative changes. CT A/P with concern for peritonitis. Also noted splenomegaly and portal vein thrombosis, seen previously. INR 1.8. Patient had noted lactic acidosis at 2.9, now resolved. Overalll suspect abdominal pain 2/2 ascites with c/f SBP vs. Worsening of chronic portal vein thrombosis Given CTX and  pRBCs at OSH. Transferred to [facility] [place] for further workup and evaluation. MELD-Na 17.     - Hold lasix and eplerenone   - continue home rifaxamin   - CTM CBC, CMP, PT/INR daily   - Hepatology consulted, appreciate recs  - started lactulose 10mg BID for hepatic encephalopathy prophylaxis   - Liver US showed cirrhotic morphology of the liver, sequela of portal hypertension and large volume of complicated ascites   - paracentesis pulled 4250mL, labs positive for SBP waiting for cultures   - continue ceftriaxone and albumin   - started on vanc to cover for enterococcus while awaiting culture data

## 2025-07-23 NOTE — ASSESSMENT & PLAN NOTE
Patient's FSGs are controlled on current medication regimen.  Last A1c reviewed-   Lab Results   Component Value Date    HGBA1C 8.8 (H) 07/20/2025     Most recent fingerstick glucose reviewed-   Recent Labs   Lab 07/22/25  1311 07/22/25  1715 07/22/25 2029 07/22/25 2117   POCTGLUCOSE 116* 94 84 199*     Current correctional scale  Low  Maintain anti-hyperglycemic dose as follows-   Antihyperglycemics (From admission, onward)      Start     Stop Route Frequency Ordered    07/22/25 2100  insulin glargine U-100 (Lantus) pen 22 Units         -- SubQ Nightly 07/22/25 1112    07/21/25 0715  insulin aspart U-100 pen 10 Units         -- SubQ 3 times daily with meals 07/20/25 2217    07/20/25 2316  insulin aspart U-100 pen 0-5 Units         -- SubQ Before meals & nightly PRN 07/20/25 2217          Hold Oral hypoglycemics while patient is in the hospital.  - decreased insulin Glargine to 22 units daily

## 2025-07-23 NOTE — ASSESSMENT & PLAN NOTE
The likely etiology of thrombocytopenia is liver disease. The patients 3 most recent labs are listed below.  Recent Labs     07/21/25  0209 07/22/25  0304 07/22/25  1904   PLT 47* 40* 58*     Plan  - Will transfuse if platelet count is <50k (if undergoing surgical procedure or have active bleeding).  - CTM CBC, PT/INR qd  - transfused 1U platelets on 7/22

## 2025-07-24 LAB
ABO + RH BLD: NORMAL
ABSOLUTE EOSINOPHIL (OHS): 0.06 K/UL
ABSOLUTE EOSINOPHIL (OHS): 0.08 K/UL
ABSOLUTE MONOCYTE (OHS): 0.42 K/UL (ref 0.3–1)
ABSOLUTE MONOCYTE (OHS): 0.52 K/UL (ref 0.3–1)
ABSOLUTE NEUTROPHIL COUNT (OHS): 3.72 K/UL (ref 1.8–7.7)
ABSOLUTE NEUTROPHIL COUNT (OHS): 5.6 K/UL (ref 1.8–7.7)
ALBUMIN SERPL BCP-MCNC: 2.4 G/DL (ref 3.5–5.2)
ALP SERPL-CCNC: 131 UNIT/L (ref 40–150)
ALT SERPL W/O P-5'-P-CCNC: <6 UNIT/L (ref 10–44)
ANION GAP (OHS): 11 MMOL/L (ref 8–16)
AST SERPL-CCNC: 27 UNIT/L (ref 11–45)
BASOPHILS # BLD AUTO: 0.01 K/UL
BASOPHILS # BLD AUTO: 0.02 K/UL
BASOPHILS NFR BLD AUTO: 0.2 %
BASOPHILS NFR BLD AUTO: 0.3 %
BILIRUB SERPL-MCNC: 1.1 MG/DL (ref 0.1–1)
BLD PROD TYP BPU: NORMAL
BLOOD UNIT EXPIRATION DATE: NORMAL
BLOOD UNIT TYPE CODE: 5100
BUN SERPL-MCNC: 18 MG/DL (ref 6–20)
CALCIUM SERPL-MCNC: 7.5 MG/DL (ref 8.7–10.5)
CHLORIDE SERPL-SCNC: 103 MMOL/L (ref 95–110)
CO2 SERPL-SCNC: 22 MMOL/L (ref 23–29)
CREAT SERPL-MCNC: 1 MG/DL (ref 0.5–1.4)
CROSSMATCH INTERPRETATION: NORMAL
DISPENSE STATUS: NORMAL
ERYTHROCYTE [DISTWIDTH] IN BLOOD BY AUTOMATED COUNT: 21.9 % (ref 11.5–14.5)
ERYTHROCYTE [DISTWIDTH] IN BLOOD BY AUTOMATED COUNT: 22 % (ref 11.5–14.5)
GFR SERPLBLD CREATININE-BSD FMLA CKD-EPI: >60 ML/MIN/1.73/M2
GLUCOSE SERPL-MCNC: 133 MG/DL (ref 70–110)
HCT VFR BLD AUTO: 21.3 % (ref 40–54)
HCT VFR BLD AUTO: 26.9 % (ref 40–54)
HGB BLD-MCNC: 6.7 GM/DL (ref 14–18)
HGB BLD-MCNC: 8.5 GM/DL (ref 14–18)
IMM GRANULOCYTES # BLD AUTO: 0.03 K/UL (ref 0–0.04)
IMM GRANULOCYTES # BLD AUTO: 0.05 K/UL (ref 0–0.04)
IMM GRANULOCYTES NFR BLD AUTO: 0.6 % (ref 0–0.5)
IMM GRANULOCYTES NFR BLD AUTO: 0.7 % (ref 0–0.5)
INDIRECT COOMBS: NORMAL
INR PPP: 1.5 (ref 0.8–1.2)
LYMPHOCYTES # BLD AUTO: 0.41 K/UL (ref 1–4.8)
LYMPHOCYTES # BLD AUTO: 0.44 K/UL (ref 1–4.8)
MAGNESIUM SERPL-MCNC: 1.8 MG/DL (ref 1.6–2.6)
MCH RBC QN AUTO: 22.1 PG (ref 27–31)
MCH RBC QN AUTO: 22.8 PG (ref 27–31)
MCHC RBC AUTO-ENTMCNC: 31.5 G/DL (ref 32–36)
MCHC RBC AUTO-ENTMCNC: 31.6 G/DL (ref 32–36)
MCV RBC AUTO: 70 FL (ref 82–98)
MCV RBC AUTO: 72 FL (ref 82–98)
NUCLEATED RBC (/100WBC) (OHS): 0 /100 WBC
NUCLEATED RBC (/100WBC) (OHS): 0 /100 WBC
PHOSPHATE SERPL-MCNC: 2.8 MG/DL (ref 2.7–4.5)
PLATELET # BLD AUTO: 37 K/UL (ref 150–450)
PLATELET # BLD AUTO: 40 K/UL (ref 150–450)
PLATELET BLD QL SMEAR: ABNORMAL
PLATELET BLD QL SMEAR: ABNORMAL
PMV BLD AUTO: 9.3 FL (ref 9.2–12.9)
PMV BLD AUTO: ABNORMAL FL
POCT GLUCOSE: 111 MG/DL (ref 70–110)
POCT GLUCOSE: 133 MG/DL (ref 70–110)
POCT GLUCOSE: 155 MG/DL (ref 70–110)
POCT GLUCOSE: 158 MG/DL (ref 70–110)
POTASSIUM SERPL-SCNC: 3.7 MMOL/L (ref 3.5–5.1)
PROT SERPL-MCNC: 6.9 GM/DL (ref 6–8.4)
PROTHROMBIN TIME: 16.1 SECONDS (ref 9–12.5)
RBC # BLD AUTO: 3.03 M/UL (ref 4.6–6.2)
RBC # BLD AUTO: 3.73 M/UL (ref 4.6–6.2)
RELATIVE EOSINOPHIL (OHS): 1.2 %
RELATIVE EOSINOPHIL (OHS): 1.3 %
RELATIVE LYMPHOCYTE (OHS): 6.6 % (ref 18–48)
RELATIVE LYMPHOCYTE (OHS): 8.8 % (ref 18–48)
RELATIVE MONOCYTE (OHS): 7.7 % (ref 4–15)
RELATIVE MONOCYTE (OHS): 9 % (ref 4–15)
RELATIVE NEUTROPHIL (OHS): 80.1 % (ref 38–73)
RELATIVE NEUTROPHIL (OHS): 83.5 % (ref 38–73)
RH BLD: NORMAL
SODIUM SERPL-SCNC: 136 MMOL/L (ref 136–145)
SPECIMEN OUTDATE: NORMAL
UNIT NUMBER: NORMAL
VANCOMYCIN TROUGH SERPL-MCNC: 17.8 UG/ML (ref 10–22)
WBC # BLD AUTO: 4.65 K/UL (ref 3.9–12.7)
WBC # BLD AUTO: 6.71 K/UL (ref 3.9–12.7)

## 2025-07-24 PROCEDURE — 82040 ASSAY OF SERUM ALBUMIN: CPT | Mod: NTX

## 2025-07-24 PROCEDURE — 25000003 PHARM REV CODE 250: Mod: NTX

## 2025-07-24 PROCEDURE — 86901 BLOOD TYPING SEROLOGIC RH(D): CPT | Mod: NTX

## 2025-07-24 PROCEDURE — 36415 COLL VENOUS BLD VENIPUNCTURE: CPT | Mod: NTX

## 2025-07-24 PROCEDURE — 85025 COMPLETE CBC W/AUTO DIFF WBC: CPT | Mod: NTX

## 2025-07-24 PROCEDURE — 25000003 PHARM REV CODE 250: Mod: NTX | Performed by: STUDENT IN AN ORGANIZED HEALTH CARE EDUCATION/TRAINING PROGRAM

## 2025-07-24 PROCEDURE — P9016 RBC LEUKOCYTES REDUCED: HCPCS | Mod: NTX

## 2025-07-24 PROCEDURE — 25000003 PHARM REV CODE 250: Mod: NTX | Performed by: HOSPITALIST

## 2025-07-24 PROCEDURE — 80202 ASSAY OF VANCOMYCIN: CPT | Mod: NTX | Performed by: STUDENT IN AN ORGANIZED HEALTH CARE EDUCATION/TRAINING PROGRAM

## 2025-07-24 PROCEDURE — 84100 ASSAY OF PHOSPHORUS: CPT | Mod: NTX

## 2025-07-24 PROCEDURE — 63600175 PHARM REV CODE 636 W HCPCS: Mod: NTX

## 2025-07-24 PROCEDURE — 86920 COMPATIBILITY TEST SPIN: CPT | Mod: NTX

## 2025-07-24 PROCEDURE — 85610 PROTHROMBIN TIME: CPT | Mod: NTX

## 2025-07-24 PROCEDURE — 83735 ASSAY OF MAGNESIUM: CPT | Mod: NTX

## 2025-07-24 PROCEDURE — 30233N1 TRANSFUSION OF NONAUTOLOGOUS RED BLOOD CELLS INTO PERIPHERAL VEIN, PERCUTANEOUS APPROACH: ICD-10-PCS | Performed by: HOSPITALIST

## 2025-07-24 PROCEDURE — 36430 TRANSFUSION BLD/BLD COMPNT: CPT | Mod: NTX

## 2025-07-24 PROCEDURE — 20600001 HC STEP DOWN PRIVATE ROOM: Mod: NTX

## 2025-07-24 PROCEDURE — 63600175 PHARM REV CODE 636 W HCPCS: Mod: NTX | Performed by: HOSPITALIST

## 2025-07-24 PROCEDURE — 63600175 PHARM REV CODE 636 W HCPCS: Mod: NTX | Performed by: STUDENT IN AN ORGANIZED HEALTH CARE EDUCATION/TRAINING PROGRAM

## 2025-07-24 RX ORDER — HYDROCODONE BITARTRATE AND ACETAMINOPHEN 500; 5 MG/1; MG/1
TABLET ORAL
Status: DISCONTINUED | OUTPATIENT
Start: 2025-07-24 | End: 2025-07-27

## 2025-07-24 RX ADMIN — INSULIN ASPART 7 UNITS: 100 INJECTION, SOLUTION INTRAVENOUS; SUBCUTANEOUS at 10:07

## 2025-07-24 RX ADMIN — METHOCARBAMOL 250 MG: 500 TABLET ORAL at 09:07

## 2025-07-24 RX ADMIN — CEFTRIAXONE SODIUM 2 G: 2 INJECTION, POWDER, FOR SOLUTION INTRAMUSCULAR; INTRAVENOUS at 10:07

## 2025-07-24 RX ADMIN — OXYBUTYNIN CHLORIDE 5 MG: 5 TABLET, EXTENDED RELEASE ORAL at 10:07

## 2025-07-24 RX ADMIN — VENLAFAXINE 75 MG: 75 TABLET ORAL at 09:07

## 2025-07-24 RX ADMIN — INSULIN GLARGINE 18 UNITS: 100 INJECTION, SOLUTION SUBCUTANEOUS at 10:07

## 2025-07-24 RX ADMIN — RIFAXIMIN 550 MG: 550 TABLET ORAL at 09:07

## 2025-07-24 RX ADMIN — METHOCARBAMOL 250 MG: 500 TABLET ORAL at 10:07

## 2025-07-24 RX ADMIN — SENNOSIDES AND DOCUSATE SODIUM 1 TABLET: 50; 8.6 TABLET ORAL at 09:07

## 2025-07-24 RX ADMIN — VANCOMYCIN HYDROCHLORIDE 1250 MG: 1.25 INJECTION, POWDER, LYOPHILIZED, FOR SOLUTION INTRAVENOUS at 01:07

## 2025-07-24 RX ADMIN — LACTULOSE 10 G: 20 SOLUTION ORAL at 09:07

## 2025-07-24 RX ADMIN — INSULIN ASPART 7 UNITS: 100 INJECTION, SOLUTION INTRAVENOUS; SUBCUTANEOUS at 12:07

## 2025-07-24 RX ADMIN — TAMSULOSIN HYDROCHLORIDE 0.4 MG: 0.4 CAPSULE ORAL at 09:07

## 2025-07-24 RX ADMIN — VANCOMYCIN HYDROCHLORIDE 1000 MG: 1 INJECTION, POWDER, LYOPHILIZED, FOR SOLUTION INTRAVENOUS at 01:07

## 2025-07-24 NOTE — PROGRESS NOTES
[facility] - Telemetry [facility] Medicine  Progress Note    Patient Name: Corky Bach  MRN: [id number]  Patient Class: IP- Inpatient   Admission Date: 7/20/2025  Length of Stay: 4 days  Attending Physician: David Mak MD  Primary Care Provider: Sarmad Estrella MD      Subjective     Principal Problem: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      HPI:  Corky Bach is a 58 y/o male with a notable hx of CORDOVA cirrhosis c/b esophageal varices, T2DM, MALT lymphoma, HLD, gout, and depression [place] [facility] [place] and GI consult.     Patient initially presented to the ED of [facility] on 7/20 with complaints of chronic back and abdominal pain that had been ongoing for appx 5 weeks. He states that the back pain began to occur shortly after his last paracentesis on 7/12 located around his mid lumbar spine, starting with a dull back/ flank pain and quickly radiating to upper abdominal pain. He was trying to use ibuprofen and tylenol to help with the pain however the pain continued to progress. He eventually started to take percocet from a prior hospitalization however this was ineffective. He denies any fevers, chills, nausea, vomiting, constipation/diarrhea, melena, hematemesis during this time. At this point he presented to the ED for further evaluation.     Initially BPs were in the low 100s. He was found to be heme occult positive. Hb 6.9 and platelets 67. AST 13, ALT 6, , bili 1.2, INR 1.8, lactic 2.9. CT Lumbar spine obtained without acute lumbar fracture, congenital lumbar stenosis with degenerative changes. CT A/P without contrast showed moderate r sized pleural effusion, splenomegaly, calcified portal vein thrombus, ascites with thickening of surrounding peritoneal reflections c/f inflammatory peritonitis. Patient started on CTX, given 1u PRBC, and transferred to [facility] [place] GI/Hepatology evaluation.           Overview/Hospital Course:  Patient is a 59 M w/ hx of CORDOVA cirrhosis,  c/b esophageal varices, T2DM, MALT lymphoma, HLD, gout, admitted for abdminal pain and suspected SBP with decompensated cirrhosis and ascites. Found to have a hemoglobin of 6.9 on presentation, received 1 unit PRBC, no signs of bleeding. Discussed with hepatology and they did not think his presentation was c/f a GIB. Treated empirically for suspected SBP with ceftriaxone. Diagnostic and therapeutic paracentesis drained 4250 mL, lab results positive for SBP. Awaiting cultures. Ascites is now resolved after paracentesis. Hemoglobin and platelets were low today, 6.4 and 40 respectively. Transfused 1U PRBCs and 1U platelets on 7/22. Monitoring hemoglobin. Continuing ceftriaxone and albumin for SBP treatment, added vanc to cover enterococcus while awaiting culture data. Hemoglobin dropped again, transfusing 1U PRBCs on 7/24. Consulted GI.     Interval History: NAEON. VSS. Patient reported feeling well, noted no back pain or abdominal distension. No complaints or concerns noted at the time.     Objective:     Vital Signs (Most Recent):  Temp: 98.2 °F (36.8 °C) (07/24/25 1347)  Pulse: 79 (07/24/25 1347)  Resp: 16 (07/24/25 1347)  BP: (!) 100/58 (07/24/25 1347)  SpO2: 100 % (07/24/25 1347) Vital Signs (24h Range):  Temp:  [97.8 °F (36.6 °C)-98.4 °F (36.9 °C)] 98.2 °F (36.8 °C)  Pulse:  [74-97] 79  Resp:  [16-18] 16  SpO2:  [97 %-100 %] 100 %  BP: ()/(48-63) 100/58     Weight: 80.6 kg (177 lb 11.1 oz)  Body mass index is 22.81 kg/m².  No intake or output data in the 24 hours ending 07/24/25 1351      Physical Exam  Constitutional:       General: He is not in acute distress.  HENT:      Head: Normocephalic and atraumatic.      Mouth/Throat:      Mouth: Mucous membranes are moist.   Eyes:      Pupils: Pupils are equal, round, and reactive to light.   Cardiovascular:      Rate and Rhythm: Normal rate and regular rhythm.      Pulses: Normal pulses.      Heart sounds: Normal heart sounds.   Pulmonary:      Effort: Pulmonary  effort is normal.      Breath sounds: Normal breath sounds. No wheezing.   Abdominal:      General: Bowel sounds are normal. There is no distension.      Palpations: Abdomen is soft.      Tenderness: There is no abdominal tenderness.      Hernia: A hernia (ventral) is present.   Musculoskeletal:      Right lower leg: No edema.      Left lower leg: No edema.   Neurological:      General: No focal deficit present.      Mental Status: He is alert and oriented to person, place, and time.   Psychiatric:         Mood and Affect: Mood normal.         Behavior: Behavior normal.           Significant Labs: All pertinent labs within the past 24 hours have been reviewed.  CBC:   Recent Labs   Lab 07/23/25  0801 07/23/25  1009 07/24/25  0245   WBC 5.97 4.68 4.65   HGB 7.7* 7.3* 6.7*   HCT 25.0* 22.8* 21.3*   PLT 47* 40* 37*     CMP:   Recent Labs   Lab 07/23/25  1009 07/24/25  0814   * 136   K 3.5 3.7    103   CO2 25 22*   GLU 66* 133*   BUN 22* 18   CREATININE 1.0 1.0   CALCIUM 7.3* 7.5*   PROT 6.5 6.9   ALBUMIN 2.1* 2.4*   BILITOT 1.1* 1.1*   ALKPHOS 93 131   AST 13 27   ALT 5* <6*   ANIONGAP 7* 11       Significant Imaging: I have reviewed all pertinent imaging results/findings within the past 24 hours.      Assessment & Plan  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  Abdominal pain  Ascites  Portal hypertensive gastropathy  Portal vein thrombosis  Splenomegaly  Patient with hx CORDOVA cirrhosis c/b ascites (requiring paracentesis, on lasix 40 mg qd, eplerenone), grade 1 esophageal varices (EGD on 11/8/2024), hepatic encephalopathy 5 week hx abdominal, back pain since last paracentesis without fevers, chills, nausea, vomiting, diarrhea. CT L spine with congenital lumbar stenosis, degenerative changes. CT A/P with concern for peritonitis. Also noted splenomegaly and portal vein thrombosis, seen previously. INR 1.8. Patient had noted lactic acidosis at 2.9, now resolved. Overalll suspect abdominal pain 2/2 ascites with c/f  SBP vs. Worsening of chronic portal vein thrombosis Given CTX and  pRBCs at OSH. [place] [facility] [place]. MELD-Na 17.     - Hold lasix and eplerenone   - continue home rifaxamin   - CTM CBC, CMP, PT/INR daily   - Hepatology consulted, appreciate recs  - started lactulose 10mg BID for hepatic encephalopathy prophylaxis   - Liver US showed cirrhotic morphology of the liver, sequela of portal hypertension and large volume of complicated ascites   - paracentesis pulled 4250mL, labs positive for SBP waiting for cultures   - continue ceftriaxone and vanc to cover for enterococcus while awaiting culture data   Anemia  Patient with hx cirrhosis c/b grade 1 esophageal varices, moderate portal hypertensive gastropathy, multiple angioectasias w/o bleeding (Last EGD 11/8/2024). Presenting with noted anemia of 6.9 at OSH. S/p 1 uPRBC.     Plan  - Monitor serial CBC: Daily  - Transfuse PRBC if patient becomes hemodynamically unstable, symptomatic or H/H drops below 7/21.  - Patient has received 1 units of PRBCs on 7/20  - transfused 1U PRBCs and 1U platelets on 7/22  - hemoglobin dropped to 6.7, transfusing 1U PRBCs on 7/24  - consulted GI   SHAREE (acute kidney injury)  Baseline Cr 0.8 noted to be 1.3 [place] [facility] [place] Unclear etiology of SHAREE. Differential includes prerenal in s/o intravascular volume depletion vs postobstructive. Noted moderate R sided hydronephrosis with normal left kidney on CT imaging at OSH.       Plan  - Avoid nephrotoxins and renally dose meds for GFR listed above  - Monitor urine output, serial BMP, and adjust therapy as needed  - F/u urine studies  - resolved   Mood disorder  Continue home venlafaxine  Hyponatremia  Hyponatremia is likely due to Cirrhosis. The patient's most recent sodium results are listed below.  Recent Labs     07/22/25  0304 07/23/25  1009 07/24/25  0814   * 134* 136     Plan  - Correct the sodium by 4-6mEq in 24 hours.   - Monitor sodium Daily.    - Patient hyponatremia is stable  Thrombocytopenia  The likely etiology of thrombocytopenia is liver disease. The patients 3 most recent labs are listed below.  Recent Labs     07/23/25  0801 07/23/25  1009 07/24/25  0245   PLT 47* 40* 37*     Plan  - Will transfuse if platelet count is <50k (if undergoing surgical procedure or have active bleeding).  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  Underwent treatment including repeated Rituxan q2 month infusions. Followed by Heme-Onc at [facility]. Most recent testing has suggested remission leading to potential transplant candidacy for cirrhosis to be re-instituted.   Type 2 diabetes mellitus with hyperglycemia, with long-term current use of insulin  Patient's FSGs are controlled on current medication regimen.  Last A1c reviewed-   Lab Results   Component Value Date    HGBA1C 8.8 (H) 07/20/2025     Most recent fingerstick glucose reviewed-   Recent Labs   Lab 07/23/25  1800 07/23/25  2212 07/24/25  0816 07/24/25  1221   POCTGLUCOSE 138* 161* 158* 133*     Current correctional scale  Low  Maintain anti-hyperglycemic dose as follows-   Antihyperglycemics (From admission, onward)      Start     Stop Route Frequency Ordered    07/23/25 2100  insulin glargine U-100 (Lantus) pen 18 Units         -- SubQ Nightly 07/23/25 1117    07/23/25 1130  insulin aspart U-100 pen 7 Units         -- SubQ 3 times daily with meals 07/23/25 1119    07/20/25 2316  insulin aspart U-100 pen 0-5 Units         -- SubQ Before meals & nightly PRN 07/20/25 2217          Hold Oral hypoglycemics while patient is in the hospital.  - decreased insulin Glargine to 18 units daily and aspart 7 units TID with meals   Mixed hyperlipidemia  - not currently on medication     VTE Risk Mitigation (From admission, onward)           Ordered     Reason  for No Pharmacological VTE Prophylaxis  Once        Question:  Reasons:  Answer:  Risk of Bleeding    07/20/25 2020     IP VTE HIGH RISK PATIENT  Once         07/20/25 2020     Place sequential compression device  Until discontinued         07/20/25 2020                    Discharge Planning   GARY: 7/29/2025     Code Status: Full Code   Medical Readiness for Discharge Date:   Discharge Plan A: Home with family (lives with mother but does not want to list her due to her being elderly.)          Veronica Freitas DO  Department of Hospital Medicine   [facility] - Telemetry Stepdown

## 2025-07-24 NOTE — ASSESSMENT & PLAN NOTE
Baseline Cr 0.8 noted to be 1.3 on presentation to [facility] [place]. Unclear etiology of SHAREE. Differential includes prerenal in s/o intravascular volume depletion vs postobstructive. Noted moderate R sided hydronephrosis with normal left kidney on CT imaging at OSH.       Plan  - Avoid nephrotoxins and renally dose meds for GFR listed above  - Monitor urine output, serial BMP, and adjust therapy as needed  - F/u urine studies  - resolved

## 2025-07-24 NOTE — ASSESSMENT & PLAN NOTE
Patient with hx CORDOVA cirrhosis c/b ascites (requiring paracentesis, on lasix 40 mg qd, eplerenone), grade 1 esophageal varices (EGD on 11/8/2024), hepatic encephalopathy 5 week hx abdominal, back pain since last paracentesis without fevers, chills, nausea, vomiting, diarrhea. CT L spine with congenital lumbar stenosis, degenerative changes. CT A/P with concern for peritonitis. Also noted splenomegaly and portal vein thrombosis, seen previously. INR 1.8. Patient had noted lactic acidosis at 2.9, now resolved. Overalll suspect abdominal pain 2/2 ascites with c/f SBP vs. Worsening of chronic portal vein thrombosis Given CTX and  pRBCs at OSH. Transferred to [facility] [place] for further workup and evaluation. MELD-Na 17.     - Hold lasix and eplerenone   - continue home rifaxamin   - CTM CBC, CMP, PT/INR daily   - Hepatology consulted, appreciate recs  - started lactulose 10mg BID for hepatic encephalopathy prophylaxis   - Liver US showed cirrhotic morphology of the liver, sequela of portal hypertension and large volume of complicated ascites   - paracentesis pulled 4250mL, labs positive for SBP waiting for cultures   - continue ceftriaxone and vanc to cover for enterococcus while awaiting culture data

## 2025-07-24 NOTE — ASSESSMENT & PLAN NOTE
STABLE     MALT-rivka with bone marrow involvement.  Underwent treatment including repeated Rituxan q2 month infusions. Followed by Heme-Onc at [facility]. Most recent testing has suggested remission leading to potential transplant candidacy for cirrhosis to be re-instituted.

## 2025-07-24 NOTE — ASSESSMENT & PLAN NOTE
Patient with hx CORDOVA cirrhosis c/b ascites (requiring paracentesis, on lasix 40 mg qd, eplerenone), grade 1 esophageal varices (EGD on 11/8/2024), hepatic encephalopathy 5 week hx abdominal, back pain since last paracentesis without fevers, chills, nausea, vomiting, diarrhea. CT L spine with congenital lumbar stenosis, degenerative changes. CT A/P with concern for peritonitis. Also noted splenomegaly and portal vein thrombosis, seen previously. INR 1.8. Patient had noted lactic acidosis at 2.9, now resolved. Overalll suspect abdominal pain 2/2 ascites with c/f SBP vs. Worsening of chronic portal vein thrombosis Given CTX and  pRBCs at OSH. Transferred to [facility] [place] for further workup and evaluation. MELD-Na 17.     - Hold lasix and eplerenone   - continue home rifaxamin   - CTM CBC, CMP, PT/INR daily   - Hepatology consulted, appreciate recs  - started lactulose 10mg BID for hepatic encephalopathy prophylaxis   - Liver US showed cirrhotic morphology of the liver, sequela of portal hypertension and large volume of complicated ascites   - paracentesis pulled 4250mL, labs positive for SBP waiting for cultures   - continue ceftriaxone and vanc to cover for enterococcus while awaiting culture data

## 2025-07-24 NOTE — ASSESSMENT & PLAN NOTE
Patient with hx cirrhosis c/b grade 1 esophageal varices, moderate portal hypertensive gastropathy, multiple angioectasias w/o bleeding (Last EGD 11/8/2024). Presenting with noted anemia of 6.9 at OSH. S/p 1 uPRBC.     Plan  - Monitor serial CBC: Daily  - Transfuse PRBC if patient becomes hemodynamically unstable, symptomatic or H/H drops below 7/21.  - Patient has received 1 units of PRBCs on 7/20  - transfused 1U PRBCs and 1U platelets on 7/22  - hemoglobin dropped to 6.7, transfusing 1U PRBCs on 7/24  - consulted GI

## 2025-07-24 NOTE — ASSESSMENT & PLAN NOTE
Patient with hx CORDOVA cirrhosis c/b ascites (requiring paracentesis, on lasix 40 mg qd, eplerenone), grade 1 esophageal varices (EGD on 11/8/2024), hepatic encephalopathy 5 week hx abdominal, back pain since last paracentesis without fevers, chills, nausea, vomiting, diarrhea. CT L spine with congenital lumbar stenosis, degenerative changes. CT A/P with concern for peritonitis. Also noted splenomegaly and portal vein thrombosis, seen previously. INR 1.8. Patient had noted lactic acidosis at 2.9, now resolved. Overalll suspect abdominal pain 2/2 ascites with c/f SBP vs. Worsening of chronic portal vein thrombosis Given CTX and  pRBCs at OSH. Transferred to [facility] [place] workup and evaluation. MELD-Na 17.     - Hold lasix and eplerenone   - continue home rifaxamin   - CTM CBC, CMP, PT/INR daily   - Hepatology consulted, appreciate recs  - started lactulose 10mg BID for hepatic encephalopathy prophylaxis   - Liver US showed cirrhotic morphology of the liver, sequela of portal hypertension and large volume of complicated ascites   - paracentesis pulled 4250mL, labs positive for SBP waiting for cultures   - continue ceftriaxone and vanc to cover for enterococcus while awaiting culture data

## 2025-07-24 NOTE — TREATMENT PLAN
Hepatology Treatment Plan    Corky Bach is a 59 y.o. 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     Interval History  Patient sees Dr. Urban outpatient via televisits, however has trouble following up consistently.   Paracentesis performed  with relief of symptoms, para positive for SBP.    To date, patient not complaining of abdominal swelling sensation and has no other complaints.     Objective  Temp:  [97.8 °F (36.6 °C)-98.4 °F (36.9 °C)] 97.9 °F (36.6 °C) (809)  Pulse:  [75-89] 76 (809)  BP: ()/(48-72) 100/61 (809)  Resp:  [17-18] 18 (809)  SpO2:  [96 %-99 %] 97 % (809)    General: Alert, Oriented x3, no distress  Neurologic: Asterixis absent  Abdomen: Normoactive bowel sounds. Non-distended. Normal tympany. Soft. Non-tender. No peritoneal signs.    Laboratory    Lab Results   Component Value Date    WBC 4.65 2025    HGB 6.7 (L) 2025    HCT 21.3 (L) 2025    MCV 70 (L) 2025    PLT 37 (LL) 2025       Lab Results   Component Value Date     (L) 2025    K 3.5 2025     2025    CO2 25 2025    BUN 22 (H) 2025    CREATININE 1.0 2025    CALCIUM 7.3 (L) 2025       Lab Results   Component Value Date    ALBUMIN 2.1 (L) 2025    ALT 5 (L) 2025    AST 13 2025    GGT 77 (H) 2023    ALKPHOS 93 2025    BILITOT 1.1 (H) 2025       Lab Results   Component Value Date    INR 1.5 (H) 2025    INR 1.5 (H) 2025    INR 1.4 (H) 2025       MELD 3.0: 15 at 2025  2:45 AM  MELD-Na: 11 at 2025  2:45 AM  Calculated from:  Serum Creatinine: 1 mg/dL at 2025 10:09 AM  Serum Sodium: 134 mmol/L at 2025 10:09 AM  Total Bilirubin: 1.1 mg/dL at 2025 10:09 AM  Serum Albumin: 2.1 g/dL at 2025 10:09 AM  INR(ratio): 1.5 at 2025  2:45 AM  Age at listin years  Sex: Male at  7/24/2025  2:45 AM      Assessment  Corky Bach is a 59 y.o. male with history of CORDOVA cirrhosis, PHG/GAVE (2024), hx of portal vein thrombosis/hepatic encephalopathy (2021), T2DM, MALT lymphoma, HLD, HTN, thrombocytopenia, gout, and depression. Patient admitted as a transfer from [facility] with upper abdominal and back pain of 3 weeks duration without nausea/vomitting sent here for a GI/hepatology evaluation      Problem List:  CORDOVA cirrhosis complicated by recurrent ascites  PHG/GAVE  Anemia        Recommendations:  -Anemia likely 2/2 PHG/GAVE. Recommend scheduled IV Iron transfusions. Primary team may consult GI if they feel benefit outweighs risk. PHG is not likely to improve with any procedure. GAVE may improve if the bleeding is not diffuse.   -Recommend a repeat paracentesis sometime before discharge at least by Friday. Patient can refuse procedure if he does not feel swelling sensation that he has learned to recognize given his extensive history of multiple ascites episodes requiring paracentesis.  -SW to help set up weekly edmar by IR.   -Patient will also need to be on lifelong ciprofloxacin 500mg daily for SBP prophylaxis given history of multiple SBP episodes in the past.    Thank you for involving us in the care of Corky Bach. Please call with any additional concerns or questions.    Camila Kowalski,   Hepatology Intern, PGY-1

## 2025-07-24 NOTE — ASSESSMENT & PLAN NOTE
Patient with hx CORDOVA cirrhosis c/b ascites (requiring paracentesis, on lasix 40 mg qd, eplerenone), grade 1 esophageal varices (EGD on 11/8/2024), hepatic encephalopathy 5 week hx abdominal, back pain since last paracentesis without fevers, chills, nausea, vomiting, diarrhea. CT L spine with congenital lumbar stenosis, degenerative changes. CT A/P with concern for peritonitis. Also noted splenomegaly and portal vein thrombosis, seen previously. INR 1.8. Patient had noted lactic acidosis at 2.9, now resolved. Overalll suspect abdominal pain 2/2 ascites with c/f SBP vs. Worsening of chronic portal vein thrombosis Given CTX and  pRBCs at OSH. [place] [facility] [place] MELD-Na 17.     - Hold lasix and eplerenone   - continue home rifaxamin   - CTM CBC, CMP, PT/INR daily   - Hepatology consulted, appreciate recs  - started lactulose 10mg BID for hepatic encephalopathy prophylaxis   - Liver US showed cirrhotic morphology of the liver, sequela of portal hypertension and large volume of complicated ascites   - paracentesis pulled 4250mL, labs positive for SBP waiting for cultures   - continue ceftriaxone and vanc to cover for enterococcus while awaiting culture data

## 2025-07-24 NOTE — ASSESSMENT & PLAN NOTE
The likely etiology of thrombocytopenia is liver disease. The patients 3 most recent labs are listed below.  Recent Labs     07/23/25  0801 07/23/25  1009 07/24/25  0245   PLT 47* 40* 37*     Plan  - Will transfuse if platelet count is <50k (if undergoing surgical procedure or have active bleeding).  - CTM CBC, PT/INR qd  - transfused 1U platelets on 7/22

## 2025-07-24 NOTE — ASSESSMENT & PLAN NOTE
Patient's FSGs are controlled on current medication regimen.  Last A1c reviewed-   Lab Results   Component Value Date    HGBA1C 8.8 (H) 07/20/2025     Most recent fingerstick glucose reviewed-   Recent Labs   Lab 07/23/25  1800 07/23/25  2212 07/24/25  0816 07/24/25  1221   POCTGLUCOSE 138* 161* 158* 133*     Current correctional scale  Low  Maintain anti-hyperglycemic dose as follows-   Antihyperglycemics (From admission, onward)      Start     Stop Route Frequency Ordered    07/23/25 2100  insulin glargine U-100 (Lantus) pen 18 Units         -- SubQ Nightly 07/23/25 1117    07/23/25 1130  insulin aspart U-100 pen 7 Units         -- SubQ 3 times daily with meals 07/23/25 1119    07/20/25 2316  insulin aspart U-100 pen 0-5 Units         -- SubQ Before meals & nightly PRN 07/20/25 2217          Hold Oral hypoglycemics while patient is in the hospital.  - decreased insulin Glargine to 18 units daily and aspart 7 units TID with meals

## 2025-07-24 NOTE — ASSESSMENT & PLAN NOTE
Hyponatremia is likely due to Cirrhosis. The patient's most recent sodium results are listed below.  Recent Labs     07/22/25  0304 07/23/25  1009 07/24/25  0814   * 134* 136     Plan  - Correct the sodium by 4-6mEq in 24 hours.   - Monitor sodium Daily.   - Patient hyponatremia is stable

## 2025-07-24 NOTE — ASSESSMENT & PLAN NOTE
Patient with hx CORDOVA cirrhosis c/b ascites (requiring paracentesis, on lasix 40 mg qd, eplerenone), grade 1 esophageal varices (EGD on 11/8/2024), hepatic encephalopathy 5 week hx abdominal, back pain since last paracentesis without fevers, chills, nausea, vomiting, diarrhea. CT L spine with congenital lumbar stenosis, degenerative changes. CT A/P with concern for peritonitis. Also noted splenomegaly and portal vein thrombosis, seen previously. INR 1.8. Patient had noted lactic acidosis at 2.9, now resolved. Overalll suspect abdominal pain 2/2 ascites with c/f SBP vs. Worsening of chronic portal vein thrombosis Given CTX and  pRBCs at OSH. [place] [facility] [place] and evaluation. MELD-Na 17.     - Hold lasix and eplerenone   - continue home rifaxamin   - CTM CBC, CMP, PT/INR daily   - Hepatology consulted, appreciate recs  - started lactulose 10mg BID for hepatic encephalopathy prophylaxis   - Liver US showed cirrhotic morphology of the liver, sequela of portal hypertension and large volume of complicated ascites   - paracentesis pulled 4250mL, labs positive for SBP waiting for cultures   - continue ceftriaxone and vanc to cover for enterococcus while awaiting culture data

## 2025-07-24 NOTE — PROGRESS NOTES
Pharmacokinetic Assessment Follow Up: IV Vancomycin    Vancomycin serum concentration assessment(s):    Vancomycin trough=17.8 mcg/mL    Vancomycin Regimen Plan:    Level 17 after only 1 dose, will decrease dose to 1g IV q12h.  Next level on 7/25 at 1200.  SCr stable.    Drug levels (last 3 results):  Recent Labs   Lab Result Units 07/23/25  1009 07/24/25  0011   Vancomycin Random ug/ml 8.9  --    Vancomycin Trough ug/ml  --  17.8       Pharmacy will continue to follow and monitor vancomycin.    Thank you for the consult,   Nannette Mina, PharmD, [facility]  [id number]     Patient brief summary:  Corky Bach is a 59 y.o. male initiated on antimicrobial therapy with IV Vancomycin for treatment of intra-abdominal infection    Actual Body Weight:   92kg    Renal Function:   Estimated Creatinine Clearance: 92.9 mL/min (based on SCr of 1 mg/dL).,     CBC (last 72 hours):  Recent Labs   Lab Result Units 07/22/25  0304 07/22/25  1904 07/23/25  0801 07/23/25  1009 07/24/25  0245   WBC K/uL 5.42 8.21 5.97 4.68 4.65   HGB gm/dL 6.4* 7.9* 7.7* 7.3* 6.7*   HCT % 21.0* 25.2* 25.0* 22.8* 21.3*   Platelet Count K/uL 40* 58* 47* 40* 37*   Lymph % % 10.7* 8.3* 9.5* 8.1* 8.8*   Mono % % 9.0 8.2 9.7 9.0 9.0   Eos % % 0.9 0.9 1.2 1.1 1.3   Basophil % % 0.4 0.2 0.3 0.4 0.2       Metabolic Panel (last 72 hours):  Recent Labs   Lab Result Units 07/21/25  1544 07/22/25  0304 07/23/25  1009 07/24/25  0245 07/24/25  0814   Sodium mmol/L  --  135* 134*  --  136   Urine Sodium mmol/L 20  --   --   --   --    Potassium mmol/L  --  3.1* 3.5  --  3.7   Chloride mmol/L  --  101 102  --  103   CO2 mmol/L  --  24 25  --  22*   Glucose mg/dL  --  79 66*  --  133*   BUN mg/dL  --  27* 22*  --  18   Creatinine mg/dL  --  1.4 1.0  --  1.0   Urine Creatinine mg/dL 146.0  147.0  --   --   --   --    Albumin g/dL  --  2.3* 2.1*  --  2.4*   Bilirubin Total mg/dL  --  1.1* 1.1*  --  1.1*   ALP unit/L  --  85 93  --  131   AST unit/L  --  10* 13  --  27   ALT  unit/L  --  <5* 5*  --  <6*   Magnesium  mg/dL  --  1.6 1.6 1.8  --    Phosphorus Level mg/dL  --  3.8 2.6* 2.8  --        Vancomycin Administrations:  vancomycin given in the last 96 hours                     vancomycin 1,250 mg in 0.9% NaCl 250 mL IVPB (admixture device) (mg) 1,250 mg New Bag 07/24/25 0116     1,250 mg New Bag 07/23/25 1217    vancomycin 1,500 mg in 0.9% NaCl 250 mL IVPB (admixture device) (mg) 1,500 mg New Bag 07/22/25 1034                    Microbiologic Results:  Microbiology Results (last 7 days)       Procedure Component Value Units Date/Time    Culture, Anaerobic [[id number]] Collected: 07/21/25 1538    Order Status: Completed Specimen: Ascites Updated: 07/23/25 0844     Anaerobe Culture Culture In Progress    Aerobic culture [[id number]] Collected: 07/21/25 1538    Order Status: Completed Specimen: Ascites Updated: 07/23/25 0820     CULTURE, AEROBIC Further report to follow    Gram stain [[id number]] Collected: 07/21/25 1538    Order Status: Completed Specimen: Ascites Updated: 07/22/25 0134     GRAM STAIN Many WBC seen      Rare Gram positive cocci

## 2025-07-25 LAB
ABSOLUTE EOSINOPHIL (OHS): 0.05 K/UL
ABSOLUTE MONOCYTE (OHS): 0.49 K/UL (ref 0.3–1)
ABSOLUTE NEUTROPHIL COUNT (OHS): 4.7 K/UL (ref 1.8–7.7)
ALBUMIN FLD-MCNC: 1 G/DL
ALBUMIN SERPL BCP-MCNC: 2.2 G/DL (ref 3.5–5.2)
ALP SERPL-CCNC: 150 UNIT/L (ref 40–150)
ALT SERPL W/O P-5'-P-CCNC: <8 UNIT/L (ref 0–55)
ANION GAP (OHS): 8 MMOL/L (ref 8–16)
ANISOCYTOSIS BLD QL SMEAR: SLIGHT
APPEARANCE FLD: NORMAL
AST SERPL-CCNC: 27 UNIT/L (ref 0–50)
BASOPHILS # BLD AUTO: 0.02 K/UL
BASOPHILS NFR BLD AUTO: 0.3 %
BILIRUB SERPL-MCNC: 1.1 MG/DL (ref 0.1–1)
BUN SERPL-MCNC: 16 MG/DL (ref 6–20)
CALCIUM SERPL-MCNC: 7.3 MG/DL (ref 8.7–10.5)
CHLORIDE SERPL-SCNC: 102 MMOL/L (ref 95–110)
CO2 SERPL-SCNC: 23 MMOL/L (ref 23–29)
COLOR FLD: NORMAL
CREAT SERPL-MCNC: 0.8 MG/DL (ref 0.5–1.4)
DACRYOCYTES BLD QL SMEAR: NORMAL
ERYTHROCYTE [DISTWIDTH] IN BLOOD BY AUTOMATED COUNT: 21.8 % (ref 11.5–14.5)
GFR SERPLBLD CREATININE-BSD FMLA CKD-EPI: >60 ML/MIN/1.73/M2
GLUCOSE SERPL-MCNC: 185 MG/DL (ref 70–110)
GRAM STN SPEC: NORMAL
GRAM STN SPEC: NORMAL
HCT VFR BLD AUTO: 23.6 % (ref 40–54)
HGB BLD-MCNC: 7.5 GM/DL (ref 14–18)
HOLD SPECIMEN: NORMAL
HYPOCHROMIA BLD QL SMEAR: NORMAL
IMM GRANULOCYTES # BLD AUTO: 0.04 K/UL (ref 0–0.04)
IMM GRANULOCYTES NFR BLD AUTO: 0.7 % (ref 0–0.5)
INR PPP: 1.4 (ref 0.8–1.2)
LDH FLD L TO P-CCNC: 3522 U/L
LYMPHOCYTES # BLD AUTO: 0.46 K/UL (ref 1–4.8)
MAGNESIUM SERPL-MCNC: 1.6 MG/DL (ref 1.6–2.6)
MCH RBC QN AUTO: 22.6 PG (ref 27–31)
MCHC RBC AUTO-ENTMCNC: 31.8 G/DL (ref 32–36)
MCV RBC AUTO: 71 FL (ref 82–98)
NUCLEATED RBC (/100WBC) (OHS): 0 /100 WBC
OVALOCYTES BLD QL SMEAR: NORMAL
PHOSPHATE SERPL-MCNC: 3 MG/DL (ref 2.7–4.5)
PLATELET # BLD AUTO: 39 K/UL (ref 150–450)
PLATELET BLD QL SMEAR: ABNORMAL
PMV BLD AUTO: ABNORMAL FL
POCT GLUCOSE: 166 MG/DL (ref 70–110)
POCT GLUCOSE: 179 MG/DL (ref 70–110)
POCT GLUCOSE: 180 MG/DL (ref 70–110)
POCT GLUCOSE: 187 MG/DL (ref 70–110)
POIKILOCYTOSIS BLD QL SMEAR: SLIGHT
POLYCHROMASIA BLD QL SMEAR: NORMAL
POTASSIUM SERPL-SCNC: 3.8 MMOL/L (ref 3.5–5.1)
PROT FLD-MCNC: 3.3 G/DL
PROT SERPL-MCNC: 6.7 GM/DL (ref 6–8.4)
PROTHROMBIN TIME: 15.3 SECONDS (ref 9–12.5)
RBC # BLD AUTO: 3.32 M/UL (ref 4.6–6.2)
RELATIVE EOSINOPHIL (OHS): 0.9 %
RELATIVE LYMPHOCYTE (OHS): 8 % (ref 18–48)
RELATIVE MONOCYTE (OHS): 8.5 % (ref 4–15)
RELATIVE NEUTROPHIL (OHS): 81.6 % (ref 38–73)
SCHISTOCYTES BLD QL SMEAR: NORMAL
SODIUM SERPL-SCNC: 133 MMOL/L (ref 136–145)
VANCOMYCIN TROUGH SERPL-MCNC: 23.5 UG/ML (ref 10–22)
WBC # BLD AUTO: 5.76 K/UL (ref 3.9–12.7)
WBC # FLD: NORMAL /CU MM

## 2025-07-25 PROCEDURE — 63600175 PHARM REV CODE 636 W HCPCS: Mod: NTX

## 2025-07-25 PROCEDURE — 87075 CULTR BACTERIA EXCEPT BLOOD: CPT | Mod: NTX

## 2025-07-25 PROCEDURE — 0W9G3ZZ DRAINAGE OF PERITONEAL CAVITY, PERCUTANEOUS APPROACH: ICD-10-PCS | Performed by: HOSPITALIST

## 2025-07-25 PROCEDURE — 83735 ASSAY OF MAGNESIUM: CPT | Mod: NTX

## 2025-07-25 PROCEDURE — 82042 OTHER SOURCE ALBUMIN QUAN EA: CPT | Mod: NTX

## 2025-07-25 PROCEDURE — 85610 PROTHROMBIN TIME: CPT | Mod: NTX

## 2025-07-25 PROCEDURE — 25000003 PHARM REV CODE 250: Mod: NTX | Performed by: HOSPITALIST

## 2025-07-25 PROCEDURE — 89051 BODY FLUID CELL COUNT: CPT | Mod: NTX

## 2025-07-25 PROCEDURE — 87205 SMEAR GRAM STAIN: CPT | Mod: NTX

## 2025-07-25 PROCEDURE — 83615 LACTATE (LD) (LDH) ENZYME: CPT | Mod: NTX

## 2025-07-25 PROCEDURE — 36415 COLL VENOUS BLD VENIPUNCTURE: CPT | Mod: NTX

## 2025-07-25 PROCEDURE — 63600175 PHARM REV CODE 636 W HCPCS: Mod: NTX | Performed by: HOSPITALIST

## 2025-07-25 PROCEDURE — 25000003 PHARM REV CODE 250: Mod: NTX

## 2025-07-25 PROCEDURE — 87070 CULTURE OTHR SPECIMN AEROBIC: CPT | Mod: NTX

## 2025-07-25 PROCEDURE — 80053 COMPREHEN METABOLIC PANEL: CPT | Mod: NTX

## 2025-07-25 PROCEDURE — 85025 COMPLETE CBC W/AUTO DIFF WBC: CPT | Mod: NTX

## 2025-07-25 PROCEDURE — 84157 ASSAY OF PROTEIN OTHER: CPT | Mod: NTX

## 2025-07-25 PROCEDURE — 84100 ASSAY OF PHOSPHORUS: CPT | Mod: NTX

## 2025-07-25 PROCEDURE — 20600001 HC STEP DOWN PRIVATE ROOM: Mod: NTX

## 2025-07-25 PROCEDURE — 80202 ASSAY OF VANCOMYCIN: CPT | Mod: NTX | Performed by: HOSPITALIST

## 2025-07-25 RX ADMIN — METHOCARBAMOL 250 MG: 500 TABLET ORAL at 10:07

## 2025-07-25 RX ADMIN — INSULIN ASPART 7 UNITS: 100 INJECTION, SOLUTION INTRAVENOUS; SUBCUTANEOUS at 10:07

## 2025-07-25 RX ADMIN — OXYCODONE HYDROCHLORIDE 5 MG: 5 TABLET ORAL at 01:07

## 2025-07-25 RX ADMIN — VENLAFAXINE 75 MG: 75 TABLET ORAL at 09:07

## 2025-07-25 RX ADMIN — CEFTRIAXONE SODIUM 2 G: 2 INJECTION, POWDER, FOR SOLUTION INTRAMUSCULAR; INTRAVENOUS at 11:07

## 2025-07-25 RX ADMIN — TAMSULOSIN HYDROCHLORIDE 0.4 MG: 0.4 CAPSULE ORAL at 09:07

## 2025-07-25 RX ADMIN — RIFAXIMIN 550 MG: 550 TABLET ORAL at 09:07

## 2025-07-25 RX ADMIN — VENLAFAXINE 75 MG: 75 TABLET ORAL at 10:07

## 2025-07-25 RX ADMIN — RIFAXIMIN 550 MG: 550 TABLET ORAL at 10:07

## 2025-07-25 RX ADMIN — METHOCARBAMOL 250 MG: 500 TABLET ORAL at 05:07

## 2025-07-25 RX ADMIN — VANCOMYCIN HYDROCHLORIDE 1000 MG: 1 INJECTION, POWDER, LYOPHILIZED, FOR SOLUTION INTRAVENOUS at 02:07

## 2025-07-25 RX ADMIN — OXYBUTYNIN CHLORIDE 5 MG: 5 TABLET, EXTENDED RELEASE ORAL at 10:07

## 2025-07-25 RX ADMIN — INSULIN ASPART 7 UNITS: 100 INJECTION, SOLUTION INTRAVENOUS; SUBCUTANEOUS at 07:07

## 2025-07-25 RX ADMIN — INSULIN GLARGINE 18 UNITS: 100 INJECTION, SOLUTION SUBCUTANEOUS at 09:07

## 2025-07-25 RX ADMIN — VANCOMYCIN HYDROCHLORIDE 1000 MG: 1 INJECTION, POWDER, LYOPHILIZED, FOR SOLUTION INTRAVENOUS at 09:07

## 2025-07-25 RX ADMIN — INSULIN ASPART 7 UNITS: 100 INJECTION, SOLUTION INTRAVENOUS; SUBCUTANEOUS at 02:07

## 2025-07-25 NOTE — ASSESSMENT & PLAN NOTE
Patient with hx CORDOVA cirrhosis c/b ascites (requiring paracentesis, on lasix 40 mg qd, eplerenone), grade 1 esophageal varices (EGD on 11/8/2024), hepatic encephalopathy 5 week hx abdominal, back pain since last paracentesis without fevers, chills, nausea, vomiting, diarrhea. CT L spine with congenital lumbar stenosis, degenerative changes. CT A/P with concern for peritonitis. Also noted splenomegaly and portal vein thrombosis, seen previously. INR 1.8. Patient had noted lactic acidosis at 2.9, now resolved. Overalll suspect abdominal pain 2/2 ascites with c/f SBP vs. Worsening of chronic portal vein thrombosis Given CTX and  pRBCs at OSH. Transferred to [facility] [place] for further workup and evaluation. MELD-Na 17.     - Hold lasix and eplerenone   - continue home rifaxamin   - CTM CBC, CMP, PT/INR daily   - Hepatology consulted, appreciate recs  - started lactulose 10mg BID for hepatic encephalopathy prophylaxis   - Liver US showed cirrhotic morphology of the liver, sequela of portal hypertension and large volume of complicated ascites   - paracentesis pulled 4250mL, labs positive for SBP waiting for cultures  - repeat para on 7/25 pulled 2500mL, labs collected and pending   - continue ceftriaxone and vanc   - aerobic culture was positive for strep mitis oralis   - consulted ID to determine appropriate outpatient antibiotics, possible discharge tomorrow

## 2025-07-25 NOTE — ASSESSMENT & PLAN NOTE
Patient's FSGs are controlled on current medication regimen.  Last A1c reviewed-   Lab Results   Component Value Date    HGBA1C 8.8 (H) 07/20/2025     Most recent fingerstick glucose reviewed-   Recent Labs   Lab 07/24/25  1720 07/24/25  2259 07/25/25  0827 07/25/25  1250   POCTGLUCOSE 111* 155* 179* 180*     Current correctional scale  Low  Maintain anti-hyperglycemic dose as follows-   Antihyperglycemics (From admission, onward)      Start     Stop Route Frequency Ordered    07/23/25 2100  insulin glargine U-100 (Lantus) pen 18 Units         -- SubQ Nightly 07/23/25 1117    07/23/25 1130  insulin aspart U-100 pen 7 Units         -- SubQ 3 times daily with meals 07/23/25 1119    07/20/25 2316  insulin aspart U-100 pen 0-5 Units         -- SubQ Before meals & nightly PRN 07/20/25 2217          Hold Oral hypoglycemics while patient is in the hospital.  - decreased insulin Glargine to 18 units daily and aspart 7 units TID with meals

## 2025-07-25 NOTE — PROCEDURES
"Corky Bach is a 59 y.o. male patient.    Temp: 97.7 °F (36.5 °C) (25)  Pulse: 84 (25)  Resp: 18 (25)  BP: 118/74 (25)  SpO2: 97 % (25)  Weight: 80.6 kg (177 lb 11.1 oz) (25)  Height: 6' 2" (188 cm) (25)       Paracentesis    Date/Time: 2025 2:19 PM  Location procedure was performed: [facility] MEDICINE    Performed by: Tawanna Hutchison DO  Authorized by: Tawanna Hutchison DO  Consent Done: Yes  Consent: Verbal consent obtained. Written consent obtained  Consent given by: patient  Patient understanding: patient states understanding of the procedure being performed  Patient consent: the patient's understanding of the procedure matches consent given  Procedure consent: procedure consent matches procedure scheduled  Relevant documents: relevant documents present and verified  Test results: test results available and properly labeled  Site marked: the operative site was marked  Patient identity confirmed: , name and verbally with patient  Time out: Immediately prior to procedure a "time out" was called to verify the correct patient, procedure, equipment, support staff and site/side marked as required.  Procedure purpose: diagnostic and therapeutic    Anesthesia:  Local Anesthetic: lidocaine 1% without epinephrine    Patient sedated: no  Preparation: Patient was prepped and draped in the usual sterile fashion.  Puncture site: left lower quadrant  Fluid removed: 2500(ml)  Fluid appearance: cloudy  Complications: No          2025    "

## 2025-07-25 NOTE — ASSESSMENT & PLAN NOTE
STABLE     MALT-rivka with bone marrow involvement.  Underwent treatment including repeated Rituxan q2 month infusions. Followed by Heme-Onc at [facility]. Most recent testing has suggested remission leading to potential transplant candidacy for cirrhosis to be re-instituted.

## 2025-07-25 NOTE — PLAN OF CARE
[facility] - Telemetry Stepdown  Discharge Reassessment    Primary Care Provider: Sarmad Estrella MD    Expected Discharge Date: 7/29/2025    Patient not medically ready to discharge per MD. Patient getting liver work up.      Discharge Plan A and Plan B have been determined by review of patient's clinical status, future medical and therapeutic needs, and coverage/benefits for post-acute care in coordination with multidisciplinary team members.      Reassessment (most recent)       Discharge Reassessment - 07/25/25 0914          Discharge Reassessment    Assessment Type Discharge Planning Reassessment     Did the patient's condition or plan change since previous assessment? No     Discharge Plan discussed with: Patient     Communicated GARY with patient/caregiver Date not available/Unable to determine     Discharge Plan A Home     Discharge Plan B Home     DME Needed Upon Discharge  other (see comments)   TBD    Transition of Care Barriers None     Why the patient remains in the hospital Requires continued medical care        Post-Acute Status    Hospital Resources/Appts/Education Provided Appointments scheduled and added to AVS     Discharge Delays None known at this time                     Rosie Melissa RN     [phone]

## 2025-07-25 NOTE — CONSULTS
Inpatient consult to Hospital Medicine-General  Consult performed by: Tawanna Hutchison DO  Consult ordered by: Prabhjot Vaz MD        Diagnostic and therapeutic paracentesis performed. 2500 cc fluid drained. Samples sent to laboratory. Refer to procedure note for full details.

## 2025-07-25 NOTE — ASSESSMENT & PLAN NOTE
Baseline Cr 0.8 noted to be 1.3 on presentation to [facility] [place] [place]. Unclear etiology of SHAREE. Differential includes prerenal in s/o intravascular volume depletion vs postobstructive. Noted moderate R sided hydronephrosis with normal left kidney on CT imaging at OSH.       Plan  - Avoid nephrotoxins and renally dose meds for GFR listed above  - Monitor urine output, serial BMP, and adjust therapy as needed  - F/u urine studies  - resolved

## 2025-07-25 NOTE — ASSESSMENT & PLAN NOTE
Patient with hx cirrhosis c/b grade 1 esophageal varices, moderate portal hypertensive gastropathy, multiple angioectasias w/o bleeding (Last EGD 11/8/2024). Presenting with noted anemia of 6.9 at OSH. S/p 1 uPRBC.     Plan  - Monitor serial CBC: Daily  - Transfuse PRBC if patient becomes hemodynamically unstable, symptomatic or H/H drops below 7/21.  - Patient has received 1 units of PRBCs on 7/20  - transfused 1U PRBCs and 1U platelets on 7/22  - hemoglobin dropped to 6.7, transfusing 1U PRBCs on 7/24  - consulted GI, anemia most likely due to decompensated cirrhosis no acute intervention needed at the moment

## 2025-07-25 NOTE — PROGRESS NOTES
Pharmacokinetic Assessment Follow Up: IV Vancomycin    Vancomycin serum concentration assessment(s):    Vancomycin trough=23.5 mcg/mL    Vancomycin Regimen Plan:    Vancomycin level above goal of 15-20 mcg/mL. Will adjust regimen from 1g IV q12h to 1g IV q24h. Start next dose at 2000.  Next level before 3rd dose of new regimen on 7/27 at 1900.  SCr stable.     Drug levels (last 3 results):  Recent Labs   Lab Result Units 07/23/25  1009 07/24/25  0011 07/25/25  1153   Vancomycin Random ug/ml 8.9  --   --    Vancomycin Trough ug/ml  --  17.8 23.5*       Pharmacy will continue to follow and monitor vancomycin.    Thank you for the consult,   Nannette Mina, PharmD, [facility]  [id number]     Patient brief summary:  Corky Bach is a 59 y.o. male initiated on antimicrobial therapy with IV Vancomycin for treatment of SBP      Actual Body Weight:   80kg    Renal Function:   Estimated Creatinine Clearance: 116.1 mL/min (based on SCr of 0.8 mg/dL).,       CBC (last 72 hours):  Recent Labs   Lab Result Units 07/22/25  1904 07/23/25  0801 07/23/25  1009 07/24/25  0245 07/24/25  1741 07/25/25  0357   WBC K/uL 8.21 5.97 4.68 4.65 6.71 5.76   HGB gm/dL 7.9* 7.7* 7.3* 6.7* 8.5* 7.5*   HCT % 25.2* 25.0* 22.8* 21.3* 26.9* 23.6*   Platelet Count K/uL 58* 47* 40* 37* 40* 39*   Lymph % % 8.3* 9.5* 8.1* 8.8* 6.6* 8.0*   Mono % % 8.2 9.7 9.0 9.0 7.7 8.5   Eos % % 0.9 1.2 1.1 1.3 1.2 0.9   Basophil % % 0.2 0.3 0.4 0.2 0.3 0.3       Metabolic Panel (last 72 hours):  Recent Labs   Lab Result Units 07/23/25  1009 07/24/25  0245 07/24/25  0814 07/25/25  0357   Sodium mmol/L 134*  --  136 133*   Potassium mmol/L 3.5  --  3.7 3.8   Chloride mmol/L 102  --  103 102   CO2 mmol/L 25  --  22* 23   Glucose mg/dL 66*  --  133* 185*   BUN mg/dL 22*  --  18 16   Creatinine mg/dL 1.0  --  1.0 0.8   Albumin g/dL 2.1*  --  2.4* 2.2*   Bilirubin Total mg/dL 1.1*  --  1.1* 1.1*   ALP unit/L 93  --  131 150   AST unit/L 13  --  27 27   ALT unit/L 5*  --  <6* <8    Magnesium  mg/dL 1.6 1.8  --  1.6   Phosphorus Level mg/dL 2.6* 2.8  --  3.0       Vancomycin Administrations:  vancomycin given in the last 96 hours                     vancomycin (VANCOCIN) 1,000 mg in 0.9% NaCl 250 mL IVPB (admixture device) (mg) 1,000 mg New Bag 07/25/25 0201     1,000 mg New Bag 07/24/25 1354    vancomycin 1,250 mg in 0.9% NaCl 250 mL IVPB (admixture device) (mg) 1,250 mg New Bag 07/24/25 0116     1,250 mg New Bag 07/23/25 1217    vancomycin 1,500 mg in 0.9% NaCl 250 mL IVPB (admixture device) (mg) 1,500 mg New Bag 07/22/25 1034                    Microbiologic Results:  Microbiology Results (last 7 days)       Procedure Component Value Units Date/Time    (rule out SBP) Gram stain [[id number]] Collected: 07/25/25 1039    Order Status: Sent Specimen: Ascites     (rule out SBP) Culture, Anaerobic [[id number]] Collected: 07/25/25 1039    Order Status: Sent Specimen: Ascites Fluid     (rule out SBP) Aerobic culture [[id number]] Collected: 07/25/25 1039    Order Status: Sent Specimen: Ascites Fluid     Culture, Anaerobic [[id number]] Collected: 07/21/25 1538    Order Status: Completed Specimen: Ascites Updated: 07/25/25 0942     Anaerobe Culture Culture In Progress    Aerobic culture [[id number]]  (Abnormal) Collected: 07/21/25 1538    Order Status: Completed Specimen: Ascites Updated: 07/24/25 1442     CULTURE, AEROBIC Few Streptococcus mitis oralis group    Gram stain [[id number]] Collected: 07/21/25 1538    Order Status: Completed Specimen: Ascites Updated: 07/22/25 0134     GRAM STAIN Many WBC seen      Rare Gram positive cocci

## 2025-07-25 NOTE — PROGRESS NOTES
Therapy with vancomycin complete and/or consult discontinued by provider. Pharmacy will sign off, please re-consult as needed.    Nannette Mina, PharmD, BCPS  [id number]

## 2025-07-25 NOTE — ASSESSMENT & PLAN NOTE
Hyponatremia is likely due to Cirrhosis. The patient's most recent sodium results are listed below.  Recent Labs     07/23/25  1009 07/24/25  0814 07/25/25  0357   * 136 133*     Plan  - Correct the sodium by 4-6mEq in 24 hours.   - Monitor sodium Daily.   - Patient hyponatremia is stable

## 2025-07-25 NOTE — ASSESSMENT & PLAN NOTE
The likely etiology of thrombocytopenia is liver disease. The patients 3 most recent labs are listed below.  Recent Labs     07/24/25  0245 07/24/25  1741 07/25/25  0357   PLT 37* 40* 39*     Plan  - Will transfuse if platelet count is <50k (if undergoing surgical procedure or have active bleeding).  - CTM CBC, PT/INR qd  - transfused 1U platelets on 7/22

## 2025-07-25 NOTE — PLAN OF CARE
Pt AAOx's 4. Pt complained of 6/10 pain. Pain meds given. Pt drank lactulose and had a total of 4 BM on yesterday. Vanc administered as directed. Bed in lowest position. Call light and personal items in reach.  Problem: Adult Inpatient Plan of Care  Goal: Plan of Care Review  Outcome: Progressing  Goal: Patient-Specific Goal (Individualized)  Outcome: Progressing  Goal: Absence of Hospital-Acquired Illness or Injury  Outcome: Progressing  Goal: Optimal Comfort and Wellbeing  Outcome: Progressing  Goal: Readiness for Transition of Care  Outcome: Progressing     Problem: Diabetes Comorbidity  Goal: Blood Glucose Level Within Targeted Range  Outcome: Progressing     Problem: Wound  Goal: Optimal Coping  Outcome: Progressing  Goal: Optimal Functional Ability  Outcome: Progressing  Goal: Absence of Infection Signs and Symptoms  Outcome: Progressing  Goal: Improved Oral Intake  Outcome: Progressing  Goal: Optimal Pain Control and Function  Outcome: Progressing  Goal: Skin Health and Integrity  Outcome: Progressing  Goal: Optimal Wound Healing  Outcome: Progressing     Problem: Acute Kidney Injury/Impairment  Goal: Fluid and Electrolyte Balance  Outcome: Progressing  Goal: Improved Oral Intake  Outcome: Progressing  Goal: Effective Renal Function  Outcome: Progressing     Problem: Fall Injury Risk  Goal: Absence of Fall and Fall-Related Injury  Outcome: Progressing

## 2025-07-25 NOTE — ASSESSMENT & PLAN NOTE
Patient with hx CORDOVA cirrhosis c/b ascites (requiring paracentesis, on lasix 40 mg qd, eplerenone), grade 1 esophageal varices (EGD on 11/8/2024), hepatic encephalopathy 5 week hx abdominal, back pain since last paracentesis without fevers, chills, nausea, vomiting, diarrhea. CT L spine with congenital lumbar stenosis, degenerative changes. CT A/P with concern for peritonitis. Also noted splenomegaly and portal vein thrombosis, seen previously. INR 1.8. Patient had noted lactic acidosis at 2.9, now resolved. Overalll suspect abdominal pain 2/2 ascites with c/f SBP vs. Worsening of chronic portal vein thrombosis Given CTX and  pRBCs at OSH. Transferred to [facility] [place] for further workup and evaluation. MELD-Na 17.     - Hold lasix and eplerenone   - continue home rifaxamin   - CTM CBC, CMP, PT/INR daily   - Hepatology consulted, appreciate recs  - started lactulose 10mg BID for hepatic encephalopathy prophylaxis   - Liver US showed cirrhotic morphology of the liver, sequela of portal hypertension and large volume of complicated ascites   - paracentesis pulled 4250mL, labs positive for SBP waiting for cultures  - repeat para on 7/25 pulled 2500mL, labs collected and pending   - continue ceftriaxone and vanc   - aerobic culture was positive for strep mitis oralis   - consulted ID to determine appropriate outpatient antibiotics, possible discharge tomorrow

## 2025-07-25 NOTE — PROGRESS NOTES
[facility] - Telemetry [facility] Medicine  Progress Note    Patient Name: Corky Bach  MRN: [id number]  Patient Class: IP- Inpatient   Admission Date: 7/20/2025  Length of Stay: 5 days  Attending Physician: David Mak MD  Primary Care Provider: Sarmad Estrella MD      Subjective     Principal Problem: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      HPI:  Corky Bach is a 60 y/o male with a notable hx of CORDOVA cirrhosis c/b esophageal varices, T2DM, MALT lymphoma, HLD, gout, and depression transferred to [facility] [place] for hepatology and GI consult.     Patient initially presented to the ED of [facility] on 7/20 with complaints of chronic back and abdominal pain that had been ongoing for appx 5 weeks. He states that the back pain began to occur shortly after his last paracentesis on 7/12 located around his mid lumbar spine, starting with a dull back/ flank pain and quickly radiating to upper abdominal pain. He was trying to use ibuprofen and tylenol to help with the pain however the pain continued to progress. He eventually started to take percocet from a prior hospitalization however this was ineffective. He denies any fevers, chills, nausea, vomiting, constipation/diarrhea, melena, hematemesis during this time. At this point he presented to the ED for further evaluation.     Initially BPs were in the low 100s. He was found to be heme occult positive. Hb 6.9 and platelets 67. AST 13, ALT 6, , bili 1.2, INR 1.8, lactic 2.9. CT Lumbar spine obtained without acute lumbar fracture, congenital lumbar stenosis with degenerative changes. CT A/P without contrast showed moderate r sized pleural effusion, splenomegaly, calcified portal vein thrombus, ascites with thickening of surrounding peritoneal reflections c/f inflammatory peritonitis. Patient started on CTX, given 1u PRBC, and transferred to [facility] [place] for GI/Hepatology evaluation.         Overview/Hospital Course:  Patient is a 59 M w/ hx of CORDOVA cirrhosis,  c/b esophageal varices, T2DM, MALT lymphoma, HLD, gout, admitted for abdminal pain and suspected SBP with decompensated cirrhosis and ascites. Found to have a hemoglobin of 6.9 on presentation, received 1 unit PRBC, no signs of bleeding. Discussed with hepatology and they did not think his presentation was c/f a GIB. Treated empirically for suspected SBP with ceftriaxone. Diagnostic and therapeutic paracentesis drained 4250 mL, lab results positive for SBP. Awaiting cultures. Ascites is now resolved after paracentesis. Hemoglobin and platelets were low today, 6.4 and 40 respectively. Transfused 1U PRBCs and 1U platelets on 7/22. Monitoring hemoglobin. Continuing ceftriaxone and albumin for SBP treatment, added vanc to cover enterococcus while awaiting culture data. Hemoglobin dropped again, transfused 1U PRBCs on 7/24. Hemoglobin stable. Aerobic culture was positive for strep mitis oralis. Repeat paracentesis on 7/25, pulled 2.5 L. Consulting ID to determine appropriate outpatient antibiotics. Possible discharge on 7/26.     Interval History: NAEON. Patient reported feeling well overall, no complaints or concerns noted at the time.     Objective:     Vital Signs (Most Recent):  Temp: 97.7 °F (36.5 °C) (07/25/25 0756)  Pulse: 84 (07/25/25 0756)  Resp: 18 (07/25/25 0756)  BP: 118/74 (07/25/25 0756)  SpO2: 97 % (07/25/25 0756) Vital Signs (24h Range):  Temp:  [97.7 °F (36.5 °C)-98.6 °F (37 °C)] 97.7 °F (36.5 °C)  Pulse:  [72-88] 84  Resp:  [18] 18  SpO2:  [95 %-99 %] 97 %  BP: ()/(50-74) 118/74     Weight: 80.6 kg (177 lb 11.1 oz)  Body mass index is 22.81 kg/m².    Intake/Output Summary (Last 24 hours) at 7/25/2025 1418  Last data filed at 7/25/2025 0156  Gross per 24 hour   Intake 1053.32 ml   Output 775 ml   Net 278.32 ml         Physical Exam  Constitutional:       General: He is not in acute distress.  HENT:      Head: Normocephalic and atraumatic.      Mouth/Throat:      Mouth: Mucous membranes are moist.    Eyes:      Pupils: Pupils are equal, round, and reactive to light.   Cardiovascular:      Rate and Rhythm: Normal rate and regular rhythm.      Pulses: Normal pulses.      Heart sounds: Normal heart sounds.   Pulmonary:      Effort: Pulmonary effort is normal. No respiratory distress.      Breath sounds: Normal breath sounds.   Abdominal:      General: Bowel sounds are normal. There is distension.      Palpations: Abdomen is soft.      Tenderness: There is no abdominal tenderness.      Hernia: A hernia (ventral) is present.   Musculoskeletal:      Right lower leg: No edema.      Left lower leg: No edema.   Neurological:      General: No focal deficit present.      Mental Status: He is alert and oriented to person, place, and time.   Psychiatric:         Mood and Affect: Mood normal.         Behavior: Behavior normal.           Significant Labs: All pertinent labs within the past 24 hours have been reviewed.  CBC:   Recent Labs   Lab 07/24/25  0245 07/24/25  1741 07/25/25  0357   WBC 4.65 6.71 5.76   HGB 6.7* 8.5* 7.5*   HCT 21.3* 26.9* 23.6*   PLT 37* 40* 39*     CMP:   Recent Labs   Lab 07/24/25  0814 07/25/25  0357    133*   K 3.7 3.8    102   CO2 22* 23   * 185*   BUN 18 16   CREATININE 1.0 0.8   CALCIUM 7.5* 7.3*   PROT 6.9 6.7   ALBUMIN 2.4* 2.2*   BILITOT 1.1* 1.1*   ALKPHOS 131 150   AST 27 27   ALT <6* <8   ANIONGAP 11 8       Significant Imaging: I have reviewed all pertinent imaging results/findings within the past 24 hours.    Assessment & Plan  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  Abdominal pain  Ascites  Portal hypertensive gastropathy  Portal vein thrombosis  Splenomegaly  Patient with hx CORDOVA cirrhosis c/b ascites (requiring paracentesis, on lasix 40 mg qd, eplerenone), grade 1 esophageal varices (EGD on 11/8/2024), hepatic encephalopathy 5 week hx abdominal, back pain since last paracentesis without fevers, chills, nausea, vomiting, diarrhea. CT L spine with congenital lumbar  stenosis, degenerative changes. CT A/P with concern for peritonitis. Also noted splenomegaly and portal vein thrombosis, seen previously. INR 1.8. Patient had noted lactic acidosis at 2.9, now resolved. Overalll suspect abdominal pain 2/2 ascites with c/f SBP vs. Worsening of chronic portal vein thrombosis Given CTX and  pRBCs at OSH. [place] [facility] [place] and evaluation. MELD-Na 17.     - Hold lasix and eplerenone   - continue home rifaxamin   - CTM CBC, CMP, PT/INR daily   - Hepatology consulted, appreciate recs  - started lactulose 10mg BID for hepatic encephalopathy prophylaxis   - Liver US showed cirrhotic morphology of the liver, sequela of portal hypertension and large volume of complicated ascites   - paracentesis pulled 4250mL, labs positive for SBP waiting for cultures  - repeat para on 7/25 pulled 2500mL, labs collected and pending   - continue ceftriaxone and vanc   - aerobic culture was positive for strep mitis oralis   - consulted ID to determine appropriate outpatient antibiotics, possible discharge tomorrow   Anemia  Patient with hx cirrhosis c/b grade 1 esophageal varices, moderate portal hypertensive gastropathy, multiple angioectasias w/o bleeding (Last EGD 11/8/2024). Presenting with noted anemia of 6.9 at OSH. S/p 1 uPRBC.     Plan  - Monitor serial CBC: Daily  - Transfuse PRBC if patient becomes hemodynamically unstable, symptomatic or H/H drops below 7/21.  - Patient has received 1 units of PRBCs on 7/20  - transfused 1U PRBCs and 1U platelets on 7/22  - hemoglobin dropped to 6.7, transfusing 1U PRBCs on 7/24  - consulted GI, anemia most likely due to decompensated cirrhosis no acute intervention needed at the moment   SHAREE (acute kidney injury)  Baseline Cr 0.8 noted to be 1.3 on presentation to [facility] [place]. Unclear etiology of SHAREE. Differential includes prerenal in s/o intravascular volume depletion vs postobstructive. Noted moderate R sided hydronephrosis with normal left kidney on  CT imaging at OSH.       Plan  - Avoid nephrotoxins and renally dose meds for GFR listed above  - Monitor urine output, serial BMP, and adjust therapy as needed  - F/u urine studies  - resolved   Mood disorder  Continue home venlafaxine  Hyponatremia  Hyponatremia is likely due to Cirrhosis. The patient's most recent sodium results are listed below.  Recent Labs     07/23/25  1009 07/24/25  0814 07/25/25  0357   * 136 133*     Plan  - Correct the sodium by 4-6mEq in 24 hours.   - Monitor sodium Daily.   - Patient hyponatremia is stable  Thrombocytopenia  The likely etiology of thrombocytopenia is liver disease. The patients 3 most recent labs are listed below.  Recent Labs     07/24/25  0245 07/24/25  1741 07/25/25  0357   PLT 37* 40* 39*     Plan  - Will transfuse if platelet count is <50k (if undergoing surgical procedure or have active bleeding).  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  Underwent treatment including repeated Rituxan q2 month infusions. Followed by Heme-Onc at [facility]. Most recent testing has suggested remission leading to potential transplant candidacy for cirrhosis to be re-instituted.   Type 2 diabetes mellitus with hyperglycemia, with long-term current use of insulin  Patient's FSGs are controlled on current medication regimen.  Last A1c reviewed-   Lab Results   Component Value Date    HGBA1C 8.8 (H) 07/20/2025     Most recent fingerstick glucose reviewed-   Recent Labs   Lab 07/24/25  1720 07/24/25  2259 07/25/25  0827 07/25/25  1250   POCTGLUCOSE 111* 155* 179* 180*     Current correctional scale  Low  Maintain anti-hyperglycemic dose as follows-   Antihyperglycemics (From admission, onward)      Start     Stop Route Frequency Ordered    07/23/25 2100  insulin glargine U-100 (Lantus) pen 18 Units         -- SubQ  Nightly 07/23/25 1117    07/23/25 1130  insulin aspart U-100 pen 7 Units         -- SubQ 3 times daily with meals 07/23/25 1119    07/20/25 2316  insulin aspart U-100 pen 0-5 Units         -- SubQ Before meals & nightly PRN 07/20/25 2217          Hold Oral hypoglycemics while patient is in the hospital.  - decreased insulin Glargine to 18 units daily and aspart 7 units TID with meals   Mixed hyperlipidemia  - not currently on medication     VTE Risk Mitigation (From admission, onward)           Ordered     Reason for No Pharmacological VTE Prophylaxis  Once        Question:  Reasons:  Answer:  Risk of Bleeding    07/20/25 2020     IP VTE HIGH RISK PATIENT  Once         07/20/25 2020     Place sequential compression device  Until discontinued         07/20/25 2020                    Discharge Planning   GARY: 7/29/2025     Code Status: Full Code   Medical Readiness for Discharge Date:   Discharge Plan A: Home   Discharge Delays: None known at this time      Veronica Freitas DO  Department of Hospital Medicine   [facility] - Telemetry Stepdown

## 2025-07-25 NOTE — ASSESSMENT & PLAN NOTE
Patient with hx CORDOVA cirrhosis c/b ascites (requiring paracentesis, on lasix 40 mg qd, eplerenone), grade 1 esophageal varices (EGD on 11/8/2024), hepatic encephalopathy 5 week hx abdominal, back pain since last paracentesis without fevers, chills, nausea, vomiting, diarrhea. CT L spine with congenital lumbar stenosis, degenerative changes. CT A/P with concern for peritonitis. Also noted splenomegaly and portal vein thrombosis, seen previously. INR 1.8. Patient had noted lactic acidosis at 2.9, now resolved. Overalll suspect abdominal pain 2/2 ascites with c/f SBP vs. Worsening of chronic portal vein thrombosis Given CTX and  pRBCs at OSH. [place] [facility] [place]. MELD-Na 17.     - Hold lasix and eplerenone   - continue home rifaxamin   - CTM CBC, CMP, PT/INR daily   - Hepatology consulted, appreciate recs  - started lactulose 10mg BID for hepatic encephalopathy prophylaxis   - Liver US showed cirrhotic morphology of the liver, sequela of portal hypertension and large volume of complicated ascites   - paracentesis pulled 4250mL, labs positive for SBP waiting for cultures  - repeat para on 7/25 pulled 2500mL, labs collected and pending   - continue ceftriaxone and vanc   - aerobic culture was positive for strep mitis oralis   - consulted ID to determine appropriate outpatient antibiotics, possible discharge tomorrow

## 2025-07-25 NOTE — ASSESSMENT & PLAN NOTE
Patient with hx CORDOVA cirrhosis c/b ascites (requiring paracentesis, on lasix 40 mg qd, eplerenone), grade 1 esophageal varices (EGD on 11/8/2024), hepatic encephalopathy 5 week hx abdominal, back pain since last paracentesis without fevers, chills, nausea, vomiting, diarrhea. CT L spine with congenital lumbar stenosis, degenerative changes. CT A/P with concern for peritonitis. Also noted splenomegaly and portal vein thrombosis, seen previously. INR 1.8. Patient had noted lactic acidosis at 2.9, now resolved. Overalll suspect abdominal pain 2/2 ascites with c/f SBP vs. Worsening of chronic portal vein thrombosis Given CTX and  pRBCs at OSH. Transferred to [facility] [place] for further workup and evaluation. MELD-Na 17.     - Hold lasix and eplerenone   - continue home rifaxamin   - CTM CBC, CMP, PT/INR daily   - Hepatology consulted, appreciate recs  - started lactulose 10mg BID for hepatic encephalopathy prophylaxis   - Liver US showed cirrhotic morphology of the liver, sequela of portal hypertension and large volume of complicated ascites   - paracentesis pulled 4250mL, labs positive for SBP waiting for cultures  - repeat para on 7/25 pulled 2500mL, labs collected and pending   - continue ceftriaxone and vanc   - aerobic culture was positive for strep mitis oralis   - consulted ID to determine appropriate outpatient antibiotics, possible discharge tomorrow

## 2025-07-25 NOTE — ASSESSMENT & PLAN NOTE
Patient with hx CORDOVA cirrhosis c/b ascites (requiring paracentesis, on lasix 40 mg qd, eplerenone), grade 1 esophageal varices (EGD on 11/8/2024), hepatic encephalopathy 5 week hx abdominal, back pain since last paracentesis without fevers, chills, nausea, vomiting, diarrhea. CT L spine with congenital lumbar stenosis, degenerative changes. CT A/P with concern for peritonitis. Also noted splenomegaly and portal vein thrombosis, seen previously. INR 1.8. Patient had noted lactic acidosis at 2.9, now resolved. Overalll suspect abdominal pain 2/2 ascites with c/f SBP vs. Worsening of chronic portal vein thrombosis Given CTX and  pRBCs at OSH. Transferred to [facility] [place] for further workup and evaluation. MELD-Na 17.     - Hold lasix and eplerenone   - continue home rifaxamin   - CTM CBC, CMP, PT/INR daily   - Hepatology consulted, appreciate recs  - started lactulose 10mg BID for hepatic encephalopathy prophylaxis   - Liver US showed cirrhotic morphology of the liver, sequela of portal hypertension and large volume of complicated ascites   - paracentesis pulled 4250mL, labs positive for SBP waiting for cultures  - repeat para on 7/25 pulled 2500mL, labs collected and pending   - continue ceftriaxone and vanc   - aerobic culture was positive for strep mitis oralis   - consulted ID to determine appropriate outpatient antibiotics, possible discharge tomorrow

## 2025-07-25 NOTE — SUBJECTIVE & OBJECTIVE
Interval History: NAEON. Patient reported feeling well overall, no complaints or concerns noted at the time.     Objective:     Vital Signs (Most Recent):  Temp: 97.7 °F (36.5 °C) (07/25/25 0756)  Pulse: 84 (07/25/25 0756)  Resp: 18 (07/25/25 0756)  BP: 118/74 (07/25/25 0756)  SpO2: 97 % (07/25/25 0756) Vital Signs (24h Range):  Temp:  [97.7 °F (36.5 °C)-98.6 °F (37 °C)] 97.7 °F (36.5 °C)  Pulse:  [72-88] 84  Resp:  [18] 18  SpO2:  [95 %-99 %] 97 %  BP: ()/(50-74) 118/74     Weight: 80.6 kg (177 lb 11.1 oz)  Body mass index is 22.81 kg/m².    Intake/Output Summary (Last 24 hours) at 7/25/2025 1418  Last data filed at 7/25/2025 0156  Gross per 24 hour   Intake 1053.32 ml   Output 775 ml   Net 278.32 ml         Physical Exam  Constitutional:       General: He is not in acute distress.  HENT:      Head: Normocephalic and atraumatic.      Mouth/Throat:      Mouth: Mucous membranes are moist.   Eyes:      Pupils: Pupils are equal, round, and reactive to light.   Cardiovascular:      Rate and Rhythm: Normal rate and regular rhythm.      Pulses: Normal pulses.      Heart sounds: Normal heart sounds.   Pulmonary:      Effort: Pulmonary effort is normal. No respiratory distress.      Breath sounds: Normal breath sounds.   Abdominal:      General: Bowel sounds are normal. There is distension.      Palpations: Abdomen is soft.      Tenderness: There is no abdominal tenderness.      Hernia: A hernia (ventral) is present.   Musculoskeletal:      Right lower leg: No edema.      Left lower leg: No edema.   Neurological:      General: No focal deficit present.      Mental Status: He is alert and oriented to person, place, and time.   Psychiatric:         Mood and Affect: Mood normal.         Behavior: Behavior normal.           Significant Labs: All pertinent labs within the past 24 hours have been reviewed.  CBC:   Recent Labs   Lab 07/24/25  0245 07/24/25  1741 07/25/25  0357   WBC 4.65 6.71 5.76   HGB 6.7* 8.5* 7.5*   HCT  21.3* 26.9* 23.6*   PLT 37* 40* 39*     CMP:   Recent Labs   Lab 07/24/25  0814 07/25/25  0357    133*   K 3.7 3.8    102   CO2 22* 23   * 185*   BUN 18 16   CREATININE 1.0 0.8   CALCIUM 7.5* 7.3*   PROT 6.9 6.7   ALBUMIN 2.4* 2.2*   BILITOT 1.1* 1.1*   ALKPHOS 131 150   AST 27 27   ALT <6* <8   ANIONGAP 11 8       Significant Imaging: I have reviewed all pertinent imaging results/findings within the past 24 hours.

## 2025-07-26 PROBLEM — K65.2 SBP (SPONTANEOUS BACTERIAL PERITONITIS): Status: ACTIVE | Noted: 2022-10-25

## 2025-07-26 PROBLEM — N17.9 AKI (ACUTE KIDNEY INJURY): Status: RESOLVED | Noted: 2025-07-20 | Resolved: 2025-07-26

## 2025-07-26 LAB
ABO + RH BLD: NORMAL
ABO + RH BLD: NORMAL
ABSOLUTE EOSINOPHIL (OHS): 0.07 K/UL
ABSOLUTE EOSINOPHIL (OHS): 0.08 K/UL
ABSOLUTE MONOCYTE (OHS): 0.42 K/UL (ref 0.3–1)
ABSOLUTE MONOCYTE (OHS): 0.65 K/UL (ref 0.3–1)
ABSOLUTE NEUTROPHIL COUNT (OHS): 3.83 K/UL (ref 1.8–7.7)
ABSOLUTE NEUTROPHIL COUNT (OHS): 6.61 K/UL (ref 1.8–7.7)
ALBUMIN SERPL BCP-MCNC: 2.1 G/DL (ref 3.5–5.2)
ALP SERPL-CCNC: 134 UNIT/L (ref 40–150)
ALT SERPL W/O P-5'-P-CCNC: <8 UNIT/L (ref 0–55)
ANION GAP (OHS): 6 MMOL/L (ref 8–16)
ANISOCYTOSIS BLD QL SMEAR: SLIGHT
AST SERPL-CCNC: 33 UNIT/L (ref 0–50)
BASOPHILS # BLD AUTO: 0.02 K/UL
BASOPHILS # BLD AUTO: 0.04 K/UL
BASOPHILS NFR BLD AUTO: 0.4 %
BASOPHILS NFR BLD AUTO: 0.5 %
BILIRUB DIRECT SERPL-MCNC: 0.6 MG/DL (ref 0.1–0.3)
BILIRUB SERPL-MCNC: 0.9 MG/DL (ref 0.1–1)
BLD PROD TYP BPU: NORMAL
BLD PROD TYP BPU: NORMAL
BLOOD UNIT EXPIRATION DATE: NORMAL
BLOOD UNIT EXPIRATION DATE: NORMAL
BLOOD UNIT TYPE CODE: 5100
BLOOD UNIT TYPE CODE: 6200
BUN SERPL-MCNC: 17 MG/DL (ref 6–20)
BURR CELLS BLD QL SMEAR: ABNORMAL
CALCIUM SERPL-MCNC: 7.6 MG/DL (ref 8.7–10.5)
CHLORIDE SERPL-SCNC: 103 MMOL/L (ref 95–110)
CO2 SERPL-SCNC: 25 MMOL/L (ref 23–29)
CREAT SERPL-MCNC: 0.8 MG/DL (ref 0.5–1.4)
CROSSMATCH INTERPRETATION: NORMAL
CROSSMATCH INTERPRETATION: NORMAL
DISPENSE STATUS: NORMAL
DISPENSE STATUS: NORMAL
ERYTHROCYTE [DISTWIDTH] IN BLOOD BY AUTOMATED COUNT: 22.1 % (ref 11.5–14.5)
ERYTHROCYTE [DISTWIDTH] IN BLOOD BY AUTOMATED COUNT: 23.3 % (ref 11.5–14.5)
GFR SERPLBLD CREATININE-BSD FMLA CKD-EPI: >60 ML/MIN/1.73/M2
GLUCOSE SERPL-MCNC: 161 MG/DL (ref 70–110)
HAPTOGLOB SERPL-MCNC: 125 MG/DL (ref 30–250)
HCT VFR BLD AUTO: 21.8 % (ref 40–54)
HCT VFR BLD AUTO: 25.4 % (ref 40–54)
HGB BLD-MCNC: 6.7 GM/DL (ref 14–18)
HGB BLD-MCNC: 8.2 GM/DL (ref 14–18)
HYPOCHROMIA BLD QL SMEAR: ABNORMAL
IMM GRANULOCYTES # BLD AUTO: 0.02 K/UL (ref 0–0.04)
IMM GRANULOCYTES # BLD AUTO: 0.06 K/UL (ref 0–0.04)
IMM GRANULOCYTES NFR BLD AUTO: 0.4 % (ref 0–0.5)
IMM GRANULOCYTES NFR BLD AUTO: 0.8 % (ref 0–0.5)
INR PPP: 1.5 (ref 0.8–1.2)
LDH SERPL-CCNC: 125 U/L (ref 110–260)
LYMPHOCYTES # BLD AUTO: 0.41 K/UL (ref 1–4.8)
LYMPHOCYTES # BLD AUTO: 0.46 K/UL (ref 1–4.8)
MAGNESIUM SERPL-MCNC: 1.5 MG/DL (ref 1.6–2.6)
MCH RBC QN AUTO: 22.3 PG (ref 27–31)
MCH RBC QN AUTO: 23.5 PG (ref 27–31)
MCHC RBC AUTO-ENTMCNC: 30.7 G/DL (ref 32–36)
MCHC RBC AUTO-ENTMCNC: 32.3 G/DL (ref 32–36)
MCV RBC AUTO: 72 FL (ref 82–98)
MCV RBC AUTO: 73 FL (ref 82–98)
NUCLEATED RBC (/100WBC) (OHS): 0 /100 WBC
NUCLEATED RBC (/100WBC) (OHS): 0 /100 WBC
OVALOCYTES BLD QL SMEAR: ABNORMAL
PHOSPHATE SERPL-MCNC: 2.8 MG/DL (ref 2.7–4.5)
PLATELET # BLD AUTO: 33 K/UL (ref 150–450)
PLATELET # BLD AUTO: 41 K/UL (ref 150–450)
PLATELET BLD QL SMEAR: ABNORMAL
PLATELET BLD QL SMEAR: ABNORMAL
PMV BLD AUTO: 9 FL (ref 9.2–12.9)
PMV BLD AUTO: 9 FL (ref 9.2–12.9)
POCT GLUCOSE: 111 MG/DL (ref 70–110)
POCT GLUCOSE: 113 MG/DL (ref 70–110)
POCT GLUCOSE: 129 MG/DL (ref 70–110)
POCT GLUCOSE: 134 MG/DL (ref 70–110)
POIKILOCYTOSIS BLD QL SMEAR: SLIGHT
POLYCHROMASIA BLD QL SMEAR: ABNORMAL
POTASSIUM SERPL-SCNC: 4 MMOL/L (ref 3.5–5.1)
PROT SERPL-MCNC: 6.6 GM/DL (ref 6–8.4)
PROTHROMBIN TIME: 16.1 SECONDS (ref 9–12.5)
RBC # BLD AUTO: 3.01 M/UL (ref 4.6–6.2)
RBC # BLD AUTO: 3.49 M/UL (ref 4.6–6.2)
RELATIVE EOSINOPHIL (OHS): 1 %
RELATIVE EOSINOPHIL (OHS): 1.5 %
RELATIVE LYMPHOCYTE (OHS): 5.8 % (ref 18–48)
RELATIVE LYMPHOCYTE (OHS): 8.6 % (ref 18–48)
RELATIVE MONOCYTE (OHS): 8.2 % (ref 4–15)
RELATIVE MONOCYTE (OHS): 8.8 % (ref 4–15)
RELATIVE NEUTROPHIL (OHS): 80.3 % (ref 38–73)
RELATIVE NEUTROPHIL (OHS): 83.7 % (ref 38–73)
RETICS/RBC NFR AUTO: 1.1 % (ref 0.4–2)
SCHISTOCYTES BLD QL SMEAR: ABNORMAL
SODIUM SERPL-SCNC: 134 MMOL/L (ref 136–145)
UNIT NUMBER: NORMAL
UNIT NUMBER: NORMAL
WBC # BLD AUTO: 4.77 K/UL (ref 3.9–12.7)
WBC # BLD AUTO: 7.9 K/UL (ref 3.9–12.7)

## 2025-07-26 PROCEDURE — 86920 COMPATIBILITY TEST SPIN: CPT | Mod: NTX

## 2025-07-26 PROCEDURE — 63600175 PHARM REV CODE 636 W HCPCS: Mod: NTX

## 2025-07-26 PROCEDURE — 83010 ASSAY OF HAPTOGLOBIN QUANT: CPT | Mod: NTX

## 2025-07-26 PROCEDURE — 85025 COMPLETE CBC W/AUTO DIFF WBC: CPT | Mod: NTX

## 2025-07-26 PROCEDURE — 83735 ASSAY OF MAGNESIUM: CPT | Mod: NTX

## 2025-07-26 PROCEDURE — P9016 RBC LEUKOCYTES REDUCED: HCPCS | Mod: NTX

## 2025-07-26 PROCEDURE — P9035 PLATELET PHERES LEUKOREDUCED: HCPCS | Mod: NTX

## 2025-07-26 PROCEDURE — 82248 BILIRUBIN DIRECT: CPT | Mod: NTX

## 2025-07-26 PROCEDURE — 36415 COLL VENOUS BLD VENIPUNCTURE: CPT | Mod: NTX

## 2025-07-26 PROCEDURE — 84100 ASSAY OF PHOSPHORUS: CPT | Mod: NTX

## 2025-07-26 PROCEDURE — 80053 COMPREHEN METABOLIC PANEL: CPT | Mod: NTX

## 2025-07-26 PROCEDURE — 25000003 PHARM REV CODE 250: Mod: NTX

## 2025-07-26 PROCEDURE — 20600001 HC STEP DOWN PRIVATE ROOM: Mod: NTX

## 2025-07-26 PROCEDURE — 83615 LACTATE (LD) (LDH) ENZYME: CPT | Mod: NTX

## 2025-07-26 PROCEDURE — 36430 TRANSFUSION BLD/BLD COMPNT: CPT | Mod: NTX

## 2025-07-26 PROCEDURE — 85610 PROTHROMBIN TIME: CPT | Mod: NTX

## 2025-07-26 PROCEDURE — 99223 1ST HOSP IP/OBS HIGH 75: CPT | Mod: GC,NTX,, | Performed by: INTERNAL MEDICINE

## 2025-07-26 PROCEDURE — 85045 AUTOMATED RETICULOCYTE COUNT: CPT | Mod: NTX

## 2025-07-26 PROCEDURE — 25000003 PHARM REV CODE 250: Mod: NTX | Performed by: INTERNAL MEDICINE

## 2025-07-26 PROCEDURE — 85060 BLOOD SMEAR INTERPRETATION: CPT | Mod: NTX,,, | Performed by: PATHOLOGY

## 2025-07-26 RX ORDER — MAGNESIUM SULFATE HEPTAHYDRATE 40 MG/ML
2 INJECTION, SOLUTION INTRAVENOUS ONCE
Status: COMPLETED | OUTPATIENT
Start: 2025-07-26 | End: 2025-07-26

## 2025-07-26 RX ORDER — MEROPENEM 1 G/1
1 INJECTION, POWDER, FOR SOLUTION INTRAVENOUS
Status: DISCONTINUED | OUTPATIENT
Start: 2025-07-26 | End: 2025-07-27

## 2025-07-26 RX ORDER — MUPIROCIN 20 MG/G
OINTMENT TOPICAL 2 TIMES DAILY
Status: COMPLETED | OUTPATIENT
Start: 2025-07-26 | End: 2025-07-31

## 2025-07-26 RX ORDER — HYDROCODONE BITARTRATE AND ACETAMINOPHEN 500; 5 MG/1; MG/1
TABLET ORAL
Status: DISCONTINUED | OUTPATIENT
Start: 2025-07-26 | End: 2025-07-27

## 2025-07-26 RX ORDER — CEFTRIAXONE 2 G/1
2 INJECTION, POWDER, FOR SOLUTION INTRAMUSCULAR; INTRAVENOUS
Status: DISCONTINUED | OUTPATIENT
Start: 2025-07-26 | End: 2025-07-26

## 2025-07-26 RX ADMIN — INSULIN ASPART 7 UNITS: 100 INJECTION, SOLUTION INTRAVENOUS; SUBCUTANEOUS at 12:07

## 2025-07-26 RX ADMIN — MUPIROCIN: 20 OINTMENT TOPICAL at 09:07

## 2025-07-26 RX ADMIN — VENLAFAXINE 75 MG: 75 TABLET ORAL at 08:07

## 2025-07-26 RX ADMIN — INSULIN GLARGINE 18 UNITS: 100 INJECTION, SOLUTION SUBCUTANEOUS at 09:07

## 2025-07-26 RX ADMIN — VENLAFAXINE 75 MG: 75 TABLET ORAL at 09:07

## 2025-07-26 RX ADMIN — RIFAXIMIN 550 MG: 550 TABLET ORAL at 09:07

## 2025-07-26 RX ADMIN — INSULIN ASPART 7 UNITS: 100 INJECTION, SOLUTION INTRAVENOUS; SUBCUTANEOUS at 05:07

## 2025-07-26 RX ADMIN — MEROPENEM 1 G: 1 INJECTION INTRAVENOUS at 06:07

## 2025-07-26 RX ADMIN — INSULIN ASPART 7 UNITS: 100 INJECTION, SOLUTION INTRAVENOUS; SUBCUTANEOUS at 08:07

## 2025-07-26 RX ADMIN — OXYCODONE HYDROCHLORIDE 10 MG: 10 TABLET ORAL at 09:07

## 2025-07-26 RX ADMIN — LACTULOSE 10 G: 20 SOLUTION ORAL at 08:07

## 2025-07-26 RX ADMIN — METHOCARBAMOL 250 MG: 500 TABLET ORAL at 09:07

## 2025-07-26 RX ADMIN — CEFTRIAXONE SODIUM 2 G: 2 INJECTION, POWDER, FOR SOLUTION INTRAMUSCULAR; INTRAVENOUS at 01:07

## 2025-07-26 RX ADMIN — LACTULOSE 10 G: 20 SOLUTION ORAL at 09:07

## 2025-07-26 RX ADMIN — RIFAXIMIN 550 MG: 550 TABLET ORAL at 08:07

## 2025-07-26 RX ADMIN — TAMSULOSIN HYDROCHLORIDE 0.4 MG: 0.4 CAPSULE ORAL at 09:07

## 2025-07-26 RX ADMIN — MEROPENEM 1 G: 1 INJECTION INTRAVENOUS at 11:07

## 2025-07-26 RX ADMIN — OXYBUTYNIN CHLORIDE 5 MG: 5 TABLET, EXTENDED RELEASE ORAL at 08:07

## 2025-07-26 RX ADMIN — METHOCARBAMOL 250 MG: 500 TABLET ORAL at 08:07

## 2025-07-26 RX ADMIN — MAGNESIUM SULFATE HEPTAHYDRATE 2 G: 40 INJECTION, SOLUTION INTRAVENOUS at 01:07

## 2025-07-26 NOTE — SUBJECTIVE & OBJECTIVE
Past Medical History:   Diagnosis Date    Anemia     Bacterial peritonitis 10/25/2022    Celiac disease     Cervicalgia     Cirrhosis     Colon polyp     Depression     Diabetes mellitus, type 2     Elevated LFTs     Esophageal varices     Gout, unspecified     History of COVID-19 01/15/2021    Hyperlipidemia     Mood disorder in conditions classified elsewhere     Obesity, unspecified     Other chronic nonalcoholic liver disease     Portal hypertensive gastropathy     Thyroid disease     Unspecified psychosis        Past Surgical History:   Procedure Laterality Date    COLONOSCOPY N/A 6/7/2017    Procedure: COLONOSCOPY;  Surgeon: Placido Green MD;  Location: [facility];  Service: Endoscopy;  Laterality: N/A;    COLONOSCOPY N/A 10/22/2024    Procedure: COLONOSCOPY;  Surgeon: Vale Covarrubias MD;  Location: [facility];  Service: Endoscopy;  Laterality: N/A;    ESOPHAGOGASTRODUODENOSCOPY N/A 7/16/2018    Procedure: ESOPHAGOGASTRODUODENOSCOPY (EGD);  Surgeon: Mitch Pan MD;  Location: [facility];  Service: Endoscopy;  Laterality: N/A;    ESOPHAGOGASTRODUODENOSCOPY N/A 7/22/2020    Procedure: ESOPHAGOGASTRODUODENOSCOPY (EGD);  Surgeon: Mitch Pan MD;  Location: [facility];  Service: Endoscopy;  Laterality: N/A;    ESOPHAGOGASTRODUODENOSCOPY N/A 3/22/2022    Procedure: EGD (ESOPHAGOGASTRODUODENOSCOPY);  Surgeon: Mitch Pan MD;  Location: [facility];  Service: Endoscopy;  Laterality: N/A;    ESOPHAGOGASTRODUODENOSCOPY N/A 12/28/2023    Procedure: EGD (ESOPHAGOGASTRODUODENOSCOPY);  Surgeon: Vale Covarrubias MD;  Location: [facility];  Service: Endoscopy;  Laterality: N/A;    ESOPHAGOGASTRODUODENOSCOPY N/A 11/8/2024    Procedure: EGD (ESOPHAGOGASTRODUODENOSCOPY);  Surgeon: Hubert Alvarado MD;  Location: [facility];  Service: Endoscopy;  Laterality: N/A;    ESOPHAGOGASTRODUODENOSCOPY (EGD)- DEVICE ASSISTED N/A 10/22/2024    Procedure: ESOPHAGOGASTRODUODENOSCOPY (EGD)- DEVICE ASSISTED;   Surgeon: Vale Covarrubias MD;  Location: [facility] ENDO;  Service: Endoscopy;  Laterality: N/A;    HERNIA REPAIR      REPEAT ABDOMINAL PARACENTESIS N/A 10/20/2022    Procedure: PARACENTESIS, ABDOMINAL, REPEAT;  Surgeon: Ruddy Salomon MD;  Location: [facility] ENDO;  Service: Endoscopy;  Laterality: N/A;    REPEAT ABDOMINAL PARACENTESIS N/A 11/9/2022    Procedure: PARACENTESIS, ABDOMINAL, REPEAT;  Surgeon: Ruddy Salomon MD;  Location: [facility] ENDO;  Service: Endoscopy;  Laterality: N/A;    REPEAT ABDOMINAL PARACENTESIS N/A 11/17/2022    Procedure: PARACENTESIS, ABDOMINAL, REPEAT;  Surgeon: Ruddy Salomon MD;  Location: [facility] ENDO;  Service: Endoscopy;  Laterality: N/A;    REPEAT ABDOMINAL PARACENTESIS N/A 12/2/2022    Procedure: PARACENTESIS, ABDOMINAL, REPEAT;  Surgeon: Ruddy Salomon MD;  Location: [facility] ENDO;  Service: Endoscopy;  Laterality: N/A;    REPEAT ABDOMINAL PARACENTESIS N/A 11/25/2022    Procedure: PARACENTESIS, ABDOMINAL, REPEAT;  Surgeon: Mitch Pan MD;  Location: [facility] CATH LAB;  Service: Endoscopy;  Laterality: N/A;    REPEAT ABDOMINAL PARACENTESIS N/A 12/9/2022    Procedure: PARACENTESIS, ABDOMINAL, REPEAT;  Surgeon: Mitch Pan MD;  Location: [facility] CATH LAB;  Service: Endoscopy;  Laterality: N/A;    REPEAT ABDOMINAL PARACENTESIS N/A 12/20/2022    Procedure: PARACENTESIS, ABDOMINAL, REPEAT;  Surgeon: Ruddy Salomon MD;  Location: [facility] ENDO;  Service: Endoscopy;  Laterality: N/A;    REPEAT ABDOMINAL PARACENTESIS N/A 12/30/2022    Procedure: PARACENTESIS, ABDOMINAL, REPEAT;  Surgeon: Deon Kathleen II, MD;  Location: [facility] ENDO;  Service: Endoscopy;  Laterality: N/A;    REPEAT ABDOMINAL PARACENTESIS N/A 1/12/2023    Procedure: PARACENTESIS, ABDOMINAL, REPEAT;  Surgeon: Ruddy Salomon MD;  Location: [facility] ENDO;  Service: Endoscopy;  Laterality: N/A;    REPEAT ABDOMINAL PARACENTESIS N/A 1/19/2023    Procedure: PARACENTESIS, ABDOMINAL, REPEAT;  Surgeon: Vale Covarrubias MD;   Location: [facility] ENDO;  Service: Endoscopy;  Laterality: N/A;    REPEAT ABDOMINAL PARACENTESIS N/A 2/1/2023    Procedure: PARACENTESIS, ABDOMINAL, REPEAT;  Surgeon: Ruddy Salomon MD;  Location: [facility] ENDO;  Service: Endoscopy;  Laterality: N/A;    REPEAT ABDOMINAL PARACENTESIS N/A 2/17/2023    Procedure: PARACENTESIS, ABDOMINAL, REPEAT;  Surgeon: Ruddy Salomon MD;  Location: [facility] ENDO;  Service: Endoscopy;  Laterality: N/A;    REPEAT ABDOMINAL PARACENTESIS N/A 3/1/2023    Procedure: PARACENTESIS, ABDOMINAL, REPEAT;  Surgeon: Ruddy Salomon MD;  Location: [facility] ENDO;  Service: Endoscopy;  Laterality: N/A;    REPEAT ABDOMINAL PARACENTESIS N/A 3/21/2023    Procedure: PARACENTESIS, ABDOMINAL, REPEAT;  Surgeon: Ruddy Salomon MD;  Location: [facility] ENDO;  Service: Endoscopy;  Laterality: N/A;    REPEAT ABDOMINAL PARACENTESIS N/A 3/9/2023    Procedure: PARACENTESIS, ABDOMINAL, REPEAT;  Surgeon: Ruddy Salomon MD;  Location: [facility] ENDO;  Service: Endoscopy;  Laterality: N/A;    REPEAT ABDOMINAL PARACENTESIS N/A 4/5/2023    Procedure: PARACENTESIS, ABDOMINAL, REPEAT;  Surgeon: Ruddy Salomon MD;  Location: [facility] ENDO;  Service: Endoscopy;  Laterality: N/A;    REPEAT ABDOMINAL PARACENTESIS N/A 4/13/2023    Procedure: PARACENTESIS, ABDOMINAL, REPEAT;  Surgeon: Ruddy Salomon MD;  Location: [facility] ENDO;  Service: Endoscopy;  Laterality: N/A;    REPEAT ABDOMINAL PARACENTESIS N/A 4/27/2023    Procedure: PARACENTESIS, ABDOMINAL, REPEAT;  Surgeon: Ruddy Salomon MD;  Location: [facility] ENDO;  Service: Endoscopy;  Laterality: N/A;    REPEAT ABDOMINAL PARACENTESIS N/A 5/11/2023    Procedure: PARACENTESIS, ABDOMINAL, REPEAT;  Surgeon: Ruddy Salomon MD;  Location: [facility] ENDO;  Service: Endoscopy;  Laterality: N/A;    REPEAT ABDOMINAL PARACENTESIS N/A 5/18/2023    Procedure: PARACENTESIS, ABDOMINAL, REPEAT;  Surgeon: Ruddy Salomon MD;  Location: [facility] ENDO;  Service: Endoscopy;  Laterality: N/A;    REPEAT ABDOMINAL PARACENTESIS  N/A 5/26/2023    Procedure: PARACENTESIS, ABDOMINAL, REPEAT;  Surgeon: Ruddy Salomon MD;  Location: [facility] ENDO;  Service: Endoscopy;  Laterality: N/A;    REPEAT ABDOMINAL PARACENTESIS N/A 6/9/2023    Procedure: PARACENTESIS, ABDOMINAL, REPEAT;  Surgeon: Ruddy Salomon MD;  Location: [facility] ENDO;  Service: Endoscopy;  Laterality: N/A;    REPEAT ABDOMINAL PARACENTESIS N/A 6/26/2023    Procedure: PARACENTESIS, ABDOMINAL, REPEAT;  Surgeon: Ruddy Salomon MD;  Location: [facility] ENDO;  Service: Endoscopy;  Laterality: N/A;    REPEAT ABDOMINAL PARACENTESIS N/A 7/10/2023    Procedure: PARACENTESIS, ABDOMINAL, REPEAT;  Surgeon: Ruddy Salomon MD;  Location: [facility] ENDO;  Service: Endoscopy;  Laterality: N/A;    REPEAT ABDOMINAL PARACENTESIS N/A 7/31/2023    Procedure: PARACENTESIS, ABDOMINAL, REPEAT;  Surgeon: Ruddy Salomon MD;  Location: [facility] ENDO;  Service: Endoscopy;  Laterality: N/A;    REPEAT ABDOMINAL PARACENTESIS N/A 8/16/2023    Procedure: PARACENTESIS, ABDOMINAL, REPEAT;  Surgeon: Ruddy Salomon MD;  Location: [facility] ENDO;  Service: Endoscopy;  Laterality: N/A;    REPEAT ABDOMINAL PARACENTESIS N/A 8/28/2023    Procedure: PARACENTESIS, ABDOMINAL, REPEAT;  Surgeon: Ruddy Salomon MD;  Location: [facility] ENDO;  Service: Endoscopy;  Laterality: N/A;    REPEAT ABDOMINAL PARACENTESIS N/A 9/7/2023    Procedure: PARACENTESIS, ABDOMINAL, REPEAT;  Surgeon: Ruddy Salomon MD;  Location: [facility] ENDO;  Service: Endoscopy;  Laterality: N/A;    REPEAT ABDOMINAL PARACENTESIS N/A 9/15/2023    Procedure: PARACENTESIS, ABDOMINAL, REPEAT;  Surgeon: Ruddy Salomon MD;  Location: [facility] ENDO;  Service: Endoscopy;  Laterality: N/A;    REPEAT ABDOMINAL PARACENTESIS N/A 9/25/2023    Procedure: PARACENTESIS, ABDOMINAL, REPEAT;  Surgeon: Ruddy Salomon MD;  Location: [facility] ENDO;  Service: Endoscopy;  Laterality: N/A;    REPEAT ABDOMINAL PARACENTESIS N/A 10/6/2023    Procedure: PARACENTESIS, ABDOMINAL, REPEAT;  Surgeon: Ruddy Salomon  MD RAJNI;  Location: [facility] ENDO;  Service: Endoscopy;  Laterality: N/A;    REPEAT ABDOMINAL PARACENTESIS N/A 10/18/2023    Procedure: PARACENTESIS, ABDOMINAL, REPEAT;  Surgeon: Ruddy Salomon MD;  Location: [facility] ENDO;  Service: Endoscopy;  Laterality: N/A;    REPEAT ABDOMINAL PARACENTESIS N/A 10/30/2023    Procedure: PARACENTESIS, ABDOMINAL, REPEAT;  Surgeon: Ruddy Salomon MD;  Location: [facility] ENDO;  Service: Endoscopy;  Laterality: N/A;    REPEAT ABDOMINAL PARACENTESIS N/A 11/15/2023    Procedure: PARACENTESIS, ABDOMINAL, REPEAT;  Surgeon: Ruddy Salomon MD;  Location: [facility] END[facility];  Service: Endoscopy;  Laterality: N/A;    REPEAT ABDOMINAL PARACENTESIS N/A 12/12/2023    Procedure: PARACENTESIS, ABDOMINAL, REPEAT;  Surgeon: Ruddy Salomon MD;  Location: [facility] ENDO;  Service: Endoscopy;  Laterality: N/A;    REPEAT ABDOMINAL PARACENTESIS N/A 12/26/2023    Procedure: PARACENTESIS, ABDOMINAL, REPEAT;  Surgeon: Ruddy Salomon MD;  Location: [facility] ENDO;  Service: Endoscopy;  Laterality: N/A;    REPEAT ABDOMINAL PARACENTESIS N/A 1/11/2024    Procedure: PARACENTESIS, ABDOMINAL, REPEAT;  Surgeon: Ruddy Salomon MD;  Location: [facility] ENDO;  Service: Endoscopy;  Laterality: N/A;    REPEAT ABDOMINAL PARACENTESIS N/A 1/22/2024    Procedure: PARACENTESIS, ABDOMINAL, REPEAT;  Surgeon: Ruddy Salomon MD;  Location: [facility] ENDO;  Service: Endoscopy;  Laterality: N/A;    REPEAT ABDOMINAL PARACENTESIS N/A 2/14/2024    Procedure: PARACENTESIS, ABDOMINAL, REPEAT;  Surgeon: Ruddy Salomon MD;  Location: [facility];  Service: Endoscopy;  Laterality: N/A;    REPEAT ABDOMINAL PARACENTESIS N/A 9/9/2024    Procedure: PARACENTESIS, ABDOMINAL, REPEAT;  Surgeon: Ruddy Salomon MD;  Location: [facility];  Service: Endoscopy;  Laterality: N/A;    REPEAT ABDOMINAL PARACENTESIS Left 10/11/2024    Procedure: PARACENTESIS, ABDOMINAL, REPEAT;  Surgeon: Ruddy Salomon MD;  Location[facility];  Service: Endoscopy;  Laterality: Left;     "REPEAT ABDOMINAL PARACENTESIS N/A 11/6/2024    Procedure: PARACENTESIS, ABDOMINAL, REPEAT;  Surgeon: Ruddy Salomon MD;  Location: [facility];  Service: Endoscopy;  Laterality: N/A;    REPEAT ABDOMINAL PARACENTESIS N/A 2/17/2025    Procedure: PARACENTESIS, ABDOMINAL, REPEAT;  Surgeon: Ruddy Salomon MD;  Location: [facility] ENDO;  Service: Endoscopy;  Laterality: N/A;    REPEAT ABDOMINAL PARACENTESIS N/A 6/12/2025    Procedure: PARACENTESIS, ABDOMINAL, REPEAT;  Surgeon: Ruddy Salomon MD;  Location: [facility];  Service: Endoscopy;  Laterality: N/A;    TONSILLECTOMY      UPPER GASTROINTESTINAL ENDOSCOPY         Review of patient's allergies indicates:  No Known Allergies    Medications:  Medications Prior to Admission   Medication Sig    acetaminophen (TYLENOL) 500 MG tablet Take 1,000 mg by mouth 2 (two) times daily as needed for Pain.    insulin glargine U-100, Lantus, (LANTUS SOLOSTAR U-100 INSULIN) 100 unit/mL (3 mL) InPn pen Inject 60 Units into the skin every evening.    linaGLIPtin (TRADJENTA) 5 mg Tab tablet TAKE 1 TABLET (5 MG TOTAL) BY MOUTH ONCE DAILY.    metOLazone (ZAROXOLYN) 2.5 MG tablet Take 2 tablets (5 mg total) by mouth once daily. Take 30 minutes before other water pills    oxybutynin (DITROPAN-XL) 5 MG TR24 Take 1 tablet (5 mg total) by mouth once daily.    tamsulosin (FLOMAX) 0.4 mg Cap Take 1 capsule (0.4 mg total) by mouth every evening.    venlafaxine (EFFEXOR) 75 MG tablet TAKE 1 TABLET (75 MG TOTAL) BY MOUTH 2 (TWO) TIMES DAILY.    BD ULTRA-FINE ORIG PEN NEEDLE 29 gauge x 1/2" Ndle USE PEN NEEDLE TO INJECT INSULIN EVERY EVENING     Antibiotics (From admission, onward)      Start     Stop Route Frequency Ordered    07/26/25 1245  cefTRIAXone injection 2 g         -- IV Every 24 hours (non-standard times) 07/26/25 1136    07/25/25 2000  vancomycin (VANCOCIN) 1,000 mg in 0.9% NaCl 250 mL IVPB (admixture device)         -- IV Every 24 hours (non-standard times) 07/25/25 1236    07/25/25 1200 " " vancomycin - pharmacy to dose        Placed in "And" Linked Group    -- IV pharmacy to manage frequency 07/25/25 0942    07/21/25 0900  rifAXIMin tablet 550 mg         -- Oral 2 times daily 07/20/25 2217          Antifungals (From admission, onward)      None          Antivirals (From admission, onward)      None             Immunization History   Administered Date(s) Administered    COVID-19, MRNA, LN-S, PF (Pfizer) (Purple Cap) 09/21/2021, 10/18/2021, 11/22/2021    COVID-19, mRNA, LNP-S, bivalent booster, PF (PFIZER OMICRON) 01/27/2023    Hepatitis A / Hepatitis B 11/20/2017, 12/20/2017, 05/14/2018    Influenza - Quadrivalent - PF *Preferred* (6 months and older) 12/20/2017, 11/13/2018, 12/06/2019, 10/21/2020, 11/22/2021, 01/27/2023, 10/23/2023    Influenza - Trivalent - Fluarix, Flulaval, Fluzone, Afluria - PF 10/16/2024    Pneumococcal Conjugate - 13 Valent 11/14/2018    Pneumococcal Conjugate - 20 Valent 08/21/2024    Pneumococcal Polysaccharide - 23 Valent 05/16/2018    Tdap 05/16/2018       Family History       Problem Relation (Age of Onset)    Diabetes Mother, Father    Heart disease Father    Hypertension Mother    Ovarian cancer Maternal Grandmother          Social History     Socioeconomic History    Marital status: Legally     Years of education: 12   Tobacco Use    Smoking status: Never    Smokeless tobacco: Never   Substance and Sexual Activity    Alcohol use: No    Drug use: No    Sexual activity: Not Currently     Partners: Female     Social Drivers of Health     Financial Resource Strain: Low Risk  (7/22/2025)    Overall Financial Resource Strain (CARDIA)     Difficulty of Paying Living Expenses: Not very hard   Recent Concern: Financial Resource Strain - High Risk (5/9/2025)    Overall Financial Resource Strain (CARDIA)     Difficulty of Paying Living Expenses: Hard   Food Insecurity: No Food Insecurity (7/22/2025)    Hunger Vital Sign     Worried About Running Out of Food in the Last " Year: Never true     Ran Out of Food in the Last Year: Never true   Recent Concern: Food Insecurity - Food Insecurity Present (5/9/2025)    Hunger Vital Sign     Worried About Running Out of Food in the Last Year: Sometimes true     Ran Out of Food in the Last Year: Never true   Transportation Needs: No Transportation Needs (7/22/2025)    PRAPARE - Transportation     Lack of Transportation (Medical): No     Lack of Transportation (Non-Medical): No   Physical Activity: Inactive (7/22/2025)    Exercise Vital Sign     Days of Exercise per Week: 0 days     Minutes of Exercise per Session: 0 min   Stress: No Stress Concern Present (7/22/2025)    [employer] - Occupational Stress Questionnaire     Feeling of Stress : Not at all   Recent Concern: Stress - Stress Concern Present (5/9/2025)    [facility] - Occupational Stress Questionnaire     Feeling of Stress : Very much   Housing Stability: High Risk (7/22/2025)    Housing Stability Vital Sign     Unable to Pay for Housing in the Last Year: Yes     Number of Times Moved in the Last Year: 1     Homeless in the Last Year: No     Review of Systems  Objective:     Vital Signs (Most Recent):  Temp: 97.5 °F (36.4 °C) (07/26/25 1140)  Pulse: 84 (07/26/25 1140)  Resp: 20 (07/26/25 1140)  BP: 105/60 (07/26/25 1140)  SpO2: 99 % (07/26/25 1140) Vital Signs (24h Range):  Temp:  [97.5 °F (36.4 °C)-98.4 °F (36.9 °C)] 97.5 °F (36.4 °C)  Pulse:  [76-92] 84  Resp:  [16-20] 20  SpO2:  [97 %-99 %] 99 %  BP: ()/(58-67) 105/60     Weight: 80.6 kg (177 lb 11.1 oz)  Body mass index is 22.81 kg/m².    Estimated Creatinine Clearance: 116.1 mL/min (based on SCr of 0.8 mg/dL).     Physical Exam  Constitutional:       General: He is not in acute distress.  HENT:      Head: Normocephalic and atraumatic.      Mouth/Throat:      Mouth: Mucous membranes are moist.   Eyes:      Pupils: Pupils are equal, round, and reactive to light.    Cardiovascular:      Rate and Rhythm: Normal rate and regular rhythm.      Pulses: Normal pulses.      Heart sounds: Normal heart sounds.   Pulmonary:      Effort: Pulmonary effort is normal. No respiratory distress.      Breath sounds: Normal breath sounds.   Abdominal:      General: Bowel sounds are normal. There is distension.      Palpations: Abdomen is soft.      Tenderness: There is no abdominal tenderness.      Hernia: A hernia (ventral) is present.   Musculoskeletal:      Right lower leg: No edema.      Left lower leg: No edema.   Neurological:      General: No focal deficit present.      Mental Status: He is alert and oriented to person, place, and time.   Psychiatric:         Mood and Affect: Mood normal.         Behavior: Behavior normal.          Significant Labs: All pertinent labs within the past 24 hours have been reviewed.    Significant Imaging: I have reviewed all pertinent imaging results/findings within the past 24 hours.

## 2025-07-26 NOTE — PROGRESS NOTES
[facility] - Telemetry [facility] Medicine  Progress Note    Patient Name: Corky Bach  MRN: [id number]  Patient Class: IP- Inpatient   Admission Date: 7/20/2025  Length of Stay: 6 days  Attending Physician: David Mak MD  Primary Care Provider: Sarmad Estrella MD        Subjective     Principal Problem: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        HPI:  Corky Bach is a 60 y/o male with a notable hx of CORDOVA cirrhosis c/b esophageal varices, T2DM, MALT lymphoma, HLD, gout, and depression transferred [place] [facility] [place] [place] and GI consult.     Patient initially presented to the ED of [facility] on 7/20 with complaints of chronic back and abdominal pain that had been ongoing for appx 5 weeks. He states that the back pain began to occur shortly after his last paracentesis on 7/12 located around his mid lumbar spine, starting with a dull back/ flank pain and quickly radiating to upper abdominal pain. He was trying to use ibuprofen and tylenol to help with the pain however the pain continued to progress. He eventually started to take percocet from a prior hospitalization however this was ineffective. He denies any fevers, chills, nausea, vomiting, constipation/diarrhea, melena, hematemesis during this time. At this point he presented to the ED for further evaluation.     Initially BPs were in the low 100s. He was found to be heme occult positive. Hb 6.9 and platelets 67. AST 13, ALT 6, , bili 1.2, INR 1.8, lactic 2.9. CT Lumbar spine obtained without acute lumbar fracture, congenital lumbar stenosis with degenerative changes. CT A/P without contrast showed moderate r sized pleural effusion, splenomegaly, calcified portal vein thrombus, ascites with thickening of surrounding peritoneal reflections c/f inflammatory peritonitis. Patient started on CTX, given 1u PRBC, and transferred to [facility] for [place]/Hepatology evaluation.           Overview/Hospital Course:  Patient is a 59 M w/ hx of CORDOVA  cirrhosis, c/b esophageal varices, T2DM, MALT lymphoma, HLD, gout, admitted for abdminal pain and suspected SBP with decompensated cirrhosis and ascites. Found to have a hemoglobin of 6.9 on presentation, received 1 unit PRBC, no signs of bleeding. Discussed with hepatology and they did not think his presentation was c/f a GIB. Treated empirically for suspected SBP with ceftriaxone. Diagnostic and therapeutic paracentesis drained 4250 mL, lab results positive for SBP. Awaiting cultures. Ascites is now resolved after paracentesis. Hemoglobin and platelets were low today, 6.4 and 40 respectively. Transfused 1U PRBCs and 1U platelets on 7/22. Monitoring hemoglobin. Continuing ceftriaxone and albumin for SBP treatment, added vanc to cover enterococcus while awaiting culture data. Hemoglobin dropped again, transfused 1U PRBCs on 7/24. Hemoglobin stable. Aerobic culture was positive for strep mitis oralis. Repeat paracentesis on 7/25, pulled 2.5 L. Consulting ID to determine appropriate outpatient SBP PPX.    Interval History: NAEON. VSS. Will transfuse 1u pRBCs and 1u PLTs. Will obtain hemolytic anemia labs    Review of Systems  Objective:     Vital Signs (Most Recent):  Temp: 97.8 °F (36.6 °C) (07/26/25 0808)  Pulse: 80 (07/26/25 0808)  Resp: 16 (07/26/25 0955)  BP: (!) 106/58 (07/26/25 0808)  SpO2: 98 % (07/26/25 0808) Vital Signs (24h Range):  Temp:  [97.8 °F (36.6 °C)-98.4 °F (36.9 °C)] 97.8 °F (36.6 °C)  Pulse:  [76-92] 80  Resp:  [16-20] 16  SpO2:  [97 %-99 %] 98 %  BP: ()/(58-67) 106/58     Weight: 80.6 kg (177 lb 11.1 oz)  Body mass index is 22.81 kg/m².    Intake/Output Summary (Last 24 hours) at 7/26/2025 1106  Last data filed at 7/25/2025 2243  Gross per 24 hour   Intake 1022 ml   Output 825 ml   Net 197 ml         Physical Exam  Constitutional:       General: He is not in acute distress.  HENT:      Head: Normocephalic and atraumatic.      Mouth/Throat:      Mouth: Mucous membranes are moist.   Eyes:       Pupils: Pupils are equal, round, and reactive to light.   Cardiovascular:      Rate and Rhythm: Normal rate and regular rhythm.      Pulses: Normal pulses.      Heart sounds: Normal heart sounds.   Pulmonary:      Effort: Pulmonary effort is normal. No respiratory distress.      Breath sounds: Normal breath sounds.   Abdominal:      General: Bowel sounds are normal. There is distension.      Palpations: Abdomen is soft.      Tenderness: There is no abdominal tenderness.      Hernia: A hernia (ventral) is present.   Musculoskeletal:      Right lower leg: No edema.      Left lower leg: No edema.   Neurological:      General: No focal deficit present.      Mental Status: He is alert and oriented to person, place, and time.   Psychiatric:         Mood and Affect: Mood normal.         Behavior: Behavior normal.               Significant Labs: All pertinent labs within the past 24 hours have been reviewed.    Significant Imaging: I have reviewed all pertinent imaging results/findings within the past 24 hours.      Assessment & Plan  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  Abdominal pain  Ascites  Portal hypertensive gastropathy  Portal vein thrombosis  Splenomegaly  Patient with hx CORDOVA cirrhosis c/b ascites (requiring paracentesis, on lasix 40 mg qd, eplerenone), grade 1 esophageal varices (EGD on 11/8/2024), hepatic encephalopathy 5 week hx abdominal, back pain since last paracentesis without fevers, chills, nausea, vomiting, diarrhea. CT L spine with congenital lumbar stenosis, degenerative changes. CT A/P with concern for peritonitis. Also noted splenomegaly and portal vein thrombosis, seen previously. INR 1.8. Patient had noted lactic acidosis at 2.9, now resolved. Overalll suspect abdominal pain 2/2 ascites with c/f SBP vs. Worsening of chronic portal vein thrombosis Given CTX and  pRBCs at OSH. Transferred to [facility] [place] workup and evaluation. MELD-Na 17.     - Hold lasix and eplerenone   - continue home rifaxamin    - CTM CBC, CMP, PT/INR daily   - Hepatology consulted, appreciate recs  - started lactulose 10mg BID for hepatic encephalopathy prophylaxis   - Liver US showed cirrhotic morphology of the liver, sequela of portal hypertension and large volume of complicated ascites   - paracentesis pulled 4250mL, labs positive for SBP waiting for cultures  - repeat para on 7/25 pulled 2500mL, labs collected and pending   - continue ceftriaxone and vanc   - aerobic culture was positive for strep mitis oralis   - consulted ID to determine appropriate outpatient antibiotics  Anemia  Patient with hx cirrhosis c/b grade 1 esophageal varices, moderate portal hypertensive gastropathy, multiple angioectasias w/o bleeding (Last EGD 11/8/2024). Presenting with noted anemia of 6.9 at OSH. S/p 1 uPRBC.     Plan  - Monitor serial CBC: Daily  - Transfuse PRBC if patient becomes hemodynamically unstable, symptomatic or H/H drops below 7/21.  - 1u pRBCs before admission at OSH, now 3u pRBCs while admitted at [facility].  - consulted GI, anemia most likely due to decompensated cirrhosis no acute intervention needed at the moment.  - Hemolytic anemia labs  Mood disorder  Continue home venlafaxine  Hyponatremia  Hyponatremia is likely due to Cirrhosis. The patient's most recent sodium results are listed below.  Recent Labs     07/24/25  0814 07/25/25  0357 07/26/25  0626    133* 134*     Plan  - Correct the sodium by 4-6mEq in 24 hours.   - Monitor sodium Daily.   - Patient hyponatremia is stable  Thrombocytopenia  The likely etiology of thrombocytopenia is liver disease. The patients 3 most recent labs are listed below.  Recent Labs     07/24/25  1741 07/25/25  0357 07/26/25  0626   PLT 40* 39* 33*     Plan  - Will transfuse if platelet count is <50k (if undergoing surgical procedure or have active bleeding).  - CTM CBC, PT/INR qd  - transfused 1U platelets on 7/22 and 1U on 07/26  Benign prostatic hyperplasia with incomplete bladder emptying  -  continue home flomax  MALT lymphoma  STABLE     MALT-rivka with bone marrow involvement.  Underwent treatment including repeated Rituxan q2 month infusions. Followed by Heme-Onc at [facility]. Most recent testing has suggested remission leading to potential transplant candidacy for cirrhosis to be re-instituted.   Type 2 diabetes mellitus with hyperglycemia, with long-term current use of insulin  Patient's FSGs are controlled on current medication regimen.  Last A1c reviewed-   Lab Results   Component Value Date    HGBA1C 8.8 (H) 07/20/2025     Most recent fingerstick glucose reviewed-   Recent Labs   Lab 07/25/25  1250 07/25/25  1713 07/25/25  2352 07/26/25  0805   POCTGLUCOSE 180* 166* 187* 134*     Current correctional scale  Low  Maintain anti-hyperglycemic dose as follows-   Antihyperglycemics (From admission, onward)      Start     Stop Route Frequency Ordered    07/23/25 2100  insulin glargine U-100 (Lantus) pen 18 Units         -- SubQ Nightly 07/23/25 1117    07/23/25 1130  insulin aspart U-100 pen 7 Units         -- SubQ 3 times daily with meals 07/23/25 1119    07/20/25 2316  insulin aspart U-100 pen 0-5 Units         -- SubQ Before meals & nightly PRN 07/20/25 2217          Hold Oral hypoglycemics while patient is in the hospital.  - decreased insulin Glargine to 18 units daily and aspart 7 units TID with meals   Mixed hyperlipidemia  - not currently on medication     VTE Risk Mitigation (From admission, onward)           Ordered     Reason for No Pharmacological VTE Prophylaxis  Once        Question:  Reasons:  Answer:  Risk of Bleeding    07/20/25 2020     IP VTE HIGH RISK PATIENT  Once         07/20/25 2020     Place sequential compression device  Until discontinued         07/20/25 2020                    Discharge Planning   GARY: 7/29/2025     Code Status: Full Code   Medical Readiness for Discharge Date:   Discharge Plan A: Home with family   Discharge Delays: None known at this time        Prabhjot  Devin Davis MD  Department of Hospital Medicine   [facility] - Telemetry Stepdown

## 2025-07-26 NOTE — ASSESSMENT & PLAN NOTE
STABLE     MALT-rivka with bone marrow involvement.  Underwent treatment including repeated Rituxan q2 month infusions. Followed by Heme-Onc at [facility]. Most recent testing has suggested remission leading to potential transplant candidacy for cirrhosis to be re-instituted.

## 2025-07-26 NOTE — SUBJECTIVE & OBJECTIVE
Interval History: NAEON. VSS. Will transfuse 1u pRBCs and 1u PLTs. Will obtain hemolytic anemia labs    Review of Systems  Objective:     Vital Signs (Most Recent):  Temp: 97.8 °F (36.6 °C) (07/26/25 0808)  Pulse: 80 (07/26/25 0808)  Resp: 16 (07/26/25 0955)  BP: (!) 106/58 (07/26/25 0808)  SpO2: 98 % (07/26/25 0808) Vital Signs (24h Range):  Temp:  [97.8 °F (36.6 °C)-98.4 °F (36.9 °C)] 97.8 °F (36.6 °C)  Pulse:  [76-92] 80  Resp:  [16-20] 16  SpO2:  [97 %-99 %] 98 %  BP: ()/(58-67) 106/58     Weight: 80.6 kg (177 lb 11.1 oz)  Body mass index is 22.81 kg/m².    Intake/Output Summary (Last 24 hours) at 7/26/2025 1106  Last data filed at 7/25/2025 2243  Gross per 24 hour   Intake 1022 ml   Output 825 ml   Net 197 ml         Physical Exam  Constitutional:       General: He is not in acute distress.  HENT:      Head: Normocephalic and atraumatic.      Mouth/Throat:      Mouth: Mucous membranes are moist.   Eyes:      Pupils: Pupils are equal, round, and reactive to light.   Cardiovascular:      Rate and Rhythm: Normal rate and regular rhythm.      Pulses: Normal pulses.      Heart sounds: Normal heart sounds.   Pulmonary:      Effort: Pulmonary effort is normal. No respiratory distress.      Breath sounds: Normal breath sounds.   Abdominal:      General: Bowel sounds are normal. There is distension.      Palpations: Abdomen is soft.      Tenderness: There is no abdominal tenderness.      Hernia: A hernia (ventral) is present.   Musculoskeletal:      Right lower leg: No edema.      Left lower leg: No edema.   Neurological:      General: No focal deficit present.      Mental Status: He is alert and oriented to person, place, and time.   Psychiatric:         Mood and Affect: Mood normal.         Behavior: Behavior normal.               Significant Labs: All pertinent labs within the past 24 hours have been reviewed.    Significant Imaging: I have reviewed all pertinent imaging results/findings within the past 24  hours.

## 2025-07-26 NOTE — CONSULTS
VANCOMYCIN DOSING BY PHARMACY DISCONTINUATION NOTE    Corky Bach is a 59 y.o. male who had been consulted for vancomycin dosing.    The pharmacy consult for vancomycin dosing has been discontinued.     Vancomycin Dosing by Pharmacy Consult will sign-off. Please reconsult if necessary. Thank you for allowing us to participate in this patient's care.     Jessica Tamayo, PharmD, BCPS  [id number]

## 2025-07-26 NOTE — ASSESSMENT & PLAN NOTE
Mr. Bach is a 60 yo M w/ HX of [facility] cirrhosis c/b portal htn, ascites presented d/t decompensated cirrhosis, s/p para, c/b anemia requiring multiple transfusions. Fluid growing strep mitis.   Plan:  - SBP prophylaxis with oral ciprofloxacin

## 2025-07-26 NOTE — ASSESSMENT & PLAN NOTE
Hyponatremia is likely due to Cirrhosis. The patient's most recent sodium results are listed below.  Recent Labs     07/24/25  0814 07/25/25  0357 07/26/25  0626    133* 134*     Plan  - Correct the sodium by 4-6mEq in 24 hours.   - Monitor sodium Daily.   - Patient hyponatremia is stable

## 2025-07-26 NOTE — PLAN OF CARE
Problem: Adult Inpatient Plan of Care  Goal: Plan of Care Review  Outcome: Progressing  Flowsheets (Taken 7/26/2025 0127)  Plan of Care Reviewed With: patient  Goal: Patient-Specific Goal (Individualized)  Outcome: Progressing  Goal: Absence of Hospital-Acquired Illness or Injury  Outcome: Progressing  Intervention: Identify and Manage Fall Risk  Flowsheets (Taken 7/26/2025 0127)  Safety Promotion/Fall Prevention:   assistive device/personal item within reach   bed alarm set   commode/urinal/bedpan at bedside   Fall Risk reviewed with patient/family   instructed to call staff for mobility   lighting adjusted   medications reviewed   nonskid shoes/socks when out of bed   patient expresses understanding of fall risk and prevention   room near unit station   side rails raised x 2  Intervention: Prevent Skin Injury  Flowsheets (Taken 7/26/2025 0127)  Body Position: position changed independently  Skin Protection: incontinence pads utilized  Device Skin Pressure Protection: absorbent pad utilized/changed     Patient awake, alert , oriented.  Pain assessed, pt denies having pain.  pt educated on safety, and medication use. Pt verbalized understanding.  Plan of care discussed with patient, patient verbalized understanding.  Bed alarm on.  Call light within reach.   Bed  low and locked.

## 2025-07-26 NOTE — ASSESSMENT & PLAN NOTE
Patient with hx CORDOVA cirrhosis c/b ascites (requiring paracentesis, on lasix 40 mg qd, eplerenone), grade 1 esophageal varices (EGD on 11/8/2024), hepatic encephalopathy 5 week hx abdominal, back pain since last paracentesis without fevers, chills, nausea, vomiting, diarrhea. CT L spine with congenital lumbar stenosis, degenerative changes. CT A/P with concern for peritonitis. Also noted splenomegaly and portal vein thrombosis, seen previously. INR 1.8. Patient had noted lactic acidosis at 2.9, now resolved. Overalll suspect abdominal pain 2/2 ascites with c/f SBP vs. Worsening of chronic portal vein thrombosis Given CTX and  pRBCs at OSH. [place] [facility] [place] and [place]. MELD-Na 17.     - Hold lasix and eplerenone   - continue home rifaxamin   - CTM CBC, CMP, PT/INR daily   - Hepatology consulted, appreciate recs  - started lactulose 10mg BID for hepatic encephalopathy prophylaxis   - Liver US showed cirrhotic morphology of the liver, sequela of portal hypertension and large volume of complicated ascites   - paracentesis pulled 4250mL, labs positive for SBP waiting for cultures  - repeat para on 7/25 pulled 2500mL, labs collected and pending   - continue ceftriaxone and vanc   - aerobic culture was positive for strep mitis oralis   - consulted ID to determine appropriate outpatient antibiotics

## 2025-07-26 NOTE — ASSESSMENT & PLAN NOTE
Patient with hx CORDOVA cirrhosis c/b ascites (requiring paracentesis, on lasix 40 mg qd, eplerenone), grade 1 esophageal varices (EGD on 11/8/2024), hepatic encephalopathy 5 week hx abdominal, back pain since last paracentesis without fevers, chills, nausea, vomiting, diarrhea. CT L spine with congenital lumbar stenosis, degenerative changes. CT A/P with concern for peritonitis. Also noted splenomegaly and portal vein thrombosis, seen previously. INR 1.8. Patient had noted lactic acidosis at 2.9, now resolved. Overalll suspect abdominal pain 2/2 ascites with c/f SBP vs. Worsening of chronic portal vein thrombosis Given CTX and  pRBCs at OSH. [place] [facility] [place] and evaluation. MELD-Na 17.     - Hold lasix and eplerenone   - continue home rifaxamin   - CTM CBC, CMP, PT/INR daily   - Hepatology consulted, appreciate recs  - started lactulose 10mg BID for hepatic encephalopathy prophylaxis   - Liver US showed cirrhotic morphology of the liver, sequela of portal hypertension and large volume of complicated ascites   - paracentesis pulled 4250mL, labs positive for SBP waiting for cultures  - repeat para on 7/25 pulled 2500mL, labs collected and pending   - continue ceftriaxone and vanc   - aerobic culture was positive for strep mitis oralis   - consulted ID to determine appropriate outpatient antibiotics

## 2025-07-26 NOTE — ASSESSMENT & PLAN NOTE
Patient's FSGs are controlled on current medication regimen.  Last A1c reviewed-   Lab Results   Component Value Date    HGBA1C 8.8 (H) 07/20/2025     Most recent fingerstick glucose reviewed-   Recent Labs   Lab 07/25/25  1250 07/25/25  1713 07/25/25  2352 07/26/25  0805   POCTGLUCOSE 180* 166* 187* 134*     Current correctional scale  Low  Maintain anti-hyperglycemic dose as follows-   Antihyperglycemics (From admission, onward)      Start     Stop Route Frequency Ordered    07/23/25 2100  insulin glargine U-100 (Lantus) pen 18 Units         -- SubQ Nightly 07/23/25 1117    07/23/25 1130  insulin aspart U-100 pen 7 Units         -- SubQ 3 times daily with meals 07/23/25 1119    07/20/25 2316  insulin aspart U-100 pen 0-5 Units         -- SubQ Before meals & nightly PRN 07/20/25 2217          Hold Oral hypoglycemics while patient is in the hospital.  - decreased insulin Glargine to 18 units daily and aspart 7 units TID with meals

## 2025-07-26 NOTE — CONSULTS
[facility] - Telemetry Stepdown  Infectious Disease  Consult Note    Patient Name: Corky Bach  MRN: [id number]  Admission Date: 7/20/2025  Hospital Length of Stay: 6 days  Attending Physician: David Mak MD  Primary Care Provider: Sarmad Estrella MD     Isolation Status: No active isolations    Patient information was obtained from patient, past medical records, and ER records.      Inpatient consult to Infectious Diseases  Consult performed by: Moses Ramachandran MD  Consult ordered by: Prabhjot Vaz MD        Assessment/Plan:     GI  SBP (spontaneous bacterial peritonitis)  Mr. Bach is a 60 yo M w/ HX of [facility] cirrhosis c/b portal htn, ascites presented d/t decompensated cirrhosis, SBP tx with CTX, s/p para, c/b anemia requiring multiple transfusions. Fluid growing strep mitis. Patient is afebrile, normal wbc, normal HR and RA. However, peritoneal fluid still with 14K WBC despite tx with vanc and ctx. Suspect resistant organism or other infectious source that may contributing to persistent SBP.  Plan:  - CT abdomen pelvis preferably with contrast to ensure no other intra-abdominal infectious source  -Change ceftriaxone to IV meropenem.   - After completing tx with jessika can start SBP prophylaxis with oral ciprofloxacin   - D/C Vanc    Thank you for your consult.We will follow-up with patient. Please contact us if you have any additional questions.    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 pain c/w suspected SBP  in the setting of decompensated cirrhosis with ascites s/p 3 RBC transf and 1 UI PLTs. On CTX for SBP. S/p para. Hepatology deemed anemia was not secondary to GIB as there was no evidence of GIB. SBP tx with CTX.   ID is consulted for  SBP ppx in the setting of strep mitis growing in culture. Currently on IV Vanc.     Past Medical History:   Diagnosis Date    Anemia     Bacterial peritonitis 10/25/2022    Celiac disease     Cervicalgia     Cirrhosis     Colon polyp     Depression     Diabetes mellitus, type 2     Elevated LFTs     Esophageal varices     Gout, unspecified     History of COVID-19 01/15/2021    Hyperlipidemia     Mood disorder in conditions classified elsewhere     Obesity, unspecified     Other chronic nonalcoholic liver disease     Portal hypertensive gastropathy     Thyroid disease     Unspecified psychosis        Past Surgical History:   Procedure Laterality Date    COLONOSCOPY N/A 6/7/2017    Procedure: COLONOSCOPY;  Surgeon: Placido Green MD;  Location: [facility];  Service: Endoscopy;  Laterality: N/A;    COLONOSCOPY N/A 10/22/2024    Procedure: COLONOSCOPY;  Surgeon: Vale Covarrubias MD;  Location: [facility];  Service: Endoscopy;  Laterality: N/A;    ESOPHAGOGASTRODUODENOSCOPY N/A 7/16/2018    Procedure: ESOPHAGOGASTRODUODENOSCOPY (EGD);  Surgeon: Mitch Pan MD;  Location: [facility];  Service: Endoscopy;  Laterality: N/A;    ESOPHAGOGASTRODUODENOSCOPY N/A 7/22/2020    Procedure: ESOPHAGOGASTRODUODENOSCOPY (EGD);  Surgeon: Mitch Pan MD;  Location: [facility];  Service: Endoscopy;  Laterality: N/A;    ESOPHAGOGASTRODUODENOSCOPY N/A 3/22/2022    Procedure: EGD (ESOPHAGOGASTRODUODENOSCOPY);  Surgeon: Mitch Pan MD;  Location: [facility];  Service: Endoscopy;  Laterality: N/A;    ESOPHAGOGASTRODUODENOSCOPY N/A 12/28/2023    Procedure: EGD (ESOPHAGOGASTRODUODENOSCOPY);  Surgeon: Vale Covarrubias MD;  Location: [facility];  Service: Endoscopy;  Laterality: N/A;    ESOPHAGOGASTRODUODENOSCOPY N/A 11/8/2024    Procedure: EGD (ESOPHAGOGASTRODUODENOSCOPY);  Surgeon: Hubert Alvarado MD;  Location: [facility];  Service: Endoscopy;  Laterality: N/A;    ESOPHAGOGASTRODUODENOSCOPY (EGD)- DEVICE  ASSISTED N/A 10/22/2024    Procedure: ESOPHAGOGASTRODUODENOSCOPY (EGD)- DEVICE ASSISTED;  Surgeon: Vale Covarrubias MD;  Location: [facility] ENDO;  Service: Endoscopy;  Laterality: N/A;    HERNIA REPAIR      REPEAT ABDOMINAL PARACENTESIS N/A 10/20/2022    Procedure: PARACENTESIS, ABDOMINAL, REPEAT;  Surgeon: Ruddy Salomon MD;  Location: [facility] ENDO;  Service: Endoscopy;  Laterality: N/A;    REPEAT ABDOMINAL PARACENTESIS N/A 11/9/2022    Procedure: PARACENTESIS, ABDOMINAL, REPEAT;  Surgeon: Ruddy Salomon MD;  Location: [facility] ENDO;  Service: Endoscopy;  Laterality: N/A;    REPEAT ABDOMINAL PARACENTESIS N/A 11/17/2022    Procedure: PARACENTESIS, ABDOMINAL, REPEAT;  Surgeon: Ruddy Salomon MD;  Location: [facility] ENDO;  Service: Endoscopy;  Laterality: N/A;    REPEAT ABDOMINAL PARACENTESIS N/A 12/2/2022    Procedure: PARACENTESIS, ABDOMINAL, REPEAT;  Surgeon: Ruddy Salomon MD;  Location: [facility];  Service: Endoscopy;  Laterality: N/A;    REPEAT ABDOMINAL PARACENTESIS N/A 11/25/2022    Procedure: PARACENTESIS, ABDOMINAL, REPEAT;  Surgeon: Mitch Pan MD;  Location: [facility] CATH LAB;  Service: Endoscopy;  Laterality: N/A;    REPEAT ABDOMINAL PARACENTESIS N/A 12/9/2022    Procedure: PARACENTESIS, ABDOMINAL, REPEAT;  Surgeon: Mitch Pan MD;  Location: [facility] CATH LAB;  Service: Endoscopy;  Laterality: N/A;    REPEAT ABDOMINAL PARACENTESIS N/A 12/20/2022    Procedure: PARACENTESIS, ABDOMINAL, REPEAT;  Surgeon: Ruddy Salomon MD;  Location: [facility] ENDO;  Service: Endoscopy;  Laterality: N/A;    REPEAT ABDOMINAL PARACENTESIS N/A 12/30/2022    Procedure: PARACENTESIS, ABDOMINAL, REPEAT;  Surgeon: Deon Kathleen II, MD;  Location: [facility] ENDO;  Service: Endoscopy;  Laterality: N/A;    REPEAT ABDOMINAL PARACENTESIS N/A 1/12/2023    Procedure: PARACENTESIS, ABDOMINAL, REPEAT;  Surgeon: Ruddy Salomon MD;  Location: [facility];  Service: Endoscopy;  Laterality: N/A;    REPEAT ABDOMINAL PARACENTESIS N/A  1/19/2023    Procedure: PARACENTESIS, ABDOMINAL, REPEAT;  Surgeon: Vale Covarrubias MD;  Location: [facility] ENDO;  Service: Endoscopy;  Laterality: N/A;    REPEAT ABDOMINAL PARACENTESIS N/A 2/1/2023    Procedure: PARACENTESIS, ABDOMINAL, REPEAT;  Surgeon: Ruddy Salomon MD;  Location: [facility] ENDO;  Service: Endoscopy;  Laterality: N/A;    REPEAT ABDOMINAL PARACENTESIS N/A 2/17/2023    Procedure: PARACENTESIS, ABDOMINAL, REPEAT;  Surgeon: Ruddy Salomon MD;  Location: [facility] ENDO;  Service: Endoscopy;  Laterality: N/A;    REPEAT ABDOMINAL PARACENTESIS N/A 3/1/2023    Procedure: PARACENTESIS, ABDOMINAL, REPEAT;  Surgeon: Ruddy Salomon MD;  Location: [facility] ENDO;  Service: Endoscopy;  Laterality: N/A;    REPEAT ABDOMINAL PARACENTESIS N/A 3/21/2023    Procedure: PARACENTESIS, ABDOMINAL, REPEAT;  Surgeon: Ruddy Salomon MD;  Location: [facility] ENDO;  Service: Endoscopy;  Laterality: N/A;    REPEAT ABDOMINAL PARACENTESIS N/A 3/9/2023    Procedure: PARACENTESIS, ABDOMINAL, REPEAT;  Surgeon: Ruddy Salomon MD;  Location: [facility] ENDO;  Service: Endoscopy;  Laterality: N/A;    REPEAT ABDOMINAL PARACENTESIS N/A 4/5/2023    Procedure: PARACENTESIS, ABDOMINAL, REPEAT;  Surgeon: Ruddy Salomon MD;  Location: [facility] ENDO;  Service: Endoscopy;  Laterality: N/A;    REPEAT ABDOMINAL PARACENTESIS N/A 4/13/2023    Procedure: PARACENTESIS, ABDOMINAL, REPEAT;  Surgeon: Ruddy Salomon MD;  Location: [facility] ENDO;  Service: Endoscopy;  Laterality: N/A;    REPEAT ABDOMINAL PARACENTESIS N/A 4/27/2023    Procedure: PARACENTESIS, ABDOMINAL, REPEAT;  Surgeon: Ruddy Salomon MD;  Location: [facility] ENDO;  Service: Endoscopy;  Laterality: N/A;    REPEAT ABDOMINAL PARACENTESIS N/A 5/11/2023    Procedure: PARACENTESIS, ABDOMINAL, REPEAT;  Surgeon: Ruddy Salomon MD;  Location: [facility];  Service: Endoscopy;  Laterality: N/A;    REPEAT ABDOMINAL PARACENTESIS N/A 5/18/2023    Procedure: PARACENTESIS, ABDOMINAL, REPEAT;  Surgeon: Ruddy Salomon MD;   Location: [facility];  Service: Endoscopy;  Laterality: N/A;    REPEAT ABDOMINAL PARACENTESIS N/A 5/26/2023    Procedure: PARACENTESIS, ABDOMINAL, REPEAT;  Surgeon: Ruddy Salomon MD;  Location: [facility] ENDO;  Service: Endoscopy;  Laterality: N/A;    REPEAT ABDOMINAL PARACENTESIS N/A 6/9/2023    Procedure: PARACENTESIS, ABDOMINAL, REPEAT;  Surgeon: Ruddy Salomon MD;  Location: [facility] ENDO;  Service: Endoscopy;  Laterality: N/A;    REPEAT ABDOMINAL PARACENTESIS N/A 6/26/2023    Procedure: PARACENTESIS, ABDOMINAL, REPEAT;  Surgeon: Ruddy Salomon MD;  Location: [facility];  Service: Endoscopy;  Laterality: N/A;    REPEAT ABDOMINAL PARACENTESIS N/A 7/10/2023    Procedure: PARACENTESIS, ABDOMINAL, REPEAT;  Surgeon: Ruddy Salomon MD;  Location: [facility] ENDO;  Service: Endoscopy;  Laterality: N/A;    REPEAT ABDOMINAL PARACENTESIS N/A 7/31/2023    Procedure: PARACENTESIS, ABDOMINAL, REPEAT;  Surgeon: Ruddy Salomon MD;  Location: [facility] ENDO;  Service: Endoscopy;  Laterality: N/A;    REPEAT ABDOMINAL PARACENTESIS N/A 8/16/2023    Procedure: PARACENTESIS, ABDOMINAL, REPEAT;  Surgeon: Ruddy Salomon MD;  Location: [facility] ENDO;  Service: Endoscopy;  Laterality: N/A;    REPEAT ABDOMINAL PARACENTESIS N/A 8/28/2023    Procedure: PARACENTESIS, ABDOMINAL, REPEAT;  Surgeon: Ruddy Salomon MD;  Location: [facility] ENDO;  Service: Endoscopy;  Laterality: N/A;    REPEAT ABDOMINAL PARACENTESIS N/A 9/7/2023    Procedure: PARACENTESIS, ABDOMINAL, REPEAT;  Surgeon: Ruddy Salomon MD;  Location: [facility];  Service: Endoscopy;  Laterality: N/A;    REPEAT ABDOMINAL PARACENTESIS N/A 9/15/2023    Procedure: PARACENTESIS, ABDOMINAL, REPEAT;  Surgeon: Ruddy Salomon MD;  Location: [facility];  Service: Endoscopy;  Laterality: N/A;    REPEAT ABDOMINAL PARACENTESIS N/A 9/25/2023    Procedure: PARACENTESIS, ABDOMINAL, REPEAT;  Surgeon: Ruddy Salomon MD;  Location: [facility];  Service: Endoscopy;  Laterality: N/A;    REPEAT ABDOMINAL  PARACENTESIS N/A 10/6/2023    Procedure: PARACENTESIS, ABDOMINAL, REPEAT;  Surgeon: Ruddy Salomon MD;  Location: [facility] ENDO;  Service: Endoscopy;  Laterality: N/A;    REPEAT ABDOMINAL PARACENTESIS N/A 10/18/2023    Procedure: PARACENTESIS, ABDOMINAL, REPEAT;  Surgeon: Ruddy Salomon MD;  Location: [facility] ENDO;  Service: Endoscopy;  Laterality: N/A;    REPEAT ABDOMINAL PARACENTESIS N/A 10/30/2023    Procedure: PARACENTESIS, ABDOMINAL, REPEAT;  Surgeon: Ruddy Salomon MD;  Location: [facility] ENDO;  Service: Endoscopy;  Laterality: N/A;    REPEAT ABDOMINAL PARACENTESIS N/A 11/15/2023    Procedure: PARACENTESIS, ABDOMINAL, REPEAT;  Surgeon: Ruddy Salomon MD;  Location: [facility] ENDO;  Service: Endoscopy;  Laterality: N/A;    REPEAT ABDOMINAL PARACENTESIS N/A 12/12/2023    Procedure: PARACENTESIS, ABDOMINAL, REPEAT;  Surgeon: Ruddy Salomon MD;  Location: [facility] ENDO;  Service: Endoscopy;  Laterality: N/A;    REPEAT ABDOMINAL PARACENTESIS N/A 12/26/2023    Procedure: PARACENTESIS, ABDOMINAL, REPEAT;  Surgeon: Ruddy Salomon MD;  Location: [facility] ENDO;  Service: Endoscopy;  Laterality: N/A;    REPEAT ABDOMINAL PARACENTESIS N/A 1/11/2024    Procedure: PARACENTESIS, ABDOMINAL, REPEAT;  Surgeon: Ruddy Salomon MD;  Location: [facility] ENDO;  Service: Endoscopy;  Laterality: N/A;    REPEAT ABDOMINAL PARACENTESIS N/A 1/22/2024    Procedure: PARACENTESIS, ABDOMINAL, REPEAT;  Surgeon: Ruddy Salomon MD;  Location: [facility] ENDO;  Service: Endoscopy;  Laterality: N/A;    REPEAT ABDOMINAL PARACENTESIS N/A 2/14/2024    Procedure: PARACENTESIS, ABDOMINAL, REPEAT;  Surgeon: Ruddy Salomon MD;  Location: [facility] ENDO;  Service: Endoscopy;  Laterality: N/A;    REPEAT ABDOMINAL PARACENTESIS N/A 9/9/2024    Procedure: PARACENTESIS, ABDOMINAL, REPEAT;  Surgeon: Ruddy Salomon MD;  Location: [facility] ENDO;  Service: Endoscopy;  Laterality: N/A;    REPEAT ABDOMINAL PARACENTESIS Left 10/11/2024    Procedure: PARACENTESIS, ABDOMINAL, REPEAT;  " Surgeon: Ruddy Salomon MD;  [facility]  Service: Endoscopy;  Laterality: Left;    REPEAT ABDOMINAL PARACENTESIS N/A 11/6/2024    Procedure: PARACENTESIS, ABDOMINAL, REPEAT;  Surgeon: Ruddy Salomon MD;  Location: [facility] ENDO;  Service: Endoscopy;  Laterality: N/A;    REPEAT ABDOMINAL PARACENTESIS N/A 2/17/2025    Procedure: PARACENTESIS, ABDOMINAL, REPEAT;  Surgeon: Ruddy Salomon MD;  Location: [facility] ENDO;  Service: Endoscopy;  Laterality: N/A;    REPEAT ABDOMINAL PARACENTESIS N/A 6/12/2025    Procedure: PARACENTESIS, ABDOMINAL, REPEAT;  Surgeon: Ruddy Salomon MD;  Location: [facility] ENDO;  Service: Endoscopy;  Laterality: N/A;    TONSILLECTOMY      UPPER GASTROINTESTINAL ENDOSCOPY         Review of patient's allergies indicates:  No Known Allergies    Medications:  Medications Prior to Admission   Medication Sig    acetaminophen (TYLENOL) 500 MG tablet Take 1,000 mg by mouth 2 (two) times daily as needed for Pain.    insulin glargine U-100, Lantus, (LANTUS SOLOSTAR U-100 INSULIN) 100 unit/mL (3 mL) InPn pen Inject 60 Units into the skin every evening.    linaGLIPtin (TRADJENTA) 5 mg Tab tablet TAKE 1 TABLET (5 MG TOTAL) BY MOUTH ONCE DAILY.    metOLazone (ZAROXOLYN) 2.5 MG tablet Take 2 tablets (5 mg total) by mouth once daily. Take 30 minutes before other water pills    oxybutynin (DITROPAN-XL) 5 MG TR24 Take 1 tablet (5 mg total) by mouth once daily.    tamsulosin (FLOMAX) 0.4 mg Cap Take 1 capsule (0.4 mg total) by mouth every evening.    venlafaxine (EFFEXOR) 75 MG tablet TAKE 1 TABLET (75 MG TOTAL) BY MOUTH 2 (TWO) TIMES DAILY.    BD ULTRA-FINE ORIG PEN NEEDLE 29 gauge x 1/2" Ndle USE PEN NEEDLE TO INJECT INSULIN EVERY EVENING     Antibiotics (From admission, onward)      Start     Stop Route Frequency Ordered    07/26/25 1245  cefTRIAXone injection 2 g         -- IV Every 24 hours (non-standard times) 07/26/25 1136    07/25/25 2000  vancomycin (VANCOCIN) 1,000 mg in 0.9% NaCl 250 mL IVPB " "(admixture device)         -- IV Every 24 hours (non-standard times) 07/25/25 1236    07/25/25 1200  vancomycin - pharmacy to dose        Placed in "And" Linked Group    -- IV pharmacy to manage frequency 07/25/25 0942    07/21/25 0900  rifAXIMin tablet 550 mg         -- Oral 2 times daily 07/20/25 2217          Antifungals (From admission, onward)      None          Antivirals (From admission, onward)      None             Immunization History   Administered Date(s) Administered    COVID-19, MRNA, LN-S, PF (Pfizer) (Purple Cap) 09/21/2021, 10/18/2021, 11/22/2021    COVID-19, mRNA, LNP-S, bivalent booster, PF (PFIZER OMICRON) 01/27/2023    Hepatitis A / Hepatitis B 11/20/2017, 12/20/2017, 05/14/2018    Influenza - Quadrivalent - PF *Preferred* (6 months and older) 12/20/2017, 11/13/2018, 12/06/2019, 10/21/2020, 11/22/2021, 01/27/2023, 10/23/2023    Influenza - Trivalent - Fluarix, Flulaval, Fluzone, Afluria - PF 10/16/2024    Pneumococcal Conjugate - 13 Valent 11/14/2018    Pneumococcal Conjugate - 20 Valent 08/21/2024    Pneumococcal Polysaccharide - 23 Valent 05/16/2018    Tdap 05/16/2018       Family History       Problem Relation (Age of Onset)    Diabetes Mother, Father    Heart disease Father    Hypertension Mother    Ovarian cancer Maternal Grandmother          Social History     Socioeconomic History    Marital status: Legally     Years of education: 12   Tobacco Use    Smoking status: Never    Smokeless tobacco: Never   Substance and Sexual Activity    Alcohol use: No    Drug use: No    Sexual activity: Not Currently     Partners: Female     Social Drivers of Health     Financial Resource Strain: Low Risk  (7/22/2025)    Overall Financial Resource Strain (CARDIA)     Difficulty of Paying Living Expenses: Not very hard   Recent Concern: Financial Resource Strain - High Risk (5/9/2025)    Overall Financial Resource Strain (CARDIA)     Difficulty of Paying Living Expenses: Hard   Food Insecurity: No " Food Insecurity (7/22/2025)    Hunger Vital Sign     Worried About Running Out of Food in the Last Year: Never true     Ran Out of Food in the Last Year: Never true   Recent Concern: Food Insecurity - Food Insecurity Present (5/9/2025)    Hunger Vital Sign     Worried About Running Out of Food in the Last Year: Sometimes true     Ran Out of Food in the Last Year: Never true   Transportation Needs: No Transportation Needs (7/22/2025)    PRAPARE - Transportation     Lack of Transportation (Medical): No     Lack of Transportation (Non-Medical): No   Physical Activity: Inactive (7/22/2025)    Exercise Vital Sign     Days of Exercise per Week: 0 days     Minutes of Exercise per Session: 0 min   Stress: No Stress Concern Present (7/22/2025)    [employer] - Occupational Stress Questionnaire     Feeling of Stress : Not at all   Recent Concern: Stress - Stress Concern Present (5/9/2025)    [employer] - Occupational Stress Questionnaire     Feeling of Stress : Very much   Housing Stability: High Risk (7/22/2025)    Housing Stability Vital Sign     Unable to Pay for Housing in the Last Year: Yes     Number of Times Moved in the Last Year: 1     Homeless in the Last Year: No     Review of Systems  Objective:     Vital Signs (Most Recent):  Temp: 97.5 °F (36.4 °C) (07/26/25 1140)  Pulse: 84 (07/26/25 1140)  Resp: 20 (07/26/25 1140)  BP: 105/60 (07/26/25 1140)  SpO2: 99 % (07/26/25 1140) Vital Signs (24h Range):  Temp:  [97.5 °F (36.4 °C)-98.4 °F (36.9 °C)] 97.5 °F (36.4 °C)  Pulse:  [76-92] 84  Resp:  [16-20] 20  SpO2:  [97 %-99 %] 99 %  BP: ()/(58-67) 105/60     Weight: 80.6 kg (177 lb 11.1 oz)  Body mass index is 22.81 kg/m².    Estimated Creatinine Clearance: 116.1 mL/min (based on SCr of 0.8 mg/dL).     Physical Exam  Constitutional:       General: He is not in acute distress.  HENT:      Head: Normocephalic and atraumatic.      Mouth/Throat:      Mouth:  Mucous membranes are moist.   Eyes:      Pupils: Pupils are equal, round, and reactive to light.   Cardiovascular:      Rate and Rhythm: Normal rate and regular rhythm.      Pulses: Normal pulses.      Heart sounds: Normal heart sounds.   Pulmonary:      Effort: Pulmonary effort is normal. No respiratory distress.      Breath sounds: Normal breath sounds.   Abdominal:      General: Bowel sounds are normal. There is distension.      Palpations: Abdomen is soft.      Tenderness: There is abdominal tenderness on the left side.      Hernia: A hernia (ventral) is present.   Musculoskeletal:      Right lower leg: No edema.      Left lower leg: No edema.   Neurological:      General: No focal deficit present.      Mental Status: He is alert and oriented to person, place, and time.   Psychiatric:         Mood and Affect: Mood normal.         Behavior: Behavior normal.     Significant Labs: All pertinent labs within the past 24 hours have been reviewed.    Significant Imaging: I have reviewed all pertinent imaging results/findings within the past 24 hours.

## 2025-07-26 NOTE — ASSESSMENT & PLAN NOTE
Patient with hx CORDOVA cirrhosis c/b ascites (requiring paracentesis, on lasix 40 mg qd, eplerenone), grade 1 esophageal varices (EGD on 11/8/2024), hepatic encephalopathy 5 week hx abdominal, back pain since last paracentesis without fevers, chills, nausea, vomiting, diarrhea. CT L spine with congenital lumbar stenosis, degenerative changes. CT A/P with concern for peritonitis. Also noted splenomegaly and portal vein thrombosis, seen previously. INR 1.8. Patient had noted lactic acidosis at 2.9, now resolved. Overalll suspect abdominal pain 2/2 ascites with c/f SBP vs. Worsening of chronic portal vein thrombosis Given CTX and  pRBCs at OSH. Transferred to [facility] for further workup and evaluation. MELD-Na 17.     - Hold lasix and eplerenone   - continue home rifaxamin   - CTM CBC, CMP, PT/INR daily   - Hepatology consulted, appreciate recs  - started lactulose 10mg BID for hepatic encephalopathy prophylaxis   - Liver US showed cirrhotic morphology of the liver, sequela of portal hypertension and large volume of complicated ascites   - paracentesis pulled 4250mL, labs positive for SBP waiting for cultures  - repeat para on 7/25 pulled 2500mL, labs collected and pending   - continue ceftriaxone and vanc   - aerobic culture was positive for strep mitis oralis   - consulted ID to determine appropriate outpatient antibiotics

## 2025-07-26 NOTE — ASSESSMENT & PLAN NOTE
The likely etiology of thrombocytopenia is liver disease. The patients 3 most recent labs are listed below.  Recent Labs     07/24/25  1741 07/25/25  0357 07/26/25  0626   PLT 40* 39* 33*     Plan  - Will transfuse if platelet count is <50k (if undergoing surgical procedure or have active bleeding).  - CTM CBC, PT/INR qd  - transfused 1U platelets on 7/22 and 1U on 07/26

## 2025-07-26 NOTE — ASSESSMENT & PLAN NOTE
Continue home venlafaxine   The patient has been examined and the H&P has been reviewed:    I concur with the findings and no changes have occurred since H&P was written.    Anesthesia/Surgery risks, benefits and alternative options discussed and understood by patient/family.          Active Hospital Problems    Diagnosis  POA    *NSTEMI (non-ST elevated myocardial infarction) [I21.4]  Yes    Hypertension [I10]  Yes      Resolved Hospital Problems   No resolved problems to display.

## 2025-07-26 NOTE — HPI
59-year-old male with CORDOVA cirrhosis c/b esophageal varices, and HE, T2DM, MALT lymphoma, HLD, PHG/GAVE (2024), admitted for abd. pain c/w suspected SBP  in the setting of decompensated cirrhosis with ascites s/p 3 RBC transf and 1 UI PLTs. On CTX for SBP. S/p para. Hepatology deemed anemia was not secondary to GIB as there was no evidence of GIB.   ID is consulted on 7/26 for SBP ppx in the setting of strep mitis growing in culture. Antibiotics were escalated to meropenem at that time given ascitic fluid WBC did not improve despite therapy with vanc and rocephin. Repeat para performed on 7/31 with worsening WBC. ID re-consulted for recommendations.

## 2025-07-26 NOTE — ASSESSMENT & PLAN NOTE
Patient with hx CORDOVA cirrhosis c/b ascites (requiring paracentesis, on lasix 40 mg qd, eplerenone), grade 1 esophageal varices (EGD on 11/8/2024), hepatic encephalopathy 5 week hx abdominal, back pain since last paracentesis without fevers, chills, nausea, vomiting, diarrhea. CT L spine with congenital lumbar stenosis, degenerative changes. CT A/P with concern for peritonitis. Also noted splenomegaly and portal vein thrombosis, seen previously. INR 1.8. Patient had noted lactic acidosis at 2.9, now resolved. Overalll suspect abdominal pain 2/2 ascites with c/f SBP vs. Worsening of chronic portal vein thrombosis Given CTX and  pRBCs at OSH. Transferred to [facility] for further workup and evaluation. MELD-Na 17.     - Hold lasix and eplerenone   - continue home rifaxamin   - CTM CBC, CMP, PT/INR daily   - Hepatology consulted, appreciate recs  - started lactulose 10mg BID for hepatic encephalopathy prophylaxis   - Liver US showed cirrhotic morphology of the liver, sequela of portal hypertension and large volume of complicated ascites   - paracentesis pulled 4250mL, labs positive for SBP waiting for cultures  - repeat para on 7/25 pulled 2500mL, labs collected and pending   - continue ceftriaxone and vanc   - aerobic culture was positive for strep mitis oralis   - consulted ID to determine appropriate outpatient antibiotics

## 2025-07-26 NOTE — ASSESSMENT & PLAN NOTE
Patient with hx CORDOVA cirrhosis c/b ascites (requiring paracentesis, on lasix 40 mg qd, eplerenone), grade 1 esophageal varices (EGD on 11/8/2024), hepatic encephalopathy 5 week hx abdominal, back pain since last paracentesis without fevers, chills, nausea, vomiting, diarrhea. CT L spine with congenital lumbar stenosis, degenerative changes. CT A/P with concern for peritonitis. Also noted splenomegaly and portal vein thrombosis, seen previously. INR 1.8. Patient had noted lactic acidosis at 2.9, now resolved. Overalll suspect abdominal pain 2/2 ascites with c/f SBP vs. Worsening of chronic portal vein thrombosis Given CTX and  pRBCs at OSH. [place] [facility] [place] and [place]. MELD-Na 17.     - Hold lasix and eplerenone   - continue home rifaxamin   - CTM CBC, CMP, PT/INR daily   - Hepatology consulted, appreciate recs  - started lactulose 10mg BID for hepatic encephalopathy prophylaxis   - Liver US showed cirrhotic morphology of the liver, sequela of portal hypertension and large volume of complicated ascites   - paracentesis pulled 4250mL, labs positive for SBP waiting for cultures  - repeat para on 7/25 pulled 2500mL, labs collected and pending   - continue ceftriaxone and vanc   - aerobic culture was positive for strep mitis oralis   - consulted ID to determine appropriate outpatient antibiotics

## 2025-07-26 NOTE — ASSESSMENT & PLAN NOTE
Patient with hx cirrhosis c/b grade 1 esophageal varices, moderate portal hypertensive gastropathy, multiple angioectasias w/o bleeding (Last EGD 11/8/2024). Presenting with noted anemia of 6.9 at OSH. S/p 1 uPRBC.     Plan  - Monitor serial CBC: Daily  - Transfuse PRBC if patient becomes hemodynamically unstable, symptomatic or H/H drops below 7/21.  - 1u pRBCs before admission at OSH, now 3u pRBCs while admitted at [facility].  - consulted GI, anemia most likely due to decompensated cirrhosis no acute intervention needed at the moment.  - Hemolytic anemia labs

## 2025-07-27 LAB
ABSOLUTE EOSINOPHIL (OHS): 0.07 K/UL
ABSOLUTE MONOCYTE (OHS): 0.47 K/UL (ref 0.3–1)
ABSOLUTE NEUTROPHIL COUNT (OHS): 4.05 K/UL (ref 1.8–7.7)
ALBUMIN SERPL BCP-MCNC: 2 G/DL (ref 3.5–5.2)
ALP SERPL-CCNC: 165 UNIT/L (ref 40–150)
ALT SERPL W/O P-5'-P-CCNC: <8 UNIT/L (ref 0–55)
ANION GAP (OHS): 6 MMOL/L (ref 8–16)
ANISOCYTOSIS BLD QL SMEAR: SLIGHT
AST SERPL-CCNC: 38 UNIT/L (ref 0–50)
BASOPHILS # BLD AUTO: 0.02 K/UL
BASOPHILS NFR BLD AUTO: 0.4 %
BILIRUB SERPL-MCNC: 1.3 MG/DL (ref 0.1–1)
BUN SERPL-MCNC: 18 MG/DL (ref 6–20)
CALCIUM SERPL-MCNC: 7.6 MG/DL (ref 8.7–10.5)
CHLORIDE SERPL-SCNC: 104 MMOL/L (ref 95–110)
CO2 SERPL-SCNC: 26 MMOL/L (ref 23–29)
CREAT SERPL-MCNC: 0.8 MG/DL (ref 0.5–1.4)
ERYTHROCYTE [DISTWIDTH] IN BLOOD BY AUTOMATED COUNT: 23.3 % (ref 11.5–14.5)
GFR SERPLBLD CREATININE-BSD FMLA CKD-EPI: >60 ML/MIN/1.73/M2
GLUCOSE SERPL-MCNC: 102 MG/DL (ref 70–110)
HCT VFR BLD AUTO: 24.3 % (ref 40–54)
HGB BLD-MCNC: 7.8 GM/DL (ref 14–18)
HYPOCHROMIA BLD QL SMEAR: ABNORMAL
IMM GRANULOCYTES # BLD AUTO: 0.03 K/UL (ref 0–0.04)
IMM GRANULOCYTES NFR BLD AUTO: 0.6 % (ref 0–0.5)
INR PPP: 1.4 (ref 0.8–1.2)
LYMPHOCYTES # BLD AUTO: 0.42 K/UL (ref 1–4.8)
MAGNESIUM SERPL-MCNC: 1.7 MG/DL (ref 1.6–2.6)
MCH RBC QN AUTO: 23.4 PG (ref 27–31)
MCHC RBC AUTO-ENTMCNC: 32.1 G/DL (ref 32–36)
MCV RBC AUTO: 73 FL (ref 82–98)
NUCLEATED RBC (/100WBC) (OHS): 0 /100 WBC
PLATELET # BLD AUTO: 36 K/UL (ref 150–450)
PLATELET BLD QL SMEAR: ABNORMAL
PLATELET BLD QL SMEAR: ABNORMAL
PMV BLD AUTO: 10.1 FL (ref 9.2–12.9)
POCT GLUCOSE: 114 MG/DL (ref 70–110)
POCT GLUCOSE: 118 MG/DL (ref 70–110)
POCT GLUCOSE: 134 MG/DL (ref 70–110)
POTASSIUM SERPL-SCNC: 3.6 MMOL/L (ref 3.5–5.1)
PROT SERPL-MCNC: 6.8 GM/DL (ref 6–8.4)
PROTHROMBIN TIME: 15.1 SECONDS (ref 9–12.5)
RBC # BLD AUTO: 3.34 M/UL (ref 4.6–6.2)
RELATIVE EOSINOPHIL (OHS): 1.4 %
RELATIVE LYMPHOCYTE (OHS): 8.3 % (ref 18–48)
RELATIVE MONOCYTE (OHS): 9.3 % (ref 4–15)
RELATIVE NEUTROPHIL (OHS): 80 % (ref 38–73)
SODIUM SERPL-SCNC: 136 MMOL/L (ref 136–145)
SPHEROCYTES BLD QL SMEAR: ABNORMAL
WBC # BLD AUTO: 5.06 K/UL (ref 3.9–12.7)

## 2025-07-27 PROCEDURE — 63600175 PHARM REV CODE 636 W HCPCS: Mod: NTX

## 2025-07-27 PROCEDURE — 25000003 PHARM REV CODE 250: Mod: NTX

## 2025-07-27 PROCEDURE — 36415 COLL VENOUS BLD VENIPUNCTURE: CPT | Mod: NTX

## 2025-07-27 PROCEDURE — 94761 N-INVAS EAR/PLS OXIMETRY MLT: CPT | Mod: NTX

## 2025-07-27 PROCEDURE — 25500020 PHARM REV CODE 255: Mod: NTX | Performed by: HOSPITALIST

## 2025-07-27 PROCEDURE — 82040 ASSAY OF SERUM ALBUMIN: CPT | Mod: NTX

## 2025-07-27 PROCEDURE — 25000003 PHARM REV CODE 250: Mod: NTX | Performed by: INTERNAL MEDICINE

## 2025-07-27 PROCEDURE — 83735 ASSAY OF MAGNESIUM: CPT | Mod: NTX

## 2025-07-27 PROCEDURE — 63600175 PHARM REV CODE 636 W HCPCS: Mod: NTX | Performed by: INTERNAL MEDICINE

## 2025-07-27 PROCEDURE — 85610 PROTHROMBIN TIME: CPT | Mod: NTX

## 2025-07-27 PROCEDURE — 99233 SBSQ HOSP IP/OBS HIGH 50: CPT | Mod: NTX,,, | Performed by: INTERNAL MEDICINE

## 2025-07-27 PROCEDURE — 20600001 HC STEP DOWN PRIVATE ROOM: Mod: NTX

## 2025-07-27 PROCEDURE — 85025 COMPLETE CBC W/AUTO DIFF WBC: CPT | Mod: NTX

## 2025-07-27 RX ADMIN — MUPIROCIN: 20 OINTMENT TOPICAL at 08:07

## 2025-07-27 RX ADMIN — INSULIN GLARGINE 18 UNITS: 100 INJECTION, SOLUTION SUBCUTANEOUS at 08:07

## 2025-07-27 RX ADMIN — VENLAFAXINE 75 MG: 75 TABLET ORAL at 08:07

## 2025-07-27 RX ADMIN — INSULIN ASPART 7 UNITS: 100 INJECTION, SOLUTION INTRAVENOUS; SUBCUTANEOUS at 05:07

## 2025-07-27 RX ADMIN — OXYBUTYNIN CHLORIDE 5 MG: 5 TABLET, EXTENDED RELEASE ORAL at 08:07

## 2025-07-27 RX ADMIN — IOHEXOL 100 ML: 350 INJECTION, SOLUTION INTRAVENOUS at 02:07

## 2025-07-27 RX ADMIN — MEROPENEM 1 G: 1 INJECTION INTRAVENOUS at 08:07

## 2025-07-27 RX ADMIN — LACTULOSE 10 G: 20 SOLUTION ORAL at 08:07

## 2025-07-27 RX ADMIN — OXYCODONE HYDROCHLORIDE 10 MG: 10 TABLET ORAL at 11:07

## 2025-07-27 RX ADMIN — METHOCARBAMOL 250 MG: 500 TABLET ORAL at 10:07

## 2025-07-27 RX ADMIN — INSULIN ASPART 7 UNITS: 100 INJECTION, SOLUTION INTRAVENOUS; SUBCUTANEOUS at 08:07

## 2025-07-27 RX ADMIN — INSULIN ASPART 7 UNITS: 100 INJECTION, SOLUTION INTRAVENOUS; SUBCUTANEOUS at 12:07

## 2025-07-27 RX ADMIN — MEROPENEM 2 G: 2 INJECTION, POWDER, FOR SOLUTION INTRAVENOUS at 11:07

## 2025-07-27 RX ADMIN — METHOCARBAMOL 250 MG: 500 TABLET ORAL at 05:07

## 2025-07-27 RX ADMIN — METHOCARBAMOL 250 MG: 500 TABLET ORAL at 08:07

## 2025-07-27 RX ADMIN — TAMSULOSIN HYDROCHLORIDE 0.4 MG: 0.4 CAPSULE ORAL at 08:07

## 2025-07-27 RX ADMIN — RIFAXIMIN 550 MG: 550 TABLET ORAL at 08:07

## 2025-07-27 RX ADMIN — MEROPENEM 2 G: 2 INJECTION, POWDER, FOR SOLUTION INTRAVENOUS at 05:07

## 2025-07-27 NOTE — PROGRESS NOTES
[facility] - Telemetry Stepdown  Infectious Disease  Progress Note    Patient Name: Corky Bach  MRN: [id number]  Admission Date: 7/20/2025  Length of Stay: 7 days  Attending Physician: David Mak MD  Primary Care Provider: Sarmad Estrella MD    Isolation Status: No active isolations  Assessment/Plan:      GI  SBP (spontaneous bacterial peritonitis)  Mr. Bach is a 58 yo with a history of cirrhosis admitted with SBP (ascitic fluid wbc=14K)  with initial culture positive for Strep mitis.  Despite being on vancomycin and ceftriaxone for 5 days, repeat ascitic fluid studies showed a wbc count still around 14K.  CT abdomen showed ascites but no other alarming findings.  Patient has now been changed to IV meropenem.      Plan  Continue IV meropenem.  Repeat paracentesis after 5 days of meropenem.  Re-consult ID if ascitic fluid wbc count still meets SBP criteria.  If SBP is resolved in 5 days, then patient should be transitioned to ciprofloxacin 500 once a day for prophylaxis.        Anticipated Disposition: TBD    Thank you for your consult. I will sign off. Please contact us if you have any additional questions.    Ismael Neville MD  Infectious Disease  [facility] - Telemetry Stepdown    Time: 50 minutes   50% of time spent on face-to-face counseling and coordination of care. Counseling included review of test results, diagnosis, and treatment plan with patient and/or family.  I have reviewed hospital notes from  service and other specialty providers as well as outside medical records. I have also reviewed CBC, CMP/BMP,  cultures and imaging with my interpretation as documented. Patient is high risk of morbidity, on antibiotics requiring intensive monitoring for toxicity.       Subjective:     Principal Problem: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    HPI: 59-year-old male with CORDOVA cirrhosis c/b esophageal varices, and HE, T2DM, MALT lymphoma, HLD, PHG/GAVE (2024), admitted for abd. pain c/w suspected  SBP  in the setting of decompensated cirrhosis with ascites s/p 3 RBC transf and 1 UI PLTs. On CTX for SBP. S/p para. Hepatology deemed anemia was not secondary to GIB as there was no evidence of GIB.   ID is consulted for SBP ppx in the setting of strep mitis growing in culture. Currently on IV Vanc.   Interval History: No adverse events.    Review of Systems   All other systems reviewed and are negative.    Objective:     Vital Signs (Most Recent):  Temp: 98 °F (36.7 °C) (07/27/25 1525)  Pulse: 75 (07/27/25 1525)  Resp: 18 (07/27/25 1525)  BP: (!) 103/57 (07/27/25 1525)  SpO2: 99 % (07/27/25 1525) Vital Signs (24h Range):  Temp:  [97.7 °F (36.5 °C)-98.3 °F (36.8 °C)] 98 °F (36.7 °C)  Pulse:  [75-90] 75  Resp:  [16-18] 18  SpO2:  [96 %-100 %] 99 %  BP: (103-128)/(55-70) 103/57     Weight: 80.6 kg (177 lb 11.1 oz)  Body mass index is 22.81 kg/m².    Estimated Creatinine Clearance: 116.1 mL/min (based on SCr of 0.8 mg/dL).     Physical Exam  Constitutional:       General: He is not in acute distress.  HENT:      Head: Normocephalic and atraumatic.      Mouth/Throat:      Mouth: Mucous membranes are moist.   Eyes:      Pupils: Pupils are equal, round, and reactive to light.   Cardiovascular:      Rate and Rhythm: Normal rate and regular rhythm.      Pulses: Normal pulses.      Heart sounds: Normal heart sounds.   Pulmonary:      Effort: Pulmonary effort is normal. No respiratory distress.      Breath sounds: Normal breath sounds.   Abdominal:      General: Bowel sounds are normal. There is distension.      Palpations: Abdomen is soft.      Tenderness: There is no abdominal tenderness.      Hernia: A hernia (ventral) is present.   Musculoskeletal:      Right lower leg: No edema.      Left lower leg: No edema.   Neurological:      General: No focal deficit present.      Mental Status: He is alert and oriented to person, place, and time.   Psychiatric:         Mood and Affect: Mood normal.         Behavior: Behavior  normal.          Significant Labs:   Microbiology Results (last 7 days)       Procedure Component Value Units Date/Time    Aerobic culture [[id number]]  (Abnormal) Collected: 07/21/25 1538    Order Status: Completed Specimen: Ascites Updated: 07/26/25 1220     CULTURE, AEROBIC Few Streptococcus mitis oralis group     Comment: Susceptibility pending       (rule out SBP) Aerobic culture [[id number]] Collected: 07/25/25 1039    Order Status: Completed Specimen: Ascites Fluid Updated: 07/26/25 0805     CULTURE, AEROBIC No Growth To Date    (rule out SBP) Gram stain [[id number]] Collected: 07/25/25 1039    Order Status: Completed Specimen: Ascites Updated: 07/25/25 1429     GRAM STAIN Few WBC seen      No organisms seen    (rule out SBP) Culture, Anaerobic [[id number]] Collected: 07/25/25 1039    Order Status: Sent Specimen: Ascites Fluid Updated: 07/25/25 1326    Culture, Anaerobic [[id number]] Collected: 07/21/25 1538    Order Status: Completed Specimen: Ascites Updated: 07/25/25 0942     Anaerobe Culture Culture In Progress    Gram stain [[id number]] Collected: 07/21/25 1538    Order Status: Completed Specimen: Ascites Updated: 07/22/25 0134     GRAM STAIN Many WBC seen      Rare Gram positive cocci            Significant Imaging: I have reviewed all pertinent imaging results/findings within the past 24 hours.

## 2025-07-27 NOTE — ASSESSMENT & PLAN NOTE
Patient with hx CORDOVA cirrhosis c/b ascites (requiring paracentesis, on lasix 40 mg qd, eplerenone), grade 1 esophageal varices (EGD on 11/8/2024), hepatic encephalopathy 5 week hx abdominal, back pain since last paracentesis without fevers, chills, nausea, vomiting, diarrhea. CT L spine with congenital lumbar stenosis, degenerative changes. CT A/P with concern for peritonitis. Also noted splenomegaly and portal vein thrombosis, seen previously. INR 1.8. Patient had noted lactic acidosis at 2.9, now resolved. Overalll suspect abdominal pain 2/2 ascites with c/f SBP vs. Worsening of chronic portal vein thrombosis Given CTX and  pRBCs at OSH. Transferred to [facility] for further workup and evaluation. MELD-Na 17.     - Hold lasix and eplerenone   - continue home rifaxamin   - CTM CBC, CMP, PT/INR daily   - Hepatology consulted, appreciate recs  - started lactulose 10mg BID for hepatic encephalopathy prophylaxis   - Liver US showed cirrhotic morphology of the liver, sequela of portal hypertension and large volume of complicated ascites   - paracentesis on 7/21 pulled 4250mL, WBC 14,960 w/ 96% PNMs and aerobic culture was positive for strep mitis oralis   - repeat para on 7/25 pulled 2500mL, WBC 14,860  - per ID, stopped vanc and CTX, started on IV meropenem due to concern of infection with pathogen not covered by previous abx   - will repeat para 5 days after start of meropenem

## 2025-07-27 NOTE — TREATMENT PLAN
Hepatology Treatment Plan    Corky Bach is a 59 y.o. male admitted to hospital 2025 (Hospital Day: 8) due to 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.     Interval History  Abx broadened yesterday  Repeat para      Objective  Temp:  [97.5 °F (36.4 °C)-98.3 °F (36.8 °C)] 97.9 °F (36.6 °C) (727)  Pulse:  [79-90] 86 (727)  BP: (103-128)/(55-70) 114/70 (727)  Resp:  [16-20] 18 (1115)  SpO2:  [96 %-100 %] 98 % (727)         Laboratory    Lab Results   Component Value Date    WBC 5.06 2025    HGB 7.8 (L) 2025    HCT 24.3 (L) 2025    MCV 73 (L) 2025    PLT 36 (LL) 2025       Lab Results   Component Value Date     2025    K 3.6 2025     2025    CO2 26 2025    BUN 18 2025    CREATININE 0.8 2025    CALCIUM 7.6 (L) 2025       Lab Results   Component Value Date    ALBUMIN 2.0 (L) 2025    ALT <8 2025    AST 38 2025    GGT 77 (H) 2023    ALKPHOS 165 (H) 2025    BILITOT 1.3 (H) 2025       Lab Results   Component Value Date    INR 1.4 (H) 2025    INR 1.5 (H) 2025    INR 1.4 (H) 2025       MELD 3.0: 14 at 2025  6:21 AM  MELD-Na: 11 at 2025  6:21 AM  Calculated from:  Serum Creatinine: 0.8 mg/dL (Using min of 1 mg/dL) at 2025  6:21 AM  Serum Sodium: 136 mmol/L at 2025  6:21 AM  Total Bilirubin: 1.3 mg/dL at 2025  6:21 AM  Serum Albumin: 2 g/dL at 2025  6:21 AM  INR(ratio): 1.4 at 2025  6:20 AM  Age at listin years  Sex: Male at 2025  6:21 AM    59 year old gentleman with decompensated MASH cirrhosis with ascites, EV, PHG, Barretts esophagus s/p ablation, gastric AVMs, PVT, HE, MALT lymphoma, HTN, HLD, DM2, thrombocytopenia, gout, transferred from outside hospital where he presented with abdominal and back pain of 3 weeks duration. Hepatology consulted for decompensated cirrhosis. Patient has recurrent SBP on  repeat paracentesis despite being on abx, ID broadened to meropenem yesterday.     Plan  - appreciate ID recommendations for recurrent SBP   - will plan repeat paracentesis 8/1 to assess cell count  - will need chronic sbp ppx on discharge   - please obtain daily CBC, BMP, LFT, INR      Thank you for involving us in the care of Corky KARINA Bach. Please call with any additional concerns or questions.    Vianney Naylor MD  Gastroenterology and Hepatology Fellow, PGY-4

## 2025-07-27 NOTE — ASSESSMENT & PLAN NOTE
Patient with hx CORDOVA cirrhosis c/b ascites (requiring paracentesis, on lasix 40 mg qd, eplerenone), grade 1 esophageal varices (EGD on 11/8/2024), hepatic encephalopathy 5 week hx abdominal, back pain since last paracentesis without fevers, chills, nausea, vomiting, diarrhea. CT L spine with congenital lumbar stenosis, degenerative changes. CT A/P with concern for peritonitis. Also noted splenomegaly and portal vein thrombosis, seen previously. INR 1.8. Patient had noted lactic acidosis at 2.9, now resolved. Overalll suspect abdominal pain 2/2 ascites with c/f SBP vs. Worsening of chronic portal vein thrombosis Given CTX and  pRBCs at OSH. [place] [facility] [place]. MELD-Na 17.     - Hold lasix and eplerenone   - continue home rifaxamin   - CTM CBC, CMP, PT/INR daily   - Hepatology consulted, appreciate recs  - started lactulose 10mg BID for hepatic encephalopathy prophylaxis   - Liver US showed cirrhotic morphology of the liver, sequela of portal hypertension and large volume of complicated ascites   - paracentesis on 7/21 pulled 4250mL, WBC 14,960 w/ 96% PNMs and aerobic culture was positive for strep mitis oralis   - repeat para on 7/25 pulled 2500mL, WBC 14,860  - per ID, stopped vanc and CTX, started on IV meropenem due to concern of infection with pathogen not covered by previous abx   - will repeat para 5 days after start of meropenem

## 2025-07-27 NOTE — ASSESSMENT & PLAN NOTE
Patient with hx CORDOVA cirrhosis c/b ascites (requiring paracentesis, on lasix 40 mg qd, eplerenone), grade 1 esophageal varices (EGD on 11/8/2024), hepatic encephalopathy 5 week hx abdominal, back pain since last paracentesis without fevers, chills, nausea, vomiting, diarrhea. CT L spine with congenital lumbar stenosis, degenerative changes. CT A/P with concern for peritonitis. Also noted splenomegaly and portal vein thrombosis, seen previously. INR 1.8. Patient had noted lactic acidosis at 2.9, now resolved. Overalll suspect abdominal pain 2/2 ascites with c/f SBP vs. Worsening of chronic portal vein thrombosis Given CTX and  pRBCs at OSH. [place] [facility] [place]. MELD-Na 17.     - Hold lasix and eplerenone   - continue home rifaxamin   - CTM CBC, CMP, PT/INR daily   - Hepatology consulted, appreciate recs  - started lactulose 10mg BID for hepatic encephalopathy prophylaxis   - Liver US showed cirrhotic morphology of the liver, sequela of portal hypertension and large volume of complicated ascites   - paracentesis on 7/21 pulled 4250mL, WBC 14,960 w/ 96% PNMs and aerobic culture was positive for strep mitis oralis   - repeat para on 7/25 pulled 2500mL, WBC 14,860  - per ID, stopped vanc and CTX, started on IV meropenem due to concern of infection with pathogen not covered by previous abx   - will repeat para 5 days after start of meropenem

## 2025-07-27 NOTE — ASSESSMENT & PLAN NOTE
Patient with hx CORDOVA cirrhosis c/b ascites (requiring paracentesis, on lasix 40 mg qd, eplerenone), grade 1 esophageal varices (EGD on 11/8/2024), hepatic encephalopathy 5 week hx abdominal, back pain since last paracentesis without fevers, chills, nausea, vomiting, diarrhea. CT L spine with congenital lumbar stenosis, degenerative changes. CT A/P with concern for peritonitis. Also noted splenomegaly and portal vein thrombosis, seen previously. INR 1.8. Patient had noted lactic acidosis at 2.9, now resolved. Overalll suspect abdominal pain 2/2 ascites with c/f SBP vs. Worsening of chronic portal vein thrombosis Given CTX and  pRBCs at OSH. Transferred to [facility] for further workup and evaluation. MELD-Na 17.     - Hold lasix and eplerenone   - continue home rifaxamin   - CTM CBC, CMP, PT/INR daily   - Hepatology consulted, appreciate recs  - started lactulose 10mg BID for hepatic encephalopathy prophylaxis   - Liver US showed cirrhotic morphology of the liver, sequela of portal hypertension and large volume of complicated ascites   - paracentesis on 7/21 pulled 4250mL, WBC 14,960 w/ 96% PNMs and aerobic culture was positive for strep mitis oralis   - repeat para on 7/25 pulled 2500mL, WBC 14,860  - per ID, stopped vanc and CTX, started on IV meropenem due to concern of infection with pathogen not covered by previous abx   - will repeat para 5 days after start of meropenem

## 2025-07-27 NOTE — ASSESSMENT & PLAN NOTE
Patient with hx CORDOVA cirrhosis c/b ascites (requiring paracentesis, on lasix 40 mg qd, eplerenone), grade 1 esophageal varices (EGD on 11/8/2024), hepatic encephalopathy 5 week hx abdominal, back pain since last paracentesis without fevers, chills, nausea, vomiting, diarrhea. CT L spine with congenital lumbar stenosis, degenerative changes. CT A/P with concern for peritonitis. Also noted splenomegaly and portal vein thrombosis, seen previously. INR 1.8. Patient had noted lactic acidosis at 2.9, now resolved. Overalll suspect abdominal pain 2/2 ascites with c/f SBP vs. Worsening of chronic portal vein thrombosis Given CTX and  pRBCs at OSH. Transferred to [facility] [place] for further workup and evaluation. MELD-Na 17.     - Hold lasix and eplerenone   - continue home rifaxamin   - CTM CBC, CMP, PT/INR daily   - Hepatology consulted, appreciate recs  - started lactulose 10mg BID for hepatic encephalopathy prophylaxis   - Liver US showed cirrhotic morphology of the liver, sequela of portal hypertension and large volume of complicated ascites   - paracentesis on 7/21 pulled 4250mL, WBC 14,960 w/ 96% PNMs and aerobic culture was positive for strep mitis oralis   - repeat para on 7/25 pulled 2500mL, WBC 14,860  - per ID, stopped vanc and CTX, started on IV meropenem due to concern of infection with pathogen not covered by previous abx   - will repeat para 5 days after start of meropenem

## 2025-07-27 NOTE — ASSESSMENT & PLAN NOTE
Mr. Bach is a 58 yo with a history of cirrhosis admitted with SBP (ascitic fluid wbc=14K)  with initial culture positive for Strep mitis.  Despite being on vancomycin and ceftriaxone for 5 days, repeat ascitic fluid studies showed a wbc count still around 14K.  CT abdomen showed ascites but no other alarming findings.  Patient has now been changed to IV meropenem.      Plan  Continue IV meropenem.  Repeat paracentesis after 5 days of meropenem.  Re-consult ID if ascitic fluid wbc count still meets SBP criteria.  If SBP is resolved in 5 days, then patient should be transitioned to ciprofloxacin 500 once a day for prophylaxis.

## 2025-07-27 NOTE — ASSESSMENT & PLAN NOTE
The likely etiology of thrombocytopenia is liver disease. The patients 3 most recent labs are listed below.  Recent Labs     07/26/25  0626 07/26/25  2155 07/27/25  0621   PLT 33* 41* 36*     Plan  - Will transfuse if platelet count is <50k (if undergoing surgical procedure or have active bleeding).  - CTM CBC, PT/INR qd  - received 3U of platelets while admitted at [facility]

## 2025-07-27 NOTE — PROGRESS NOTES
[facility] - Telemetry [facility] Medicine  Progress Note    Patient Name: Corky Bach  MRN: [id number]  Patient Class: IP- Inpatient   Admission Date: 7/20/2025  Length of Stay: 7 days  Attending Physician: David Mak MD  Primary Care Provider: Sarmad Estrella MD      Subjective     Principal Problem: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      HPI:  Corky Bach is a 60 y/o male with a notable hx of CORDOVA cirrhosis c/b esophageal varices, T2DM, MALT lymphoma, HLD, gout, and depression [place] [facility] [place] consult.     Patient initially presented to the ED of [facility] on 7/20 with complaints of chronic back and abdominal pain that had been ongoing for appx 5 weeks. He states that the back pain began to occur shortly after his last paracentesis on 7/12 located around his mid lumbar spine, starting with a dull back/ flank pain and quickly radiating to upper abdominal pain. He was trying to use ibuprofen and tylenol to help with the pain however the pain continued to progress. He eventually started to take percocet from a prior hospitalization however this was ineffective. He denies any fevers, chills, nausea, vomiting, constipation/diarrhea, melena, hematemesis during this time. At this point he presented to the ED for further evaluation.     Initially BPs were in the low 100s. He was found to be heme occult positive. Hb 6.9 and platelets 67. AST 13, ALT 6, , bili 1.2, INR 1.8, lactic 2.9. CT Lumbar spine obtained without acute lumbar fracture, congenital lumbar stenosis with degenerative changes. CT A/P without contrast showed moderate r sized pleural effusion, splenomegaly, calcified portal vein thrombus, ascites with thickening of surrounding peritoneal reflections c/f inflammatory peritonitis. Patient started on CTX, given 1u PRBC, and transferred [place] [facility] [place]atology evaluation.         Overview/Hospital Course:  Patient is a 59 M w/ hx of CORDOVA cirrhosis,  c/b esophageal varices, T2DM, MALT lymphoma, HLD, gout, admitted for abdminal pain and suspected SBP with decompensated cirrhosis and ascites. Found to have a hemoglobin of 6.9 on presentation, received 1 unit PRBC, no signs of bleeding. Discussed with hepatology and they did not think his presentation was c/f a GIB. Treated empirically for suspected SBP with ceftriaxone. Diagnostic and therapeutic paracentesis drained 4250 mL, lab results positive for SBP. Awaiting cultures. Ascites is now resolved after paracentesis. Hemoglobin and platelets were low today, 6.4 and 40 respectively. Transfused 1U PRBCs and 1U platelets on 7/22. Monitoring hemoglobin. Continuing ceftriaxone and albumin for SBP treatment, added vanc to cover enterococcus while awaiting culture data. Hemoglobin dropped again, transfused 1U PRBCs on 7/24. Hemoglobin stable. Aerobic culture was positive for strep mitis oralis. Repeat paracentesis on 7/25, pulled 2.5 L. Labs revealed an ascitic fluid WBC count of around 14K again. ID recommended to discontinue vanc and CTX and start IV meropenem. Will repeat paracentesis after 5 days of meropenem to re-assess the WBC count.    Interval History: NAEON. VSS. Patient reported feeling okay, denies chest pain and shortness of breath. No complaints or concerns noted at the time.     Objective:     Vital Signs (Most Recent):  Temp: 97.9 °F (36.6 °C) (07/27/25 1143)  Pulse: 77 (07/27/25 1143)  Resp: 18 (07/27/25 1143)  BP: 113/61 (07/27/25 1143)  SpO2: 100 % (07/27/25 1143) Vital Signs (24h Range):  Temp:  [97.7 °F (36.5 °C)-98.3 °F (36.8 °C)] 97.9 °F (36.6 °C)  Pulse:  [77-90] 77  Resp:  [16-20] 18  SpO2:  [96 %-100 %] 100 %  BP: (103-128)/(55-70) 113/61     Weight: 80.6 kg (177 lb 11.1 oz)  Body mass index is 22.81 kg/m².    Intake/Output Summary (Last 24 hours) at 7/27/2025 1147  Last data filed at 7/26/2025 2113  Gross per 24 hour   Intake 1728.49 ml   Output 950 ml   Net 778.49 ml         Physical  Exam  Constitutional:       General: He is not in acute distress.  HENT:      Head: Normocephalic and atraumatic.      Mouth/Throat:      Mouth: Mucous membranes are moist.   Eyes:      Pupils: Pupils are equal, round, and reactive to light.   Cardiovascular:      Rate and Rhythm: Normal rate and regular rhythm.      Pulses: Normal pulses.      Heart sounds: Normal heart sounds.   Pulmonary:      Effort: Pulmonary effort is normal. No respiratory distress.      Breath sounds: Normal breath sounds.   Abdominal:      General: Bowel sounds are normal. There is distension.      Palpations: Abdomen is soft.      Tenderness: There is abdominal tenderness (LUQ).      Hernia: A hernia (umbilical) is present.   Musculoskeletal:      Right lower leg: No edema.      Left lower leg: No edema.   Skin:     General: Skin is warm and dry.   Neurological:      General: No focal deficit present.      Mental Status: He is alert and oriented to person, place, and time.           Significant Labs: All pertinent labs within the past 24 hours have been reviewed.  CBC:   Recent Labs   Lab 07/26/25  0626 07/26/25  2155 07/27/25  0621   WBC 4.77 7.90 5.06   HGB 6.7* 8.2* 7.8*   HCT 21.8* 25.4* 24.3*   PLT 33* 41* 36*     CMP:   Recent Labs   Lab 07/26/25  0626 07/27/25  0621   * 136   K 4.0 3.6    104   CO2 25 26   * 102   BUN 17 18   CREATININE 0.8 0.8   CALCIUM 7.6* 7.6*   PROT 6.6 6.8   ALBUMIN 2.1* 2.0*   BILITOT 0.9 1.3*   ALKPHOS 134 165*   AST 33 38   ALT <8 <8   ANIONGAP 6* 6*       Significant Imaging: I have reviewed all pertinent imaging results/findings within the past 24 hours.  Assessment & Plan  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  Abdominal pain  Ascites  Portal hypertensive gastropathy  Portal vein thrombosis  Splenomegaly  SBP (spontaneous bacterial peritonitis)  Patient with hx CORDOVA cirrhosis c/b ascites (requiring paracentesis, on lasix 40 mg qd, eplerenone), grade 1 esophageal varices (EGD on 11/8/2024),  hepatic encephalopathy 5 week hx abdominal, back pain since last paracentesis without fevers, chills, nausea, vomiting, diarrhea. CT L spine with congenital lumbar stenosis, degenerative changes. CT A/P with concern for peritonitis. Also noted splenomegaly and portal vein thrombosis, seen previously. INR 1.8. Patient had noted lactic acidosis at 2.9, now resolved. Overalll suspect abdominal pain 2/2 ascites with c/f SBP vs. Worsening of chronic portal vein thrombosis Given CTX and  pRBCs at OSH. [place] [facility]up and evaluation. MELD-Na 17.     - Hold lasix and eplerenone   - continue home rifaxamin   - CTM CBC, CMP, PT/INR daily   - Hepatology consulted, appreciate recs  - started lactulose 10mg BID for hepatic encephalopathy prophylaxis   - Liver US showed cirrhotic morphology of the liver, sequela of portal hypertension and large volume of complicated ascites   - paracentesis on 7/21 pulled 4250mL, WBC 14,960 w/ 96% PNMs and aerobic culture was positive for strep mitis oralis   - repeat para on 7/25 pulled 2500mL, WBC 14,860  - per ID, stopped vanc and CTX, started on IV meropenem due to concern of infection with pathogen not covered by previous abx   - will repeat para 5 days after start of meropenem   Anemia  Patient with hx cirrhosis c/b grade 1 esophageal varices, moderate portal hypertensive gastropathy, multiple angioectasias w/o bleeding (Last EGD 11/8/2024). Presenting with noted anemia of 6.9 at OSH. S/p 1 uPRBC.     Plan  - Monitor serial CBC: Daily  - Transfuse PRBC if patient becomes hemodynamically unstable, symptomatic or H/H drops below 7/21  - 1u pRBCs before admission at OSH, now 3u pRBCs while admitted at [facility]  - consulted GI, anemia most likely due to decompensated cirrhosis no acute intervention needed at the moment  - Hemolytic anemia labs unremarkable   Mood disorder  Continue home venlafaxine  Hyponatremia  Hyponatremia is likely due to Cirrhosis. The patient's most  recent sodium results are listed below.  Recent Labs     07/25/25  0357 07/26/25  0626 07/27/25  0621   * 134* 136     Plan  - Correct the sodium by 4-6mEq in 24 hours.   - Monitor sodium Daily.   - Patient hyponatremia is stable  Thrombocytopenia  The likely etiology of thrombocytopenia is liver disease. The patients 3 most recent labs are listed below.  Recent Labs     07/26/25  0626 07/26/25  2155 07/27/25  0621   PLT 33* 41* 36*     Plan  - Will transfuse if platelet count is <50k (if undergoing surgical procedure or have active bleeding).  - CTM CBC, PT/INR qd  - received 3U of platelets while [facility]   Benign prostatic hyperplasia with incomplete bladder emptying  - continue home flomax  MALT lymphoma  STABLE     MALT-rivka with bone marrow involvement.  Underwent treatment including repeated Rituxan q2 month infusions. Followed by Hem[facility]Onc at [facility]. Most recent testing has suggested remission leading to potential transplant candidacy for cirrhosis to be re-instituted.   Type 2 diabetes mellitus with hyperglycemia, with long-term current use of insulin  Patient's FSGs are controlled on current medication regimen.  Last A1c reviewed-   Lab Results   Component Value Date    HGBA1C 8.8 (H) 07/20/2025     Most recent fingerstick glucose reviewed-   Recent Labs   Lab 07/26/25  1547 07/26/25  2158 07/27/25  0817   POCTGLUCOSE 113* 111* 134*     Current correctional scale  Low  Maintain anti-hyperglycemic dose as follows-   Antihyperglycemics (From admission, onward)      Start     Stop Route Frequency Ordered    07/23/25 2100  insulin glargine U-100 (Lantus) pen 18 Units         -- SubQ Nightly 07/23/25 1117    07/23/25 1130  insulin aspart U-100 pen 7 Units         -- SubQ 3 times daily with meals 07/23/25 1119    07/20/25 2316  insulin aspart U-100 pen 0-5 Units         -- SubQ Before meals & nightly PRN 07/20/25 2217          Hold Oral hypoglycemics while patient is in the hospital.  - decreased  insulin Glargine to 18 units daily and aspart 7 units TID with meals   Mixed hyperlipidemia  - not currently on medication     VTE Risk Mitigation (From admission, onward)           Ordered     Reason for No Pharmacological VTE Prophylaxis  Once        Question:  Reasons:  Answer:  Risk of Bleeding    07/20/25 2020     IP VTE HIGH RISK PATIENT  Once         07/20/25 2020     Place sequential compression device  Until discontinued         07/20/25 2020                    Discharge Planning   GARY: 7/29/2025     Code Status: Full Code   Medical Readiness for Discharge Date:   Discharge Plan A: Home with family   Discharge Delays: None known at this time      Veronica Freitas DO  Department of Hospital Medicine   [facility] - Telemetry Stepdown

## 2025-07-27 NOTE — ASSESSMENT & PLAN NOTE
Patient's FSGs are controlled on current medication regimen.  Last A1c reviewed-   Lab Results   Component Value Date    HGBA1C 8.8 (H) 07/20/2025     Most recent fingerstick glucose reviewed-   Recent Labs   Lab 07/26/25  1547 07/26/25  2158 07/27/25  0817   POCTGLUCOSE 113* 111* 134*     Current correctional scale  Low  Maintain anti-hyperglycemic dose as follows-   Antihyperglycemics (From admission, onward)      Start     Stop Route Frequency Ordered    07/23/25 2100  insulin glargine U-100 (Lantus) pen 18 Units         -- SubQ Nightly 07/23/25 1117    07/23/25 1130  insulin aspart U-100 pen 7 Units         -- SubQ 3 times daily with meals 07/23/25 1119    07/20/25 2316  insulin aspart U-100 pen 0-5 Units         -- SubQ Before meals & nightly PRN 07/20/25 2217          Hold Oral hypoglycemics while patient is in the hospital.  - decreased insulin Glargine to 18 units daily and aspart 7 units TID with meals

## 2025-07-27 NOTE — ASSESSMENT & PLAN NOTE
Patient with hx cirrhosis c/b grade 1 esophageal varices, moderate portal hypertensive gastropathy, multiple angioectasias w/o bleeding (Last EGD 11/8/2024). Presenting with noted anemia of 6.9 at OSH. S/p 1 uPRBC.     Plan  - Monitor serial CBC: Daily  - Transfuse PRBC if patient becomes hemodynamically unstable, symptomatic or H/H drops below 7/21  - 1u pRBCs before admission at OSH, now 3u pRBCs while admitted at [facility]  - consulted GI, anemia most likely due to decompensated cirrhosis no acute intervention needed at the moment  - Hemolytic anemia labs unremarkable

## 2025-07-27 NOTE — ASSESSMENT & PLAN NOTE
STABLE     MALT-rivka with bone marrow involvement.  Underwent treatment including repeated Rituxan q2 month infusions. Followed by Heme-Onc at [facility]. Most recent testing has suggested remission leading to potential transplant candidacy for cirrhosis to be re-instituted.

## 2025-07-27 NOTE — SUBJECTIVE & OBJECTIVE
Interval History: No adverse events.    Review of Systems   All other systems reviewed and are negative.    Objective:     Vital Signs (Most Recent):  Temp: 98 °F (36.7 °C) (07/27/25 1525)  Pulse: 75 (07/27/25 1525)  Resp: 18 (07/27/25 1525)  BP: (!) 103/57 (07/27/25 1525)  SpO2: 99 % (07/27/25 1525) Vital Signs (24h Range):  Temp:  [97.7 °F (36.5 °C)-98.3 °F (36.8 °C)] 98 °F (36.7 °C)  Pulse:  [75-90] 75  Resp:  [16-18] 18  SpO2:  [96 %-100 %] 99 %  BP: (103-128)/(55-70) 103/57     Weight: 80.6 kg (177 lb 11.1 oz)  Body mass index is 22.81 kg/m².    Estimated Creatinine Clearance: 116.1 mL/min (based on SCr of 0.8 mg/dL).     Physical Exam  Constitutional:       General: He is not in acute distress.  HENT:      Head: Normocephalic and atraumatic.      Mouth/Throat:      Mouth: Mucous membranes are moist.   Eyes:      Pupils: Pupils are equal, round, and reactive to light.   Cardiovascular:      Rate and Rhythm: Normal rate and regular rhythm.      Pulses: Normal pulses.      Heart sounds: Normal heart sounds.   Pulmonary:      Effort: Pulmonary effort is normal. No respiratory distress.      Breath sounds: Normal breath sounds.   Abdominal:      General: Bowel sounds are normal. There is distension.      Palpations: Abdomen is soft.      Tenderness: There is no abdominal tenderness.      Hernia: A hernia (ventral) is present.   Musculoskeletal:      Right lower leg: No edema.      Left lower leg: No edema.   Neurological:      General: No focal deficit present.      Mental Status: He is alert and oriented to person, place, and time.   Psychiatric:         Mood and Affect: Mood normal.         Behavior: Behavior normal.          Significant Labs:   Microbiology Results (last 7 days)       Procedure Component Value Units Date/Time    Aerobic culture [[id number]]  (Abnormal) Collected: 07/21/25 1538    Order Status: Completed Specimen: Ascites Updated: 07/26/25 1220     CULTURE, AEROBIC Few Streptococcus mitis oralis  group     Comment: Susceptibility pending       (rule out SBP) Aerobic culture [[id number]] Collected: 07/25/25 1039    Order Status: Completed Specimen: Ascites Fluid Updated: 07/26/25 0805     CULTURE, AEROBIC No Growth To Date    (rule out SBP) Gram stain [[id number]] Collected: 07/25/25 1039    Order Status: Completed Specimen: Ascites Updated: 07/25/25 1429     GRAM STAIN Few WBC seen      No organisms seen    (rule out SBP) Culture, Anaerobic [[id number]] Collected: 07/25/25 1039    Order Status: Sent Specimen: Ascites Fluid Updated: 07/25/25 1326    Culture, Anaerobic [[id number]] Collected: 07/21/25 1538    Order Status: Completed Specimen: Ascites Updated: 07/25/25 0942     Anaerobe Culture Culture In Progress    Gram stain [[id number]] Collected: 07/21/25 1538    Order Status: Completed Specimen: Ascites Updated: 07/22/25 0134     GRAM STAIN Many WBC seen      Rare Gram positive cocci            Significant Imaging: I have reviewed all pertinent imaging results/findings within the past 24 hours.

## 2025-07-27 NOTE — ASSESSMENT & PLAN NOTE
Patient with hx CORDOVA cirrhosis c/b ascites (requiring paracentesis, on lasix 40 mg qd, eplerenone), grade 1 esophageal varices (EGD on 11/8/2024), hepatic encephalopathy 5 week hx abdominal, back pain since last paracentesis without fevers, chills, nausea, vomiting, diarrhea. CT L spine with congenital lumbar stenosis, degenerative changes. CT A/P with concern for peritonitis. Also noted splenomegaly and portal vein thrombosis, seen previously. INR 1.8. Patient had noted lactic acidosis at 2.9, now resolved. Overalll suspect abdominal pain 2/2 ascites with c/f SBP vs. Worsening of chronic portal vein thrombosis Given CTX and  pRBCs at OSH. Transferred to [facility] [place] workup and evaluation. MELD-Na 17.     - Hold lasix and eplerenone   - continue home rifaxamin   - CTM CBC, CMP, PT/INR daily   - Hepatology consulted, appreciate recs  - started lactulose 10mg BID for hepatic encephalopathy prophylaxis   - Liver US showed cirrhotic morphology of the liver, sequela of portal hypertension and large volume of complicated ascites   - paracentesis on 7/21 pulled 4250mL, WBC 14,960 w/ 96% PNMs and aerobic culture was positive for strep mitis oralis   - repeat para on 7/25 pulled 2500mL, WBC 14,860  - per ID, stopped vanc and CTX, started on IV meropenem due to concern of infection with pathogen not covered by previous abx   - will repeat para 5 days after start of meropenem

## 2025-07-27 NOTE — PLAN OF CARE
Problem: Adult Inpatient Plan of Care  Goal: Plan of Care Review  Outcome: Progressing  Flowsheets (Taken 7/27/2025 0201)  Plan of Care Reviewed With: patient     Problem: Diabetes Comorbidity  Goal: Blood Glucose Level Within Targeted Range  Outcome: Progressing  Intervention: Monitor and Manage Glycemia  Flowsheets (Taken 7/27/2025 0201)  Glycemic Management:   blood glucose monitored   oral hydration promoted     Problem: Wound  Goal: Absence of Infection Signs and Symptoms  Outcome: Progressing  Intervention: Prevent or Manage Infection  Flowsheets (Taken 7/27/2025 0201)  Fever Reduction/Comfort Measures:   lightweight bedding   lightweight clothing  Infection Management: aseptic technique maintained  Isolation Precautions:   protective   precautions initiated     Patient awake, alert , oriented.  Pain assessed, pt denies having pain.  Antibiotic administered as ordered.  CT abd and pelvis done over night.  pt educated on safety, and medication use. Pt verbalized understanding.  Plan of care discussed with patient, patient verbalized understanding.  Bed alarm on.  Call light within reach.   Bed  low and locked.

## 2025-07-27 NOTE — SUBJECTIVE & OBJECTIVE
Interval History: NAEON. VSS. Patient reported feeling okay, denies chest pain and shortness of breath. No complaints or concerns noted at the time.     Objective:     Vital Signs (Most Recent):  Temp: 97.9 °F (36.6 °C) (07/27/25 1143)  Pulse: 77 (07/27/25 1143)  Resp: 18 (07/27/25 1143)  BP: 113/61 (07/27/25 1143)  SpO2: 100 % (07/27/25 1143) Vital Signs (24h Range):  Temp:  [97.7 °F (36.5 °C)-98.3 °F (36.8 °C)] 97.9 °F (36.6 °C)  Pulse:  [77-90] 77  Resp:  [16-20] 18  SpO2:  [96 %-100 %] 100 %  BP: (103-128)/(55-70) 113/61     Weight: 80.6 kg (177 lb 11.1 oz)  Body mass index is 22.81 kg/m².    Intake/Output Summary (Last 24 hours) at 7/27/2025 1147  Last data filed at 7/26/2025 2113  Gross per 24 hour   Intake 1728.49 ml   Output 950 ml   Net 778.49 ml         Physical Exam  Constitutional:       General: He is not in acute distress.  HENT:      Head: Normocephalic and atraumatic.      Mouth/Throat:      Mouth: Mucous membranes are moist.   Eyes:      Pupils: Pupils are equal, round, and reactive to light.   Cardiovascular:      Rate and Rhythm: Normal rate and regular rhythm.      Pulses: Normal pulses.      Heart sounds: Normal heart sounds.   Pulmonary:      Effort: Pulmonary effort is normal. No respiratory distress.      Breath sounds: Normal breath sounds.   Abdominal:      General: Bowel sounds are normal. There is distension.      Palpations: Abdomen is soft.      Tenderness: There is abdominal tenderness (LUQ).      Hernia: A hernia (umbilical) is present.   Musculoskeletal:      Right lower leg: No edema.      Left lower leg: No edema.   Skin:     General: Skin is warm and dry.   Neurological:      General: No focal deficit present.      Mental Status: He is alert and oriented to person, place, and time.           Significant Labs: All pertinent labs within the past 24 hours have been reviewed.  CBC:   Recent Labs   Lab 07/26/25  0626 07/26/25 2155 07/27/25  0621   WBC 4.77 7.90 5.06   HGB 6.7* 8.2*  7.8*   HCT 21.8* 25.4* 24.3*   PLT 33* 41* 36*     CMP:   Recent Labs   Lab 07/26/25  0626 07/27/25  0621   * 136   K 4.0 3.6    104   CO2 25 26   * 102   BUN 17 18   CREATININE 0.8 0.8   CALCIUM 7.6* 7.6*   PROT 6.6 6.8   ALBUMIN 2.1* 2.0*   BILITOT 0.9 1.3*   ALKPHOS 134 165*   AST 33 38   ALT <8 <8   ANIONGAP 6* 6*       Significant Imaging: I have reviewed all pertinent imaging results/findings within the past 24 hours.

## 2025-07-27 NOTE — ASSESSMENT & PLAN NOTE
Hyponatremia is likely due to Cirrhosis. The patient's most recent sodium results are listed below.  Recent Labs     07/25/25  0357 07/26/25  0626 07/27/25  0621   * 134* 136     Plan  - Correct the sodium by 4-6mEq in 24 hours.   - Monitor sodium Daily.   - Patient hyponatremia is stable

## 2025-07-28 LAB
ABSOLUTE EOSINOPHIL (OHS): 0.06 K/UL
ABSOLUTE MONOCYTE (OHS): 0.46 K/UL (ref 0.3–1)
ABSOLUTE NEUTROPHIL COUNT (OHS): 4.25 K/UL (ref 1.8–7.7)
ALBUMIN SERPL BCP-MCNC: 2.1 G/DL (ref 3.5–5.2)
ALP SERPL-CCNC: 171 UNIT/L (ref 40–150)
ALT SERPL W/O P-5'-P-CCNC: 14 UNIT/L (ref 0–55)
ANION GAP (OHS): 7 MMOL/L (ref 8–16)
AST SERPL-CCNC: 58 UNIT/L (ref 0–50)
BACTERIA SPEC AEROBE CULT: NO GROWTH
BASOPHILS # BLD AUTO: 0.02 K/UL
BASOPHILS NFR BLD AUTO: 0.4 %
BILIRUB SERPL-MCNC: 1.1 MG/DL (ref 0.1–1)
BUN SERPL-MCNC: 18 MG/DL (ref 6–20)
CALCIUM SERPL-MCNC: 7.6 MG/DL (ref 8.7–10.5)
CHLORIDE SERPL-SCNC: 102 MMOL/L (ref 95–110)
CO2 SERPL-SCNC: 24 MMOL/L (ref 23–29)
CREAT SERPL-MCNC: 0.8 MG/DL (ref 0.5–1.4)
ERYTHROCYTE [DISTWIDTH] IN BLOOD BY AUTOMATED COUNT: 23.4 % (ref 11.5–14.5)
GFR SERPLBLD CREATININE-BSD FMLA CKD-EPI: >60 ML/MIN/1.73/M2
GLUCOSE SERPL-MCNC: 163 MG/DL (ref 70–110)
HCT VFR BLD AUTO: 24.1 % (ref 40–54)
HGB BLD-MCNC: 7.5 GM/DL (ref 14–18)
IMM GRANULOCYTES # BLD AUTO: 0.02 K/UL (ref 0–0.04)
IMM GRANULOCYTES NFR BLD AUTO: 0.4 % (ref 0–0.5)
INDIRECT COOMBS: NORMAL
INR PPP: 1.4 (ref 0.8–1.2)
LYMPHOCYTES # BLD AUTO: 0.4 K/UL (ref 1–4.8)
MAGNESIUM SERPL-MCNC: 1.5 MG/DL (ref 1.6–2.6)
MCH RBC QN AUTO: 23.2 PG (ref 27–31)
MCHC RBC AUTO-ENTMCNC: 31.1 G/DL (ref 32–36)
MCV RBC AUTO: 75 FL (ref 82–98)
NUCLEATED RBC (/100WBC) (OHS): 0 /100 WBC
OB PNL STL: POSITIVE
PATHOLOGIST REVIEW - CBC/DIFF (OHS): NORMAL
PLATELET # BLD AUTO: 34 K/UL (ref 150–450)
PLATELET BLD QL SMEAR: ABNORMAL
PMV BLD AUTO: 9.6 FL (ref 9.2–12.9)
POCT GLUCOSE: 130 MG/DL (ref 70–110)
POCT GLUCOSE: 244 MG/DL (ref 70–110)
POTASSIUM SERPL-SCNC: 3.7 MMOL/L (ref 3.5–5.1)
PROT SERPL-MCNC: 6.9 GM/DL (ref 6–8.4)
PROTHROMBIN TIME: 15 SECONDS (ref 9–12.5)
RBC # BLD AUTO: 3.23 M/UL (ref 4.6–6.2)
RELATIVE EOSINOPHIL (OHS): 1.2 %
RELATIVE LYMPHOCYTE (OHS): 7.7 % (ref 18–48)
RELATIVE MONOCYTE (OHS): 8.8 % (ref 4–15)
RELATIVE NEUTROPHIL (OHS): 81.5 % (ref 38–73)
RH BLD: NORMAL
SODIUM SERPL-SCNC: 133 MMOL/L (ref 136–145)
SPECIMEN OUTDATE: NORMAL
WBC # BLD AUTO: 5.21 K/UL (ref 3.9–12.7)

## 2025-07-28 PROCEDURE — 25000003 PHARM REV CODE 250: Mod: NTX

## 2025-07-28 PROCEDURE — 85025 COMPLETE CBC W/AUTO DIFF WBC: CPT | Mod: NTX

## 2025-07-28 PROCEDURE — 63600175 PHARM REV CODE 636 W HCPCS: Mod: NTX

## 2025-07-28 PROCEDURE — 82565 ASSAY OF CREATININE: CPT | Mod: NTX

## 2025-07-28 PROCEDURE — 63600175 PHARM REV CODE 636 W HCPCS: Mod: NTX | Performed by: INTERNAL MEDICINE

## 2025-07-28 PROCEDURE — 25000003 PHARM REV CODE 250: Mod: NTX | Performed by: INTERNAL MEDICINE

## 2025-07-28 PROCEDURE — 83735 ASSAY OF MAGNESIUM: CPT | Mod: NTX

## 2025-07-28 PROCEDURE — 86850 RBC ANTIBODY SCREEN: CPT | Mod: NTX | Performed by: HOSPITALIST

## 2025-07-28 PROCEDURE — 85610 PROTHROMBIN TIME: CPT | Mod: NTX

## 2025-07-28 PROCEDURE — 20600001 HC STEP DOWN PRIVATE ROOM: Mod: NTX

## 2025-07-28 PROCEDURE — 82272 OCCULT BLD FECES 1-3 TESTS: CPT | Mod: NTX

## 2025-07-28 PROCEDURE — 36415 COLL VENOUS BLD VENIPUNCTURE: CPT | Mod: NTX

## 2025-07-28 RX ORDER — SPIRONOLACTONE 50 MG/1
50 TABLET, FILM COATED ORAL DAILY
Status: DISCONTINUED | OUTPATIENT
Start: 2025-07-28 | End: 2025-08-03 | Stop reason: HOSPADM

## 2025-07-28 RX ORDER — FUROSEMIDE 20 MG/1
20 TABLET ORAL DAILY
Status: DISCONTINUED | OUTPATIENT
Start: 2025-07-28 | End: 2025-08-03

## 2025-07-28 RX ORDER — MAGNESIUM SULFATE HEPTAHYDRATE 40 MG/ML
2 INJECTION, SOLUTION INTRAVENOUS ONCE
Status: CANCELLED | OUTPATIENT
Start: 2025-07-28 | End: 2025-07-28

## 2025-07-28 RX ADMIN — METHOCARBAMOL 250 MG: 500 TABLET ORAL at 03:07

## 2025-07-28 RX ADMIN — MUPIROCIN: 20 OINTMENT TOPICAL at 09:07

## 2025-07-28 RX ADMIN — Medication 6 MG: at 09:07

## 2025-07-28 RX ADMIN — SPIRONOLACTONE 50 MG: 50 TABLET ORAL at 01:07

## 2025-07-28 RX ADMIN — INSULIN ASPART 7 UNITS: 100 INJECTION, SOLUTION INTRAVENOUS; SUBCUTANEOUS at 12:07

## 2025-07-28 RX ADMIN — VENLAFAXINE 75 MG: 75 TABLET ORAL at 08:07

## 2025-07-28 RX ADMIN — METHOCARBAMOL 250 MG: 500 TABLET ORAL at 09:07

## 2025-07-28 RX ADMIN — VENLAFAXINE 75 MG: 75 TABLET ORAL at 09:07

## 2025-07-28 RX ADMIN — FUROSEMIDE 20 MG: 20 TABLET ORAL at 01:07

## 2025-07-28 RX ADMIN — MEROPENEM 2 G: 2 INJECTION, POWDER, FOR SOLUTION INTRAVENOUS at 08:07

## 2025-07-28 RX ADMIN — MEROPENEM 2 G: 2 INJECTION, POWDER, FOR SOLUTION INTRAVENOUS at 04:07

## 2025-07-28 RX ADMIN — INSULIN GLARGINE 18 UNITS: 100 INJECTION, SOLUTION SUBCUTANEOUS at 09:07

## 2025-07-28 RX ADMIN — SENNOSIDES AND DOCUSATE SODIUM 1 TABLET: 50; 8.6 TABLET ORAL at 08:07

## 2025-07-28 RX ADMIN — OXYBUTYNIN CHLORIDE 5 MG: 5 TABLET, EXTENDED RELEASE ORAL at 08:07

## 2025-07-28 RX ADMIN — INSULIN ASPART 7 UNITS: 100 INJECTION, SOLUTION INTRAVENOUS; SUBCUTANEOUS at 08:07

## 2025-07-28 RX ADMIN — INSULIN ASPART 2 UNITS: 100 INJECTION, SOLUTION INTRAVENOUS; SUBCUTANEOUS at 12:07

## 2025-07-28 RX ADMIN — RIFAXIMIN 550 MG: 550 TABLET ORAL at 09:07

## 2025-07-28 RX ADMIN — INSULIN ASPART 7 UNITS: 100 INJECTION, SOLUTION INTRAVENOUS; SUBCUTANEOUS at 04:07

## 2025-07-28 RX ADMIN — POLYETHYLENE GLYCOL 3350 17 G: 17 POWDER, FOR SOLUTION ORAL at 09:07

## 2025-07-28 RX ADMIN — LACTULOSE 10 G: 20 SOLUTION ORAL at 08:07

## 2025-07-28 RX ADMIN — TAMSULOSIN HYDROCHLORIDE 0.4 MG: 0.4 CAPSULE ORAL at 09:07

## 2025-07-28 RX ADMIN — MEROPENEM 2 G: 2 INJECTION, POWDER, FOR SOLUTION INTRAVENOUS at 11:07

## 2025-07-28 RX ADMIN — RIFAXIMIN 550 MG: 550 TABLET ORAL at 08:07

## 2025-07-28 RX ADMIN — LACTULOSE 10 G: 20 SOLUTION ORAL at 09:07

## 2025-07-28 NOTE — ASSESSMENT & PLAN NOTE
Hyponatremia is likely due to Cirrhosis. The patient's most recent sodium results are listed below.  Recent Labs     07/26/25  0626 07/27/25  0621 07/28/25  0632   * 136 133*     Plan  - Correct the sodium by 4-6mEq in 24 hours.   - Monitor sodium Daily.   - Patient hyponatremia is stable

## 2025-07-28 NOTE — ASSESSMENT & PLAN NOTE
Patient with hx CORDOVA cirrhosis c/b ascites (requiring paracentesis, on lasix 40 mg qd, eplerenone), grade 1 esophageal varices (EGD on 11/8/2024), hepatic encephalopathy 5 week hx abdominal, back pain since last paracentesis without fevers, chills, nausea, vomiting, diarrhea. CT L spine with congenital lumbar stenosis, degenerative changes. CT A/P with concern for peritonitis. Also noted splenomegaly and portal vein thrombosis, seen previously. INR 1.8. Patient had noted lactic acidosis at 2.9, now resolved. Overalll suspect abdominal pain 2/2 ascites with c/f SBP vs. Worsening of chronic portal vein thrombosis Given CTX and  pRBCs at OSH. Transferred to [facility] [place] workup and evaluation.     -started Lasix 20mg and spirolactone 50mg   - continue home rifaxamin   - CTM CBC, CMP, PT/INR daily   - started lactulose 10mg BID for hepatic encephalopathy prophylaxis, patient having 4 BM daily, d/c senna and miralax as lactulose is sufficient for bowel regimen   - Liver US showed cirrhotic morphology of the liver, sequela of portal hypertension and large volume of complicated ascites   - paracentesis on 7/21 pulled 4250mL, WBC 14,960 w/ 96% PNMs and aerobic culture was positive for strep mitis oralis   - repeat para on 7/25 pulled 2500mL, WBC 14,860  - per ID, stopped vanc and CTX, started on IV meropenem due to concern of infection with pathogen not covered by previous abx   -Day 2 of IV meropenem, will repeat paracentesis after 5 days of meropenem therapy per ID. If SBP resolved on repeat para, will transition to Cipro 500mg   -repeat CT abdomen/pelvis:   Moderate to large volume ascites with findings suggestive of associated peritonitis.     Cirrhotic liver morphology with sequelae of portal hypertension including massive splenomegaly, upper abdominal collateral vessels, recanalized umbilical vein, and ascites.     Chronic partially thrombosed portal vein and splenic vein, similar to prior.     Mild right  hydroureteronephrosis.  Distal ureter is poorly visualized, no large obstructing stone.     Small right pleural effusion.    - MELD 3.0: 15 at 2025  6:32 AM  MELD-Na: 11 at 2025  6:32 AM  Calculated from:  Serum Creatinine: 0.8 mg/dL (Using min of 1 mg/dL) at 2025  6:32 AM  Serum Sodium: 133 mmol/L at 2025  6:32 AM  Total Bilirubin: 1.1 mg/dL at 2025  6:32 AM  Serum Albumin: 2.1 g/dL at 2025  6:32 AM  INR(ratio): 1.4 at 2025  6:31 AM  Age at listin years  Sex: Male at 2025  6:32 AM

## 2025-07-28 NOTE — ASSESSMENT & PLAN NOTE
Patient's FSGs are controlled on current medication regimen.  Last A1c reviewed-   Lab Results   Component Value Date    HGBA1C 8.8 (H) 07/20/2025     Most recent fingerstick glucose reviewed-   Recent Labs   Lab 07/27/25  1710 07/27/25 2126   POCTGLUCOSE 118* 114*     Current correctional scale  Low  Maintain anti-hyperglycemic dose as follows-   Antihyperglycemics (From admission, onward)      Start     Stop Route Frequency Ordered    07/23/25 2100  insulin glargine U-100 (Lantus) pen 18 Units         -- SubQ Nightly 07/23/25 1117    07/23/25 1130  insulin aspart U-100 pen 7 Units         -- SubQ 3 times daily with meals 07/23/25 1119    07/20/25 2316  insulin aspart U-100 pen 0-5 Units         -- SubQ Before meals & nightly PRN 07/20/25 2217          Hold Oral hypoglycemics while patient is in the hospital.  - decreased insulin Glargine to 18 units daily and aspart 7 units TID with meals

## 2025-07-28 NOTE — ASSESSMENT & PLAN NOTE
Patient with hx CORDOVA cirrhosis c/b ascites (requiring paracentesis, on lasix 40 mg qd, eplerenone), grade 1 esophageal varices (EGD on 11/8/2024), hepatic encephalopathy 5 week hx abdominal, back pain since last paracentesis without fevers, chills, nausea, vomiting, diarrhea. CT L spine with congenital lumbar stenosis, degenerative changes. CT A/P with concern for peritonitis. Also noted splenomegaly and portal vein thrombosis, seen previously. INR 1.8. Patient had noted lactic acidosis at 2.9, now resolved. Overalll suspect abdominal pain 2/2 ascites with c/f SBP vs. Worsening of chronic portal vein thrombosis Given CTX and  pRBCs at OSH. Transferred to [facility] for further workup and evaluation. MELD-Na 17.     - Hold lasix and eplerenone   - continue home rifaxamin   - CTM CBC, CMP, PT/INR daily   - Hepatology consulted, appreciate recs  - started lactulose 10mg BID for hepatic encephalopathy prophylaxis   - Liver US showed cirrhotic morphology of the liver, sequela of portal hypertension and large volume of complicated ascites   - paracentesis on 7/21 pulled 4250mL, WBC 14,960 w/ 96% PNMs and aerobic culture was positive for strep mitis oralis   - repeat para on 7/25 pulled 2500mL, WBC 14,860  - per ID, stopped vanc and CTX, started on IV meropenem due to concern of infection with pathogen not covered by previous abx   - will repeat para 5 days after start of meropenem

## 2025-07-28 NOTE — PLAN OF CARE
07/28/25 1721   Rounds   Attendance Charge nurse;Nurse;   Discharge Plan A Home with family   Why the patient remains in the hospital Requires continued medical care  (Liver Work-Up)   Transition of Care Barriers None     Blood/Platelets. Stool for Occult Blood.     Discharge Plan A and Plan B have been determined by review of patient's clinical status, future medical and therapeutic needs, and coverage/benefits for post-acute care in coordination with multidisciplinary team members.     Mine Dueñas LMSW

## 2025-07-28 NOTE — ASSESSMENT & PLAN NOTE
Patient's FSGs are controlled on current medication regimen.  Last A1c reviewed-   Lab Results   Component Value Date    HGBA1C 8.8 (H) 07/20/2025     Most recent fingerstick glucose reviewed-   Recent Labs   Lab 07/27/25  0817 07/27/25  1710 07/27/25 2126   POCTGLUCOSE 134* 118* 114*     Current correctional scale  Low  Maintain anti-hyperglycemic dose as follows-   Antihyperglycemics (From admission, onward)      Start     Stop Route Frequency Ordered    07/23/25 2100  insulin glargine U-100 (Lantus) pen 18 Units         -- SubQ Nightly 07/23/25 1117    07/23/25 1130  insulin aspart U-100 pen 7 Units         -- SubQ 3 times daily with meals 07/23/25 1119    07/20/25 2316  insulin aspart U-100 pen 0-5 Units         -- SubQ Before meals & nightly PRN 07/20/25 2217          Hold Oral hypoglycemics while patient is in the hospital.  - decreased insulin Glargine to 18 units daily and aspart 7 units TID with meals

## 2025-07-28 NOTE — ASSESSMENT & PLAN NOTE
The likely etiology of thrombocytopenia is liver disease. The patients 3 most recent labs are listed below.  Recent Labs     07/26/25  2155 07/27/25  0621 07/28/25  0632   PLT 41* 36* 34*     Plan  - Will transfuse if platelet count is <50k (if undergoing surgical procedure or have active bleeding).  - CTM CBC, PT/INR qd  - received 3U of platelets while admitted at [facility]

## 2025-07-28 NOTE — ASSESSMENT & PLAN NOTE
Patient with hx CORDOVA cirrhosis c/b ascites (requiring paracentesis, on lasix 40 mg qd, eplerenone), grade 1 esophageal varices (EGD on 11/8/2024), hepatic encephalopathy 5 week hx abdominal, back pain since last paracentesis without fevers, chills, nausea, vomiting, diarrhea. CT L spine with congenital lumbar stenosis, degenerative changes. CT A/P with concern for peritonitis. Also noted splenomegaly and portal vein thrombosis, seen previously. INR 1.8. Patient had noted lactic acidosis at 2.9, now resolved. Overalll suspect abdominal pain 2/2 ascites with c/f SBP vs. Worsening of chronic portal vein thrombosis Given CTX and  pRBCs at OSH. Transferred to [facility] for further workup and evaluation. MELD-Na 17.     - Hold lasix and eplerenone   - continue home rifaxamin   - CTM CBC, CMP, PT/INR daily   - Hepatology consulted, appreciate recs  - started lactulose 10mg BID for hepatic encephalopathy prophylaxis   - Liver US showed cirrhotic morphology of the liver, sequela of portal hypertension and large volume of complicated ascites   - paracentesis on 7/21 pulled 4250mL, WBC 14,960 w/ 96% PNMs and aerobic culture was positive for strep mitis oralis   - repeat para on 7/25 pulled 2500mL, WBC 14,860  - per ID, stopped vanc and CTX, started on IV meropenem due to concern of infection with pathogen not covered by previous abx   - will repeat para 5 days after start of meropenem

## 2025-07-28 NOTE — PLAN OF CARE
[facility] - Telemetry Stepdown  Discharge Reassessment    Primary Care Provider: Sarmad Estrella MD    Expected Discharge Date: 7/29/2025    Reassessment (most recent)       Discharge Reassessment - 07/28/25 1723          Discharge Reassessment    Assessment Type Discharge Planning Reassessment (P)      Did the patient's condition or plan change since previous assessment? No (P)      Communicated GARY with patient/caregiver Date not available/Unable to determine (P)      Discharge Plan A Home with family (P)      Discharge Plan B Home (P)      DME Needed Upon Discharge  other (see comments) (P)    TBD.    Transition of Care Barriers None (P)      Why the patient remains in the hospital Requires continued medical care (P)                    Discharge Plan A and Plan B have been determined by review of patient's clinical status, future medical and therapeutic needs, and coverage/benefits for post-acute care in coordination with multidisciplinary team members.     Mine Dueñas LMSW

## 2025-07-28 NOTE — ASSESSMENT & PLAN NOTE
Patient with hx CORDOVA cirrhosis c/b ascites (requiring paracentesis, on lasix 40 mg qd, eplerenone), grade 1 esophageal varices (EGD on 11/8/2024), hepatic encephalopathy 5 week hx abdominal, back pain since last paracentesis without fevers, chills, nausea, vomiting, diarrhea. CT L spine with congenital lumbar stenosis, degenerative changes. CT A/P with concern for peritonitis. Also noted splenomegaly and portal vein thrombosis, seen previously. INR 1.8. Patient had noted lactic acidosis at 2.9, now resolved. Overalll suspect abdominal pain 2/2 ascites with c/f SBP vs. Worsening of chronic portal vein thrombosis Given CTX and  pRBCs at OSH. Transferred to [facility] for further workup and evaluation. MELD-Na 17.     - Hold lasix and eplerenone   - continue home rifaxamin   - CTM CBC, CMP, PT/INR daily   - Hepatology consulted, appreciate recs  - started lactulose 10mg BID for hepatic encephalopathy prophylaxis   - Liver US showed cirrhotic morphology of the liver, sequela of portal hypertension and large volume of complicated ascites   - paracentesis on 7/21 pulled 4250mL, WBC 14,960 w/ 96% PNMs and aerobic culture was positive for strep mitis oralis   - repeat para on 7/25 pulled 2500mL, WBC 14,860  - per ID, stopped vanc and CTX, started on IV meropenem due to concern of infection with pathogen not covered by previous abx   - will repeat para 5 days after start of meropenem

## 2025-07-28 NOTE — ASSESSMENT & PLAN NOTE
STABLE     MALT-rivka with bone marrow involvement.  Underwent treatment including repeated Rituxan q2 month infusions. Followed by Heme-Onc at [facility]. Most recent testing has suggested remission leading to potential transplant candidacy for cirrhosis to be re-instituted.

## 2025-07-28 NOTE — PROGRESS NOTES
[facility] - Telemetry [facility] Medicine  Progress Note    Patient Name: Corky Bach  MRN: [id number]  Patient Class: IP- Inpatient   Admission Date: 7/20/2025  Length of Stay: 8 days  Attending Physician: David Mak MD  Primary Care Provider: Sarmad Estrella MD        Subjective     Principal Problem: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        HPI:  Corky Bach is a 58 y/o male with a notable hx of CORDOVA cirrhosis c/b esophageal varices, T2DM, MALT lymphoma, HLD, gout, and depression [place] [facility] [place].     Patient initially presented to the ED of [facility] on 7/20 with complaints of chronic back and abdominal pain that had been ongoing for appx 5 weeks. He states that the back pain began to occur shortly after his last paracentesis on 7/12 located around his mid lumbar spine, starting with a dull back/ flank pain and quickly radiating to upper abdominal pain. He was trying to use ibuprofen and tylenol to help with the pain however the pain continued to progress. He eventually started to take percocet from a prior hospitalization however this was ineffective. He denies any fevers, chills, nausea, vomiting, constipation/diarrhea, melena, hematemesis during this time. At this point he presented to the ED for further evaluation.     Initially BPs were in the low 100s. He was found to be heme occult positive. Hb 6.9 and platelets 67. AST 13, ALT 6, , bili 1.2, INR 1.8, lactic 2.9. CT Lumbar spine obtained without acute lumbar fracture, congenital lumbar stenosis with degenerative changes. CT A/P without contrast showed moderate r sized pleural effusion, splenomegaly, calcified portal vein thrombus, ascites with thickening of surrounding peritoneal reflections c/f inflammatory peritonitis. Patient started on CTX, given 1u PRBC, and [place] [facility] [place].           Overview/Hospital Course:  Patient is a 59 M w/ hx of CORDOVA  cirrhosis, c/b esophageal varices, T2DM, MALT lymphoma, HLD, gout, admitted for abdminal pain and suspected SBP with decompensated cirrhosis and ascites. Found to have a hemoglobin of 6.9 on presentation, received 1 unit PRBC, no signs of bleeding. Discussed with hepatology and they did not think his presentation was c/f a GIB. Treated empirically for suspected SBP with ceftriaxone. Diagnostic and therapeutic paracentesis drained 4250 mL, lab results positive for SBP. Awaiting cultures. Ascites is now resolved after paracentesis. Hemoglobin and platelets were low today, 6.4 and 40 respectively. Transfused 1U PRBCs and 1U platelets on 7/22. Monitoring hemoglobin. Continuing ceftriaxone and albumin for SBP treatment, added vanc to cover enterococcus while awaiting culture data. Hemoglobin dropped again, transfused 1U PRBCs on 7/24. Hemoglobin stable. Aerobic culture was positive for strep mitis oralis. Repeat paracentesis on 7/25, pulled 2.5 L. Labs revealed an ascitic fluid WBC count of around 14K again. ID recommended to discontinue vanc and CTX and start IV meropenem. Will repeat paracentesis after 5 days of meropenem to re-assess the WBC count.    No new subjective & objective note has been filed under this hospital service since the last note was generated.        Assessment & Plan  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  Abdominal pain  Ascites  Portal hypertensive gastropathy  Portal vein thrombosis  Splenomegaly  SBP (spontaneous bacterial peritonitis)  Patient with hx CORDOVA cirrhosis c/b ascites (requiring paracentesis, on lasix 40 mg qd, eplerenone), grade 1 esophageal varices (EGD on 11/8/2024), hepatic encephalopathy 5 week hx abdominal, back pain since last paracentesis without fevers, chills, nausea, vomiting, diarrhea. CT L spine with congenital lumbar stenosis, degenerative changes. CT A/P with concern for peritonitis. Also noted splenomegaly and portal vein thrombosis, seen previously. INR 1.8. Patient had  noted lactic acidosis at 2.9, now resolved. Overalll suspect abdominal pain 2/2 ascites with c/f SBP vs. Worsening of chronic portal vein thrombosis Given CTX and  pRBCs at OSH. Transferred to [facility] for further workup and evaluation. MELD-Na 17.     - Hold lasix and eplerenone   - continue home rifaxamin   - CTM CBC, CMP, PT/INR daily   - Hepatology consulted, appreciate recs  - started lactulose 10mg BID for hepatic encephalopathy prophylaxis   - Liver US showed cirrhotic morphology of the liver, sequela of portal hypertension and large volume of complicated ascites   - paracentesis on 7/21 pulled 4250mL, WBC 14,960 w/ 96% PNMs and aerobic culture was positive for strep mitis oralis   - repeat para on 7/25 pulled 2500mL, WBC 14,860  - per ID, stopped vanc and CTX, started on IV meropenem due to concern of infection with pathogen not covered by previous abx   - will repeat para 5 days after start of meropenem   Anemia  Patient with hx cirrhosis c/b grade 1 esophageal varices, moderate portal hypertensive gastropathy, multiple angioectasias w/o bleeding (Last EGD 11/8/2024). Presenting with noted anemia of 6.9 at OSH. S/p 1 uPRBC.     Plan  - Monitor serial CBC: Daily  - Transfuse PRBC if patient becomes hemodynamically unstable, symptomatic or H/H drops below 7/21  - 1u pRBCs before admission at OSH, now 3u pRBCs while admitted at [facility]  - consulted GI, anemia most likely due to decompensated cirrhosis no acute intervention needed at the moment  - Hemolytic anemia labs unremarkable   Mood disorder  Continue home venlafaxine  Hyponatremia  Hyponatremia is likely due to Cirrhosis. The patient's most recent sodium results are listed below.  Recent Labs     07/26/25  0626 07/27/25  0621 07/28/25  0632   * 136 133*     Plan  - Correct the sodium by 4-6mEq in 24 hours.   - Monitor sodium Daily.   - Patient hyponatremia is stable  Thrombocytopenia  The likely etiology of thrombocytopenia is liver disease. The patients 3  most recent labs are listed below.  Recent Labs     07/26/25  2155 07/27/25  0621 07/28/25  0632   PLT 41* 36* 34*     Plan  - Will transfuse if platelet count is <50k (if undergoing surgical procedure or have active bleeding).  - CTM CBC, PT/INR qd  - received 3U of platelets while admitted at [place]   Benign prostatic hyperplasia with incomplete bladder emptying  - continue home flomax  MALT lymphoma  STABLE     MALT-rivka with bone marrow involvement.  Underwent treatment including repeated Rituxan q2 month infusions. Followed by Heme-Onc at [facility]. Most recent testing has suggested remission leading to potential transplant candidacy for cirrhosis to be re-instituted.   Type 2 diabetes mellitus with hyperglycemia, with long-term current use of insulin  Patient's FSGs are controlled on current medication regimen.  Last A1c reviewed-   Lab Results   Component Value Date    HGBA1C 8.8 (H) 07/20/2025     Most recent fingerstick glucose reviewed-   Recent Labs   Lab 07/27/25  0817 07/27/25  1710 07/27/25  2126   POCTGLUCOSE 134* 118* 114*     Current correctional scale  Low  Maintain anti-hyperglycemic dose as follows-   Antihyperglycemics (From admission, onward)      Start     Stop Route Frequency Ordered    07/23/25 2100  insulin glargine U-100 (Lantus) pen 18 Units         -- SubQ Nightly 07/23/25 1117    07/23/25 1130  insulin aspart U-100 pen 7 Units         -- SubQ 3 times daily with meals 07/23/25 1119    07/20/25 2316  insulin aspart U-100 pen 0-5 Units         -- SubQ Before meals & nightly PRN 07/20/25 2217          Hold Oral hypoglycemics while patient is in the hospital.  - decreased insulin Glargine to 18 units daily and aspart 7 units TID with meals   Mixed hyperlipidemia  - not currently on medication     VTE Risk Mitigation (From admission, onward)           Ordered     Reason for No Pharmacological VTE Prophylaxis  Once        Question:  Reasons:  Answer:  Risk of Bleeding    07/20/25 2020     IP VTE  HIGH RISK PATIENT  Once         07/20/25 2020     Place sequential compression device  Until discontinued         07/20/25 2020                    Discharge Planning   GARY: 7/29/2025     Code Status: Full Code   Medical Readiness for Discharge Date:   Discharge Plan A: Home with family   Discharge Delays: None known at this time                    Amanda Aguilar DO  Department of Hospital Medicine   [facility] - Telemetry Stepdown

## 2025-07-28 NOTE — ASSESSMENT & PLAN NOTE
Patient with hx CORDOVA cirrhosis c/b ascites (requiring paracentesis, on lasix 40 mg qd, eplerenone), grade 1 esophageal varices (EGD on 11/8/2024), hepatic encephalopathy 5 week hx abdominal, back pain since last paracentesis without fevers, chills, nausea, vomiting, diarrhea. CT L spine with congenital lumbar stenosis, degenerative changes. CT A/P with concern for peritonitis. Also noted splenomegaly and portal vein thrombosis, seen previously. INR 1.8. Patient had noted lactic acidosis at 2.9, now resolved. Overalll suspect abdominal pain 2/2 ascites with c/f SBP vs. Worsening of chronic portal vein thrombosis Given CTX and  pRBCs at OSH. Transferred to [facility] for further workup and evaluation.     -started Lasix 20mg and spirolactone 50mg   - continue home rifaxamin   - CTM CBC, CMP, PT/INR daily   - started lactulose 10mg BID for hepatic encephalopathy prophylaxis, patient having 4 BM daily, d/c senna and miralax as lactulose is sufficient for bowel regimen   - Liver US showed cirrhotic morphology of the liver, sequela of portal hypertension and large volume of complicated ascites   - paracentesis on 7/21 pulled 4250mL, WBC 14,960 w/ 96% PNMs and aerobic culture was positive for strep mitis oralis   - repeat para on 7/25 pulled 2500mL, WBC 14,860  - per ID, stopped vanc and CTX, started on IV meropenem due to concern of infection with pathogen not covered by previous abx   -Day 2 of IV meropenem, will repeat paracentesis after 5 days of meropenem therapy per ID. If SBP resolved on repeat para, will transition to Cipro 500mg   -repeat CT abdomen/pelvis:   Moderate to large volume ascites with findings suggestive of associated peritonitis.     Cirrhotic liver morphology with sequelae of portal hypertension including massive splenomegaly, upper abdominal collateral vessels, recanalized umbilical vein, and ascites.     Chronic partially thrombosed portal vein and splenic vein, similar to prior.     Mild right  hydroureteronephrosis.  Distal ureter is poorly visualized, no large obstructing stone.     Small right pleural effusion.    - MELD 3.0: 15 at 2025  6:32 AM  MELD-Na: 11 at 2025  6:32 AM  Calculated from:  Serum Creatinine: 0.8 mg/dL (Using min of 1 mg/dL) at 2025  6:32 AM  Serum Sodium: 133 mmol/L at 2025  6:32 AM  Total Bilirubin: 1.1 mg/dL at 2025  6:32 AM  Serum Albumin: 2.1 g/dL at 2025  6:32 AM  INR(ratio): 1.4 at 2025  6:31 AM  Age at listin years  Sex: Male at 2025  6:32 AM

## 2025-07-28 NOTE — ASSESSMENT & PLAN NOTE
Patient with hx CORDOVA cirrhosis c/b ascites (requiring paracentesis, on lasix 40 mg qd, eplerenone), grade 1 esophageal varices (EGD on 11/8/2024), hepatic encephalopathy 5 week hx abdominal, back pain since last paracentesis without fevers, chills, nausea, vomiting, diarrhea. CT L spine with congenital lumbar stenosis, degenerative changes. CT A/P with concern for peritonitis. Also noted splenomegaly and portal vein thrombosis, seen previously. INR 1.8. Patient had noted lactic acidosis at 2.9, now resolved. Overalll suspect abdominal pain 2/2 ascites with c/f SBP vs. Worsening of chronic portal vein thrombosis Given CTX and  pRBCs at OSH. [place] [facility] [place] and [place]. MELD-Na 17.     - Hold lasix and eplerenone   - continue home rifaxamin   - CTM CBC, CMP, PT/INR daily   - Hepatology consulted, appreciate recs  - started lactulose 10mg BID for hepatic encephalopathy prophylaxis   - Liver US showed cirrhotic morphology of the liver, sequela of portal hypertension and large volume of complicated ascites   - paracentesis on 7/21 pulled 4250mL, WBC 14,960 w/ 96% PNMs and aerobic culture was positive for strep mitis oralis   - repeat para on 7/25 pulled 2500mL, WBC 14,860  - per ID, stopped vanc and CTX, started on IV meropenem due to concern of infection with pathogen not covered by previous abx   - will repeat para 5 days after start of meropenem

## 2025-07-28 NOTE — ASSESSMENT & PLAN NOTE
Patient with hx cirrhosis c/b grade 1 esophageal varices, moderate portal hypertensive gastropathy, multiple angioectasias w/o bleeding (Last EGD 11/8/2024). Presenting with noted anemia of 6.9 at OSH. S/p 1 uPRBC.     Plan  - Monitor serial CBC: Daily  - Transfuse PRBC if patient becomes hemodynamically unstable, symptomatic or H/H drops below   -Down trending today 7.8 > 7.5. No signs of active bleeding   - 1u pRBCs before admission at OSH, now 3u pRBCs while admitted at [facility]  - consulted GI, anemia most likely due to decompensated cirrhosis no acute intervention needed at the moment   - Hemolytic anemia labs unremarkable

## 2025-07-28 NOTE — ASSESSMENT & PLAN NOTE
The likely etiology of thrombocytopenia is liver disease. The patients 3 most recent labs are listed below.  Recent Labs     07/26/25  2155 07/27/25  0621 07/28/25  0632   PLT 41* 36* 34*     Plan  - Will transfuse if platelet count is <50k (if undergoing surgical procedure or have active bleeding).  - CTM CBC, PT/INR qd  - received 3U of platelets while admitted at [facility]

## 2025-07-28 NOTE — PROGRESS NOTES
[facility] - Telemetry [facility] Medicine  Progress Note    Patient Name: Corky Bach  MRN: [id number]  Patient Class: IP- Inpatient   Admission Date: 7/20/2025  Length of Stay: 8 days  Attending Physician: David Mak MD  Primary Care Provider: Sarmad Estrella MD    Subjective     Principal Problem: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    HPI:  Corky Bach is a 58 y/o male with a notable hx of CORDOVA cirrhosis c/b esophageal varices, T2DM, MALT lymphoma, HLD, gout, and depression transferred to [facility] for hepatology and GI consult.     Patient initially presented to the ED of [facility] on 7/20 with complaints of chronic back and abdominal pain that had been ongoing for appx 5 weeks. He states that the back pain began to occur shortly after his last paracentesis on 7/12 located around his mid lumbar spine, starting with a dull back/ flank pain and quickly radiating to upper abdominal pain. He was trying to use ibuprofen and tylenol to help with the pain however the pain continued to progress. He eventually started to take percocet from a prior hospitalization however this was ineffective. He denies any fevers, chills, nausea, vomiting, constipation/diarrhea, melena, hematemesis during this time. At this point he presented to the ED for further evaluation.     Initially BPs were in the low 100s. He was found to be heme occult positive. Hb 6.9 and platelets 67. AST 13, ALT 6, , bili 1.2, INR 1.8, lactic 2.9. CT Lumbar spine obtained without acute lumbar fracture, congenital lumbar stenosis with degenerative changes. CT A/P without contrast showed moderate r sized pleural effusion, splenomegaly, calcified portal vein thrombus, ascites with thickening of surrounding peritoneal reflections c/f inflammatory peritonitis. Patient started on CTX, given 1u PRBC, and transferred to [facility] for GI/Hepatology evaluation.         Overview/Hospital Course:  Patient is a 59 M w/ hx of CORDOVA cirrhosis, c/b  esophageal varices, T2DM, MALT lymphoma, HLD, gout, admitted for abdminal pain and suspected SBP with decompensated cirrhosis and ascites. Found to have a hemoglobin of 6.9 on presentation, received 1 unit PRBC, no signs of bleeding. Discussed with hepatology and they did not think his presentation was c/f a GIB. Treated empirically for suspected SBP with ceftriaxone. Diagnostic and therapeutic paracentesis drained 4250 mL, lab results positive for SBP. Awaiting cultures. Ascites is now resolved after paracentesis. Hemoglobin and platelets were low today, 6.4 and 40 respectively. Transfused 1U PRBCs and 1U platelets on 7/22. Monitoring hemoglobin. Continuing ceftriaxone and albumin for SBP treatment, added vanc to cover enterococcus while awaiting culture data. Hemoglobin dropped again, transfused 1U PRBCs on 7/24. Hemoglobin stable. Aerobic culture was positive for strep mitis oralis. Repeat paracentesis on 7/25, pulled 2.5 L. Labs revealed an ascitic fluid WBC count of around 14K again. ID recommended to discontinue vanc and CTX and start IV meropenem. Will repeat paracentesis after 5 days of meropenem to re-assess the WBC count.    Interval History: NAEON. Patient reporting 4 medium volume bowel movements overnight, soft and without blood. Patient noticed lump in LLQ at paracentesis site this morning. Tender to palpation, fixed and hard. No overlying erythema but bruising present on skin. Denies other complaints.     Review of Systems   Constitutional:  Negative for chills, diaphoresis, fatigue and fever.   HENT:  Negative for congestion and sore throat.    Eyes:  Negative for photophobia and pain.   Respiratory:  Negative for cough, chest tightness, shortness of breath, wheezing and stridor.    Cardiovascular:  Negative for chest pain, palpitations and leg swelling.   Gastrointestinal:  Positive for abdominal distention. Negative for abdominal pain, blood in stool, constipation, diarrhea, nausea and vomiting.    Genitourinary:  Negative for difficulty urinating and frequency.   Musculoskeletal:  Negative for arthralgias, back pain and joint swelling.   Neurological:  Negative for dizziness and headaches.   Psychiatric/Behavioral:  Negative for agitation.      Objective:     Vital Signs (Most Recent):  Temp: 98 °F (36.7 °C) (07/28/25 0751)  Pulse: 79 (07/28/25 0751)  Resp: 18 (07/28/25 0751)  BP: 108/68 (07/28/25 0751)  SpO2: 98 % (07/28/25 0751) Vital Signs (24h Range):  Temp:  [97.9 °F (36.6 °C)-98.6 °F (37 °C)] 98 °F (36.7 °C)  Pulse:  [73-79] 79  Resp:  [15-18] 18  SpO2:  [98 %-100 %] 98 %  BP: (100-113)/(55-68) 108/68     Weight: 80.6 kg (177 lb 11.1 oz)  Body mass index is 22.81 kg/m².    Intake/Output Summary (Last 24 hours) at 7/28/2025 0849  Last data filed at 7/28/2025 0557  Gross per 24 hour   Intake 140 ml   Output --   Net 140 ml         Physical Exam  Constitutional:       General: He is not in acute distress.     Appearance: He is ill-appearing.   HENT:      Head: Normocephalic and atraumatic.      Mouth/Throat:      Mouth: Mucous membranes are dry.   Cardiovascular:      Rate and Rhythm: Normal rate and regular rhythm.      Pulses: Normal pulses.      Heart sounds: Murmur heard.   Pulmonary:      Effort: Pulmonary effort is normal. No respiratory distress.      Breath sounds: No wheezing.   Abdominal:      General: There is distension.      Tenderness: There is abdominal tenderness.      Hernia: A hernia is present.      Comments: LLQ mass at previous paracentesis site. TTP. Bruising noted on skin overlying mass.    Musculoskeletal:      Right lower leg: No edema.      Left lower leg: No edema.   Skin:     Coloration: Skin is pale.   Neurological:      General: No focal deficit present.      Mental Status: He is alert.   Psychiatric:         Mood and Affect: Mood normal.         Behavior: Behavior normal.               Significant Labs: All pertinent labs within the past 24 hours have been reviewed.  BMP:    Recent Labs   Lab 07/28/25  0632   *   *   K 3.7      CO2 24   BUN 18   CREATININE 0.8   CALCIUM 7.6*   MG 1.5*     CBC:   Recent Labs   Lab 07/26/25  2155 07/27/25  0621 07/28/25  0632   WBC 7.90 5.06 5.21   HGB 8.2* 7.8* 7.5*   HCT 25.4* 24.3* 24.1*   PLT 41* 36* 34*     Magnesium:   Recent Labs   Lab 07/27/25  0621 07/28/25  0632   MG 1.7 1.5*     POCT Glucose:   Recent Labs   Lab 07/27/25  0817 07/27/25  1710 07/27/25  2126   POCTGLUCOSE 134* 118* 114*       Significant Imaging: I have reviewed all pertinent imaging results/findings within the past 24 hours.  I have reviewed and interpreted all pertinent imaging results/findings within the past 24 hours.      Assessment & Plan  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  Abdominal pain  Ascites  Portal hypertensive gastropathy  Portal vein thrombosis  Splenomegaly  SBP (spontaneous bacterial peritonitis)  Patient with hx CORDOVA cirrhosis c/b ascites (requiring paracentesis, on lasix 40 mg qd, eplerenone), grade 1 esophageal varices (EGD on 11/8/2024), hepatic encephalopathy 5 week hx abdominal, back pain since last paracentesis without fevers, chills, nausea, vomiting, diarrhea. CT L spine with congenital lumbar stenosis, degenerative changes. CT A/P with concern for peritonitis. Also noted splenomegaly and portal vein thrombosis, seen previously. INR 1.8. Patient had noted lactic acidosis at 2.9, now resolved. Overalll suspect abdominal pain 2/2 ascites with c/f SBP vs. Worsening of chronic portal vein thrombosis Given CTX and  pRBCs at OSH. [place] [facility] and evaluation.     -started Lasix 20mg and spirolactone 50mg   - continue home rifaxamin   - CTM CBC, CMP, PT/INR daily   - started lactulose 10mg BID for hepatic encephalopathy prophylaxis, patient having 4 BM daily, d/c senna and miralax as lactulose is sufficient for bowel regimen   - Liver US showed cirrhotic morphology of the liver, sequela of portal hypertension and  large volume of complicated ascites   - paracentesis on  pulled 4250mL, WBC 14,960 w/ 96% PNMs and aerobic culture was positive for strep mitis oralis   - repeat para on  pulled 2500mL, WBC 14,860  - per ID, stopped vanc and CTX, started on IV meropenem due to concern of infection with pathogen not covered by previous abx   -Day 2 of IV meropenem, will repeat paracentesis after 5 days of meropenem therapy per ID. If SBP resolved on repeat para, will transition to Cipro 500mg   -repeat CT abdomen/pelvis:   Moderate to large volume ascites with findings suggestive of associated peritonitis.     Cirrhotic liver morphology with sequelae of portal hypertension including massive splenomegaly, upper abdominal collateral vessels, recanalized umbilical vein, and ascites.     Chronic partially thrombosed portal vein and splenic vein, similar to prior.     Mild right hydroureteronephrosis.  Distal ureter is poorly visualized, no large obstructing stone.     Small right pleural effusion.    - MELD 3.0: 15 at 2025  6:32 AM  MELD-Na: 11 at 2025  6:32 AM  Calculated from:  Serum Creatinine: 0.8 mg/dL (Using min of 1 mg/dL) at 2025  6:32 AM  Serum Sodium: 133 mmol/L at 2025  6:32 AM  Total Bilirubin: 1.1 mg/dL at 2025  6:32 AM  Serum Albumin: 2.1 g/dL at 2025  6:32 AM  INR(ratio): 1.4 at 2025  6:31 AM  Age at listin years  Sex: Male at 2025  6:32 AM     Anemia  Patient with hx cirrhosis c/b grade 1 esophageal varices, moderate portal hypertensive gastropathy, multiple angioectasias w/o bleeding (Last EGD 2024). Presenting with noted anemia of 6.9 at OSH. S/p 1 uPRBC.     Plan  - Monitor serial CBC: Daily  - Transfuse PRBC if patient becomes hemodynamically unstable, symptomatic or H/H drops below   -Down trending today 7.8 > 7.5. No signs of active bleeding   - 1u pRBCs before admission at OSH, now 3u pRBCs while admitted at [facility]  - consulted GI, anemia most likely due to  decompensated cirrhosis no acute intervention needed at the moment   - Hemolytic anemia labs unremarkable   Mood disorder  Continue home venlafaxine  Hyponatremia  Hyponatremia is likely due to Cirrhosis. The patient's most recent sodium results are listed below.  Recent Labs     07/26/25  0626 07/27/25  0621 07/28/25  0632   * 136 133*     Plan  - Correct the sodium by 4-6mEq in 24 hours.   - Monitor sodium Daily.   - Patient hyponatremia is stable  Thrombocytopenia  The likely etiology of thrombocytopenia is liver disease. The patients 3 most recent labs are listed below.  Recent Labs     07/26/25  2155 07/27/25  0621 07/28/25  0632   PLT 41* 36* 34*     Plan  - Will transfuse if platelet count is <50k (if undergoing surgical procedure or have active bleeding).  - CTM CBC, PT/INR qd  - received 3U of platelets while admitted at [facility]   Benign prostatic hyperplasia with incomplete bladder emptying  - continue home flomax  MALT lymphoma  STABLE     MALT-rivka with bone marrow involvement.  Underwent treatment including repeated Rituxan q2 month infusions. Followed by Heme-Onc at [facility]. Most recent testing has suggested remission leading to potential transplant candidacy for cirrhosis to be re-instituted.   Type 2 diabetes mellitus with hyperglycemia, with long-term current use of insulin  Patient's FSGs are controlled on current medication regimen.  Last A1c reviewed-   Lab Results   Component Value Date    HGBA1C 8.8 (H) 07/20/2025     Most recent fingerstick glucose reviewed-   Recent Labs   Lab 07/27/25  1710 07/27/25  2126   POCTGLUCOSE 118* 114*     Current correctional scale  Low  Maintain anti-hyperglycemic dose as follows-   Antihyperglycemics (From admission, onward)      Start     Stop Route Frequency Ordered    07/23/25 2100  insulin glargine U-100 (Lantus) pen 18 Units         -- SubQ Nightly 07/23/25 1117    07/23/25 1130  insulin aspart U-100 pen 7 Units         -- SubQ 3 times daily with meals  07/23/25 1119    07/20/25 2316  insulin aspart U-100 pen 0-5 Units         -- SubQ Before meals & nightly PRN 07/20/25 2217          Hold Oral hypoglycemics while patient is in the hospital.  - decreased insulin Glargine to 18 units daily and aspart 7 units TID with meals   Mixed hyperlipidemia  - not currently on medication     VTE Risk Mitigation (From admission, onward)           Ordered     Reason for No Pharmacological VTE Prophylaxis  Once        Question:  Reasons:  Answer:  Risk of Bleeding    07/20/25 2020     IP VTE HIGH RISK PATIENT  Once         07/20/25 2020     Place sequential compression device  Until discontinued         07/20/25 2020                    Discharge Planning   GARY: 7/29/2025     Code Status: Full Code   Medical Readiness for Discharge Date:   Discharge Plan A: Home with family   Discharge Delays: None known at this time      Amanda Aguilar DO  Department of Hospital Medicine   [facility] - Telemetry Stepdown

## 2025-07-28 NOTE — ASSESSMENT & PLAN NOTE
Patient with hx CORDOVA cirrhosis c/b ascites (requiring paracentesis, on lasix 40 mg qd, eplerenone), grade 1 esophageal varices (EGD on 11/8/2024), hepatic encephalopathy 5 week hx abdominal, back pain since last paracentesis without fevers, chills, nausea, vomiting, diarrhea. CT L spine with congenital lumbar stenosis, degenerative changes. CT A/P with concern for peritonitis. Also noted splenomegaly and portal vein thrombosis, seen previously. INR 1.8. Patient had noted lactic acidosis at 2.9, now resolved. Overalll suspect abdominal pain 2/2 ascites with c/f SBP vs. Worsening of chronic portal vein thrombosis Given CTX and  pRBCs at OSH. Transferred to [facility] [place] for further workup and evaluation.     -started Lasix 20mg and spirolactone 50mg   - continue home rifaxamin   - CTM CBC, CMP, PT/INR daily   - started lactulose 10mg BID for hepatic encephalopathy prophylaxis, patient having 4 BM daily, d/c senna and miralax as lactulose is sufficient for bowel regimen   - Liver US showed cirrhotic morphology of the liver, sequela of portal hypertension and large volume of complicated ascites   - paracentesis on 7/21 pulled 4250mL, WBC 14,960 w/ 96% PNMs and aerobic culture was positive for strep mitis oralis   - repeat para on 7/25 pulled 2500mL, WBC 14,860  - per ID, stopped vanc and CTX, started on IV meropenem due to concern of infection with pathogen not covered by previous abx   -Day 2 of IV meropenem, will repeat paracentesis after 5 days of meropenem therapy per ID. If SBP resolved on repeat para, will transition to Cipro 500mg   -repeat CT abdomen/pelvis:   Moderate to large volume ascites with findings suggestive of associated peritonitis.     Cirrhotic liver morphology with sequelae of portal hypertension including massive splenomegaly, upper abdominal collateral vessels, recanalized umbilical vein, and ascites.     Chronic partially thrombosed portal vein and splenic vein, similar to prior.     Mild right  hydroureteronephrosis.  Distal ureter is poorly visualized, no large obstructing stone.     Small right pleural effusion.    - MELD 3.0: 15 at 2025  6:32 AM  MELD-Na: 11 at 2025  6:32 AM  Calculated from:  Serum Creatinine: 0.8 mg/dL (Using min of 1 mg/dL) at 2025  6:32 AM  Serum Sodium: 133 mmol/L at 2025  6:32 AM  Total Bilirubin: 1.1 mg/dL at 2025  6:32 AM  Serum Albumin: 2.1 g/dL at 2025  6:32 AM  INR(ratio): 1.4 at 2025  6:31 AM  Age at listin years  Sex: Male at 2025  6:32 AM

## 2025-07-28 NOTE — ASSESSMENT & PLAN NOTE
STABLE     MALT-rivka with bone marrow involvement.  Underwent treatment including repeated Rituxan q2 month infusions. Followed by Heme-Onc at [facility]. Most recent testing has suggested remission leading to potential transplant candidacy for cirrhosis to be re-instituted.

## 2025-07-28 NOTE — ASSESSMENT & PLAN NOTE
Patient with hx CORDOVA cirrhosis c/b ascites (requiring paracentesis, on lasix 40 mg qd, eplerenone), grade 1 esophageal varices (EGD on 11/8/2024), hepatic encephalopathy 5 week hx abdominal, back pain since last paracentesis without fevers, chills, nausea, vomiting, diarrhea. CT L spine with congenital lumbar stenosis, degenerative changes. CT A/P with concern for peritonitis. Also noted splenomegaly and portal vein thrombosis, seen previously. INR 1.8. Patient had noted lactic acidosis at 2.9, now resolved. Overalll suspect abdominal pain 2/2 ascites with c/f SBP vs. Worsening of chronic portal vein thrombosis Given CTX and  pRBCs at OSH. [place] [facility] [place].     -started Lasix 20mg and spirolactone 50mg   - continue home rifaxamin   - CTM CBC, CMP, PT/INR daily   - started lactulose 10mg BID for hepatic encephalopathy prophylaxis, patient having 4 BM daily, d/c senna and miralax as lactulose is sufficient for bowel regimen   - Liver US showed cirrhotic morphology of the liver, sequela of portal hypertension and large volume of complicated ascites   - paracentesis on 7/21 pulled 4250mL, WBC 14,960 w/ 96% PNMs and aerobic culture was positive for strep mitis oralis   - repeat para on 7/25 pulled 2500mL, WBC 14,860  - per ID, stopped vanc and CTX, started on IV meropenem due to concern of infection with pathogen not covered by previous abx   -Day 2 of IV meropenem, will repeat paracentesis after 5 days of meropenem therapy per ID. If SBP resolved on repeat para, will transition to Cipro 500mg   -repeat CT abdomen/pelvis:   Moderate to large volume ascites with findings suggestive of associated peritonitis.     Cirrhotic liver morphology with sequelae of portal hypertension including massive splenomegaly, upper abdominal collateral vessels, recanalized umbilical vein, and ascites.     Chronic partially thrombosed portal vein and splenic vein, similar to prior.     Mild right  hydroureteronephrosis.  Distal ureter is poorly visualized, no large obstructing stone.     Small right pleural effusion.    - MELD 3.0: 15 at 2025  6:32 AM  MELD-Na: 11 at 2025  6:32 AM  Calculated from:  Serum Creatinine: 0.8 mg/dL (Using min of 1 mg/dL) at 2025  6:32 AM  Serum Sodium: 133 mmol/L at 2025  6:32 AM  Total Bilirubin: 1.1 mg/dL at 2025  6:32 AM  Serum Albumin: 2.1 g/dL at 2025  6:32 AM  INR(ratio): 1.4 at 2025  6:31 AM  Age at listin years  Sex: Male at 2025  6:32 AM

## 2025-07-28 NOTE — SUBJECTIVE & OBJECTIVE
Interval History: NAEON. Patient reporting 4 medium volume bowel movements overnight, soft and without blood. Patient noticed lump in LLQ at paracentesis site this morning. Tender to palpation, fixed and hard. No overlying erythema but bruising present on skin. Denies other complaints.     Review of Systems   Constitutional:  Negative for chills, diaphoresis, fatigue and fever.   HENT:  Negative for congestion and sore throat.    Eyes:  Negative for photophobia and pain.   Respiratory:  Negative for cough, chest tightness, shortness of breath, wheezing and stridor.    Cardiovascular:  Negative for chest pain, palpitations and leg swelling.   Gastrointestinal:  Positive for abdominal distention. Negative for abdominal pain, blood in stool, constipation, diarrhea, nausea and vomiting.   Genitourinary:  Negative for difficulty urinating and frequency.   Musculoskeletal:  Negative for arthralgias, back pain and joint swelling.   Neurological:  Negative for dizziness and headaches.   Psychiatric/Behavioral:  Negative for agitation.      Objective:     Vital Signs (Most Recent):  Temp: 98 °F (36.7 °C) (07/28/25 0751)  Pulse: 79 (07/28/25 0751)  Resp: 18 (07/28/25 0751)  BP: 108/68 (07/28/25 0751)  SpO2: 98 % (07/28/25 0751) Vital Signs (24h Range):  Temp:  [97.9 °F (36.6 °C)-98.6 °F (37 °C)] 98 °F (36.7 °C)  Pulse:  [73-79] 79  Resp:  [15-18] 18  SpO2:  [98 %-100 %] 98 %  BP: (100-113)/(55-68) 108/68     Weight: 80.6 kg (177 lb 11.1 oz)  Body mass index is 22.81 kg/m².    Intake/Output Summary (Last 24 hours) at 7/28/2025 0876  Last data filed at 7/28/2025 0557  Gross per 24 hour   Intake 140 ml   Output --   Net 140 ml         Physical Exam  Constitutional:       General: He is not in acute distress.     Appearance: He is ill-appearing.   HENT:      Head: Normocephalic and atraumatic.      Mouth/Throat:      Mouth: Mucous membranes are dry.   Cardiovascular:      Rate and Rhythm: Normal rate and regular rhythm.       Pulses: Normal pulses.      Heart sounds: Murmur heard.   Pulmonary:      Effort: Pulmonary effort is normal. No respiratory distress.      Breath sounds: No wheezing.   Abdominal:      General: There is distension.      Tenderness: There is abdominal tenderness.      Hernia: A hernia is present.      Comments: LLQ mass at previous paracentesis site. TTP. Bruising noted on skin overlying mass.    Musculoskeletal:      Right lower leg: No edema.      Left lower leg: No edema.   Skin:     Coloration: Skin is pale.   Neurological:      General: No focal deficit present.      Mental Status: He is alert.   Psychiatric:         Mood and Affect: Mood normal.         Behavior: Behavior normal.               Significant Labs: All pertinent labs within the past 24 hours have been reviewed.  BMP:   Recent Labs   Lab 07/28/25  0632   *   *   K 3.7      CO2 24   BUN 18   CREATININE 0.8   CALCIUM 7.6*   MG 1.5*     CBC:   Recent Labs   Lab 07/26/25  2155 07/27/25  0621 07/28/25  0632   WBC 7.90 5.06 5.21   HGB 8.2* 7.8* 7.5*   HCT 25.4* 24.3* 24.1*   PLT 41* 36* 34*     Magnesium:   Recent Labs   Lab 07/27/25  0621 07/28/25  0632   MG 1.7 1.5*     POCT Glucose:   Recent Labs   Lab 07/27/25  0817 07/27/25  1710 07/27/25  2126   POCTGLUCOSE 134* 118* 114*       Significant Imaging: I have reviewed all pertinent imaging results/findings within the past 24 hours.  I have reviewed and interpreted all pertinent imaging results/findings within the past 24 hours.

## 2025-07-28 NOTE — ASSESSMENT & PLAN NOTE
Patient with hx CORDOVA cirrhosis c/b ascites (requiring paracentesis, on lasix 40 mg qd, eplerenone), grade 1 esophageal varices (EGD on 11/8/2024), hepatic encephalopathy 5 week hx abdominal, back pain since last paracentesis without fevers, chills, nausea, vomiting, diarrhea. CT L spine with congenital lumbar stenosis, degenerative changes. CT A/P with concern for peritonitis. Also noted splenomegaly and portal vein thrombosis, seen previously. INR 1.8. Patient had noted lactic acidosis at 2.9, now resolved. Overalll suspect abdominal pain 2/2 ascites with c/f SBP vs. Worsening of chronic portal vein thrombosis Given CTX and  pRBCs at OSH. Transferred to [facility] [place] for further workup and evaluation. MELD-Na 17.     - Hold lasix and eplerenone   - continue home rifaxamin   - CTM CBC, CMP, PT/INR daily   - Hepatology consulted, appreciate recs  - started lactulose 10mg BID for hepatic encephalopathy prophylaxis   - Liver US showed cirrhotic morphology of the liver, sequela of portal hypertension and large volume of complicated ascites   - paracentesis on 7/21 pulled 4250mL, WBC 14,960 w/ 96% PNMs and aerobic culture was positive for strep mitis oralis   - repeat para on 7/25 pulled 2500mL, WBC 14,860  - per ID, stopped vanc and CTX, started on IV meropenem due to concern of infection with pathogen not covered by previous abx   - will repeat para 5 days after start of meropenem

## 2025-07-28 NOTE — ASSESSMENT & PLAN NOTE
Patient with hx CORDOVA cirrhosis c/b ascites (requiring paracentesis, on lasix 40 mg qd, eplerenone), grade 1 esophageal varices (EGD on 11/8/2024), hepatic encephalopathy 5 week hx abdominal, back pain since last paracentesis without fevers, chills, nausea, vomiting, diarrhea. CT L spine with congenital lumbar stenosis, degenerative changes. CT A/P with concern for peritonitis. Also noted splenomegaly and portal vein thrombosis, seen previously. INR 1.8. Patient had noted lactic acidosis at 2.9, now resolved. Overalll suspect abdominal pain 2/2 ascites with c/f SBP vs. Worsening of chronic portal vein thrombosis Given CTX and  pRBCs at OSH. Transferred to [facility] [place] for further workup and evaluation.     -started Lasix 20mg and spirolactone 50mg   - continue home rifaxamin   - CTM CBC, CMP, PT/INR daily   - started lactulose 10mg BID for hepatic encephalopathy prophylaxis, patient having 4 BM daily, d/c senna and miralax as lactulose is sufficient for bowel regimen   - Liver US showed cirrhotic morphology of the liver, sequela of portal hypertension and large volume of complicated ascites   - paracentesis on 7/21 pulled 4250mL, WBC 14,960 w/ 96% PNMs and aerobic culture was positive for strep mitis oralis   - repeat para on 7/25 pulled 2500mL, WBC 14,860  - per ID, stopped vanc and CTX, started on IV meropenem due to concern of infection with pathogen not covered by previous abx   -Day 2 of IV meropenem, will repeat paracentesis after 5 days of meropenem therapy per ID. If SBP resolved on repeat para, will transition to Cipro 500mg   -repeat CT abdomen/pelvis:   Moderate to large volume ascites with findings suggestive of associated peritonitis.     Cirrhotic liver morphology with sequelae of portal hypertension including massive splenomegaly, upper abdominal collateral vessels, recanalized umbilical vein, and ascites.     Chronic partially thrombosed portal vein and splenic vein, similar to prior.     Mild right  hydroureteronephrosis.  Distal ureter is poorly visualized, no large obstructing stone.     Small right pleural effusion.    - MELD 3.0: 15 at 2025  6:32 AM  MELD-Na: 11 at 2025  6:32 AM  Calculated from:  Serum Creatinine: 0.8 mg/dL (Using min of 1 mg/dL) at 2025  6:32 AM  Serum Sodium: 133 mmol/L at 2025  6:32 AM  Total Bilirubin: 1.1 mg/dL at 2025  6:32 AM  Serum Albumin: 2.1 g/dL at 2025  6:32 AM  INR(ratio): 1.4 at 2025  6:31 AM  Age at listin years  Sex: Male at 2025  6:32 AM

## 2025-07-28 NOTE — ASSESSMENT & PLAN NOTE
Patient with hx CORDOVA cirrhosis c/b ascites (requiring paracentesis, on lasix 40 mg qd, eplerenone), grade 1 esophageal varices (EGD on 11/8/2024), hepatic encephalopathy 5 week hx abdominal, back pain since last paracentesis without fevers, chills, nausea, vomiting, diarrhea. CT L spine with congenital lumbar stenosis, degenerative changes. CT A/P with concern for peritonitis. Also noted splenomegaly and portal vein thrombosis, seen previously. INR 1.8. Patient had noted lactic acidosis at 2.9, now resolved. Overalll suspect abdominal pain 2/2 ascites with c/f SBP vs. Worsening of chronic portal vein thrombosis Given CTX and  pRBCs at OSH. Transferred to [facility] [place] for further workup and evaluation.     -started Lasix 20mg and spirolactone 50mg   - continue home rifaxamin   - CTM CBC, CMP, PT/INR daily   - started lactulose 10mg BID for hepatic encephalopathy prophylaxis, patient having 4 BM daily, d/c senna and miralax as lactulose is sufficient for bowel regimen   - Liver US showed cirrhotic morphology of the liver, sequela of portal hypertension and large volume of complicated ascites   - paracentesis on 7/21 pulled 4250mL, WBC 14,960 w/ 96% PNMs and aerobic culture was positive for strep mitis oralis   - repeat para on 7/25 pulled 2500mL, WBC 14,860  - per ID, stopped vanc and CTX, started on IV meropenem due to concern of infection with pathogen not covered by previous abx   -Day 2 of IV meropenem, will repeat paracentesis after 5 days of meropenem therapy per ID. If SBP resolved on repeat para, will transition to Cipro 500mg   -repeat CT abdomen/pelvis:   Moderate to large volume ascites with findings suggestive of associated peritonitis.     Cirrhotic liver morphology with sequelae of portal hypertension including massive splenomegaly, upper abdominal collateral vessels, recanalized umbilical vein, and ascites.     Chronic partially thrombosed portal vein and splenic vein, similar to prior.     Mild right  hydroureteronephrosis.  Distal ureter is poorly visualized, no large obstructing stone.     Small right pleural effusion.    - MELD 3.0: 15 at 2025  6:32 AM  MELD-Na: 11 at 2025  6:32 AM  Calculated from:  Serum Creatinine: 0.8 mg/dL (Using min of 1 mg/dL) at 2025  6:32 AM  Serum Sodium: 133 mmol/L at 2025  6:32 AM  Total Bilirubin: 1.1 mg/dL at 2025  6:32 AM  Serum Albumin: 2.1 g/dL at 2025  6:32 AM  INR(ratio): 1.4 at 2025  6:31 AM  Age at listin years  Sex: Male at 2025  6:32 AM

## 2025-07-28 NOTE — PLAN OF CARE
Problem: Adult Inpatient Plan of Care  Goal: Plan of Care Review  Outcome: Progressing     Problem: Diabetes Comorbidity  Goal: Blood Glucose Level Within Targeted Range  Outcome: Progressing  Intervention: Monitor and Manage Glycemia  Flowsheets (Taken 7/28/2025 0119)  Glycemic Management:   blood glucose monitored   oral hydration promoted         Patient awake, alert , oriented.  Pain assessed, pt denies having pain.  pt educated on safety, and medication use. Pt verbalized understanding.  Plan of care discussed with patient, patient verbalized understanding.   Call light and urinal within reach.Bed alarm on.  Bed  low and locked.

## 2025-07-29 LAB
ABSOLUTE EOSINOPHIL (OHS): 0.07 K/UL
ABSOLUTE MONOCYTE (OHS): 0.42 K/UL (ref 0.3–1)
ABSOLUTE NEUTROPHIL COUNT (OHS): 4.17 K/UL (ref 1.8–7.7)
ALBUMIN SERPL BCP-MCNC: 2.1 G/DL (ref 3.5–5.2)
ALP SERPL-CCNC: 163 UNIT/L (ref 40–150)
ALT SERPL W/O P-5'-P-CCNC: 10 UNIT/L (ref 0–55)
ANION GAP (OHS): 6 MMOL/L (ref 8–16)
AST SERPL-CCNC: 44 UNIT/L (ref 0–50)
BACTERIA SPEC AEROBE CULT: ABNORMAL
BASOPHILS # BLD AUTO: 0.03 K/UL
BASOPHILS NFR BLD AUTO: 0.6 %
BILIRUB SERPL-MCNC: 1.6 MG/DL (ref 0.1–1)
BUN SERPL-MCNC: 16 MG/DL (ref 6–20)
CALCIUM SERPL-MCNC: 8 MG/DL (ref 8.7–10.5)
CHLORIDE SERPL-SCNC: 103 MMOL/L (ref 95–110)
CO2 SERPL-SCNC: 27 MMOL/L (ref 23–29)
CREAT SERPL-MCNC: 0.8 MG/DL (ref 0.5–1.4)
ERYTHROCYTE [DISTWIDTH] IN BLOOD BY AUTOMATED COUNT: 24 % (ref 11.5–14.5)
GFR SERPLBLD CREATININE-BSD FMLA CKD-EPI: >60 ML/MIN/1.73/M2
GLUCOSE SERPL-MCNC: 93 MG/DL (ref 70–110)
HCT VFR BLD AUTO: 23.7 % (ref 40–54)
HGB BLD-MCNC: 7.4 GM/DL (ref 14–18)
IMM GRANULOCYTES # BLD AUTO: 0.02 K/UL (ref 0–0.04)
IMM GRANULOCYTES NFR BLD AUTO: 0.4 % (ref 0–0.5)
INR PPP: 1.4 (ref 0.8–1.2)
LYMPHOCYTES # BLD AUTO: 0.33 K/UL (ref 1–4.8)
MAGNESIUM SERPL-MCNC: 1.5 MG/DL (ref 1.6–2.6)
MCH RBC QN AUTO: 23.1 PG (ref 27–31)
MCHC RBC AUTO-ENTMCNC: 31.2 G/DL (ref 32–36)
MCV RBC AUTO: 74 FL (ref 82–98)
NUCLEATED RBC (/100WBC) (OHS): 0 /100 WBC
PLATELET # BLD AUTO: 37 K/UL (ref 150–450)
PLATELET BLD QL SMEAR: ABNORMAL
PMV BLD AUTO: ABNORMAL FL
POCT GLUCOSE: 115 MG/DL (ref 70–110)
POCT GLUCOSE: 119 MG/DL (ref 70–110)
POCT GLUCOSE: 95 MG/DL (ref 70–110)
POTASSIUM SERPL-SCNC: 4.3 MMOL/L (ref 3.5–5.1)
PROT SERPL-MCNC: 7.2 GM/DL (ref 6–8.4)
PROTHROMBIN TIME: 14.5 SECONDS (ref 9–12.5)
RBC # BLD AUTO: 3.21 M/UL (ref 4.6–6.2)
RELATIVE EOSINOPHIL (OHS): 1.4 %
RELATIVE LYMPHOCYTE (OHS): 6.5 % (ref 18–48)
RELATIVE MONOCYTE (OHS): 8.3 % (ref 4–15)
RELATIVE NEUTROPHIL (OHS): 82.8 % (ref 38–73)
SODIUM SERPL-SCNC: 136 MMOL/L (ref 136–145)
WBC # BLD AUTO: 5.04 K/UL (ref 3.9–12.7)

## 2025-07-29 PROCEDURE — 85025 COMPLETE CBC W/AUTO DIFF WBC: CPT | Mod: NTX

## 2025-07-29 PROCEDURE — 85610 PROTHROMBIN TIME: CPT | Mod: NTX

## 2025-07-29 PROCEDURE — 83735 ASSAY OF MAGNESIUM: CPT | Mod: NTX

## 2025-07-29 PROCEDURE — 36415 COLL VENOUS BLD VENIPUNCTURE: CPT | Mod: NTX

## 2025-07-29 PROCEDURE — 25000003 PHARM REV CODE 250: Mod: NTX

## 2025-07-29 PROCEDURE — 20600001 HC STEP DOWN PRIVATE ROOM: Mod: NTX

## 2025-07-29 PROCEDURE — 82040 ASSAY OF SERUM ALBUMIN: CPT | Mod: NTX

## 2025-07-29 PROCEDURE — 25000003 PHARM REV CODE 250: Mod: NTX | Performed by: INTERNAL MEDICINE

## 2025-07-29 PROCEDURE — 63600175 PHARM REV CODE 636 W HCPCS: Mod: NTX | Performed by: INTERNAL MEDICINE

## 2025-07-29 RX ADMIN — FUROSEMIDE 20 MG: 20 TABLET ORAL at 08:07

## 2025-07-29 RX ADMIN — METHOCARBAMOL 250 MG: 500 TABLET ORAL at 08:07

## 2025-07-29 RX ADMIN — SPIRONOLACTONE 50 MG: 50 TABLET ORAL at 08:07

## 2025-07-29 RX ADMIN — INSULIN ASPART 7 UNITS: 100 INJECTION, SOLUTION INTRAVENOUS; SUBCUTANEOUS at 11:07

## 2025-07-29 RX ADMIN — RIFAXIMIN 550 MG: 550 TABLET ORAL at 08:07

## 2025-07-29 RX ADMIN — MEROPENEM 2 G: 2 INJECTION, POWDER, FOR SOLUTION INTRAVENOUS at 04:07

## 2025-07-29 RX ADMIN — RIFAXIMIN 550 MG: 550 TABLET ORAL at 09:07

## 2025-07-29 RX ADMIN — VENLAFAXINE 75 MG: 75 TABLET ORAL at 08:07

## 2025-07-29 RX ADMIN — VENLAFAXINE 75 MG: 75 TABLET ORAL at 09:07

## 2025-07-29 RX ADMIN — METHOCARBAMOL 250 MG: 500 TABLET ORAL at 03:07

## 2025-07-29 RX ADMIN — METHOCARBAMOL 250 MG: 500 TABLET ORAL at 09:07

## 2025-07-29 RX ADMIN — INSULIN ASPART 7 UNITS: 100 INJECTION, SOLUTION INTRAVENOUS; SUBCUTANEOUS at 04:07

## 2025-07-29 RX ADMIN — LACTULOSE 10 G: 20 SOLUTION ORAL at 09:07

## 2025-07-29 RX ADMIN — INSULIN GLARGINE 18 UNITS: 100 INJECTION, SOLUTION SUBCUTANEOUS at 09:07

## 2025-07-29 RX ADMIN — MUPIROCIN: 20 OINTMENT TOPICAL at 08:07

## 2025-07-29 RX ADMIN — INSULIN ASPART 7 UNITS: 100 INJECTION, SOLUTION INTRAVENOUS; SUBCUTANEOUS at 08:07

## 2025-07-29 RX ADMIN — OXYBUTYNIN CHLORIDE 5 MG: 5 TABLET, EXTENDED RELEASE ORAL at 08:07

## 2025-07-29 RX ADMIN — MEROPENEM 2 G: 2 INJECTION, POWDER, FOR SOLUTION INTRAVENOUS at 08:07

## 2025-07-29 RX ADMIN — TAMSULOSIN HYDROCHLORIDE 0.4 MG: 0.4 CAPSULE ORAL at 09:07

## 2025-07-29 RX ADMIN — OXYCODONE HYDROCHLORIDE 10 MG: 10 TABLET ORAL at 08:07

## 2025-07-29 RX ADMIN — MUPIROCIN: 20 OINTMENT TOPICAL at 09:07

## 2025-07-29 NOTE — ASSESSMENT & PLAN NOTE
Patient's FSGs are controlled on current medication regimen.  Last A1c reviewed-   Lab Results   Component Value Date    HGBA1C 8.8 (H) 07/20/2025     Current correctional scale  Low  Maintain anti-hyperglycemic dose as follows-   Antihyperglycemics (From admission, onward)      Start     Stop Route Frequency Ordered    07/23/25 2100  insulin glargine U-100 (Lantus) pen 18 Units         -- SubQ Nightly 07/23/25 1117    07/23/25 1130  insulin aspart U-100 pen 7 Units         -- SubQ 3 times daily with meals 07/23/25 1119    07/20/25 2316  insulin aspart U-100 pen 0-5 Units         -- SubQ Before meals & nightly PRN 07/20/25 2217          Hold Oral hypoglycemics while patient is in the hospital.  - decreased insulin Glargine to 18 units daily and aspart 7 units TID with meals

## 2025-07-29 NOTE — ASSESSMENT & PLAN NOTE
Patient with hx CORDOVA cirrhosis c/b ascites (requiring paracentesis, on lasix 40 mg qd, eplerenone), grade 1 esophageal varices (EGD on 11/8/2024), hepatic encephalopathy 5 week hx abdominal, back pain since last paracentesis without fevers, chills, nausea, vomiting, diarrhea. CT L spine with congenital lumbar stenosis, degenerative changes. CT A/P with concern for peritonitis. Also noted splenomegaly and portal vein thrombosis, seen previously. INR 1.8. Patient had noted lactic acidosis at 2.9, now resolved. Overalll suspect abdominal pain 2/2 ascites with c/f SBP vs. Worsening of chronic portal vein thrombosis Given CTX and  pRBCs at OSH. Transferred to [facility] [place] for further workup and evaluation.     -started Lasix 20mg and spirolactone 50mg on 7/29, net 1.4L positive from admission but producing adequate urine output. Abdominal distention/umbilical hernia remains unchanged     - started lactulose 10mg BID for hepatic encephalopathy prophylaxis on admission, -Patient having 4-5 BM nightly the past 2 nights, so d/c senna and miralax on 7/28 and held morning dose of lactulose on 7/29  - Liver US showed cirrhotic morphology of the liver, sequela of portal hypertension and large volume of complicated ascites   - paracentesis on 7/21 pulled 4250mL, WBC 14,960 w/ 96% PNMs and aerobic culture was positive for strep mitis oralis   - repeat para on 7/25 pulled 2500mL, WBC 14,860  - per ID, stopped vanc and CTX, started on IV meropenem due to concern of infection with pathogen not covered by previous abx   -Day 3 of IV meropenem, will repeat paracentesis after 5 days of meropenem therapy per ID. Repeat cultures NGTD for 36 hours. If SBP resolved on repeat para, will transition to Cipro 500mg   -repeat CT abdomen/pelvis with large volume ascites with findings suggestive of associated peritonitis, massive splenomegaly, partially thrombosed portal vein and splenic vein and a small right pleural effusion.   - continue home rifaxamin    - CTM CBC, CMP, PT/INR daily   - MELD 3.0: 14 at 2025  7:07 AM  MELD-Na: 13 at 2025  7:07 AM  Calculated from:  Serum Creatinine: 0.8 mg/dL (Using min of 1 mg/dL) at 2025  7:07 AM  Serum Sodium: 136 mmol/L at 2025  7:07 AM  Total Bilirubin: 1.6 mg/dL at 2025  7:07 AM  Serum Albumin: 2.1 g/dL at 2025  7:07 AM  INR(ratio): 1.4 at 2025  7:07 AM  Age at listin years  Sex: Male at 2025  7:07 AM

## 2025-07-29 NOTE — PROGRESS NOTES
[facility] - Tele[facility] Medicine  Progress Note    Patient Name: Corky Bach  MRN: [id number]  Patient Class: IP- Inpatient   Admission Date: 7/20/2025  Length of Stay: 9 days  Attending Physician: David Mak MD  Primary Care Provider: Sarmad Estrella MD        Subjective     Principal Problem: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    HPI:  Corky Bach is a 60 y/o male with a notable hx of CORDOVA cirrhosis c/b esophageal varices, T2DM, MALT lymphoma, HLD, gout, and depression [facility]ology and GI consult.     Patient initially presented to the ED of [facility] on 7/20 with complaints of chronic back and abdominal pain that had been ongoing for appx 5 weeks. He states that the back pain began to occur shortly after his last paracentesis on 7/12 located around his mid lumbar spine, starting with a dull back/ flank pain and quickly radiating to upper abdominal pain. He was trying to use ibuprofen and tylenol to help with the pain however the pain continued to progress. He eventually started to take percocet from a prior hospitalization however this was ineffective. He denies any fevers, chills, nausea, vomiting, constipation/diarrhea, melena, hematemesis during this time. At this point he presented to the ED for further evaluation.     Initially BPs were in the low 100s. He was found to be heme occult positive. Hb 6.9 and platelets 67. AST 13, ALT 6, , bili 1.2, INR 1.8, lactic 2.9. CT Lumbar spine obtained without acute lumbar fracture, congenital lumbar stenosis with degenerative changes. CT A/P without contrast showed moderate r sized pleural effusion, splenomegaly, calcified portal vein thrombus, ascites with thickening of surrounding peritoneal reflections c/f inflammatory peritonitis. Patient started on CTX, given 1u PRBC, and transferred [facility] for GI/Hepatology evaluation.         Overview/Hospital Course:  Patient is a 59 M w/ hx of CORDOVA cirrhosis,  c/b esophageal varices, T2DM, MALT lymphoma, HLD, gout, admitted for abdminal pain and suspected SBP with decompensated cirrhosis and ascites. Found to have a hemoglobin of 6.9 on presentation, received 1 unit PRBC, no signs of bleeding. Discussed with hepatology and they did not think his presentation was c/f a GIB. Treated empirically for suspected SBP with ceftriaxone. Diagnostic and therapeutic paracentesis drained 4250 mL, lab results positive for SBP and ascites resolved. Hemoglobin and platelets remained low 6.4 and 40 respectively so transfused 1U PRBCs and 1U platelets on 7/22 and another unit of PRBC on 7/24. Originally on ceftriaxone and albumin for SBP treatment, added vanc to cover enterococcus while awaiting culture data. Aerobic culture from paracentesis sample was positive for strep mitis oralis. Repeat paracentesis on 7/25, pulled 2.5 L and labs revealed an ascitic fluid WBC count of around 14K again so ID recommended to discontinue vanc and CTX and start IV meropenem with repeat paracentesis after 5 days of IV meropenem treatment. Bowel regimen de-escalated on 7/28 due to patient having multiple soft stool bowel movements on night of 7/27, only on lactulose. FOBT positive on 7/28 with no signs of active bleeding, Hgb steadily down trending but stable and not requiring any more transfusions.     Physical Exam   Constitutional: He appears ill.   Cardiovascular: Normal rate and regular rhythm.   Murmur heard.Pulmonary:      Effort: No respiratory distress.      Breath sounds: No wheezing or rales.     Abdominal: He exhibits distension. A hernia is present.   Musculoskeletal:      Right lower leg: No edema.      Left lower leg: No edema.   Neurological: He is alert.   Skin: There is pallor.   Psychiatric: His behavior is normal. Mood normal.     Assessment & Plan  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  Abdominal pain  Ascites  Portal hypertensive gastropathy  Portal vein thrombosis  Splenomegaly  SBP  (spontaneous bacterial peritonitis)  Patient with hx CORDOVA cirrhosis c/b ascites (requiring paracentesis, on lasix 40 mg qd, eplerenone), grade 1 esophageal varices (EGD on 11/8/2024), hepatic encephalopathy 5 week hx abdominal, back pain since last paracentesis without fevers, chills, nausea, vomiting, diarrhea. CT L spine with congenital lumbar stenosis, degenerative changes. CT A/P with concern for peritonitis. Also noted splenomegaly and portal vein thrombosis, seen previously. INR 1.8. Patient had noted lactic acidosis at 2.9, now resolved. Overalll suspect abdominal pain 2/2 ascites with c/f SBP vs. Worsening of chronic portal vein thrombosis Given CTX and  pRBCs at OSH. Transferred to [facility] [place] for further workup and evaluation.     -started Lasix 20mg and spirolactone 50mg on 7/29, net 1.4L positive from admission but producing adequate urine output. Abdominal distention/umbilical hernia remains unchanged     - started lactulose 10mg BID for hepatic encephalopathy prophylaxis on admission, -Patient having 4-5 BM nightly the past 2 nights, so d/c senna and miralax on 7/28 and held morning dose of lactulose on 7/29  - Liver US showed cirrhotic morphology of the liver, sequela of portal hypertension and large volume of complicated ascites   - paracentesis on 7/21 pulled 4250mL, WBC 14,960 w/ 96% PNMs and aerobic culture was positive for strep mitis oralis   - repeat para on 7/25 pulled 2500mL, WBC 14,860  - per ID, stopped vanc and CTX, started on IV meropenem due to concern of infection with pathogen not covered by previous abx   -Day 3 of IV meropenem, will repeat paracentesis after 5 days of meropenem therapy per ID. Repeat cultures NGTD for 36 hours. If SBP resolved on repeat para, will transition to Cipro 500mg   -repeat CT abdomen/pelvis with large volume ascites with findings suggestive of associated peritonitis, massive splenomegaly, partially thrombosed portal vein and splenic vein and a small right pleural  effusion.   - continue home rifaxamin   - CTM CBC, CMP, PT/INR daily   - MELD 3.0: 14 at 2025  7:07 AM  MELD-Na: 13 at 2025  7:07 AM  Calculated from:  Serum Creatinine: 0.8 mg/dL (Using min of 1 mg/dL) at 2025  7:07 AM  Serum Sodium: 136 mmol/L at 2025  7:07 AM  Total Bilirubin: 1.6 mg/dL at 2025  7:07 AM  Serum Albumin: 2.1 g/dL at 2025  7:07 AM  INR(ratio): 1.4 at 2025  7:07 AM  Age at listin years  Sex: Male at 2025  7:07 AM     Anemia  Patient with hx cirrhosis c/b grade 1 esophageal varices, moderate portal hypertensive gastropathy, multiple angioectasias w/o bleeding (Last EGD 2024). Presenting with noted anemia of 6.9 at OSH. S/p 3 uPRBC.     Plan  - Monitor serial CBC: Daily  - Transfuse PRBC if patient becomes hemodynamically unstable, symptomatic or H/H drops below 7  -Down trending today 7.8 > 7.5>7.4. No signs of active bleeding   - 1u pRBCs before admission at OSH, now 3u pRBCs while admitted at [facility]  - consulted GI, anemia most likely due to decompensated cirrhosis no acute intervention needed at the moment, will monitor CBC closely   - Hemolytic anemia labs unremarkable   Mood disorder  Continue home venlafaxine  Hyponatremia  Hyponatremia is likely due to Cirrhosis. The patient's most recent sodium results are listed below.  Recent Labs     25  0621 25  0632 25  0707    133* 136     Plan  - Correct the sodium by 4-6mEq in 24 hours if hyponatremia persists   - Monitor sodium Daily.   - Patient hyponatremia is stable  Thrombocytopenia  The likely etiology of thrombocytopenia is liver disease. The patients 3 most recent labs are listed below.  Recent Labs     25  0621 25  0632 25  0707   PLT 36* 34* 37*     Plan  - Will transfuse if platelet count is <50k (if undergoing surgical procedure or have active bleeding).  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  Underwent treatment including repeated Rituxan q2 month infusions. Followed by Heme-Onc at [facility]. Most recent testing has suggested remission leading to potential transplant candidacy for cirrhosis to be re-instituted.   Type 2 diabetes mellitus with hyperglycemia, with long-term current use of insulin  Patient's FSGs are controlled on current medication regimen.  Last A1c reviewed-   Lab Results   Component Value Date    HGBA1C 8.8 (H) 07/20/2025     Current correctional scale  Low  Maintain anti-hyperglycemic dose as follows-   Antihyperglycemics (From admission, onward)      Start     Stop Route Frequency Ordered    07/23/25 2100  insulin glargine U-100 (Lantus) pen 18 Units         -- SubQ Nightly 07/23/25 1117    07/23/25 1130  insulin aspart U-100 pen 7 Units         -- SubQ 3 times daily with meals 07/23/25 1119    07/20/25 2316  insulin aspart U-100 pen 0-5 Units         -- SubQ Before meals & nightly PRN 07/20/25 2217          Hold Oral hypoglycemics while patient is in the hospital.  - decreased insulin Glargine to 18 units daily and aspart 7 units TID with meals   Mixed hyperlipidemia  - not currently on medication     VTE Risk Mitigation (From admission, onward)           Ordered     Reason for No Pharmacological VTE Prophylaxis  Once        Question:  Reasons:  Answer:  Risk of Bleeding    07/20/25 2020     IP VTE HIGH RISK PATIENT  Once         07/20/25 2020     Place sequential compression device  Until discontinued         07/20/25 2020                    Discharge Planning   GARY: 8/1/2025    Code Status: Full Code   Medical Readiness for Discharge Date:   Discharge plan: home with home health   Discharge Delays: None known at this time          Amanda Aguilar DO  Department of Hospital Medicine    [facility] - Telemetry Stepdown

## 2025-07-29 NOTE — PLAN OF CARE
Problem: Adult Inpatient Plan of Care  Goal: Plan of Care Review  Outcome: Progressing  Flowsheets (Taken 7/29/2025 0620)  Plan of Care Reviewed With: patient  Goal: Patient-Specific Goal (Individualized)  Outcome: Progressing  Goal: Absence of Hospital-Acquired Illness or Injury  Outcome: Progressing  Intervention: Identify and Manage Fall Risk  Flowsheets (Taken 7/29/2025 0620)  Safety Promotion/Fall Prevention:   assistive device/personal item within reach   bed alarm set   Fall Risk reviewed with patient/family   medications reviewed   lighting adjusted   high risk medications identified   side rails raised x 2  Intervention: Prevent and Manage VTE (Venous Thromboembolism) Risk  Flowsheets (Taken 7/29/2025 0620)  VTE Prevention/Management:   ambulation promoted   bleeding precautions maintained   bleeding risk assessed  Goal: Optimal Comfort and Wellbeing  Outcome: Progressing  Goal: Readiness for Transition of Care  Outcome: Progressing     Problem: Diabetes Comorbidity  Goal: Blood Glucose Level Within Targeted Range  Outcome: Progressing  Intervention: Monitor and Manage Glycemia  Flowsheets (Taken 7/29/2025 0620)  Glycemic Management: blood glucose monitored     Problem: Wound  Goal: Optimal Coping  Outcome: Progressing  Goal: Optimal Functional Ability  Outcome: Progressing  Goal: Absence of Infection Signs and Symptoms  Outcome: Progressing  Goal: Improved Oral Intake  Outcome: Progressing  Goal: Optimal Pain Control and Function  Outcome: Progressing  Intervention: Prevent or Manage Pain  Flowsheets (Taken 7/29/2025 0620)  Pain Management Interventions: care clustered  Goal: Skin Health and Integrity  Outcome: Progressing  Goal: Optimal Wound Healing  Outcome: Progressing     Problem: Acute Kidney Injury/Impairment  Goal: Fluid and Electrolyte Balance  Outcome: Progressing  Goal: Improved Oral Intake  Outcome: Progressing  Goal: Effective Renal Function  Outcome: Progressing     Problem: Fall Injury  Risk  Goal: Absence of Fall and Fall-Related Injury  Outcome: Progressing

## 2025-07-29 NOTE — ASSESSMENT & PLAN NOTE
Patient with hx CORDOVA cirrhosis c/b ascites (requiring paracentesis, on lasix 40 mg qd, eplerenone), grade 1 esophageal varices (EGD on 11/8/2024), hepatic encephalopathy 5 week hx abdominal, back pain since last paracentesis without fevers, chills, nausea, vomiting, diarrhea. CT L spine with congenital lumbar stenosis, degenerative changes. CT A/P with concern for peritonitis. Also noted splenomegaly and portal vein thrombosis, seen previously. INR 1.8. Patient had noted lactic acidosis at 2.9, now resolved. Overalll suspect abdominal pain 2/2 ascites with c/f SBP vs. Worsening of chronic portal vein thrombosis Given CTX and  pRBCs at OSH. [place] [facility] [place].     -started Lasix 20mg and spirolactone 50mg on 7/29, net 1.4L positive from admission but producing adequate urine output. Abdominal distention/umbilical hernia remains unchanged     - started lactulose 10mg BID for hepatic encephalopathy prophylaxis on admission, -Patient having 4-5 BM nightly the past 2 nights, so d/c senna and miralax on 7/28 and held morning dose of lactulose on 7/29  - Liver US showed cirrhotic morphology of the liver, sequela of portal hypertension and large volume of complicated ascites   - paracentesis on 7/21 pulled 4250mL, WBC 14,960 w/ 96% PNMs and aerobic culture was positive for strep mitis oralis   - repeat para on 7/25 pulled 2500mL, WBC 14,860  - per ID, stopped vanc and CTX, started on IV meropenem due to concern of infection with pathogen not covered by previous abx   -Day 3 of IV meropenem, will repeat paracentesis after 5 days of meropenem therapy per ID. Repeat cultures NGTD for 36 hours. If SBP resolved on repeat para, will transition to Cipro 500mg   -repeat CT abdomen/pelvis with large volume ascites with findings suggestive of associated peritonitis, massive splenomegaly, partially thrombosed portal vein and splenic vein and a small right pleural effusion.   - continue home rifaxamin    - CTM CBC, CMP, PT/INR daily   - MELD 3.0: 14 at 2025  7:07 AM  MELD-Na: 13 at 2025  7:07 AM  Calculated from:  Serum Creatinine: 0.8 mg/dL (Using min of 1 mg/dL) at 2025  7:07 AM  Serum Sodium: 136 mmol/L at 2025  7:07 AM  Total Bilirubin: 1.6 mg/dL at 2025  7:07 AM  Serum Albumin: 2.1 g/dL at 2025  7:07 AM  INR(ratio): 1.4 at 2025  7:07 AM  Age at listin years  Sex: Male at 2025  7:07 AM

## 2025-07-29 NOTE — ASSESSMENT & PLAN NOTE
Patient with hx CORDOVA cirrhosis c/b ascites (requiring paracentesis, on lasix 40 mg qd, eplerenone), grade 1 esophageal varices (EGD on 11/8/2024), hepatic encephalopathy 5 week hx abdominal, back pain since last paracentesis without fevers, chills, nausea, vomiting, diarrhea. CT L spine with congenital lumbar stenosis, degenerative changes. CT A/P with concern for peritonitis. Also noted splenomegaly and portal vein thrombosis, seen previously. INR 1.8. Patient had noted lactic acidosis at 2.9, now resolved. Overalll suspect abdominal pain 2/2 ascites with c/f SBP vs. Worsening of chronic portal vein thrombosis Given CTX and  pRBCs at OSH. Transferred to [facility] [place] for further workup and evaluation.     -started Lasix 20mg and spirolactone 50mg on 7/29, net 1.4L positive from admission but producing adequate urine output. Abdominal distention/umbilical hernia remains unchanged     - started lactulose 10mg BID for hepatic encephalopathy prophylaxis on admission, -Patient having 4-5 BM nightly the past 2 nights, so d/c senna and miralax on 7/28 and held morning dose of lactulose on 7/29  - Liver US showed cirrhotic morphology of the liver, sequela of portal hypertension and large volume of complicated ascites   - paracentesis on 7/21 pulled 4250mL, WBC 14,960 w/ 96% PNMs and aerobic culture was positive for strep mitis oralis   - repeat para on 7/25 pulled 2500mL, WBC 14,860  - per ID, stopped vanc and CTX, started on IV meropenem due to concern of infection with pathogen not covered by previous abx   -Day 3 of IV meropenem, will repeat paracentesis after 5 days of meropenem therapy per ID. Repeat cultures NGTD for 36 hours. If SBP resolved on repeat para, will transition to Cipro 500mg   -repeat CT abdomen/pelvis with large volume ascites with findings suggestive of associated peritonitis, massive splenomegaly, partially thrombosed portal vein and splenic vein and a small right pleural effusion.   - continue home rifaxamin    - CTM CBC, CMP, PT/INR daily   - MELD 3.0: 14 at 2025  7:07 AM  MELD-Na: 13 at 2025  7:07 AM  Calculated from:  Serum Creatinine: 0.8 mg/dL (Using min of 1 mg/dL) at 2025  7:07 AM  Serum Sodium: 136 mmol/L at 2025  7:07 AM  Total Bilirubin: 1.6 mg/dL at 2025  7:07 AM  Serum Albumin: 2.1 g/dL at 2025  7:07 AM  INR(ratio): 1.4 at 2025  7:07 AM  Age at listin years  Sex: Male at 2025  7:07 AM

## 2025-07-29 NOTE — ASSESSMENT & PLAN NOTE
Patient with hx CORDOVA cirrhosis c/b ascites (requiring paracentesis, on lasix 40 mg qd, eplerenone), grade 1 esophageal varices (EGD on 11/8/2024), hepatic encephalopathy 5 week hx abdominal, back pain since last paracentesis without fevers, chills, nausea, vomiting, diarrhea. CT L spine with congenital lumbar stenosis, degenerative changes. CT A/P with concern for peritonitis. Also noted splenomegaly and portal vein thrombosis, seen previously. INR 1.8. Patient had noted lactic acidosis at 2.9, now resolved. Overalll suspect abdominal pain 2/2 ascites with c/f SBP vs. Worsening of chronic portal vein thrombosis Given CTX and  pRBCs at OSH. Transferred to [facility] [place] for further workup and evaluation.     -started Lasix 20mg and spirolactone 50mg on 7/29, net 1.4L positive from admission but producing adequate urine output. Abdominal distention/umbilical hernia remains unchanged     - started lactulose 10mg BID for hepatic encephalopathy prophylaxis on admission, -Patient having 4-5 BM nightly the past 2 nights, so d/c senna and miralax on 7/28 and held morning dose of lactulose on 7/29  - Liver US showed cirrhotic morphology of the liver, sequela of portal hypertension and large volume of complicated ascites   - paracentesis on 7/21 pulled 4250mL, WBC 14,960 w/ 96% PNMs and aerobic culture was positive for strep mitis oralis   - repeat para on 7/25 pulled 2500mL, WBC 14,860  - per ID, stopped vanc and CTX, started on IV meropenem due to concern of infection with pathogen not covered by previous abx   -Day 3 of IV meropenem, will repeat paracentesis after 5 days of meropenem therapy per ID. Repeat cultures NGTD for 36 hours. If SBP resolved on repeat para, will transition to Cipro 500mg   -repeat CT abdomen/pelvis with large volume ascites with findings suggestive of associated peritonitis, massive splenomegaly, partially thrombosed portal vein and splenic vein and a small right pleural effusion.   - continue home rifaxamin    - CTM CBC, CMP, PT/INR daily   - MELD 3.0: 14 at 2025  7:07 AM  MELD-Na: 13 at 2025  7:07 AM  Calculated from:  Serum Creatinine: 0.8 mg/dL (Using min of 1 mg/dL) at 2025  7:07 AM  Serum Sodium: 136 mmol/L at 2025  7:07 AM  Total Bilirubin: 1.6 mg/dL at 2025  7:07 AM  Serum Albumin: 2.1 g/dL at 2025  7:07 AM  INR(ratio): 1.4 at 2025  7:07 AM  Age at listin years  Sex: Male at 2025  7:07 AM

## 2025-07-29 NOTE — ASSESSMENT & PLAN NOTE
The likely etiology of thrombocytopenia is liver disease. The patients 3 most recent labs are listed below.  Recent Labs     07/27/25  0621 07/28/25  0632 07/29/25  0707   PLT 36* 34* 37*     Plan  - Will transfuse if platelet count is <50k (if undergoing surgical procedure or have active bleeding).  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

## 2025-07-29 NOTE — ASSESSMENT & PLAN NOTE
Patient with hx CORDOVA cirrhosis c/b ascites (requiring paracentesis, on lasix 40 mg qd, eplerenone), grade 1 esophageal varices (EGD on 11/8/2024), hepatic encephalopathy 5 week hx abdominal, back pain since last paracentesis without fevers, chills, nausea, vomiting, diarrhea. CT L spine with congenital lumbar stenosis, degenerative changes. CT A/P with concern for peritonitis. Also noted splenomegaly and portal vein thrombosis, seen previously. INR 1.8. Patient had noted lactic acidosis at 2.9, now resolved. Overalll suspect abdominal pain 2/2 ascites with c/f SBP vs. Worsening of chronic portal vein thrombosis Given CTX and  pRBCs at OSH. Transferred to [facility] [place] workup and evaluation.     -started Lasix 20mg and spirolactone 50mg on 7/29, net 1.4L positive from admission but producing adequate urine output. Abdominal distention/umbilical hernia remains unchanged     - started lactulose 10mg BID for hepatic encephalopathy prophylaxis on admission, -Patient having 4-5 BM nightly the past 2 nights, so d/c senna and miralax on 7/28 and held morning dose of lactulose on 7/29  - Liver US showed cirrhotic morphology of the liver, sequela of portal hypertension and large volume of complicated ascites   - paracentesis on 7/21 pulled 4250mL, WBC 14,960 w/ 96% PNMs and aerobic culture was positive for strep mitis oralis   - repeat para on 7/25 pulled 2500mL, WBC 14,860  - per ID, stopped vanc and CTX, started on IV meropenem due to concern of infection with pathogen not covered by previous abx   -Day 3 of IV meropenem, will repeat paracentesis after 5 days of meropenem therapy per ID. Repeat cultures NGTD for 36 hours. If SBP resolved on repeat para, will transition to Cipro 500mg   -repeat CT abdomen/pelvis with large volume ascites with findings suggestive of associated peritonitis, massive splenomegaly, partially thrombosed portal vein and splenic vein and a small right pleural effusion.   - continue home rifaxamin    - CTM CBC, CMP, PT/INR daily   - MELD 3.0: 14 at 2025  7:07 AM  MELD-Na: 13 at 2025  7:07 AM  Calculated from:  Serum Creatinine: 0.8 mg/dL (Using min of 1 mg/dL) at 2025  7:07 AM  Serum Sodium: 136 mmol/L at 2025  7:07 AM  Total Bilirubin: 1.6 mg/dL at 2025  7:07 AM  Serum Albumin: 2.1 g/dL at 2025  7:07 AM  INR(ratio): 1.4 at 2025  7:07 AM  Age at listin years  Sex: Male at 2025  7:07 AM

## 2025-07-29 NOTE — ASSESSMENT & PLAN NOTE
Patient with hx cirrhosis c/b grade 1 esophageal varices, moderate portal hypertensive gastropathy, multiple angioectasias w/o bleeding (Last EGD 11/8/2024). Presenting with noted anemia of 6.9 at OSH. S/p 3 uPRBC.     Plan  - Monitor serial CBC: Daily  - Transfuse PRBC if patient becomes hemodynamically unstable, symptomatic or H/H drops below 7  -Down trending today 7.8 > 7.5>7.4. No signs of active bleeding   - 1u pRBCs before admission at OSH, now 3u pRBCs while admitted at [facility]  - consulted GI, anemia most likely due to decompensated cirrhosis no acute intervention needed at the moment, will monitor CBC closely   - Hemolytic anemia labs unremarkable

## 2025-07-29 NOTE — ASSESSMENT & PLAN NOTE
Patient with hx CORDOVA cirrhosis c/b ascites (requiring paracentesis, on lasix 40 mg qd, eplerenone), grade 1 esophageal varices (EGD on 11/8/2024), hepatic encephalopathy 5 week hx abdominal, back pain since last paracentesis without fevers, chills, nausea, vomiting, diarrhea. CT L spine with congenital lumbar stenosis, degenerative changes. CT A/P with concern for peritonitis. Also noted splenomegaly and portal vein thrombosis, seen previously. INR 1.8. Patient had noted lactic acidosis at 2.9, now resolved. Overalll suspect abdominal pain 2/2 ascites with c/f SBP vs. Worsening of chronic portal vein thrombosis Given CTX and  pRBCs at OSH. [place] [facility] [place]     -started Lasix 20mg and spirolactone 50mg on 7/29, net 1.4L positive from admission but producing adequate urine output. Abdominal distention/umbilical hernia remains unchanged     - started lactulose 10mg BID for hepatic encephalopathy prophylaxis on admission, -Patient having 4-5 BM nightly the past 2 nights, so d/c senna and miralax on 7/28 and held morning dose of lactulose on 7/29  - Liver US showed cirrhotic morphology of the liver, sequela of portal hypertension and large volume of complicated ascites   - paracentesis on 7/21 pulled 4250mL, WBC 14,960 w/ 96% PNMs and aerobic culture was positive for strep mitis oralis   - repeat para on 7/25 pulled 2500mL, WBC 14,860  - per ID, stopped vanc and CTX, started on IV meropenem due to concern of infection with pathogen not covered by previous abx   -Day 3 of IV meropenem, will repeat paracentesis after 5 days of meropenem therapy per ID. Repeat cultures NGTD for 36 hours. If SBP resolved on repeat para, will transition to Cipro 500mg   -repeat CT abdomen/pelvis with large volume ascites with findings suggestive of associated peritonitis, massive splenomegaly, partially thrombosed portal vein and splenic vein and a small right pleural effusion.   - continue home rifaxamin    - CTM CBC, CMP, PT/INR daily   - MELD 3.0: 14 at 2025  7:07 AM  MELD-Na: 13 at 2025  7:07 AM  Calculated from:  Serum Creatinine: 0.8 mg/dL (Using min of 1 mg/dL) at 2025  7:07 AM  Serum Sodium: 136 mmol/L at 2025  7:07 AM  Total Bilirubin: 1.6 mg/dL at 2025  7:07 AM  Serum Albumin: 2.1 g/dL at 2025  7:07 AM  INR(ratio): 1.4 at 2025  7:07 AM  Age at listin years  Sex: Male at 2025  7:07 AM

## 2025-07-29 NOTE — SUBJECTIVE & OBJECTIVE
Interval History: NAEON. Patient reporting multiple loose bowel movements overnight as well as back pain. States he did not request his PRN pain meds overnight. Reports increased urination after starting dieretics. Urine in bedside urinal dark vesta colored.   FOBT positive on 7/28, denies presence of visible blood in his urine or stool, Hgb stable, no indication for transfusion at this time.     Review of Systems   Constitutional:  Negative for activity change, appetite change, chills, fatigue and fever.   HENT:  Negative for sore throat.    Eyes:  Negative for photophobia and pain.   Respiratory:  Negative for apnea, cough, chest tightness, shortness of breath and wheezing.    Cardiovascular:  Negative for chest pain, palpitations and leg swelling.   Gastrointestinal:  Positive for abdominal distention, abdominal pain and diarrhea. Negative for blood in stool, constipation, nausea and vomiting.   Genitourinary:  Negative for difficulty urinating.   Musculoskeletal:  Positive for back pain.   Skin:         Bruising over paracentesis site    Neurological:  Negative for dizziness, weakness and light-headedness.     Objective:     Vital Signs (Most Recent):  Temp: 97.9 °F (36.6 °C) (07/29/25 0435)  Pulse: 81 (07/29/25 0435)  Resp: 20 (07/29/25 0435)  BP: 106/63 (07/29/25 0435)  SpO2: 99 % (07/29/25 0435) Vital Signs (24h Range):  Temp:  [97.8 °F (36.6 °C)-98.3 °F (36.8 °C)] 97.9 °F (36.6 °C)  Pulse:  [65-84] 81  Resp:  [18-20] 20  SpO2:  [97 %-99 %] 99 %  BP: ()/(53-68) 106/63     Weight: 80.6 kg (177 lb 11.1 oz)  Body mass index is 22.81 kg/m².    Intake/Output Summary (Last 24 hours) at 7/29/2025 0732  Last data filed at 7/28/2025 1823  Gross per 24 hour   Intake 400 ml   Output 800 ml   Net -400 ml         Physical Exam  Constitutional:       Appearance: He is ill-appearing.   HENT:      Head: Normocephalic and atraumatic.      Mouth/Throat:      Mouth: Mucous membranes are dry.   Cardiovascular:      Rate  and Rhythm: Normal rate and regular rhythm.      Pulses: Normal pulses.      Heart sounds: No murmur heard.     No friction rub. No gallop.   Pulmonary:      Effort: Pulmonary effort is normal. No respiratory distress.      Breath sounds: Normal breath sounds. No wheezing or rales.   Chest:      Chest wall: No tenderness.   Abdominal:      General: There is distension.      Tenderness: There is abdominal tenderness. There is no right CVA tenderness or left CVA tenderness.      Hernia: A hernia is present.      Comments: Umbilical hernia present   Hematoma on LLQ at paracentesis site present with overlying bruising, TTP   Musculoskeletal:      Right lower leg: No edema.      Left lower leg: No edema.   Skin:     Coloration: Skin is pale.   Neurological:      Mental Status: He is alert.   Psychiatric:         Mood and Affect: Mood normal.         Behavior: Behavior normal.               Significant Labs: All pertinent labs within the past 24 hours have been reviewed.  BMP:   Recent Labs   Lab 07/28/25  0632   *   *   K 3.7      CO2 24   BUN 18   CREATININE 0.8   CALCIUM 7.6*   MG 1.5*     CBC:   Recent Labs   Lab 07/28/25  0632   WBC 5.21   HGB 7.5*   HCT 24.1*   PLT 34*     CMP:   Recent Labs   Lab 07/28/25  0632   *   K 3.7      CO2 24   *   BUN 18   CREATININE 0.8   CALCIUM 7.6*   PROT 6.9   ALBUMIN 2.1*   BILITOT 1.1*   ALKPHOS 171*   AST 58*   ALT 14   ANIONGAP 7*       Significant Imaging: I have reviewed all pertinent imaging results/findings within the past 24 hours.  I have reviewed and interpreted all pertinent imaging results/findings within the past 24 hours.

## 2025-07-29 NOTE — ASSESSMENT & PLAN NOTE
Hyponatremia is likely due to Cirrhosis. The patient's most recent sodium results are listed below.  Recent Labs     07/27/25  0621 07/28/25  0632 07/29/25  0707    133* 136     Plan  - Correct the sodium by 4-6mEq in 24 hours if hyponatremia persists   - Monitor sodium Daily.   - Patient hyponatremia is stable

## 2025-07-29 NOTE — PLAN OF CARE
Problem: Adult Inpatient Plan of Care  Goal: Plan of Care Review  Outcome: Progressing  Goal: Patient-Specific Goal (Individualized)  Outcome: Progressing  Goal: Absence of Hospital-Acquired Illness or Injury  Outcome: Progressing  Goal: Optimal Comfort and Wellbeing  Outcome: Progressing  Goal: Readiness for Transition of Care  Outcome: Progressing     Problem: Diabetes Comorbidity  Goal: Blood Glucose Level Within Targeted Range  Outcome: Progressing     Problem: Wound  Goal: Optimal Coping  Outcome: Progressing  Goal: Optimal Functional Ability  Outcome: Progressing  Goal: Absence of Infection Signs and Symptoms  Outcome: Progressing  Goal: Improved Oral Intake  Outcome: Progressing  Goal: Optimal Pain Control and Function  Outcome: Progressing  Goal: Skin Health and Integrity  Outcome: Progressing  Goal: Optimal Wound Healing  Outcome: Progressing   Positive OCB. CBC monitoring.

## 2025-07-29 NOTE — ASSESSMENT & PLAN NOTE
STABLE     MALT-rivka with bone marrow involvement.  Underwent treatment including repeated Rituxan q2 month infusions. Followed by Heme-Onc at [facility]. Most recent testing has suggested remission leading to potential transplant candidacy for cirrhosis to be re-instituted.

## 2025-07-29 NOTE — ASSESSMENT & PLAN NOTE
Patient with hx CORDOVA cirrhosis c/b ascites (requiring paracentesis, on lasix 40 mg qd, eplerenone), grade 1 esophageal varices (EGD on 11/8/2024), hepatic encephalopathy 5 week hx abdominal, back pain since last paracentesis without fevers, chills, nausea, vomiting, diarrhea. CT L spine with congenital lumbar stenosis, degenerative changes. CT A/P with concern for peritonitis. Also noted splenomegaly and portal vein thrombosis, seen previously. INR 1.8. Patient had noted lactic acidosis at 2.9, now resolved. Overalll suspect abdominal pain 2/2 ascites with c/f SBP vs. Worsening of chronic portal vein thrombosis Given CTX and  pRBCs at OSH. Transferred to [facility] [place] workup and evaluation.     -started Lasix 20mg and spirolactone 50mg on 7/29, net 1.4L positive from admission but producing adequate urine output. Abdominal distention/umbilical hernia remains unchanged     - started lactulose 10mg BID for hepatic encephalopathy prophylaxis on admission, -Patient having 4-5 BM nightly the past 2 nights, so d/c senna and miralax on 7/28 and held morning dose of lactulose on 7/29  - Liver US showed cirrhotic morphology of the liver, sequela of portal hypertension and large volume of complicated ascites   - paracentesis on 7/21 pulled 4250mL, WBC 14,960 w/ 96% PNMs and aerobic culture was positive for strep mitis oralis   - repeat para on 7/25 pulled 2500mL, WBC 14,860  - per ID, stopped vanc and CTX, started on IV meropenem due to concern of infection with pathogen not covered by previous abx   -Day 3 of IV meropenem, will repeat paracentesis after 5 days of meropenem therapy per ID. Repeat cultures NGTD for 36 hours. If SBP resolved on repeat para, will transition to Cipro 500mg   -repeat CT abdomen/pelvis with large volume ascites with findings suggestive of associated peritonitis, massive splenomegaly, partially thrombosed portal vein and splenic vein and a small right pleural effusion.   - continue home rifaxamin    - CTM CBC, CMP, PT/INR daily   - MELD 3.0: 14 at 2025  7:07 AM  MELD-Na: 13 at 2025  7:07 AM  Calculated from:  Serum Creatinine: 0.8 mg/dL (Using min of 1 mg/dL) at 2025  7:07 AM  Serum Sodium: 136 mmol/L at 2025  7:07 AM  Total Bilirubin: 1.6 mg/dL at 2025  7:07 AM  Serum Albumin: 2.1 g/dL at 2025  7:07 AM  INR(ratio): 1.4 at 2025  7:07 AM  Age at listin years  Sex: Male at 2025  7:07 AM

## 2025-07-30 LAB
ABSOLUTE EOSINOPHIL (OHS): 0.12 K/UL
ABSOLUTE MONOCYTE (OHS): 0.47 K/UL (ref 0.3–1)
ABSOLUTE NEUTROPHIL COUNT (OHS): 5.02 K/UL (ref 1.8–7.7)
ALBUMIN SERPL BCP-MCNC: 2.2 G/DL (ref 3.5–5.2)
ALP SERPL-CCNC: 167 UNIT/L (ref 40–150)
ALT SERPL W/O P-5'-P-CCNC: 10 UNIT/L (ref 0–55)
ANION GAP (OHS): 7 MMOL/L (ref 8–16)
AST SERPL-CCNC: 44 UNIT/L (ref 0–50)
BACTERIA SPEC ANAEROBE CULT: NORMAL
BASOPHILS # BLD AUTO: 0.03 K/UL
BASOPHILS NFR BLD AUTO: 0.5 %
BILIRUB SERPL-MCNC: 1.5 MG/DL (ref 0.1–1)
BUN SERPL-MCNC: 15 MG/DL (ref 6–20)
CALCIUM SERPL-MCNC: 8.1 MG/DL (ref 8.7–10.5)
CHLORIDE SERPL-SCNC: 103 MMOL/L (ref 95–110)
CO2 SERPL-SCNC: 26 MMOL/L (ref 23–29)
CREAT SERPL-MCNC: 0.8 MG/DL (ref 0.5–1.4)
ERYTHROCYTE [DISTWIDTH] IN BLOOD BY AUTOMATED COUNT: 24.5 % (ref 11.5–14.5)
GFR SERPLBLD CREATININE-BSD FMLA CKD-EPI: >60 ML/MIN/1.73/M2
GLUCOSE SERPL-MCNC: 86 MG/DL (ref 70–110)
HCT VFR BLD AUTO: 25.5 % (ref 40–54)
HGB BLD-MCNC: 8.2 GM/DL (ref 14–18)
IMM GRANULOCYTES # BLD AUTO: 0.12 K/UL (ref 0–0.04)
IMM GRANULOCYTES NFR BLD AUTO: 1.9 % (ref 0–0.5)
INR PPP: 1.3 (ref 0.8–1.2)
LYMPHOCYTES # BLD AUTO: 0.51 K/UL (ref 1–4.8)
MAGNESIUM SERPL-MCNC: 1.5 MG/DL (ref 1.6–2.6)
MCH RBC QN AUTO: 23.6 PG (ref 27–31)
MCHC RBC AUTO-ENTMCNC: 32.2 G/DL (ref 32–36)
MCV RBC AUTO: 74 FL (ref 82–98)
NUCLEATED RBC (/100WBC) (OHS): 0 /100 WBC
PLATELET # BLD AUTO: 42 K/UL (ref 150–450)
PMV BLD AUTO: ABNORMAL FL
POCT GLUCOSE: 117 MG/DL (ref 70–110)
POCT GLUCOSE: 148 MG/DL (ref 70–110)
POCT GLUCOSE: 90 MG/DL (ref 70–110)
POTASSIUM SERPL-SCNC: 3.9 MMOL/L (ref 3.5–5.1)
PROT SERPL-MCNC: 7.7 GM/DL (ref 6–8.4)
PROTHROMBIN TIME: 14 SECONDS (ref 9–12.5)
RBC # BLD AUTO: 3.47 M/UL (ref 4.6–6.2)
RELATIVE EOSINOPHIL (OHS): 1.9 %
RELATIVE LYMPHOCYTE (OHS): 8.1 % (ref 18–48)
RELATIVE MONOCYTE (OHS): 7.5 % (ref 4–15)
RELATIVE NEUTROPHIL (OHS): 80.1 % (ref 38–73)
SODIUM SERPL-SCNC: 136 MMOL/L (ref 136–145)
WBC # BLD AUTO: 6.27 K/UL (ref 3.9–12.7)

## 2025-07-30 PROCEDURE — 63600175 PHARM REV CODE 636 W HCPCS: Mod: NTX | Performed by: INTERNAL MEDICINE

## 2025-07-30 PROCEDURE — 20600001 HC STEP DOWN PRIVATE ROOM: Mod: NTX

## 2025-07-30 PROCEDURE — 25000003 PHARM REV CODE 250: Mod: NTX

## 2025-07-30 PROCEDURE — 85610 PROTHROMBIN TIME: CPT | Mod: NTX

## 2025-07-30 PROCEDURE — 36415 COLL VENOUS BLD VENIPUNCTURE: CPT | Mod: NTX

## 2025-07-30 PROCEDURE — 83735 ASSAY OF MAGNESIUM: CPT | Mod: NTX

## 2025-07-30 PROCEDURE — 82247 BILIRUBIN TOTAL: CPT | Mod: NTX

## 2025-07-30 PROCEDURE — 85025 COMPLETE CBC W/AUTO DIFF WBC: CPT | Mod: NTX

## 2025-07-30 PROCEDURE — 25000003 PHARM REV CODE 250: Mod: NTX | Performed by: INTERNAL MEDICINE

## 2025-07-30 RX ADMIN — VENLAFAXINE 75 MG: 75 TABLET ORAL at 08:07

## 2025-07-30 RX ADMIN — MUPIROCIN: 20 OINTMENT TOPICAL at 08:07

## 2025-07-30 RX ADMIN — OXYCODONE HYDROCHLORIDE 10 MG: 10 TABLET ORAL at 02:07

## 2025-07-30 RX ADMIN — OXYBUTYNIN CHLORIDE 5 MG: 5 TABLET, EXTENDED RELEASE ORAL at 08:07

## 2025-07-30 RX ADMIN — SPIRONOLACTONE 50 MG: 50 TABLET ORAL at 08:07

## 2025-07-30 RX ADMIN — MEROPENEM 2 G: 2 INJECTION, POWDER, FOR SOLUTION INTRAVENOUS at 09:07

## 2025-07-30 RX ADMIN — FUROSEMIDE 20 MG: 20 TABLET ORAL at 08:07

## 2025-07-30 RX ADMIN — METHOCARBAMOL 250 MG: 500 TABLET ORAL at 08:07

## 2025-07-30 RX ADMIN — INSULIN ASPART 7 UNITS: 100 INJECTION, SOLUTION INTRAVENOUS; SUBCUTANEOUS at 05:07

## 2025-07-30 RX ADMIN — INSULIN GLARGINE 18 UNITS: 100 INJECTION, SOLUTION SUBCUTANEOUS at 08:07

## 2025-07-30 RX ADMIN — INSULIN ASPART 7 UNITS: 100 INJECTION, SOLUTION INTRAVENOUS; SUBCUTANEOUS at 08:07

## 2025-07-30 RX ADMIN — MEROPENEM 2 G: 2 INJECTION, POWDER, FOR SOLUTION INTRAVENOUS at 05:07

## 2025-07-30 RX ADMIN — TAMSULOSIN HYDROCHLORIDE 0.4 MG: 0.4 CAPSULE ORAL at 08:07

## 2025-07-30 RX ADMIN — LACTULOSE 10 G: 20 SOLUTION ORAL at 08:07

## 2025-07-30 RX ADMIN — METHOCARBAMOL 250 MG: 500 TABLET ORAL at 02:07

## 2025-07-30 RX ADMIN — RIFAXIMIN 550 MG: 550 TABLET ORAL at 08:07

## 2025-07-30 RX ADMIN — MEROPENEM 2 G: 2 INJECTION, POWDER, FOR SOLUTION INTRAVENOUS at 02:07

## 2025-07-30 NOTE — SUBJECTIVE & OBJECTIVE
Interval History: NAEON. VSS. Patient only reporting abdominal tenderness at previous paracentesis site. Repeat paracentesis on 8/1, continue IV meropenem.     Review of Systems  Objective:     Vital Signs (Most Recent):  Temp: 98 °F (36.7 °C) (07/30/25 1551)  Pulse: 84 (07/30/25 1551)  Resp: 18 (07/30/25 1551)  BP: 106/65 (07/30/25 1551)  SpO2: 97 % (07/30/25 1551) Vital Signs (24h Range):  Temp:  [98 °F (36.7 °C)-99.8 °F (37.7 °C)] 98 °F (36.7 °C)  Pulse:  [74-84] 84  Resp:  [17-20] 18  SpO2:  [97 %-99 %] 97 %  BP: ()/(52-65) 106/65     Weight: 80.6 kg (177 lb 11.1 oz)  Body mass index is 22.81 kg/m².    Intake/Output Summary (Last 24 hours) at 7/30/2025 1716  Last data filed at 7/29/2025 1822  Gross per 24 hour   Intake 500 ml   Output --   Net 500 ml         Physical Exam  Constitutional:       Appearance: Normal appearance.   Cardiovascular:      Rate and Rhythm: Normal rate and regular rhythm.      Pulses: Normal pulses.      Heart sounds: Murmur heard.   Pulmonary:      Effort: Pulmonary effort is normal. No respiratory distress.      Breath sounds: No wheezing.   Abdominal:      General: There is distension.      Tenderness: There is abdominal tenderness.      Hernia: A hernia is present.      Comments: Umbilical hernia present , hematoma present at paracentesis site on LLQ.    Musculoskeletal:      Right lower leg: No edema.      Left lower leg: No edema.   Skin:     General: Skin is warm and dry.   Neurological:      General: No focal deficit present.      Mental Status: He is alert and oriented to person, place, and time.   Psychiatric:         Mood and Affect: Mood normal.               Significant Labs: All pertinent labs within the past 24 hours have been reviewed.  BMP:   Recent Labs   Lab 07/30/25  0756   GLU 86      K 3.9      CO2 26   BUN 15   CREATININE 0.8   CALCIUM 8.1*   MG 1.5*     CBC:   Recent Labs   Lab 07/29/25  0707 07/30/25  0756   WBC 5.04 6.27   HGB 7.4* 8.2*   HCT  23.7* 25.5*   PLT 37* 42*       Significant Imaging: I have reviewed all pertinent imaging results/findings within the past 24 hours.  I have reviewed and interpreted all pertinent imaging results/findings within the past 24 hours.

## 2025-07-30 NOTE — ASSESSMENT & PLAN NOTE
Hyponatremia is likely due to Cirrhosis. The patient's most recent sodium results are listed below.  Recent Labs     07/28/25  0632 07/29/25  0707 07/30/25  0756   * 136 136     Plan  - Correct the sodium by 4-6mEq in 24 hours if hyponatremia persists   - Monitor sodium Daily.   - Patient hyponatremia is stable

## 2025-07-30 NOTE — ASSESSMENT & PLAN NOTE
Patient with hx CORDOVA cirrhosis c/b ascites (requiring paracentesis, on lasix 40 mg qd, eplerenone), grade 1 esophageal varices (EGD on 11/8/2024), hepatic encephalopathy 5 week hx abdominal, back pain since last paracentesis without fevers, chills, nausea, vomiting, diarrhea. CT L spine with congenital lumbar stenosis, degenerative changes. CT A/P with concern for peritonitis. Also noted splenomegaly and portal vein thrombosis, seen previously. INR 1.8. Patient had noted lactic acidosis at 2.9, now resolved. Overalll suspect abdominal pain 2/2 ascites with c/f SBP vs. Worsening of chronic portal vein thrombosis Given CTX and  pRBCs at OSH. Transferred to [facility] [place] for further workup and evaluation.     -started Lasix 20mg and spirolactone 50mg on 7/29, net 2.1L positive from admission but producing adequate urine output. Abdominal distention/umbilical hernia remains unchanged     - started lactulose 10mg BID for hepatic encephalopathy prophylaxis on admission   -3 BM overnight, continue lactulose   - Liver US showed cirrhotic morphology of the liver, sequela of portal hypertension and large volume of complicated ascites   - paracentesis on 7/21 pulled 4250mL, WBC 14,960 w/ 96% PNMs and aerobic culture was positive for strep mitis oralis   - repeat para on 7/25 pulled 2500mL, WBC 14,860  - per ID, stopped vanc and CTX, started on IV meropenem due to concern of infection with pathogen not covered by previous abx   -Day 4 of IV meropenem, will repeat paracentesis after 5 days of meropenem therapy per ID. Repeat cultures NGTD for 48 hours. If SBP resolved on repeat para, will transition to Cipro 500mg   -repeat CT abdomen/pelvis with large volume ascites with findings suggestive of associated peritonitis, massive splenomegaly, partially thrombosed portal vein and splenic vein and a small right pleural effusion.   - continue home rifaxamin   - CTM CBC, CMP, PT/INR daily   - MELD 3.0: 13 at 7/30/2025  7:56 AM  MELD-Na: 11 at  2025  7:56 AM  Calculated from:  Serum Creatinine: 0.8 mg/dL (Using min of 1 mg/dL) at 2025  7:56 AM  Serum Sodium: 136 mmol/L at 2025  7:56 AM  Total Bilirubin: 1.5 mg/dL at 2025  7:56 AM  Serum Albumin: 2.2 g/dL at 2025  7:56 AM  INR(ratio): 1.3 at 2025  7:56 AM  Age at listin years  Sex: Male at 2025  7:56 AM

## 2025-07-30 NOTE — PLAN OF CARE
Problem: Adult Inpatient Plan of Care  Goal: Plan of Care Review  Outcome: Progressing     Problem: Diabetes Comorbidity  Goal: Blood Glucose Level Within Targeted Range  Outcome: Progressing     Problem: Wound  Goal: Optimal Coping  Outcome: Progressing     Problem: Acute Kidney Injury/Impairment  Goal: Fluid and Electrolyte Balance  Outcome: Progressing     Problem: Fall Injury Risk  Goal: Absence of Fall and Fall-Related Injury  Outcome: Progressing

## 2025-07-30 NOTE — PLAN OF CARE
Recommendations     Interventions: Carbohydrate modified diet, Protein modified diet, Sodium modified diet     Recommendations:   1. Continue diabetic diet as tolerated     - recommend cardiac diet modifier due to PMHx    - please continue to document PO % intake via flowsheets    2. Recommend boost glucose control BID    3. Encourage good intake     4. RD to monitor weight, labs, meds, intake, tolerance     5. Recommend MVI     Goal #1: PO intake will meet greater than or equal to 75% of estimated needs by RD follow up  Nutrition Goal Status #1: new  Goal #2: Maintain weight throughout hospitalization  Nutrition Goal Status #2: new  Communication of RD Recs:  (POC)     Nutrition Discharge Planning     Nutrition Discharge Planning: Therapeutic diet (comments), Oral supplement regimen (comments)  Therapeutic diet (comments): cardiac diabetic diet  Oral supplement regimen (comments): MVI

## 2025-07-30 NOTE — PROGRESS NOTES
[facility] - Telemetry [facility] Medicine  Progress Note    Patient Name: Corky Bach  MRN: [id number]  Patient Class: IP- Inpatient   Admission Date: 7/20/2025  Length of Stay: 10 days  Attending Physician: David Mak MD  Primary Care Provider: Sarmad Estrella MD      Subjective     Principal Problem:SBP (spontaneous bacterial peritonitis)      HPI:  Corky Bach is a 60 y/o male with a notable hx of CORDOVA cirrhosis c/b esophageal varices, T2DM, MALT lymphoma, HLD, gout, and depression transferred to [facility] for hepatology and GI consult.     Patient initially presented to the ED of [facility] on 7/20 with complaints of chronic back and abdominal pain that had been ongoing for appx 5 weeks. He states that the back pain began to occur shortly after his last paracentesis on 7/12 located around his mid lumbar spine, starting with a dull back/ flank pain and quickly radiating to upper abdominal pain. He was trying to use ibuprofen and tylenol to help with the pain however the pain continued to progress. He eventually started to take percocet from a prior hospitalization however this was ineffective. He denies any fevers, chills, nausea, vomiting, constipation/diarrhea, melena, hematemesis during this time. At this point he presented to the ED for further evaluation.     Initially BPs were in the low 100s. He was found to be heme occult positive. Hb 6.9 and platelets 67. AST 13, ALT 6, , bili 1.2, INR 1.8, lactic 2.9. CT Lumbar spine obtained without acute lumbar fracture, congenital lumbar stenosis with degenerative changes. CT A/P without contrast showed moderate r sized pleural effusion, splenomegaly, calcified portal vein thrombus, ascites with thickening of surrounding peritoneal reflections c/f inflammatory peritonitis. Patient started on CTX, given 1u PRBC, and transferred to [facility] for GI/Hepatology evaluation.         Overview/Hospital Course:  Patient is a 59 M w/ hx of CORDOVA  cirrhosis, c/b esophageal varices, T2DM, MALT lymphoma, HLD, gout, admitted for abdminal pain and suspected SBP with decompensated cirrhosis and ascites. Found to have a hemoglobin of 6.9 on presentation, received 1 unit PRBC, no signs of bleeding. Discussed with hepatology and they did not think his presentation was c/f a GIB. Treated empirically for suspected SBP with ceftriaxone. Diagnostic and therapeutic paracentesis drained 4250 mL, lab results positive for SBP. Awaiting cultures. Ascites is now resolved after paracentesis. Hemoglobin and platelets were low today, 6.4 and 40 respectively. Transfused 1U PRBCs and 1U platelets on 7/22. Monitoring hemoglobin. Continuing ceftriaxone and albumin for SBP treatment, added vanc to cover enterococcus while awaiting culture data. Hemoglobin dropped again, transfused 1U PRBCs on 7/24. Aerobic culture from paracentesis sample was positive for strep mitis oralis. Repeat paracentesis on 7/25, pulled 2.5 L and labs revealed an ascitic fluid WBC count of around 14K again. ID recommended to discontinue vanc and CTX and start IV meropenem with repeat paracentesis after 5 days of IV meropenem treatment. Bowel regimen de-escalated on 7/28 due to patient having multiple soft stool bowel movements on night of 7/27. FOBT positive on 7/28 with no signs of active bleeding.     Interval History: NAEON. VSS. Patient only reporting abdominal tenderness at previous paracentesis site. Repeat paracentesis on 8/1, continue IV meropenem. Still producing adequate urine output.     Review of Systems  Objective:     Vital Signs (Most Recent):  Temp: 98 °F (36.7 °C) (07/30/25 1551)  Pulse: 84 (07/30/25 1551)  Resp: 18 (07/30/25 1551)  BP: 106/65 (07/30/25 1551)  SpO2: 97 % (07/30/25 1551) Vital Signs (24h Range):  Temp:  [98 °F (36.7 °C)-99.8 °F (37.7 °C)] 98 °F (36.7 °C)  Pulse:  [74-84] 84  Resp:  [17-20] 18  SpO2:  [97 %-99 %] 97 %  BP: ()/(52-65) 106/65     Weight: 80.6 kg (177 lb 11.1  oz)  Body mass index is 22.81 kg/m².    Intake/Output Summary (Last 24 hours) at 7/30/2025 1716  Last data filed at 7/29/2025 1822  Gross per 24 hour   Intake 500 ml   Output --   Net 500 ml         Physical Exam  Constitutional:       Appearance: Normal appearance.   Cardiovascular:      Rate and Rhythm: Normal rate and regular rhythm.      Pulses: Normal pulses.      Heart sounds: Murmur heard.   Pulmonary:      Effort: Pulmonary effort is normal. No respiratory distress.      Breath sounds: No wheezing.   Abdominal:      General: There is distension.      Tenderness: There is abdominal tenderness.      Hernia: A hernia is present.      Comments: Umbilical hernia present , hematoma present at paracentesis site on LLQ.    Musculoskeletal:      Right lower leg: No edema.      Left lower leg: No edema.   Skin:     General: Skin is warm and dry.   Neurological:      General: No focal deficit present.      Mental Status: He is alert and oriented to person, place, and time.   Psychiatric:         Mood and Affect: Mood normal.               Significant Labs: All pertinent labs within the past 24 hours have been reviewed.  BMP:   Recent Labs   Lab 07/30/25  0756   GLU 86      K 3.9      CO2 26   BUN 15   CREATININE 0.8   CALCIUM 8.1*   MG 1.5*     CBC:   Recent Labs   Lab 07/29/25  0707 07/30/25  0756   WBC 5.04 6.27   HGB 7.4* 8.2*   HCT 23.7* 25.5*   PLT 37* 42*       Significant Imaging: I have reviewed all pertinent imaging results/findings within the past 24 hours.  I have reviewed and interpreted all pertinent imaging results/findings within the past 24 hours.  Assessment & Plan  SBP (spontaneous bacterial peritonitis)  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  Abdominal pain  Ascites  Portal hypertensive gastropathy  Portal vein thrombosis  Splenomegaly  Patient with hx CORDOVA cirrhosis c/b ascites (requiring paracentesis, on lasix 40 mg qd, eplerenone), grade 1 esophageal varices (EGD on 11/8/2024), hepatic  encephalopathy 5 week hx abdominal, back pain since last paracentesis without fevers, chills, nausea, vomiting, diarrhea. CT L spine with congenital lumbar stenosis, degenerative changes. CT A/P with concern for peritonitis. Also noted splenomegaly and portal vein thrombosis, seen previously. INR 1.8. Patient had noted lactic acidosis at 2.9, now resolved. Overalll suspect abdominal pain 2/2 ascites with c/f SBP vs. Worsening of chronic portal vein thrombosis Given CTX and  pRBCs at OSH. Transferred to [facility] [place] [place] for further workup and evaluation.     -started Lasix 20mg and spirolactone 50mg on 7/29, net 2.1L positive from admission but producing adequate urine output. Abdominal distention/umbilical hernia remains unchanged     - started lactulose 10mg BID for hepatic encephalopathy prophylaxis on admission   -3 BM overnight, continue lactulose   - Liver US showed cirrhotic morphology of the liver, sequela of portal hypertension and large volume of complicated ascites   - paracentesis on 7/21 pulled 4250mL, WBC 14,960 w/ 96% PNMs and aerobic culture was positive for strep mitis oralis   - repeat para on 7/25 pulled 2500mL, WBC 14,860  - per ID, stopped vanc and CTX, started on IV meropenem due to concern of infection with pathogen not covered by previous abx   -Day 4 of IV meropenem, will repeat paracentesis after 5 days of meropenem therapy per ID. Repeat cultures NGTD for 48 hours. If SBP resolved on repeat para, will transition to Cipro 500mg   -repeat CT abdomen/pelvis with large volume ascites with findings suggestive of associated peritonitis, massive splenomegaly, partially thrombosed portal vein and splenic vein and a small right pleural effusion.   - continue home rifaxamin   - CTM CBC, CMP, PT/INR daily   - MELD 3.0: 13 at 7/30/2025  7:56 AM  MELD-Na: 11 at 7/30/2025  7:56 AM  Calculated from:  Serum Creatinine: 0.8 mg/dL (Using min of 1 mg/dL) at 7/30/2025  7:56 AM  Serum Sodium: 136 mmol/L at 7/30/2025  7:56  AM  Total Bilirubin: 1.5 mg/dL at 2025  7:56 AM  Serum Albumin: 2.2 g/dL at 2025  7:56 AM  INR(ratio): 1.3 at 2025  7:56 AM  Age at listin years  Sex: Male at 2025  7:56 AM     Anemia  Patient with hx cirrhosis c/b grade 1 esophageal varices, moderate portal hypertensive gastropathy, multiple angioectasias w/o bleeding (Last EGD 2024). Presenting with noted anemia of 6.9 at OSH. S/p 3 uPRBC.     Plan  - Monitor serial CBC: Daily  - Transfuse PRBC if patient becomes hemodynamically unstable, symptomatic or H/H drops below 7  -Up trending today 7.8 > 7.5>7.4 >8.2. No signs of active bleeding   - 1u pRBCs before admission at OSH, now 3u pRBCs while admitted at [facility] [place]  - consulted GI, anemia most likely due to decompensated cirrhosis no acute intervention needed at the moment, will monitor CBC closely   - Hemolytic anemia labs unremarkable   Mood disorder  Continue home venlafaxine  Hyponatremia  Hyponatremia is likely due to Cirrhosis. The patient's most recent sodium results are listed below.  Recent Labs     25  0632 25  0707 25  0756   * 136 136     Plan  - Correct the sodium by 4-6mEq in 24 hours if hyponatremia persists   - Monitor sodium Daily.   - Patient hyponatremia is stable  Thrombocytopenia  The likely etiology of thrombocytopenia is liver disease. The patients 3 most recent labs are listed below.  Recent Labs     25  0632 25  0707 25  0756   PLT 34* 37* 42*     Plan  - Will transfuse if platelet count is <50k (if undergoing surgical procedure or have active bleeding).  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  Underwent treatment including repeated Rituxan q2 month infusions. Followed by  Heme-Onc at [facility]. Most recent testing has suggested remission leading to potential transplant candidacy for cirrhosis to be re-instituted.   Type 2 diabetes mellitus with hyperglycemia, with long-term current use of insulin  Patient's FSGs are controlled on current medication regimen.  Last A1c reviewed-   Lab Results   Component Value Date    HGBA1C 8.8 (H) 07/20/2025     Current correctional scale  Low  Maintain anti-hyperglycemic dose as follows-   Antihyperglycemics (From admission, onward)      Start     Stop Route Frequency Ordered    07/23/25 2100  insulin glargine U-100 (Lantus) pen 18 Units         -- SubQ Nightly 07/23/25 1117    07/23/25 1130  insulin aspart U-100 pen 7 Units         -- SubQ 3 times daily with meals 07/23/25 1119    07/20/25 2316  insulin aspart U-100 pen 0-5 Units         -- SubQ Before meals & nightly PRN 07/20/25 2217          Hold Oral hypoglycemics while patient is in the hospital.  - decreased insulin Glargine to 18 units daily and aspart 7 units TID with meals   Mixed hyperlipidemia  - not currently on medication     VTE Risk Mitigation (From admission, onward)           Ordered     Reason for No Pharmacological VTE Prophylaxis  Once        Question:  Reasons:  Answer:  Risk of Bleeding    07/20/25 2020     IP VTE HIGH RISK PATIENT  Once         07/20/25 2020     Place sequential compression device  Until discontinued         07/20/25 2020                    Discharge Planning   GARY: 8/2/2025     Code Status: Full Code   Medical Readiness for Discharge Date:   Discharge Plan A: Home with family   Discharge Delays: None known at this time        Amanda Aguilar DO  Department of Hospital Medicine   [facility] - Telemetry Stepdown

## 2025-07-30 NOTE — ASSESSMENT & PLAN NOTE
Patient with hx cirrhosis c/b grade 1 esophageal varices, moderate portal hypertensive gastropathy, multiple angioectasias w/o bleeding (Last EGD 11/8/2024). Presenting with noted anemia of 6.9 at OSH. S/p 3 uPRBC.     Plan  - Monitor serial CBC: Daily  - Transfuse PRBC if patient becomes hemodynamically unstable, symptomatic or H/H drops below 7  -Up trending today 7.8 > 7.5>7.4 >8.2. No signs of active bleeding   - 1u pRBCs before admission at OSH, now 3u pRBCs while admitted at [facility]  - consulted GI, anemia most likely due to decompensated cirrhosis no acute intervention needed at the moment, will monitor CBC closely   - Hemolytic anemia labs unremarkable

## 2025-07-30 NOTE — ASSESSMENT & PLAN NOTE
The likely etiology of thrombocytopenia is liver disease. The patients 3 most recent labs are listed below.  Recent Labs     07/28/25  0632 07/29/25  0707 07/30/25  0756   PLT 34* 37* 42*     Plan  - Will transfuse if platelet count is <50k (if undergoing surgical procedure or have active bleeding).  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

## 2025-07-30 NOTE — ASSESSMENT & PLAN NOTE
STABLE     MALT-rivka with bone marrow involvement.  Underwent treatment including repeated Rituxan q2 month infusions. Followed by Heme-Onc at [facility]. Most recent testing has suggested remission leading to potential transplant candidacy for cirrhosis to be re-instituted.

## 2025-07-30 NOTE — ASSESSMENT & PLAN NOTE
Patient with hx CORDOVA cirrhosis c/b ascites (requiring paracentesis, on lasix 40 mg qd, eplerenone), grade 1 esophageal varices (EGD on 11/8/2024), hepatic encephalopathy 5 week hx abdominal, back pain since last paracentesis without fevers, chills, nausea, vomiting, diarrhea. CT L spine with congenital lumbar stenosis, degenerative changes. CT A/P with concern for peritonitis. Also noted splenomegaly and portal vein thrombosis, seen previously. INR 1.8. Patient had noted lactic acidosis at 2.9, now resolved. Overalll suspect abdominal pain 2/2 ascites with c/f SBP vs. Worsening of chronic portal vein thrombosis Given CTX and  pRBCs at OSH. Transferred to [facility] [place] for further workup and evaluation.     -started Lasix 20mg and spirolactone 50mg on 7/29, net 2.1L positive from admission but producing adequate urine output. Abdominal distention/umbilical hernia remains unchanged     - started lactulose 10mg BID for hepatic encephalopathy prophylaxis on admission   -3 BM overnight, continue lactulose   - Liver US showed cirrhotic morphology of the liver, sequela of portal hypertension and large volume of complicated ascites   - paracentesis on 7/21 pulled 4250mL, WBC 14,960 w/ 96% PNMs and aerobic culture was positive for strep mitis oralis   - repeat para on 7/25 pulled 2500mL, WBC 14,860  - per ID, stopped vanc and CTX, started on IV meropenem due to concern of infection with pathogen not covered by previous abx   -Day 4 of IV meropenem, will repeat paracentesis after 5 days of meropenem therapy per ID. Repeat cultures NGTD for 48 hours. If SBP resolved on repeat para, will transition to Cipro 500mg   -repeat CT abdomen/pelvis with large volume ascites with findings suggestive of associated peritonitis, massive splenomegaly, partially thrombosed portal vein and splenic vein and a small right pleural effusion.   - continue home rifaxamin   - CTM CBC, CMP, PT/INR daily   - MELD 3.0: 13 at 7/30/2025  7:56 AM  MELD-Na: 11 at  2025  7:56 AM  Calculated from:  Serum Creatinine: 0.8 mg/dL (Using min of 1 mg/dL) at 2025  7:56 AM  Serum Sodium: 136 mmol/L at 2025  7:56 AM  Total Bilirubin: 1.5 mg/dL at 2025  7:56 AM  Serum Albumin: 2.2 g/dL at 2025  7:56 AM  INR(ratio): 1.3 at 2025  7:56 AM  Age at listin years  Sex: Male at 2025  7:56 AM

## 2025-07-30 NOTE — ASSESSMENT & PLAN NOTE
Patient with hx CORDOVA cirrhosis c/b ascites (requiring paracentesis, on lasix 40 mg qd, eplerenone), grade 1 esophageal varices (EGD on 11/8/2024), hepatic encephalopathy 5 week hx abdominal, back pain since last paracentesis without fevers, chills, nausea, vomiting, diarrhea. CT L spine with congenital lumbar stenosis, degenerative changes. CT A/P with concern for peritonitis. Also noted splenomegaly and portal vein thrombosis, seen previously. INR 1.8. Patient had noted lactic acidosis at 2.9, now resolved. Overalll suspect abdominal pain 2/2 ascites with c/f SBP vs. Worsening of chronic portal vein thrombosis Given CTX and  pRBCs at OSH. [place] [facility] [place]     -started Lasix 20mg and spirolactone 50mg on 7/29, net 2.1L positive from admission but producing adequate urine output. Abdominal distention/umbilical hernia remains unchanged     - started lactulose 10mg BID for hepatic encephalopathy prophylaxis on admission   -3 BM overnight, continue lactulose   - Liver US showed cirrhotic morphology of the liver, sequela of portal hypertension and large volume of complicated ascites   - paracentesis on 7/21 pulled 4250mL, WBC 14,960 w/ 96% PNMs and aerobic culture was positive for strep mitis oralis   - repeat para on 7/25 pulled 2500mL, WBC 14,860  - per ID, stopped vanc and CTX, started on IV meropenem due to concern of infection with pathogen not covered by previous abx   -Day 4 of IV meropenem, will repeat paracentesis after 5 days of meropenem therapy per ID. Repeat cultures NGTD for 48 hours. If SBP resolved on repeat para, will transition to Cipro 500mg   -repeat CT abdomen/pelvis with large volume ascites with findings suggestive of associated peritonitis, massive splenomegaly, partially thrombosed portal vein and splenic vein and a small right pleural effusion.   - continue home rifaxamin   - CTM CBC, CMP, PT/INR daily   - MELD 3.0: 13 at 7/30/2025  7:56 AM  MELD-Na: 11 at  2025  7:56 AM  Calculated from:  Serum Creatinine: 0.8 mg/dL (Using min of 1 mg/dL) at 2025  7:56 AM  Serum Sodium: 136 mmol/L at 2025  7:56 AM  Total Bilirubin: 1.5 mg/dL at 2025  7:56 AM  Serum Albumin: 2.2 g/dL at 2025  7:56 AM  INR(ratio): 1.3 at 2025  7:56 AM  Age at listin years  Sex: Male at 2025  7:56 AM

## 2025-07-30 NOTE — ASSESSMENT & PLAN NOTE
Patient with hx CORDOVA cirrhosis c/b ascites (requiring paracentesis, on lasix 40 mg qd, eplerenone), grade 1 esophageal varices (EGD on 11/8/2024), hepatic encephalopathy 5 week hx abdominal, back pain since last paracentesis without fevers, chills, nausea, vomiting, diarrhea. CT L spine with congenital lumbar stenosis, degenerative changes. CT A/P with concern for peritonitis. Also noted splenomegaly and portal vein thrombosis, seen previously. INR 1.8. Patient had noted lactic acidosis at 2.9, now resolved. Overalll suspect abdominal pain 2/2 ascites with c/f SBP vs. Worsening of chronic portal vein thrombosis Given CTX and  pRBCs at OSH. [place] [facility] [place] [facility] [place].     -started Lasix 20mg and spirolactone 50mg on 7/29, net 2.1L positive from admission but producing adequate urine output. Abdominal distention/umbilical hernia remains unchanged     - started lactulose 10mg BID for hepatic encephalopathy prophylaxis on admission   -3 BM overnight, continue lactulose   - Liver US showed cirrhotic morphology of the liver, sequela of portal hypertension and large volume of complicated ascites   - paracentesis on 7/21 pulled 4250mL, WBC 14,960 w/ 96% PNMs and aerobic culture was positive for strep mitis oralis   - repeat para on 7/25 pulled 2500mL, WBC 14,860  - per ID, stopped vanc and CTX, started on IV meropenem due to concern of infection with pathogen not covered by previous abx   -Day 4 of IV meropenem, will repeat paracentesis after 5 days of meropenem therapy per ID. Repeat cultures NGTD for 48 hours. If SBP resolved on repeat para, will transition to Cipro 500mg   -repeat CT abdomen/pelvis with large volume ascites with findings suggestive of associated peritonitis, massive splenomegaly, partially thrombosed portal vein and splenic vein and a small right pleural effusion.   - continue home rifaxamin   - CTM CBC, CMP, PT/INR daily   - MELD 3.0: 13 at 7/30/2025  7:56 AM  MELD-Na: 11 at  2025  7:56 AM  Calculated from:  Serum Creatinine: 0.8 mg/dL (Using min of 1 mg/dL) at 2025  7:56 AM  Serum Sodium: 136 mmol/L at 2025  7:56 AM  Total Bilirubin: 1.5 mg/dL at 2025  7:56 AM  Serum Albumin: 2.2 g/dL at 2025  7:56 AM  INR(ratio): 1.3 at 2025  7:56 AM  Age at listin years  Sex: Male at 2025  7:56 AM

## 2025-07-30 NOTE — ASSESSMENT & PLAN NOTE
Patient with hx CORDOVA cirrhosis c/b ascites (requiring paracentesis, on lasix 40 mg qd, eplerenone), grade 1 esophageal varices (EGD on 11/8/2024), hepatic encephalopathy 5 week hx abdominal, back pain since last paracentesis without fevers, chills, nausea, vomiting, diarrhea. CT L spine with congenital lumbar stenosis, degenerative changes. CT A/P with concern for peritonitis. Also noted splenomegaly and portal vein thrombosis, seen previously. INR 1.8. Patient had noted lactic acidosis at 2.9, now resolved. Overalll suspect abdominal pain 2/2 ascites with c/f SBP vs. Worsening of chronic portal vein thrombosis Given CTX and  pRBCs at OSH. Transferred to [facility] [place] for further workup and evaluation.     -started Lasix 20mg and spirolactone 50mg on 7/29, net 2.1L positive from admission but producing adequate urine output. Abdominal distention/umbilical hernia remains unchanged     - started lactulose 10mg BID for hepatic encephalopathy prophylaxis on admission   -3 BM overnight, continue lactulose   - Liver US showed cirrhotic morphology of the liver, sequela of portal hypertension and large volume of complicated ascites   - paracentesis on 7/21 pulled 4250mL, WBC 14,960 w/ 96% PNMs and aerobic culture was positive for strep mitis oralis   - repeat para on 7/25 pulled 2500mL, WBC 14,860  - per ID, stopped vanc and CTX, started on IV meropenem due to concern of infection with pathogen not covered by previous abx   -Day 4 of IV meropenem, will repeat paracentesis after 5 days of meropenem therapy per ID. Repeat cultures NGTD for 48 hours. If SBP resolved on repeat para, will transition to Cipro 500mg   -repeat CT abdomen/pelvis with large volume ascites with findings suggestive of associated peritonitis, massive splenomegaly, partially thrombosed portal vein and splenic vein and a small right pleural effusion.   - continue home rifaxamin   - CTM CBC, CMP, PT/INR daily   - MELD 3.0: 13 at 7/30/2025  7:56 AM  MELD-Na: 11 at  2025  7:56 AM  Calculated from:  Serum Creatinine: 0.8 mg/dL (Using min of 1 mg/dL) at 2025  7:56 AM  Serum Sodium: 136 mmol/L at 2025  7:56 AM  Total Bilirubin: 1.5 mg/dL at 2025  7:56 AM  Serum Albumin: 2.2 g/dL at 2025  7:56 AM  INR(ratio): 1.3 at 2025  7:56 AM  Age at listin years  Sex: Male at 2025  7:56 AM

## 2025-07-30 NOTE — PROGRESS NOTES
[facility] - Telemetry Stepdown  Adult Nutrition  Progress Note    SUMMARY       Recommendations    Interventions: Carbohydrate modified diet, Protein modified diet, Sodium modified diet    Recommendations:   1. Continue diabetic diet as tolerated     - recommend cardiac diet modifier due to PMHx    - please continue to document PO % intake via flowsheets    2. Recommend boost glucose control BID    3. Encourage good intake     4. RD to monitor weight, labs, meds, intake, tolerance     5. Recommend MVI    Goal #1: PO intake will meet greater than or equal to 75% of estimated needs by RD follow up  Nutrition Goal Status #1: new  Goal #2: Maintain weight throughout hospitalization  Nutrition Goal Status #2: new  Communication of RD Recs:  (POC)    Nutrition Discharge Planning    Nutrition Discharge Planning: Therapeutic diet (comments), Oral supplement regimen (comments)  Therapeutic diet (comments): cardiac diabetic diet  Oral supplement regimen (comments): MVI    Reason for Assessment    Reason For Assessment: length of stay  Diagnosis:  (SBP (spontaneous bacterial peritonitis))  General Information Comments: Pt admitted with SBP (spontaneous bacterial peritonitis). PMHx: CORDOVA cirrhosis c/b esophageal varices, T2DM, MALT lymphoma, HLD, gout. Recent surgical hx: Repeat abdominal paracentesis 6/12. Pt was a transfer for hepatology and GI consult. 1+ edema. No nutritionally significant wounds. No GI s/s - BM: 7/29. Having a varied intake - PO: %. No wt taken since admit wt on 7/20. Unable to determine if wt loss is accurate due to wt from 6/12 being a stated wt. Pt does get abdominal paracentesis frequently and is on furosemide. NFPE at follow up if medically warranted.     Nutrition/Diet History    Spiritual, Cultural Beliefs, Buddhist Practices, Values that Affect Care: no  Food Allergies: NKFA  Factors Affecting Nutritional Intake: None identified at this time  Nutrition-related SDOH: None  "Identified    Anthropometrics    Height: 6' 2" (188 cm)  Height (inches): 74 in  Height Method: Stated  Weight: 80.6 kg (177 lb 11.1 oz)  Weight (lb): 177.69 lb  Weight Method: Bed Scale  Ideal Body Weight (IBW), Male: 190 lb  % Ideal Body Weight, Male (lb): 93.52 %  BMI (Calculated): 22.8  BMI Grade: 18.5-24.9 - normal    Lab/Procedures/Meds    Pertinent Labs Reviewed: reviewed  Pertinent Labs Comments: H/H 8.2/25.5 low, Ca 8.1 low, Mg 1.5 low, albumin 2.2 low  Pertinent Medications Reviewed: reviewed  Scheduled Meds:   furosemide  20 mg Oral Daily    insulin aspart U-100  7 Units Subcutaneous TIDWM    insulin glargine U-100 (Lantus)  18 Units Subcutaneous QHS    lactulose  10 g Oral BID    meropenem IV (PEDS and ADULTS)  2 g Intravenous Q8H    methocarbamoL  250 mg Oral TID    mupirocin   Nasal BID    oxybutynin  5 mg Oral Daily    rifAXIMin  550 mg Oral BID    spironolactone  50 mg Oral Daily    tamsulosin  0.4 mg Oral QHS    venlafaxine  75 mg Oral BID     Continuous Infusions:  PRN Meds:.  Current Facility-Administered Medications:     acetaminophen, 500 mg, Oral, Q6H PRN    dextrose 50%, 12.5 g, Intravenous, PRN    dextrose 50%, 25 g, Intravenous, PRN    glucagon (human recombinant), 1 mg, Intramuscular, PRN    glucose, 16 g, Oral, PRN    glucose, 24 g, Oral, PRN    insulin aspart U-100, 0-5 Units, Subcutaneous, QID (AC + HS) PRN    melatonin, 6 mg, Oral, Nightly PRN    naloxone, 0.02 mg, Intravenous, PRN    ondansetron, 4 mg, Intravenous, Q6H PRN    oxyCODONE, 5 mg, Oral, Q6H PRN    oxyCODONE, 10 mg, Oral, Q6H PRN    sodium chloride 0.9%, 10 mL, Intravenous, Q12H PRN    Estimated/Assessed Needs    Weight Used For Calorie Calculations: 80.6 kg (177 lb 11.1 oz)  Energy Calorie Requirements (kcal): 6885-9903 kcal  Energy Need Method: Kcal/kg (30-35 kcal/kg)  Protein Requirements:  g/pro (1.0-1.5 g/kg)  Weight Used For Protein Calculations: 80.6 kg (177 lb 11.1 oz)        RDA Method (mL): 2418  CHO " Requirement: 302-353 g    Nutrition Prescription Ordered    Current Diet Order: diabetic    Evaluation of Received Nutrient/Fluid Intake    Intake/Output Summary (Last 24 hours) at 7/30/2025 1232  Last data filed at 7/29/2025 1822  Gross per 24 hour   Intake 600 ml   Output --   Net 600 ml     Energy Calories Required: not meeting needs  Protein Required: not meeting needs  Fluid Required:  (per MD)  Tolerance: tolerating  % Intake of Estimated Energy Needs: 50 - 75 %  % Meal Intake: Other: %    PES Statement    Increased nutrient needs related to Chronic illness (protein/calorie) as evidenced by  (CORDOVA cirrhosis)  Status: New    Nutrition Risk    Level of Risk/Frequency of Follow-up: low (1/week)     Monitor and Evaluation    Monitor and Evaluation: Energy intake, Food and beverage intake, Protein intake, Carbohydrate intake, Diet order, Weight, Electrolyte and renal panel, Gastrointestinal profile, Glucose/endocrine profile, Inflammatory profile, Lipid profile, Nutrition focused physical findings, Skin     Nutrition Follow-Up    RD Follow-up?: Yes

## 2025-07-30 NOTE — ASSESSMENT & PLAN NOTE
Patient with hx CORDOVA cirrhosis c/b ascites (requiring paracentesis, on lasix 40 mg qd, eplerenone), grade 1 esophageal varices (EGD on 11/8/2024), hepatic encephalopathy 5 week hx abdominal, back pain since last paracentesis without fevers, chills, nausea, vomiting, diarrhea. CT L spine with congenital lumbar stenosis, degenerative changes. CT A/P with concern for peritonitis. Also noted splenomegaly and portal vein thrombosis, seen previously. INR 1.8. Patient had noted lactic acidosis at 2.9, now resolved. Overalll suspect abdominal pain 2/2 ascites with c/f SBP vs. Worsening of chronic portal vein thrombosis Given CTX and  pRBCs at OSH. Transferred to [facility] [place] workup and evaluation.     -started Lasix 20mg and spirolactone 50mg on 7/29, net 2.1L positive from admission but producing adequate urine output. Abdominal distention/umbilical hernia remains unchanged     - started lactulose 10mg BID for hepatic encephalopathy prophylaxis on admission   -3 BM overnight, continue lactulose   - Liver US showed cirrhotic morphology of the liver, sequela of portal hypertension and large volume of complicated ascites   - paracentesis on 7/21 pulled 4250mL, WBC 14,960 w/ 96% PNMs and aerobic culture was positive for strep mitis oralis   - repeat para on 7/25 pulled 2500mL, WBC 14,860  - per ID, stopped vanc and CTX, started on IV meropenem due to concern of infection with pathogen not covered by previous abx   -Day 4 of IV meropenem, will repeat paracentesis after 5 days of meropenem therapy per ID. Repeat cultures NGTD for 48 hours. If SBP resolved on repeat para, will transition to Cipro 500mg   -repeat CT abdomen/pelvis with large volume ascites with findings suggestive of associated peritonitis, massive splenomegaly, partially thrombosed portal vein and splenic vein and a small right pleural effusion.   - continue home rifaxamin   - CTM CBC, CMP, PT/INR daily   - MELD 3.0: 13 at 7/30/2025  7:56 AM  MELD-Na: 11 at  2025  7:56 AM  Calculated from:  Serum Creatinine: 0.8 mg/dL (Using min of 1 mg/dL) at 2025  7:56 AM  Serum Sodium: 136 mmol/L at 2025  7:56 AM  Total Bilirubin: 1.5 mg/dL at 2025  7:56 AM  Serum Albumin: 2.2 g/dL at 2025  7:56 AM  INR(ratio): 1.3 at 2025  7:56 AM  Age at listin years  Sex: Male at 2025  7:56 AM

## 2025-07-30 NOTE — ASSESSMENT & PLAN NOTE
Patient with hx CORDOVA cirrhosis c/b ascites (requiring paracentesis, on lasix 40 mg qd, eplerenone), grade 1 esophageal varices (EGD on 11/8/2024), hepatic encephalopathy 5 week hx abdominal, back pain since last paracentesis without fevers, chills, nausea, vomiting, diarrhea. CT L spine with congenital lumbar stenosis, degenerative changes. CT A/P with concern for peritonitis. Also noted splenomegaly and portal vein thrombosis, seen previously. INR 1.8. Patient had noted lactic acidosis at 2.9, now resolved. Overalll suspect abdominal pain 2/2 ascites with c/f SBP vs. Worsening of chronic portal vein thrombosis Given CTX and  pRBCs at OSH. Transferred to [facility] [place] for further workup and evaluation.     -started Lasix 20mg and spirolactone 50mg on 7/29, net 2.1L positive from admission but producing adequate urine output. Abdominal distention/umbilical hernia remains unchanged     - started lactulose 10mg BID for hepatic encephalopathy prophylaxis on admission   -3 BM overnight, continue lactulose   - Liver US showed cirrhotic morphology of the liver, sequela of portal hypertension and large volume of complicated ascites   - paracentesis on 7/21 pulled 4250mL, WBC 14,960 w/ 96% PNMs and aerobic culture was positive for strep mitis oralis   - repeat para on 7/25 pulled 2500mL, WBC 14,860  - per ID, stopped vanc and CTX, started on IV meropenem due to concern of infection with pathogen not covered by previous abx   -Day 4 of IV meropenem, will repeat paracentesis after 5 days of meropenem therapy per ID. Repeat cultures NGTD for 48 hours. If SBP resolved on repeat para, will transition to Cipro 500mg   -repeat CT abdomen/pelvis with large volume ascites with findings suggestive of associated peritonitis, massive splenomegaly, partially thrombosed portal vein and splenic vein and a small right pleural effusion.   - continue home rifaxamin   - CTM CBC, CMP, PT/INR daily   - MELD 3.0: 13 at 7/30/2025  7:56 AM  MELD-Na: 11 at  2025  7:56 AM  Calculated from:  Serum Creatinine: 0.8 mg/dL (Using min of 1 mg/dL) at 2025  7:56 AM  Serum Sodium: 136 mmol/L at 2025  7:56 AM  Total Bilirubin: 1.5 mg/dL at 2025  7:56 AM  Serum Albumin: 2.2 g/dL at 2025  7:56 AM  INR(ratio): 1.3 at 2025  7:56 AM  Age at listin years  Sex: Male at 2025  7:56 AM

## 2025-07-31 PROBLEM — E87.6 HYPOKALEMIA: Status: ACTIVE | Noted: 2025-07-31

## 2025-07-31 LAB
ABO + RH BLD: NORMAL
ABSOLUTE EOSINOPHIL (OHS): 0.12 K/UL
ABSOLUTE MONOCYTE (OHS): 0.54 K/UL (ref 0.3–1)
ABSOLUTE NEUTROPHIL COUNT (OHS): 4.25 K/UL (ref 1.8–7.7)
ALBUMIN FLD-MCNC: 1.1 G/DL
ALBUMIN SERPL BCP-MCNC: 2 G/DL (ref 3.5–5.2)
ALP SERPL-CCNC: 183 UNIT/L (ref 40–150)
ALT SERPL W/O P-5'-P-CCNC: 17 UNIT/L (ref 0–55)
ANION GAP (OHS): 4 MMOL/L (ref 8–16)
APPEARANCE FLD: NORMAL
AST SERPL-CCNC: 73 UNIT/L (ref 0–50)
BASOPHILS # BLD AUTO: 0.04 K/UL
BASOPHILS NFR BLD AUTO: 0.7 %
BILIRUB SERPL-MCNC: 1.3 MG/DL (ref 0.1–1)
BLD PROD TYP BPU: NORMAL
BLOOD UNIT EXPIRATION DATE: NORMAL
BLOOD UNIT TYPE CODE: 6200
BUN SERPL-MCNC: 16 MG/DL (ref 6–20)
CALCIUM SERPL-MCNC: 7.9 MG/DL (ref 8.7–10.5)
CHLORIDE SERPL-SCNC: 104 MMOL/L (ref 95–110)
CO2 SERPL-SCNC: 28 MMOL/L (ref 23–29)
COLOR FLD: NORMAL
CREAT SERPL-MCNC: 0.8 MG/DL (ref 0.5–1.4)
CROSSMATCH INTERPRETATION: NORMAL
DISPENSE STATUS: NORMAL
ERYTHROCYTE [DISTWIDTH] IN BLOOD BY AUTOMATED COUNT: 24.7 % (ref 11.5–14.5)
GFR SERPLBLD CREATININE-BSD FMLA CKD-EPI: >60 ML/MIN/1.73/M2
GLUCOSE SERPL-MCNC: 93 MG/DL (ref 70–110)
GRAM STN SPEC: NORMAL
GRAM STN SPEC: NORMAL
HCT VFR BLD AUTO: 22.7 % (ref 40–54)
HGB BLD-MCNC: 7.3 GM/DL (ref 14–18)
IMM GRANULOCYTES # BLD AUTO: 0.03 K/UL (ref 0–0.04)
IMM GRANULOCYTES NFR BLD AUTO: 0.6 % (ref 0–0.5)
INR PPP: 1.4 (ref 0.8–1.2)
LDH FLD L TO P-CCNC: 3188 U/L
LYMPHOCYTES # BLD AUTO: 0.38 K/UL (ref 1–4.8)
LYMPHOCYTES NFR FLD MANUAL: 2 %
MAGNESIUM SERPL-MCNC: 1.4 MG/DL (ref 1.6–2.6)
MCH RBC QN AUTO: 23.7 PG (ref 27–31)
MCHC RBC AUTO-ENTMCNC: 32.2 G/DL (ref 32–36)
MCV RBC AUTO: 74 FL (ref 82–98)
MONOS+MACROS NFR FLD MANUAL: 2 %
NEUTROPHILS NFR FLD MANUAL: 96 %
NUCLEATED RBC (/100WBC) (OHS): 0 /100 WBC
PLATELET # BLD AUTO: 37 K/UL (ref 150–450)
PLATELET BLD QL SMEAR: ABNORMAL
PMV BLD AUTO: 8.5 FL (ref 9.2–12.9)
POCT GLUCOSE: 110 MG/DL (ref 70–110)
POCT GLUCOSE: 117 MG/DL (ref 70–110)
POCT GLUCOSE: 123 MG/DL (ref 70–110)
POCT GLUCOSE: 144 MG/DL (ref 70–110)
POCT GLUCOSE: 95 MG/DL (ref 70–110)
POTASSIUM SERPL-SCNC: 3.4 MMOL/L (ref 3.5–5.1)
PROT FLD-MCNC: 3.3 G/DL
PROT SERPL-MCNC: 7.1 GM/DL (ref 6–8.4)
PROTHROMBIN TIME: 14.6 SECONDS (ref 9–12.5)
RBC # BLD AUTO: 3.08 M/UL (ref 4.6–6.2)
RELATIVE EOSINOPHIL (OHS): 2.2 %
RELATIVE LYMPHOCYTE (OHS): 7.1 % (ref 18–48)
RELATIVE MONOCYTE (OHS): 10.1 % (ref 4–15)
RELATIVE NEUTROPHIL (OHS): 79.3 % (ref 38–73)
SODIUM SERPL-SCNC: 136 MMOL/L (ref 136–145)
UNIT NUMBER: NORMAL
WBC # BLD AUTO: 5.36 K/UL (ref 3.9–12.7)
WBC # FLD: NORMAL /CU MM

## 2025-07-31 PROCEDURE — P9073 PLATELETS PHERESIS PATH REDU: HCPCS | Mod: NTX

## 2025-07-31 PROCEDURE — 20600001 HC STEP DOWN PRIVATE ROOM: Mod: NTX

## 2025-07-31 PROCEDURE — 85025 COMPLETE CBC W/AUTO DIFF WBC: CPT | Mod: NTX

## 2025-07-31 PROCEDURE — 87116 MYCOBACTERIA CULTURE: CPT | Mod: NTX | Performed by: INTERNAL MEDICINE

## 2025-07-31 PROCEDURE — 36415 COLL VENOUS BLD VENIPUNCTURE: CPT | Mod: NTX

## 2025-07-31 PROCEDURE — 63600175 PHARM REV CODE 636 W HCPCS: Mod: NTX | Performed by: INTERNAL MEDICINE

## 2025-07-31 PROCEDURE — 87205 SMEAR GRAM STAIN: CPT | Mod: NTX

## 2025-07-31 PROCEDURE — 25000003 PHARM REV CODE 250: Mod: NTX

## 2025-07-31 PROCEDURE — 0W9G3ZZ DRAINAGE OF PERITONEAL CAVITY, PERCUTANEOUS APPROACH: ICD-10-PCS | Performed by: HOSPITALIST

## 2025-07-31 PROCEDURE — 84157 ASSAY OF PROTEIN OTHER: CPT | Mod: NTX

## 2025-07-31 PROCEDURE — 85610 PROTHROMBIN TIME: CPT | Mod: NTX

## 2025-07-31 PROCEDURE — 87206 SMEAR FLUORESCENT/ACID STAI: CPT | Mod: NTX | Performed by: INTERNAL MEDICINE

## 2025-07-31 PROCEDURE — 87102 FUNGUS ISOLATION CULTURE: CPT | Mod: NTX | Performed by: INTERNAL MEDICINE

## 2025-07-31 PROCEDURE — 89051 BODY FLUID CELL COUNT: CPT | Mod: NTX

## 2025-07-31 PROCEDURE — 80053 COMPREHEN METABOLIC PANEL: CPT | Mod: NTX

## 2025-07-31 PROCEDURE — 25000003 PHARM REV CODE 250: Mod: NTX | Performed by: INTERNAL MEDICINE

## 2025-07-31 PROCEDURE — 83615 LACTATE (LD) (LDH) ENZYME: CPT | Mod: NTX

## 2025-07-31 PROCEDURE — 36430 TRANSFUSION BLD/BLD COMPNT: CPT | Mod: NTX

## 2025-07-31 PROCEDURE — P9073 PLATELETS PHERESIS PATH REDU: HCPCS | Mod: 91,NTX

## 2025-07-31 PROCEDURE — 83735 ASSAY OF MAGNESIUM: CPT | Mod: NTX

## 2025-07-31 PROCEDURE — 87070 CULTURE OTHR SPECIMN AEROBIC: CPT | Mod: NTX

## 2025-07-31 PROCEDURE — 82042 OTHER SOURCE ALBUMIN QUAN EA: CPT | Mod: NTX

## 2025-07-31 PROCEDURE — 87075 CULTR BACTERIA EXCEPT BLOOD: CPT | Mod: NTX

## 2025-07-31 PROCEDURE — 63600175 PHARM REV CODE 636 W HCPCS: Mod: NTX

## 2025-07-31 RX ORDER — HYDROCODONE BITARTRATE AND ACETAMINOPHEN 500; 5 MG/1; MG/1
TABLET ORAL
Status: DISCONTINUED | OUTPATIENT
Start: 2025-07-31 | End: 2025-08-02

## 2025-07-31 RX ORDER — CIPROFLOXACIN 500 MG/1
500 TABLET, FILM COATED ORAL DAILY
Qty: 30 TABLET | Refills: 2 | Status: CANCELLED | OUTPATIENT
Start: 2025-07-31

## 2025-07-31 RX ORDER — MAGNESIUM SULFATE HEPTAHYDRATE 40 MG/ML
2 INJECTION, SOLUTION INTRAVENOUS
Status: COMPLETED | OUTPATIENT
Start: 2025-07-31 | End: 2025-07-31

## 2025-07-31 RX ADMIN — METHOCARBAMOL 250 MG: 500 TABLET ORAL at 09:07

## 2025-07-31 RX ADMIN — RIFAXIMIN 550 MG: 550 TABLET ORAL at 08:07

## 2025-07-31 RX ADMIN — METHOCARBAMOL 250 MG: 500 TABLET ORAL at 06:07

## 2025-07-31 RX ADMIN — OXYCODONE HYDROCHLORIDE 10 MG: 10 TABLET ORAL at 06:07

## 2025-07-31 RX ADMIN — MEROPENEM 2 G: 2 INJECTION, POWDER, FOR SOLUTION INTRAVENOUS at 07:07

## 2025-07-31 RX ADMIN — POTASSIUM BICARBONATE 40 MEQ: 391 TABLET, EFFERVESCENT ORAL at 08:07

## 2025-07-31 RX ADMIN — TAMSULOSIN HYDROCHLORIDE 0.4 MG: 0.4 CAPSULE ORAL at 09:07

## 2025-07-31 RX ADMIN — METHOCARBAMOL 250 MG: 500 TABLET ORAL at 08:07

## 2025-07-31 RX ADMIN — Medication 10 MG: at 12:07

## 2025-07-31 RX ADMIN — INSULIN ASPART 7 UNITS: 100 INJECTION, SOLUTION INTRAVENOUS; SUBCUTANEOUS at 05:07

## 2025-07-31 RX ADMIN — OXYCODONE HYDROCHLORIDE 10 MG: 10 TABLET ORAL at 09:07

## 2025-07-31 RX ADMIN — VENLAFAXINE 75 MG: 75 TABLET ORAL at 08:07

## 2025-07-31 RX ADMIN — OXYBUTYNIN CHLORIDE 5 MG: 5 TABLET, EXTENDED RELEASE ORAL at 08:07

## 2025-07-31 RX ADMIN — MEROPENEM 2 G: 2 INJECTION, POWDER, FOR SOLUTION INTRAVENOUS at 06:07

## 2025-07-31 RX ADMIN — LACTULOSE 10 G: 20 SOLUTION ORAL at 08:07

## 2025-07-31 RX ADMIN — INSULIN GLARGINE 18 UNITS: 100 INJECTION, SOLUTION SUBCUTANEOUS at 09:07

## 2025-07-31 RX ADMIN — VENLAFAXINE 75 MG: 75 TABLET ORAL at 09:07

## 2025-07-31 RX ADMIN — MUPIROCIN: 20 OINTMENT TOPICAL at 08:07

## 2025-07-31 RX ADMIN — MAGNESIUM SULFATE HEPTAHYDRATE 2 G: 40 INJECTION, SOLUTION INTRAVENOUS at 08:07

## 2025-07-31 RX ADMIN — SPIRONOLACTONE 50 MG: 50 TABLET ORAL at 08:07

## 2025-07-31 RX ADMIN — MAGNESIUM SULFATE HEPTAHYDRATE 2 G: 40 INJECTION, SOLUTION INTRAVENOUS at 12:07

## 2025-07-31 RX ADMIN — FUROSEMIDE 20 MG: 20 TABLET ORAL at 08:07

## 2025-07-31 RX ADMIN — INSULIN ASPART 7 UNITS: 100 INJECTION, SOLUTION INTRAVENOUS; SUBCUTANEOUS at 12:07

## 2025-07-31 RX ADMIN — MEROPENEM 2 G: 2 INJECTION, POWDER, FOR SOLUTION INTRAVENOUS at 12:07

## 2025-07-31 RX ADMIN — RIFAXIMIN 550 MG: 550 TABLET ORAL at 09:07

## 2025-07-31 NOTE — ASSESSMENT & PLAN NOTE
Patient with hx cirrhosis c/b grade 1 esophageal varices, moderate portal hypertensive gastropathy, multiple angioectasias w/o bleeding (Last EGD 11/8/2024). Presenting with noted anemia of 6.9 at OSH. S/p 3 uPRBC.     Plan  - Monitor serial CBC: Daily  - Transfuse PRBC if patient becomes hemodynamically unstable, symptomatic or H/H drops below 7  -Up trending today 7.8 > 7.5>7.4 >8.2. No signs of active bleeding   - 1u pRBCs before admission at OSH, now 3u pRBCs while admitted at [facility] [place]  - consulted GI, anemia most likely due to decompensated cirrhosis no acute intervention needed at the moment, will monitor CBC closely   - Hemolytic anemia labs unremarkable

## 2025-07-31 NOTE — ASSESSMENT & PLAN NOTE
Patient with hx CORDOVA cirrhosis c/b ascites (requiring paracentesis, on lasix 40 mg qd, eplerenone), grade 1 esophageal varices (EGD on 11/8/2024), hepatic encephalopathy 5 week hx abdominal, back pain since last paracentesis without fevers, chills, nausea, vomiting, diarrhea. CT L spine with congenital lumbar stenosis, degenerative changes. CT A/P with concern for peritonitis. Also noted splenomegaly and portal vein thrombosis, seen previously. INR 1.8. Patient had noted lactic acidosis at 2.9, now resolved. Overalll suspect abdominal pain 2/2 ascites with c/f SBP vs. Worsening of chronic portal vein thrombosis Given CTX and  pRBCs at OSH. Transferred to [facility] [place] for further workup and evaluation.     -started Lasix 20mg and spirolactone 50mg on 7/29, net 2.1L positive from admission but producing adequate urine output. Abdominal distention/umbilical hernia remains unchanged     - started lactulose 10mg BID for hepatic encephalopathy prophylaxis on admission   -3 BM overnight, continue lactulose   - Liver US showed cirrhotic morphology of the liver, sequela of portal hypertension and large volume of complicated ascites   - paracentesis on 7/21 pulled 4250mL, WBC 14,960 w/ 96% PNMs and aerobic culture was positive for strep mitis oralis   - repeat para on 7/25 pulled 2500mL, WBC 14,860  - per ID, stopped vanc and CTX, started on IV meropenem due to concern of infection with pathogen not covered by previous abx   -Day 5 of IV meropenem, will repeat paracentesis after 5 days of meropenem therapy per ID. Repeat cultures NGTD for 48 hours. If SBP resolved on repeat para, will transition to Cipro 500mg   -repeat CT abdomen/pelvis with large volume ascites with findings suggestive of associated peritonitis, massive splenomegaly, partially thrombosed portal vein and splenic vein and a small right pleural effusion.   - continue home rifaxamin   - CTM CBC, CMP, PT/INR daily   - MELD 3.0: 14 at 7/31/2025  3:27 AM  MELD-Na: 11 at  2025  3:27 AM  Calculated from:  Serum Creatinine: 0.8 mg/dL (Using min of 1 mg/dL) at 2025  3:27 AM  Serum Sodium: 136 mmol/L at 2025  3:27 AM  Total Bilirubin: 1.3 mg/dL at 2025  3:27 AM  Serum Albumin: 2 g/dL at 2025  3:27 AM  INR(ratio): 1.4 at 2025  3:27 AM  Age at listin years  Sex: Male at 2025  3:27 AM

## 2025-07-31 NOTE — PROCEDURES
"Corky Bach is a 59 y.o. male patient.    Temp: 98.2 °F (36.8 °C) (25 120)  Pulse: 76 (25 1204)  Resp: 18 (25 0603)  BP: 105/62 (25 120)  SpO2: 98 % (25 120)  Weight: 80.6 kg (177 lb 11.1 oz) (25)  Height: 6' 2" (188 cm) (25)       Paracentesis    Date/Time: 2025 2:52 PM  Location procedure was performed: [facility] MEDICINE    Performed by: Melvina Oneill DO  Authorized by: Melvina Oneill DO  Assisting provider: Marie Aldana MD  Consent Done: Yes  Consent: Verbal consent obtained. Written consent obtained  Consent given by: patient  Patient identity confirmed: , name and verbally with patient  Time out: Immediately prior to procedure a "time out" was called to verify the correct patient, procedure, equipment, support staff and site/side marked as required.  Initial or subsequent exam: subsequent  Procedure purpose: diagnostic and therapeutic  Indications: abdominal discomfort secondary to ascites and secondary bacterial peritonitis  Anesthesia: local infiltration    Anesthesia:  Local Anesthetic: lidocaine 1% without epinephrine  Anesthetic total: 15 mL    Patient sedated: no  Preparation: Patient was prepped and draped in the usual sterile fashion.  Puncture site: left lower quadrant  Fluid removed: 2000(ml)  Fluid appearance: cloudy  Complications: No  Estimated blood loss (mL): 10  Comments: Due to peritoneal scarring, needed second catheter to successfully drain fluid          2025    "

## 2025-07-31 NOTE — ASSESSMENT & PLAN NOTE
Hyponatremia is likely due to Cirrhosis. The patient's most recent sodium results are listed below.  Recent Labs     07/29/25  0707 07/30/25  0756 07/31/25  0327    136 136     Plan  - Correct the sodium by 4-6mEq in 24 hours if hyponatremia persists   - Monitor sodium Daily.   - Patient hyponatremia is stable

## 2025-07-31 NOTE — ASSESSMENT & PLAN NOTE
Patient with hx CORDOVA cirrhosis c/b ascites (requiring paracentesis, on lasix 40 mg qd, eplerenone), grade 1 esophageal varices (EGD on 11/8/2024), hepatic encephalopathy 5 week hx abdominal, back pain since last paracentesis without fevers, chills, nausea, vomiting, diarrhea. CT L spine with congenital lumbar stenosis, degenerative changes. CT A/P with concern for peritonitis. Also noted splenomegaly and portal vein thrombosis, seen previously. INR 1.8. Patient had noted lactic acidosis at 2.9, now resolved. Overalll suspect abdominal pain 2/2 ascites with c/f SBP vs. Worsening of chronic portal vein thrombosis Given CTX and  pRBCs at OSH. Transferred to [facility] [place] for further workup and evaluation.     -started Lasix 20mg and spirolactone 50mg on 7/29, net 2.1L positive from admission but producing adequate urine output. Abdominal distention/umbilical hernia remains unchanged     - started lactulose 10mg BID for hepatic encephalopathy prophylaxis on admission   -3 BM overnight, continue lactulose   - Liver US showed cirrhotic morphology of the liver, sequela of portal hypertension and large volume of complicated ascites   - paracentesis on 7/21 pulled 4250mL, WBC 14,960 w/ 96% PNMs and aerobic culture was positive for strep mitis oralis   - repeat para on 7/25 pulled 2500mL, WBC 14,860  - per ID, stopped vanc and CTX, started on IV meropenem due to concern of infection with pathogen not covered by previous abx   -Day 5 of IV meropenem, will repeat paracentesis after 5 days of meropenem therapy per ID. Repeat cultures NGTD for 48 hours. If SBP resolved on repeat para, will transition to Cipro 500mg   -repeat CT abdomen/pelvis with large volume ascites with findings suggestive of associated peritonitis, massive splenomegaly, partially thrombosed portal vein and splenic vein and a small right pleural effusion.   - continue home rifaxamin   - CTM CBC, CMP, PT/INR daily   - MELD 3.0: 14 at 7/31/2025  3:27 AM  MELD-Na: 11 at  2025  3:27 AM  Calculated from:  Serum Creatinine: 0.8 mg/dL (Using min of 1 mg/dL) at 2025  3:27 AM  Serum Sodium: 136 mmol/L at 2025  3:27 AM  Total Bilirubin: 1.3 mg/dL at 2025  3:27 AM  Serum Albumin: 2 g/dL at 2025  3:27 AM  INR(ratio): 1.4 at 2025  3:27 AM  Age at listin years  Sex: Male at 2025  3:27 AM

## 2025-07-31 NOTE — ASSESSMENT & PLAN NOTE
Patient with hx CORDOVA cirrhosis c/b ascites (requiring paracentesis, on lasix 40 mg qd, eplerenone), grade 1 esophageal varices (EGD on 11/8/2024), hepatic encephalopathy 5 week hx abdominal, back pain since last paracentesis without fevers, chills, nausea, vomiting, diarrhea. CT L spine with congenital lumbar stenosis, degenerative changes. CT A/P with concern for peritonitis. Also noted splenomegaly and portal vein thrombosis, seen previously. INR 1.8. Patient had noted lactic acidosis at 2.9, now resolved. Overalll suspect abdominal pain 2/2 ascites with c/f SBP vs. Worsening of chronic portal vein thrombosis Given CTX and  pRBCs at OSH. [place] [facility] [place].     -started Lasix 20mg and spirolactone 50mg on 7/29, net 2.1L positive from admission but producing adequate urine output. Abdominal distention/umbilical hernia remains unchanged     - started lactulose 10mg BID for hepatic encephalopathy prophylaxis on admission   -3 BM overnight, continue lactulose   - Liver US showed cirrhotic morphology of the liver, sequela of portal hypertension and large volume of complicated ascites   - paracentesis on 7/21 pulled 4250mL, WBC 14,960 w/ 96% PNMs and aerobic culture was positive for strep mitis oralis   - repeat para on 7/25 pulled 2500mL, WBC 14,860  - per ID, stopped vanc and CTX, started on IV meropenem due to concern of infection with pathogen not covered by previous abx   -Day 5 of IV meropenem, will repeat paracentesis after 5 days of meropenem therapy per ID. Repeat cultures NGTD for 48 hours. If SBP resolved on repeat para, will transition to Cipro 500mg   -repeat CT abdomen/pelvis with large volume ascites with findings suggestive of associated peritonitis, massive splenomegaly, partially thrombosed portal vein and splenic vein and a small right pleural effusion.   - continue home rifaxamin   - CTM CBC, CMP, PT/INR daily   - MELD 3.0: 14 at 7/31/2025  3:27 AM  MELD-Na: 11 at  2025  3:27 AM  Calculated from:  Serum Creatinine: 0.8 mg/dL (Using min of 1 mg/dL) at 2025  3:27 AM  Serum Sodium: 136 mmol/L at 2025  3:27 AM  Total Bilirubin: 1.3 mg/dL at 2025  3:27 AM  Serum Albumin: 2 g/dL at 2025  3:27 AM  INR(ratio): 1.4 at 2025  3:27 AM  Age at listin years  Sex: Male at 2025  3:27 AM

## 2025-07-31 NOTE — SUBJECTIVE & OBJECTIVE
Interval History: NAEON and VSS. Patient with decreased abdominal pain though still present. Denies fevers/chills, nausea/vomiting, diarrhea, constipation.     Review of Systems   Constitutional:  Negative for activity change, appetite change, chills, fatigue and fever.   HENT:  Negative for sore throat.    Eyes:  Negative for photophobia and pain.   Respiratory:  Negative for apnea, cough, chest tightness, shortness of breath and wheezing.    Cardiovascular:  Negative for chest pain, palpitations and leg swelling.   Gastrointestinal:  Positive for abdominal distention and abdominal pain. Negative for blood in stool, constipation, nausea and vomiting.   Genitourinary:  Negative for difficulty urinating.   Musculoskeletal:  Positive for back pain.   Skin:         Bruising over paracentesis site    Neurological:  Negative for dizziness, weakness and light-headedness.   All other systems reviewed and are negative.    Objective:     Vital Signs (Most Recent):  Temp: 97.9 °F (36.6 °C) (07/31/25 1544)  Pulse: 79 (07/31/25 1544)  Resp: 18 (07/31/25 0603)  BP: (!) 91/44 (07/31/25 1544)  SpO2: 96 % (07/31/25 1544) Vital Signs (24h Range):  Temp:  [97.9 °F (36.6 °C)-98.2 °F (36.8 °C)] 97.9 °F (36.6 °C)  Pulse:  [75-84] 79  Resp:  [17-18] 18  SpO2:  [96 %-100 %] 96 %  BP: ()/(44-62) 91/44     Weight: 80.6 kg (177 lb 11.1 oz)  Body mass index is 22.81 kg/m².  No intake or output data in the 24 hours ending 07/31/25 1728      Physical Exam  Vitals and nursing note reviewed.   Constitutional:       Appearance: Normal appearance. He is ill-appearing.   HENT:      Head: Normocephalic and atraumatic.      Mouth/Throat:      Mouth: Mucous membranes are dry.   Eyes:      General: No scleral icterus.     Extraocular Movements: Extraocular movements intact.      Conjunctiva/sclera: Conjunctivae normal.   Cardiovascular:      Rate and Rhythm: Normal rate and regular rhythm.      Pulses: Normal pulses.      Heart sounds: No murmur  heard.     No friction rub. No gallop.   Pulmonary:      Effort: Pulmonary effort is normal. No respiratory distress.      Breath sounds: Normal breath sounds. No wheezing or rales.   Chest:      Chest wall: No tenderness.   Abdominal:      General: There is distension.      Tenderness: There is no abdominal tenderness. There is no right CVA tenderness or left CVA tenderness.      Hernia: A hernia is present.      Comments: Umbilical hernia present   Hematoma on LLQ at paracentesis site present with overlying bruising, TTP   Musculoskeletal:      Right lower leg: No edema.      Left lower leg: No edema.   Skin:     Coloration: Skin is pale.   Neurological:      Mental Status: He is alert.   Psychiatric:         Mood and Affect: Mood normal.         Behavior: Behavior normal.               Significant Labs: All pertinent labs within the past 24 hours have been reviewed.    Significant Imaging: I have reviewed all pertinent imaging results/findings within the past 24 hours.

## 2025-07-31 NOTE — ASSESSMENT & PLAN NOTE
The likely etiology of thrombocytopenia is liver disease. The patients 3 most recent labs are listed below.  Recent Labs     07/29/25  0707 07/30/25  0756 07/31/25  0327   PLT 37* 42* 37*     Plan  - Will transfuse if platelet count is <50k (if undergoing surgical procedure or have active bleeding).  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

## 2025-07-31 NOTE — PROGRESS NOTES
[facility] - Tele[facility]  Progress Note    Patient Name: Corky Bach  MRN: [id number]  Patient Class: IP- Inpatient   Admission Date: 7/20/2025  Length of Stay: 11 days  Attending Physician: Kirk Phillip, *  Primary Care Provider: Sarmad Estrella MD        Subjective     Principal Problem:SBP (spontaneous bacterial peritonitis)        HPI:  Corky Bach is a 58 y/o male with a notable hx of CORDOVA cirrhosis c/b esophageal varices, T2DM, MALT lymphoma, HLD, gout, and depression transferred to [facility] for hepatology and GI consult.     Patient initially presented to the ED of [facility] on 7/20 with complaints of chronic back and abdominal pain that had been ongoing for appx 5 weeks. He states that the back pain began to occur shortly after his last paracentesis on 7/12 located around his mid lumbar spine, starting with a dull back/ flank pain and quickly radiating to upper abdominal pain. He was trying to use ibuprofen and tylenol to help with the pain however the pain continued to progress. He eventually started to take percocet from a prior hospitalization however this was ineffective. He denies any fevers, chills, nausea, vomiting, constipation/diarrhea, melena, hematemesis during this time. At this point he presented to the ED for further evaluation.     Initially BPs were in the low 100s. He was found to be heme occult positive. Hb 6.9 and platelets 67. AST 13, ALT 6, , bili 1.2, INR 1.8, lactic 2.9. CT Lumbar spine obtained without acute lumbar fracture, congenital lumbar stenosis with degenerative changes. CT A/P without contrast showed moderate r sized pleural effusion, splenomegaly, calcified portal vein thrombus, ascites with thickening of surrounding peritoneal reflections c/f inflammatory peritonitis. Patient started on CTX, given 1u PRBC, and transferred to [facility] for GI/Hepatology evaluation.           Overview/Hospital Course:  Patient is a 59 M w/ hx of  CORDOVA cirrhosis, c/b esophageal varices, T2DM, MALT lymphoma, HLD, gout, admitted for abdminal pain and suspected SBP with decompensated cirrhosis and ascites. Found to have a hemoglobin of 6.9 on presentation, received 1 unit PRBC, no signs of bleeding. Discussed with hepatology and they did not think his presentation was c/f a GIB. Treated empirically for suspected SBP with ceftriaxone. Diagnostic and therapeutic paracentesis drained 4250 mL, lab results positive for SBP. Awaiting cultures. Ascites is now resolved after paracentesis. Hemoglobin and platelets were low today, 6.4 and 40 respectively. Transfused 1U PRBCs and 1U platelets on 7/22. Monitoring hemoglobin. Continuing ceftriaxone and albumin for SBP treatment, added vanc to cover enterococcus while awaiting culture data. Hemoglobin dropped again, transfused 1U PRBCs on 7/24. Aerobic culture from paracentesis sample was positive for strep mitis oralis. Repeat paracentesis on 7/25, pulled 2.5 L and labs revealed an ascitic fluid WBC count of around 14K again. ID recommended to discontinue vanc and CTX and start IV meropenem with repeat paracentesis after 5 days of IV meropenem treatment. Bowel regimen de-escalated on 7/28 due to patient having multiple soft stool bowel movements on night of 7/27. FOBT positive on 7/28 with no signs of active bleeding.     Interval History: NAEON and VSS. Patient with decreased abdominal pain though still present. Denies fevers/chills, nausea/vomiting, diarrhea, constipation.     Review of Systems   Constitutional:  Negative for activity change, appetite change, chills, fatigue and fever.   HENT:  Negative for sore throat.    Eyes:  Negative for photophobia and pain.   Respiratory:  Negative for apnea, cough, chest tightness, shortness of breath and wheezing.    Cardiovascular:  Negative for chest pain, palpitations and leg swelling.   Gastrointestinal:  Positive for abdominal distention and abdominal pain. Negative for blood  in stool, constipation, nausea and vomiting.   Genitourinary:  Negative for difficulty urinating.   Musculoskeletal:  Positive for back pain.   Skin:         Bruising over paracentesis site    Neurological:  Negative for dizziness, weakness and light-headedness.   All other systems reviewed and are negative.    Objective:     Vital Signs (Most Recent):  Temp: 97.9 °F (36.6 °C) (07/31/25 1544)  Pulse: 79 (07/31/25 1544)  Resp: 18 (07/31/25 0603)  BP: (!) 91/44 (07/31/25 1544)  SpO2: 96 % (07/31/25 1544) Vital Signs (24h Range):  Temp:  [97.9 °F (36.6 °C)-98.2 °F (36.8 °C)] 97.9 °F (36.6 °C)  Pulse:  [75-84] 79  Resp:  [17-18] 18  SpO2:  [96 %-100 %] 96 %  BP: ()/(44-62) 91/44     Weight: 80.6 kg (177 lb 11.1 oz)  Body mass index is 22.81 kg/m².  No intake or output data in the 24 hours ending 07/31/25 1728      Physical Exam  Vitals and nursing note reviewed.   Constitutional:       Appearance: Normal appearance. He is ill-appearing.   HENT:      Head: Normocephalic and atraumatic.      Mouth/Throat:      Mouth: Mucous membranes are dry.   Eyes:      General: No scleral icterus.     Extraocular Movements: Extraocular movements intact.      Conjunctiva/sclera: Conjunctivae normal.   Cardiovascular:      Rate and Rhythm: Normal rate and regular rhythm.      Pulses: Normal pulses.      Heart sounds: No murmur heard.     No friction rub. No gallop.   Pulmonary:      Effort: Pulmonary effort is normal. No respiratory distress.      Breath sounds: Normal breath sounds. No wheezing or rales.   Chest:      Chest wall: No tenderness.   Abdominal:      General: There is distension.      Tenderness: There is no abdominal tenderness. There is no right CVA tenderness or left CVA tenderness.      Hernia: A hernia is present.      Comments: Umbilical hernia present   Hematoma on LLQ at paracentesis site present with overlying bruising, TTP   Musculoskeletal:      Right lower leg: No edema.      Left lower leg: No edema.    Skin:     Coloration: Skin is pale.   Neurological:      Mental Status: He is alert.   Psychiatric:         Mood and Affect: Mood normal.         Behavior: Behavior normal.               Significant Labs: All pertinent labs within the past 24 hours have been reviewed.    Significant Imaging: I have reviewed all pertinent imaging results/findings within the past 24 hours.      Assessment & Plan  SBP (spontaneous bacterial peritonitis)  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  Abdominal pain  Ascites  Portal hypertensive gastropathy  Portal vein thrombosis  Splenomegaly  Patient with hx CORDOVA cirrhosis c/b ascites (requiring paracentesis, on lasix 40 mg qd, eplerenone), grade 1 esophageal varices (EGD on 11/8/2024), hepatic encephalopathy 5 week hx abdominal, back pain since last paracentesis without fevers, chills, nausea, vomiting, diarrhea. CT L spine with congenital lumbar stenosis, degenerative changes. CT A/P with concern for peritonitis. Also noted splenomegaly and portal vein thrombosis, seen previously. INR 1.8. Patient had noted lactic acidosis at 2.9, now resolved. Overalll suspect abdominal pain 2/2 ascites with c/f SBP vs. Worsening of chronic portal vein thrombosis Given CTX and  pRBCs at OSH. Transferred to [facility] [place] for further workup and evaluation.     -started Lasix 20mg and spirolactone 50mg on 7/29, net 2.1L positive from admission but producing adequate urine output. Abdominal distention/umbilical hernia remains unchanged     - started lactulose 10mg BID for hepatic encephalopathy prophylaxis on admission   -3 BM overnight, continue lactulose   - Liver US showed cirrhotic morphology of the liver, sequela of portal hypertension and large volume of complicated ascites   - paracentesis on 7/21 pulled 4250mL, WBC 14,960 w/ 96% PNMs and aerobic culture was positive for strep mitis oralis   - repeat para on 7/25 pulled 2500mL, WBC 14,860  - per ID, stopped vanc and CTX, started on IV meropenem due to concern of  infection with pathogen not covered by previous abx   -Day 5 of IV meropenem, will repeat paracentesis after 5 days of meropenem therapy per ID. Repeat cultures NGTD for 48 hours. If SBP resolved on repeat para, will transition to Cipro 500mg   -repeat CT abdomen/pelvis with large volume ascites with findings suggestive of associated peritonitis, massive splenomegaly, partially thrombosed portal vein and splenic vein and a small right pleural effusion.   - continue home rifaxamin   - CTM CBC, CMP, PT/INR daily   - MELD 3.0: 14 at 2025  3:27 AM  MELD-Na: 11 at 2025  3:27 AM  Calculated from:  Serum Creatinine: 0.8 mg/dL (Using min of 1 mg/dL) at 2025  3:27 AM  Serum Sodium: 136 mmol/L at 2025  3:27 AM  Total Bilirubin: 1.3 mg/dL at 2025  3:27 AM  Serum Albumin: 2 g/dL at 2025  3:27 AM  INR(ratio): 1.4 at 2025  3:27 AM  Age at listin years  Sex: Male at 2025  3:27 AM     Anemia  Patient with hx cirrhosis c/b grade 1 esophageal varices, moderate portal hypertensive gastropathy, multiple angioectasias w/o bleeding (Last EGD 2024). Presenting with noted anemia of 6.9 at OSH. S/p 3 uPRBC.     Plan  - Monitor serial CBC: Daily  - Transfuse PRBC if patient becomes hemodynamically unstable, symptomatic or H/H drops below 7  -Up trending today 7.8 > 7.5>7.4 >8.2. No signs of active bleeding   - 1u pRBCs before admission at OSH, now 3u pRBCs while admitted at [facility]  - consulted GI, anemia most likely due to decompensated cirrhosis no acute intervention needed at the moment, will monitor CBC closely   - Hemolytic anemia labs unremarkable   Mood disorder  Continue home venlafaxine  Hyponatremia  Hyponatremia is likely due to Cirrhosis. The patient's most recent sodium results are listed below.  Recent Labs     25  0707 25  0756 25  0327    136 136     Plan  - Correct the sodium by 4-6mEq in 24 hours if hyponatremia persists   - Monitor sodium Daily.   -  Patient hyponatremia is stable  Thrombocytopenia  The likely etiology of thrombocytopenia is liver disease. The patients 3 most recent labs are listed below.  Recent Labs     07/29/25  0707 07/30/25  0756 07/31/25  0327   PLT 37* 42* 37*     Plan  - Will transfuse if platelet count is <50k (if undergoing surgical procedure or have active bleeding).  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  Underwent treatment including repeated Rituxan q2 month infusions. Followed by Heme-Onc at [facility]. Most recent testing has suggested remission leading to potential transplant candidacy for cirrhosis to be re-instituted.   Type 2 diabetes mellitus with hyperglycemia, with long-term current use of insulin  Patient's FSGs are controlled on current medication regimen.  Last A1c reviewed-   Lab Results   Component Value Date    HGBA1C 8.8 (H) 07/20/2025     Current correctional scale  Low  Maintain anti-hyperglycemic dose as follows-   Antihyperglycemics (From admission, onward)      Start     Stop Route Frequency Ordered    07/23/25 2100  insulin glargine U-100 (Lantus) pen 18 Units         -- SubQ Nightly 07/23/25 1117    07/23/25 1130  insulin aspart U-100 pen 7 Units         -- SubQ 3 times daily with meals 07/23/25 1119    07/20/25 2316  insulin aspart U-100 pen 0-5 Units         -- SubQ Before meals & nightly PRN 07/20/25 2217          Hold Oral hypoglycemics while patient is in the hospital.  - decreased insulin Glargine to 18 units daily and aspart 7 units TID with meals   Mixed hyperlipidemia  - not currently on medication     Hypokalemia  Patient's most recent potassium results are listed below.   Recent Labs     07/29/25  0707 07/30/25  0756 07/31/25  0327   K 4.3 3.9 3.4*      Plan  - Replete potassium per protocol  - Monitor potassium Daily  - Patient's hypokalemia is stable  VTE Risk Mitigation (From admission, onward)           Ordered     Reason for No Pharmacological VTE Prophylaxis  Once        Question:  Reasons:  Answer:  Risk of Bleeding    07/20/25 2020     IP VTE HIGH RISK PATIENT  Once         07/20/25 2020     Place sequential compression device  Until discontinued         07/20/25 2020                    Discharge Planning   GARY: 8/1/2025     Code Status: Full Code   Medical Readiness for Discharge Date:   Discharge Plan A: Home with family   Discharge Delays: None known at this time                    Martin Marshall MD  Department of Hospital Medicine   [facility] - Telemetry Stepdown

## 2025-07-31 NOTE — ASSESSMENT & PLAN NOTE
Patient with hx CORDOVA cirrhosis c/b ascites (requiring paracentesis, on lasix 40 mg qd, eplerenone), grade 1 esophageal varices (EGD on 11/8/2024), hepatic encephalopathy 5 week hx abdominal, back pain since last paracentesis without fevers, chills, nausea, vomiting, diarrhea. CT L spine with congenital lumbar stenosis, degenerative changes. CT A/P with concern for peritonitis. Also noted splenomegaly and portal vein thrombosis, seen previously. INR 1.8. Patient had noted lactic acidosis at 2.9, now resolved. Overalll suspect abdominal pain 2/2 ascites with c/f SBP vs. Worsening of chronic portal vein thrombosis Given CTX and  pRBCs at OSH. Transferred to [facility] for further workup and evaluation.     -started Lasix 20mg and spirolactone 50mg on 7/29, net 2.1L positive from admission but producing adequate urine output. Abdominal distention/umbilical hernia remains unchanged     - started lactulose 10mg BID for hepatic encephalopathy prophylaxis on admission   -3 BM overnight, continue lactulose   - Liver US showed cirrhotic morphology of the liver, sequela of portal hypertension and large volume of complicated ascites   - paracentesis on 7/21 pulled 4250mL, WBC 14,960 w/ 96% PNMs and aerobic culture was positive for strep mitis oralis   - repeat para on 7/25 pulled 2500mL, WBC 14,860  - per ID, stopped vanc and CTX, started on IV meropenem due to concern of infection with pathogen not covered by previous abx   -Day 5 of IV meropenem, will repeat paracentesis after 5 days of meropenem therapy per ID. Repeat cultures NGTD for 48 hours. If SBP resolved on repeat para, will transition to Cipro 500mg   -repeat CT abdomen/pelvis with large volume ascites with findings suggestive of associated peritonitis, massive splenomegaly, partially thrombosed portal vein and splenic vein and a small right pleural effusion.   - continue home rifaxamin   - CTM CBC, CMP, PT/INR daily   - MELD 3.0: 14 at 7/31/2025  3:27 AM  MELD-Na: 11 at  2025  3:27 AM  Calculated from:  Serum Creatinine: 0.8 mg/dL (Using min of 1 mg/dL) at 2025  3:27 AM  Serum Sodium: 136 mmol/L at 2025  3:27 AM  Total Bilirubin: 1.3 mg/dL at 2025  3:27 AM  Serum Albumin: 2 g/dL at 2025  3:27 AM  INR(ratio): 1.4 at 2025  3:27 AM  Age at listin years  Sex: Male at 2025  3:27 AM

## 2025-07-31 NOTE — ASSESSMENT & PLAN NOTE
Patient's most recent potassium results are listed below.   Recent Labs     07/29/25  0707 07/30/25  0756 07/31/25  0327   K 4.3 3.9 3.4*     Plan  - Replete potassium per protocol  - Monitor potassium Daily  - Patient's hypokalemia is stable

## 2025-07-31 NOTE — ASSESSMENT & PLAN NOTE
Patient with hx CORDOVA cirrhosis c/b ascites (requiring paracentesis, on lasix 40 mg qd, eplerenone), grade 1 esophageal varices (EGD on 11/8/2024), hepatic encephalopathy 5 week hx abdominal, back pain since last paracentesis without fevers, chills, nausea, vomiting, diarrhea. CT L spine with congenital lumbar stenosis, degenerative changes. CT A/P with concern for peritonitis. Also noted splenomegaly and portal vein thrombosis, seen previously. INR 1.8. Patient had noted lactic acidosis at 2.9, now resolved. Overalll suspect abdominal pain 2/2 ascites with c/f SBP vs. Worsening of chronic portal vein thrombosis Given CTX and  pRBCs at OSH. Transferred to [facility] for further workup and evaluation.     -started Lasix 20mg and spirolactone 50mg on 7/29, net 2.1L positive from admission but producing adequate urine output. Abdominal distention/umbilical hernia remains unchanged     - started lactulose 10mg BID for hepatic encephalopathy prophylaxis on admission   -3 BM overnight, continue lactulose   - Liver US showed cirrhotic morphology of the liver, sequela of portal hypertension and large volume of complicated ascites   - paracentesis on 7/21 pulled 4250mL, WBC 14,960 w/ 96% PNMs and aerobic culture was positive for strep mitis oralis   - repeat para on 7/25 pulled 2500mL, WBC 14,860  - per ID, stopped vanc and CTX, started on IV meropenem due to concern of infection with pathogen not covered by previous abx   -Day 5 of IV meropenem, will repeat paracentesis after 5 days of meropenem therapy per ID. Repeat cultures NGTD for 48 hours. If SBP resolved on repeat para, will transition to Cipro 500mg   -repeat CT abdomen/pelvis with large volume ascites with findings suggestive of associated peritonitis, massive splenomegaly, partially thrombosed portal vein and splenic vein and a small right pleural effusion.   - continue home rifaxamin   - CTM CBC, CMP, PT/INR daily   - MELD 3.0: 14 at 7/31/2025  3:27 AM  MELD-Na: 11 at  2025  3:27 AM  Calculated from:  Serum Creatinine: 0.8 mg/dL (Using min of 1 mg/dL) at 2025  3:27 AM  Serum Sodium: 136 mmol/L at 2025  3:27 AM  Total Bilirubin: 1.3 mg/dL at 2025  3:27 AM  Serum Albumin: 2 g/dL at 2025  3:27 AM  INR(ratio): 1.4 at 2025  3:27 AM  Age at listin years  Sex: Male at 2025  3:27 AM

## 2025-07-31 NOTE — ASSESSMENT & PLAN NOTE
STABLE     MALT-rivka with bone marrow involvement.  Underwent treatment including repeated Rituxan q2 month infusions. Followed by Heme-Onc at [facility]. Most recent testing has suggested remission leading to potential transplant candidacy for cirrhosis to be re-instituted.

## 2025-07-31 NOTE — ASSESSMENT & PLAN NOTE
Patient with hx CORDOVA cirrhosis c/b ascites (requiring paracentesis, on lasix 40 mg qd, eplerenone), grade 1 esophageal varices (EGD on 11/8/2024), hepatic encephalopathy 5 week hx abdominal, back pain since last paracentesis without fevers, chills, nausea, vomiting, diarrhea. CT L spine with congenital lumbar stenosis, degenerative changes. CT A/P with concern for peritonitis. Also noted splenomegaly and portal vein thrombosis, seen previously. INR 1.8. Patient had noted lactic acidosis at 2.9, now resolved. Overalll suspect abdominal pain 2/2 ascites with c/f SBP vs. Worsening of chronic portal vein thrombosis Given CTX and  pRBCs at OSH. Transferred to [facility] for further workup and evaluation.     -started Lasix 20mg and spirolactone 50mg on 7/29, net 2.1L positive from admission but producing adequate urine output. Abdominal distention/umbilical hernia remains unchanged     - started lactulose 10mg BID for hepatic encephalopathy prophylaxis on admission   -3 BM overnight, continue lactulose   - Liver US showed cirrhotic morphology of the liver, sequela of portal hypertension and large volume of complicated ascites   - paracentesis on 7/21 pulled 4250mL, WBC 14,960 w/ 96% PNMs and aerobic culture was positive for strep mitis oralis   - repeat para on 7/25 pulled 2500mL, WBC 14,860  - per ID, stopped vanc and CTX, started on IV meropenem due to concern of infection with pathogen not covered by previous abx   -Day 5 of IV meropenem, will repeat paracentesis after 5 days of meropenem therapy per ID. Repeat cultures NGTD for 48 hours. If SBP resolved on repeat para, will transition to Cipro 500mg   -repeat CT abdomen/pelvis with large volume ascites with findings suggestive of associated peritonitis, massive splenomegaly, partially thrombosed portal vein and splenic vein and a small right pleural effusion.   - continue home rifaxamin   - CTM CBC, CMP, PT/INR daily   - MELD 3.0: 14 at 7/31/2025  3:27 AM  MELD-Na: 11 at  2025  3:27 AM  Calculated from:  Serum Creatinine: 0.8 mg/dL (Using min of 1 mg/dL) at 2025  3:27 AM  Serum Sodium: 136 mmol/L at 2025  3:27 AM  Total Bilirubin: 1.3 mg/dL at 2025  3:27 AM  Serum Albumin: 2 g/dL at 2025  3:27 AM  INR(ratio): 1.4 at 2025  3:27 AM  Age at listin years  Sex: Male at 2025  3:27 AM

## 2025-07-31 NOTE — CONSULTS
Consult received for paracentesis. Performed at bedside. See procedure note from same day by Dr. Oneill.    Rolanda Schwartz MD PGY3  Medicine Consults

## 2025-07-31 NOTE — ASSESSMENT & PLAN NOTE
Patient with hx CORDOVA cirrhosis c/b ascites (requiring paracentesis, on lasix 40 mg qd, eplerenone), grade 1 esophageal varices (EGD on 11/8/2024), hepatic encephalopathy 5 week hx abdominal, back pain since last paracentesis without fevers, chills, nausea, vomiting, diarrhea. CT L spine with congenital lumbar stenosis, degenerative changes. CT A/P with concern for peritonitis. Also noted splenomegaly and portal vein thrombosis, seen previously. INR 1.8. Patient had noted lactic acidosis at 2.9, now resolved. Overalll suspect abdominal pain 2/2 ascites with c/f SBP vs. Worsening of chronic portal vein thrombosis Given CTX and  pRBCs at OSH. Transferred to [facility] [place] for further workup and evaluation.     -started Lasix 20mg and spirolactone 50mg on 7/29, net 2.1L positive from admission but producing adequate urine output. Abdominal distention/umbilical hernia remains unchanged     - started lactulose 10mg BID for hepatic encephalopathy prophylaxis on admission   -3 BM overnight, continue lactulose   - Liver US showed cirrhotic morphology of the liver, sequela of portal hypertension and large volume of complicated ascites   - paracentesis on 7/21 pulled 4250mL, WBC 14,960 w/ 96% PNMs and aerobic culture was positive for strep mitis oralis   - repeat para on 7/25 pulled 2500mL, WBC 14,860  - per ID, stopped vanc and CTX, started on IV meropenem due to concern of infection with pathogen not covered by previous abx   -Day 5 of IV meropenem, will repeat paracentesis after 5 days of meropenem therapy per ID. Repeat cultures NGTD for 48 hours. If SBP resolved on repeat para, will transition to Cipro 500mg   -repeat CT abdomen/pelvis with large volume ascites with findings suggestive of associated peritonitis, massive splenomegaly, partially thrombosed portal vein and splenic vein and a small right pleural effusion.   - continue home rifaxamin   - CTM CBC, CMP, PT/INR daily   - MELD 3.0: 14 at 7/31/2025  3:27 AM  MELD-Na: 11 at  2025  3:27 AM  Calculated from:  Serum Creatinine: 0.8 mg/dL (Using min of 1 mg/dL) at 2025  3:27 AM  Serum Sodium: 136 mmol/L at 2025  3:27 AM  Total Bilirubin: 1.3 mg/dL at 2025  3:27 AM  Serum Albumin: 2 g/dL at 2025  3:27 AM  INR(ratio): 1.4 at 2025  3:27 AM  Age at listin years  Sex: Male at 2025  3:27 AM

## 2025-08-01 LAB
ABSOLUTE EOSINOPHIL (OHS): 0.22 K/UL
ABSOLUTE MONOCYTE (OHS): 0.46 K/UL (ref 0.3–1)
ABSOLUTE NEUTROPHIL COUNT (OHS): 3.69 K/UL (ref 1.8–7.7)
AFP SERPL-MCNC: <2 NG/ML
ALBUMIN SERPL BCP-MCNC: 2 G/DL (ref 3.5–5.2)
ALP SERPL-CCNC: 207 UNIT/L (ref 40–150)
ALT SERPL W/O P-5'-P-CCNC: 29 UNIT/L (ref 0–55)
ANION GAP (OHS): 6 MMOL/L (ref 8–16)
AST SERPL-CCNC: 115 UNIT/L (ref 0–50)
BACTERIA SPEC ANAEROBE CULT: NORMAL
BASOPHILS # BLD AUTO: 0.03 K/UL
BASOPHILS NFR BLD AUTO: 0.6 %
BILIRUB SERPL-MCNC: 1.6 MG/DL (ref 0.1–1)
BUN SERPL-MCNC: 15 MG/DL (ref 6–20)
CALCIUM SERPL-MCNC: 7.7 MG/DL (ref 8.7–10.5)
CHLORIDE SERPL-SCNC: 102 MMOL/L (ref 95–110)
CO2 SERPL-SCNC: 26 MMOL/L (ref 23–29)
CREAT SERPL-MCNC: 0.8 MG/DL (ref 0.5–1.4)
ERYTHROCYTE [DISTWIDTH] IN BLOOD BY AUTOMATED COUNT: 24.7 % (ref 11.5–14.5)
GFR SERPLBLD CREATININE-BSD FMLA CKD-EPI: >60 ML/MIN/1.73/M2
GLUCOSE SERPL-MCNC: 69 MG/DL (ref 70–110)
HCT VFR BLD AUTO: 22.9 % (ref 40–54)
HGB BLD-MCNC: 7.2 GM/DL (ref 14–18)
IMM GRANULOCYTES # BLD AUTO: 0.02 K/UL (ref 0–0.04)
IMM GRANULOCYTES NFR BLD AUTO: 0.4 % (ref 0–0.5)
INR PPP: 1.3 (ref 0.8–1.2)
LYMPHOCYTES # BLD AUTO: 0.55 K/UL (ref 1–4.8)
MAGNESIUM SERPL-MCNC: 1.7 MG/DL (ref 1.6–2.6)
MCH RBC QN AUTO: 23.5 PG (ref 27–31)
MCHC RBC AUTO-ENTMCNC: 31.4 G/DL (ref 32–36)
MCV RBC AUTO: 75 FL (ref 82–98)
NUCLEATED RBC (/100WBC) (OHS): 0 /100 WBC
PLATELET # BLD AUTO: 44 K/UL (ref 150–450)
PMV BLD AUTO: 9.8 FL (ref 9.2–12.9)
POCT GLUCOSE: 113 MG/DL (ref 70–110)
POCT GLUCOSE: 136 MG/DL (ref 70–110)
POCT GLUCOSE: 157 MG/DL (ref 70–110)
POTASSIUM SERPL-SCNC: 3.7 MMOL/L (ref 3.5–5.1)
PROT SERPL-MCNC: 7.2 GM/DL (ref 6–8.4)
PROTHROMBIN TIME: 14.1 SECONDS (ref 9–12.5)
RBC # BLD AUTO: 3.06 M/UL (ref 4.6–6.2)
RELATIVE EOSINOPHIL (OHS): 4.4 %
RELATIVE LYMPHOCYTE (OHS): 11.1 % (ref 18–48)
RELATIVE MONOCYTE (OHS): 9.3 % (ref 4–15)
RELATIVE NEUTROPHIL (OHS): 74.2 % (ref 38–73)
SODIUM SERPL-SCNC: 134 MMOL/L (ref 136–145)
WBC # BLD AUTO: 4.97 K/UL (ref 3.9–12.7)

## 2025-08-01 PROCEDURE — 80053 COMPREHEN METABOLIC PANEL: CPT | Mod: NTX

## 2025-08-01 PROCEDURE — 83735 ASSAY OF MAGNESIUM: CPT | Mod: NTX

## 2025-08-01 PROCEDURE — 82105 ALPHA-FETOPROTEIN SERUM: CPT | Mod: NTX

## 2025-08-01 PROCEDURE — 36415 COLL VENOUS BLD VENIPUNCTURE: CPT | Mod: NTX

## 2025-08-01 PROCEDURE — 99233 SBSQ HOSP IP/OBS HIGH 50: CPT | Mod: GC,NTX,, | Performed by: STUDENT IN AN ORGANIZED HEALTH CARE EDUCATION/TRAINING PROGRAM

## 2025-08-01 PROCEDURE — 25000003 PHARM REV CODE 250: Mod: NTX

## 2025-08-01 PROCEDURE — 63600175 PHARM REV CODE 636 W HCPCS: Mod: NTX | Performed by: INTERNAL MEDICINE

## 2025-08-01 PROCEDURE — 85610 PROTHROMBIN TIME: CPT | Mod: NTX

## 2025-08-01 PROCEDURE — 63600175 PHARM REV CODE 636 W HCPCS: Mod: NTX

## 2025-08-01 PROCEDURE — G0545 PR VISIT INHERENT TO INPT OR OBS CARE, INFECTIOUS DISEASE: HCPCS | Mod: NTX,,, | Performed by: STUDENT IN AN ORGANIZED HEALTH CARE EDUCATION/TRAINING PROGRAM

## 2025-08-01 PROCEDURE — 25000003 PHARM REV CODE 250: Mod: NTX | Performed by: INTERNAL MEDICINE

## 2025-08-01 PROCEDURE — 85025 COMPLETE CBC W/AUTO DIFF WBC: CPT | Mod: NTX

## 2025-08-01 PROCEDURE — 20600001 HC STEP DOWN PRIVATE ROOM: Mod: NTX

## 2025-08-01 RX ORDER — INSULIN ASPART 100 [IU]/ML
5 INJECTION, SOLUTION INTRAVENOUS; SUBCUTANEOUS
Status: DISCONTINUED | OUTPATIENT
Start: 2025-08-01 | End: 2025-08-03 | Stop reason: HOSPADM

## 2025-08-01 RX ORDER — INSULIN GLARGINE 100 [IU]/ML
14 INJECTION, SOLUTION SUBCUTANEOUS NIGHTLY
Status: DISCONTINUED | OUTPATIENT
Start: 2025-08-01 | End: 2025-08-03 | Stop reason: HOSPADM

## 2025-08-01 RX ORDER — INSULIN ASPART 100 [IU]/ML
5 INJECTION, SOLUTION INTRAVENOUS; SUBCUTANEOUS
Status: DISCONTINUED | OUTPATIENT
Start: 2025-08-01 | End: 2025-08-01

## 2025-08-01 RX ADMIN — MICAFUNGIN SODIUM 100 MG: 100 INJECTION, POWDER, LYOPHILIZED, FOR SOLUTION INTRAVENOUS at 01:08

## 2025-08-01 RX ADMIN — LACTULOSE 10 G: 20 SOLUTION ORAL at 11:08

## 2025-08-01 RX ADMIN — METHOCARBAMOL 250 MG: 500 TABLET ORAL at 04:08

## 2025-08-01 RX ADMIN — INSULIN GLARGINE 14 UNITS: 100 INJECTION, SOLUTION SUBCUTANEOUS at 10:08

## 2025-08-01 RX ADMIN — METHOCARBAMOL 250 MG: 500 TABLET ORAL at 10:08

## 2025-08-01 RX ADMIN — Medication 10 MG: at 12:08

## 2025-08-01 RX ADMIN — VENLAFAXINE 75 MG: 75 TABLET ORAL at 10:08

## 2025-08-01 RX ADMIN — MEROPENEM 2 G: 2 INJECTION, POWDER, FOR SOLUTION INTRAVENOUS at 07:08

## 2025-08-01 RX ADMIN — INSULIN ASPART 5 UNITS: 100 INJECTION, SOLUTION INTRAVENOUS; SUBCUTANEOUS at 12:08

## 2025-08-01 RX ADMIN — VANCOMYCIN HYDROCHLORIDE 1500 MG: 1.5 INJECTION, POWDER, LYOPHILIZED, FOR SOLUTION INTRAVENOUS at 03:08

## 2025-08-01 RX ADMIN — RIFAXIMIN 550 MG: 550 TABLET ORAL at 10:08

## 2025-08-01 RX ADMIN — OXYCODONE HYDROCHLORIDE 5 MG: 5 TABLET ORAL at 10:08

## 2025-08-01 RX ADMIN — RIFAXIMIN 550 MG: 550 TABLET ORAL at 11:08

## 2025-08-01 RX ADMIN — MEROPENEM 2 G: 2 INJECTION, POWDER, FOR SOLUTION INTRAVENOUS at 11:08

## 2025-08-01 RX ADMIN — INSULIN ASPART 5 UNITS: 100 INJECTION, SOLUTION INTRAVENOUS; SUBCUTANEOUS at 05:08

## 2025-08-01 RX ADMIN — VENLAFAXINE 75 MG: 75 TABLET ORAL at 11:08

## 2025-08-01 RX ADMIN — METHOCARBAMOL 250 MG: 500 TABLET ORAL at 11:08

## 2025-08-01 RX ADMIN — OXYBUTYNIN CHLORIDE 5 MG: 5 TABLET, EXTENDED RELEASE ORAL at 11:08

## 2025-08-01 RX ADMIN — SPIRONOLACTONE 50 MG: 50 TABLET ORAL at 11:08

## 2025-08-01 RX ADMIN — TAMSULOSIN HYDROCHLORIDE 0.4 MG: 0.4 CAPSULE ORAL at 10:08

## 2025-08-01 RX ADMIN — MEROPENEM 2 G: 2 INJECTION, POWDER, FOR SOLUTION INTRAVENOUS at 01:08

## 2025-08-01 RX ADMIN — FUROSEMIDE 20 MG: 20 TABLET ORAL at 11:08

## 2025-08-01 NOTE — ASSESSMENT & PLAN NOTE
STABLE     MALT-rivka with bone marrow involvement.  Underwent treatment including repeated Rituxan q2 month infusions. Followed by Heme-Onc at [facility]. Most recent testing has suggested remission leading to potential transplant candidacy for cirrhosis to be re-instituted.

## 2025-08-01 NOTE — SUBJECTIVE & OBJECTIVE
Interval History: NAEON. VSS. Patient tolerated paracentesis well, denies nausea, vomiting. Reporting 4 BM in last 24 hours. Reports some bruising and tenderness at paracentesis site with relief in distention.     Review of Systems   Constitutional:  Negative for fatigue and fever.   Respiratory:  Negative for cough, chest tightness and shortness of breath.    Cardiovascular:  Negative for chest pain and leg swelling.   Gastrointestinal:  Positive for abdominal distention. Negative for constipation, diarrhea, nausea and vomiting.   Genitourinary:  Negative for dysuria.   Neurological:  Negative for dizziness and headaches.     Objective:     Vital Signs (Most Recent):  Temp: 98.7 °F (37.1 °C) (08/01/25 1211)  Pulse: 73 (08/01/25 1211)  Resp: 19 (08/01/25 0355)  BP: (!) 104/54 (08/01/25 1211)  SpO2: 100 % (08/01/25 1211) Vital Signs (24h Range):  Temp:  [97.9 °F (36.6 °C)-98.7 °F (37.1 °C)] 98.7 °F (37.1 °C)  Pulse:  [73-79] 73  Resp:  [18-19] 19  SpO2:  [95 %-100 %] 100 %  BP: ()/(44-64) 104/54     Weight: 80.6 kg (177 lb 11.1 oz)  Body mass index is 22.81 kg/m².    Intake/Output Summary (Last 24 hours) at 8/1/2025 1343  Last data filed at 7/31/2025 1620  Gross per 24 hour   Intake 90 ml   Output --   Net 90 ml         Physical Exam  Constitutional:       Appearance: He is ill-appearing.   Cardiovascular:      Rate and Rhythm: Normal rate and regular rhythm.      Pulses: Normal pulses.      Heart sounds: Murmur heard.   Pulmonary:      Effort: Pulmonary effort is normal. No respiratory distress.      Breath sounds: Normal breath sounds. No wheezing.   Abdominal:      General: There is distension.      Tenderness: There is abdominal tenderness.      Hernia: A hernia is present.      Comments: Umbilical hernia present. Tenderness present at paracentesis site. Significant bruising on left side of abdomen 2/2 paracentesis    Musculoskeletal:      Right lower leg: No edema.      Left lower leg: No edema.   Skin:      Findings: Bruising present.      Comments: Bruising on left side of abdomen    Neurological:      Mental Status: He is alert and oriented to person, place, and time.   Psychiatric:         Mood and Affect: Mood normal.         Behavior: Behavior normal.               Significant Labs: All pertinent labs within the past 24 hours have been reviewed.  BMP:   Recent Labs   Lab 08/01/25 0317   GLU 69*   *   K 3.7      CO2 26   BUN 15   CREATININE 0.8   CALCIUM 7.7*   MG 1.7     CBC:   Recent Labs   Lab 07/31/25 0327 08/01/25 0317   WBC 5.36 4.97   HGB 7.3* 7.2*   HCT 22.7* 22.9*   PLT 37* 44*       Significant Imaging: I have reviewed all pertinent imaging results/findings within the past 24 hours.  I have reviewed and interpreted all pertinent imaging results/findings within the past 24 hours.

## 2025-08-01 NOTE — PROGRESS NOTES
"Pharmacokinetic Initial Assessment: IV Vancomycin    Assessment/Plan:    Initiate intravenous vancomycin with loading dose of 1500 mg once followed by a maintenance dose of vancomycin 1250mg IV every 24 hours (pt subratherapeutic on q12h previously)  Desired empiric serum trough concentration is 15 to 20 mcg/mL  Draw vancomycin trough level 60 min prior to third dose on 8/3 at approximately 1245  Pharmacy will continue to follow and monitor vancomycin.      Thank you for the consult,   Nannette Mina, PharmD, [facility]  [phone]    Patient brief summary:  Corky Bach is a 59 y.o. male initiated on antimicrobial therapy with IV Vancomycin for treatment of suspected SBP    Drug Allergies:   Review of patient's allergies indicates:  No Known Allergies    Actual Body Weight:   80kg    Renal Function:   Estimated Creatinine Clearance: 116.1 mL/min (based on SCr of 0.8 mg/dL).,     CBC (last 72 hours):  Recent Labs   Lab Result Units 07/30/25  0756 07/31/25 0327 08/01/25  0317   WBC K/uL 6.27 5.36 4.97   HGB gm/dL 8.2* 7.3* 7.2*   HCT % 25.5* 22.7* 22.9*   Platelet Count K/uL 42* 37* 44*   Lymph % % 8.1* 7.1* 11.1*   Mono % % 7.5 10.1 9.3   Eos % % 1.9 2.2 4.4   Basophil % % 0.5 0.7 0.6       Metabolic Panel (last 72 hours):  Recent Labs   Lab Result Units 07/30/25  0756 07/31/25 0327 08/01/25  0317   Sodium mmol/L 136 136 134*   Potassium mmol/L 3.9 3.4* 3.7   Chloride mmol/L 103 104 102   CO2 mmol/L 26 28 26   Glucose mg/dL 86 93 69*   BUN mg/dL 15 16 15   Creatinine mg/dL 0.8 0.8 0.8   Albumin g/dL 2.2* 2.0* 2.0*   Bilirubin Total mg/dL 1.5* 1.3* 1.6*   ALP unit/L 167* 183* 207*   AST unit/L 44 73* 115*   ALT unit/L 10 17 29   Magnesium  mg/dL 1.5* 1.4* 1.7       Drug levels (last 3 results):  No results for input(s): "VANCOMYCINRA", "VANCORANDOM", "VANCOMYCINPE", "VANCOPEAK", "VANCOMYCINTR", "VANCOTROUGH" in the last 72 hours.    Microbiologic Results:  Microbiology Results (last 7 days)       Procedure Component " Value Units Date/Time    AFB Culture & Smear [[id number]] Collected: 07/31/25 1502    Order Status: Sent Specimen: Ascites Updated: 08/01/25 1205    Fungus culture [[id number]] Collected: 07/31/25 1502    Order Status: Sent Specimen: Ascites Updated: 08/01/25 1205    (rule out SBP) Culture, Anaerobic [[id number]] Collected: 07/25/25 1039    Order Status: Completed Specimen: Ascites Fluid Updated: 08/01/25 0847     Anaerobe Culture No Anaerobes Isolated    Gram stain [[id number]] Collected: 07/31/25 1502    Order Status: Completed Specimen: Ascites Updated: 07/31/25 2345     GRAM STAIN Moderate WBC seen      No organisms seen    Culture, Anaerobic [[id number]] Collected: 07/31/25 1502    Order Status: Sent Specimen: Ascites Updated: 07/31/25 1719    Aerobic culture [[id number]] Collected: 07/31/25 1502    Order Status: Sent Specimen: Ascites Updated: 07/31/25 1719    Culture, Anaerobic [[id number]] Collected: 07/21/25 1538    Order Status: Completed Specimen: Ascites Updated: 07/30/25 1122     Anaerobe Culture No Anaerobes Isolated    Aerobic culture [[id number]]  (Abnormal)  (Susceptibility) Collected: 07/21/25 1538    Order Status: Completed Specimen: Ascites Updated: 07/29/25 1358     CULTURE, AEROBIC Few Streptococcus mitis oralis group    (rule out SBP) Aerobic culture [[id number]] Collected: 07/25/25 1039    Order Status: Completed Specimen: Ascites Fluid Updated: 07/28/25 1031     CULTURE, AEROBIC No Growth    (rule out SBP) Gram stain [[id number]] Collected: 07/25/25 1039    Order Status: Completed Specimen: Ascites Updated: 07/25/25 1429     GRAM STAIN Few WBC seen      No organisms seen

## 2025-08-01 NOTE — ASSESSMENT & PLAN NOTE
Patient with hx CORDOVA cirrhosis c/b ascites (requiring paracentesis, on lasix 40 mg qd, eplerenone), grade 1 esophageal varices (EGD on 11/8/2024), hepatic encephalopathy 5 week hx abdominal, back pain since last paracentesis without fevers, chills, nausea, vomiting, diarrhea. CT L spine with congenital lumbar stenosis, degenerative changes. CT A/P with concern for peritonitis. Also noted splenomegaly and portal vein thrombosis, seen previously. INR 1.8. Patient had noted lactic acidosis at 2.9, now resolved. Overalll suspect abdominal pain 2/2 ascites with c/f SBP vs. Worsening of chronic portal vein thrombosis Given CTX and  pRBCs at OSH. Transferred to [facility] [place] workup and evaluation.     -started Lasix 20mg and spirolactone 50mg on 7/29, net 2.2 L positive from admission but producing adequate urine output. Abdominal distention/umbilical hernia remains unchanged     - started lactulose 10mg BID for hepatic encephalopathy prophylaxis on admission   -4 BM overnight, continue lactulose   - Liver US showed cirrhotic morphology of the liver, sequela of portal hypertension and large volume of complicated ascites   - paracentesis on 7/21 pulled 4250mL, WBC 14,960 w/ 96% PNMs and aerobic culture was positive for strep mitis oralis   - repeat para on 7/25 pulled 2500mL, WBC 14,860  -repeat para on 7/31 with WBC 17,945 and neutrophils 96.   - ID considering micafungin and vancomycin in addition to continuing IV meropenem due to concern of infection with pathogen not covered by previous abx   -Culture pending and will include AFB culture as well as fungal   -repeat CT abdomen/pelvis with large volume ascites with findings suggestive of associated peritonitis, massive splenomegaly, partially thrombosed portal vein and splenic vein and a small right pleural effusion.   -AFP < 2   - continue home rifaxamin   - CTM CBC, CMP, PT/INR daily   - MELD 3.0: 15 at 8/1/2025  3:17 AM  MELD-Na: 11 at 8/1/2025  3:17 AM  Calculated  from:  Serum Creatinine: 0.8 mg/dL (Using min of 1 mg/dL) at 2025  3:17 AM  Serum Sodium: 134 mmol/L at 2025  3:17 AM  Total Bilirubin: 1.6 mg/dL at 2025  3:17 AM  Serum Albumin: 2 g/dL at 2025  3:17 AM  INR(ratio): 1.3 at 2025  3:17 AM  Age at listin years  Sex: Male at 2025  3:17 AM

## 2025-08-01 NOTE — PROGRESS NOTES
[facility] - Tele[facility]  Progress Note    Patient Name: Corky Bach  MRN: [id number]  Patient Class: IP- Inpatient   Admission Date: 7/20/2025  Length of Stay: 12 days  Attending Physician: Kirk Phillip, *  Primary Care Provider: Sarmad Estrella MD        Subjective     Principal Problem:SBP (spontaneous bacterial peritonitis)    HPI:  Corky Bach is a 60 y/o male with a notable hx of CORDOVA cirrhosis c/b esophageal varices, T2DM, MALT lymphoma, HLD, gout, and depression transferred to [facility] [place] for hepatology and GI consult.     Patient initially presented to the ED of [facility] on 7/20 with complaints of chronic back and abdominal pain that had been ongoing for appx 5 weeks. He states that the back pain began to occur shortly after his last paracentesis on 7/12 located around his mid lumbar spine, starting with a dull back/ flank pain and quickly radiating to upper abdominal pain. He was trying to use ibuprofen and tylenol to help with the pain however the pain continued to progress. He eventually started to take percocet from a prior hospitalization however this was ineffective. He denies any fevers, chills, nausea, vomiting, constipation/diarrhea, melena, hematemesis during this time. At this point he presented to the ED for further evaluation.     Initially BPs were in the low 100s. He was found to be heme occult positive. Hb 6.9 and platelets 67. AST 13, ALT 6, , bili 1.2, INR 1.8, lactic 2.9. CT Lumbar spine obtained without acute lumbar fracture, congenital lumbar stenosis with degenerative changes. CT A/P without contrast showed moderate r sized pleural effusion, splenomegaly, calcified portal vein thrombus, ascites with thickening of surrounding peritoneal reflections c/f inflammatory peritonitis. Patient started on CTX, given 1u PRBC, and transferred to [facility] [place] for GI/Hepatology evaluation.         Overview/Hospital Course:  Patient is a 59 M w/ hx of CORDOVA  cirrhosis, c/b esophageal varices, T2DM, MALT lymphoma, HLD, gout, admitted for abdminal pain and suspected SBP with decompensated cirrhosis and ascites. Found to have a hemoglobin of 6.9 on presentation, received 1 unit PRBC, no signs of bleeding. Discussed with hepatology and they did not think his presentation was c/f a GIB. Treated empirically for suspected SBP with ceftriaxone. Diagnostic and therapeutic paracentesis drained 4250 mL, lab results positive for SBP. Awaiting cultures. Ascites is now resolved after paracentesis. Hemoglobin and platelets were low today, 6.4 and 40 respectively. Transfused 1U PRBCs and 1U platelets on 7/22. Monitoring hemoglobin. Continuing ceftriaxone and albumin for SBP treatment, added vanc to cover enterococcus while awaiting culture data. Hemoglobin dropped again, transfused 1U PRBCs on 7/24. Aerobic culture from paracentesis sample was positive for strep mitis oralis. Repeat paracentesis on 7/25, pulled 2.5 L and labs revealed an ascitic fluid WBC count of around 14K again. ID recommended to discontinue vanc and CTX and start IV meropenem with repeat paracentesis after 5 days of IV meropenem treatment. Bowel regimen de-escalated on 7/28 due to patient having multiple soft stool bowel movements on night of 7/27. FOBT positive on 7/28 with no signs of active bleeding. Repeat para performed 7/31 with worsening WBC count at 17K. Adding Vancomycin and micafungin is being considered on 8/1 following ID consult.     Interval History: NAEON. VSS. Patient tolerated paracentesis well, denies nausea, vomiting. Reporting 4 BM in last 24 hours. Reports some bruising and tenderness at paracentesis site with relief in distention. Repeat paracentesis with worsening WBC of 17K.     Review of Systems   Constitutional:  Negative for fatigue and fever.   Respiratory:  Negative for cough, chest tightness and shortness of breath.    Cardiovascular:  Negative for chest pain and leg swelling.    Gastrointestinal:  Positive for abdominal distention. Negative for constipation, diarrhea, nausea and vomiting.   Genitourinary:  Negative for dysuria.   Neurological:  Negative for dizziness and headaches.     Objective:     Vital Signs (Most Recent):  Temp: 98.7 °F (37.1 °C) (08/01/25 1211)  Pulse: 73 (08/01/25 1211)  Resp: 19 (08/01/25 0355)  BP: (!) 104/54 (08/01/25 1211)  SpO2: 100 % (08/01/25 1211) Vital Signs (24h Range):  Temp:  [97.9 °F (36.6 °C)-98.7 °F (37.1 °C)] 98.7 °F (37.1 °C)  Pulse:  [73-79] 73  Resp:  [18-19] 19  SpO2:  [95 %-100 %] 100 %  BP: ()/(44-64) 104/54     Weight: 80.6 kg (177 lb 11.1 oz)  Body mass index is 22.81 kg/m².    Intake/Output Summary (Last 24 hours) at 8/1/2025 1343  Last data filed at 7/31/2025 1620  Gross per 24 hour   Intake 90 ml   Output --   Net 90 ml         Physical Exam  Constitutional:       Appearance: He is ill-appearing.   Cardiovascular:      Rate and Rhythm: Normal rate and regular rhythm.      Pulses: Normal pulses.      Heart sounds: Murmur heard.   Pulmonary:      Effort: Pulmonary effort is normal. No respiratory distress.      Breath sounds: Normal breath sounds. No wheezing.   Abdominal:      General: There is distension.      Tenderness: There is abdominal tenderness.      Hernia: A hernia is present.      Comments: Umbilical hernia present. Tenderness present at paracentesis site. Significant bruising on left side of abdomen 2/2 paracentesis    Musculoskeletal:      Right lower leg: No edema.      Left lower leg: No edema.   Skin:     Findings: Bruising present.      Comments: Bruising on left side of abdomen    Neurological:      Mental Status: He is alert and oriented to person, place, and time.   Psychiatric:         Mood and Affect: Mood normal.         Behavior: Behavior normal.               Significant Labs: All pertinent labs within the past 24 hours have been reviewed.  BMP:   Recent Labs   Lab 08/01/25  0317   GLU 69*   *   K 3.7       CO2 26   BUN 15   CREATININE 0.8   CALCIUM 7.7*   MG 1.7     CBC:   Recent Labs   Lab 07/31/25  0327 08/01/25  0317   WBC 5.36 4.97   HGB 7.3* 7.2*   HCT 22.7* 22.9*   PLT 37* 44*       Significant Imaging: I have reviewed all pertinent imaging results/findings within the past 24 hours.  I have reviewed and interpreted all pertinent imaging results/findings within the past 24 hours.      Assessment & Plan  SBP (spontaneous bacterial peritonitis)  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  Abdominal pain  Ascites  Portal hypertensive gastropathy  Portal vein thrombosis  Splenomegaly  Patient with hx CORDOVA cirrhosis c/b ascites (requiring paracentesis, on lasix 40 mg qd, eplerenone), grade 1 esophageal varices (EGD on 11/8/2024), hepatic encephalopathy 5 week hx abdominal, back pain since last paracentesis without fevers, chills, nausea, vomiting, diarrhea. CT L spine with congenital lumbar stenosis, degenerative changes. CT A/P with concern for peritonitis. Also noted splenomegaly and portal vein thrombosis, seen previously. INR 1.8. Patient had noted lactic acidosis at 2.9, now resolved. Overalll suspect abdominal pain 2/2 ascites with c/f SBP vs. Worsening of chronic portal vein thrombosis Given CTX and  pRBCs at OSH. Transferred to [facility] [place] workup and evaluation.     -started Lasix 20mg and spirolactone 50mg on 7/29, net 2.2 L positive from admission but producing adequate urine output. Abdominal distention/umbilical hernia remains unchanged     - started lactulose 10mg BID for hepatic encephalopathy prophylaxis on admission   -4 BM overnight, continue lactulose   - Liver US showed cirrhotic morphology of the liver, sequela of portal hypertension and large volume of complicated ascites   - paracentesis on 7/21 pulled 4250mL, WBC 14,960 w/ 96% PNMs and aerobic culture was positive for strep mitis oralis   - repeat para on 7/25 pulled 2500mL, WBC 14,860  -repeat para on 7/31 with WBC 17,945 and neutrophils  96.   - ID considering micafungin and vancomycin in addition to continuing IV meropenem due to concern of infection with pathogen not covered by previous abx   -Culture pending and will include AFB culture as well as fungal   -repeat CT abdomen/pelvis with large volume ascites with findings suggestive of associated peritonitis, massive splenomegaly, partially thrombosed portal vein and splenic vein and a small right pleural effusion.   -AFP < 2   - continue home rifaxamin   - CTM CBC, CMP, PT/INR daily   - MELD 3.0: 15 at 2025  3:17 AM  MELD-Na: 11 at 2025  3:17 AM  Calculated from:  Serum Creatinine: 0.8 mg/dL (Using min of 1 mg/dL) at 2025  3:17 AM  Serum Sodium: 134 mmol/L at 2025  3:17 AM  Total Bilirubin: 1.6 mg/dL at 2025  3:17 AM  Serum Albumin: 2 g/dL at 2025  3:17 AM  INR(ratio): 1.3 at 2025  3:17 AM  Age at listin years  Sex: Male at 2025  3:17 AM     Anemia  Patient with hx cirrhosis c/b grade 1 esophageal varices, moderate portal hypertensive gastropathy, multiple angioectasias w/o bleeding (Last EGD 2024). Presenting with noted anemia of 6.9 at OSH. S/p 3 uPRBC.     Plan  - Monitor serial CBC: Daily  - Transfuse PRBC if patient becomes hemodynamically unstable, symptomatic or H/H drops below 7  -Down trending today 7.8 > 7.5>7.4 >8.2>7.3>7.2. No signs of active bleeding   - 1u pRBCs before admission at OSH, now 3u pRBCs while admitted at [facility]  - consulted GI, anemia most likely due to decompensated cirrhosis no acute intervention needed at the moment, will monitor CBC closely   - Hemolytic anemia labs unremarkable   Mood disorder  Continue home venlafaxine  Hyponatremia  Hyponatremia is likely due to Cirrhosis. The patient's most recent sodium results are listed below.  Recent Labs     25  0756 25  0327 25  0317    136 134*     Plan  - Correct the sodium by 4-6mEq in 24 hours if hyponatremia persists   - Monitor sodium Daily.   - Patient  hyponatremia is stable  Thrombocytopenia  The likely etiology of thrombocytopenia is liver disease. The patients 3 most recent labs are listed below.  Recent Labs     07/30/25  0756 07/31/25  0327 08/01/25 0317   PLT 42* 37* 44*     Plan  - Will transfuse if platelet count is <50k (if undergoing surgical procedure or have active bleeding).  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  Underwent treatment including repeated Rituxan q2 month infusions. Followed by Heme-Onc at [facility]. Most recent testing has suggested remission leading to potential transplant candidacy for cirrhosis to be re-instituted.   Type 2 diabetes mellitus with hyperglycemia, with long-term current use of insulin  Patient's FSGs are controlled on current medication regimen.  Last A1c reviewed-   Lab Results   Component Value Date    HGBA1C 8.8 (H) 07/20/2025     Current correctional scale  Low  Maintain anti-hyperglycemic dose as follows-   Antihyperglycemics (From admission, onward)      Start     Stop Route Frequency Ordered    08/01/25 2100  insulin glargine U-100 (Lantus) pen 14 Units         -- SubQ Nightly 08/01/25 1248    08/01/25 1245  insulin aspart U-100 pen 5 Units         -- SubQ 3 times daily with meals 08/01/25 1238    07/20/25 2316  insulin aspart U-100 pen 0-5 Units         -- SubQ Before meals & nightly PRN 07/20/25 2217          Hold Oral hypoglycemics while patient is in the hospital.  - decreased insulin Glargine to 14 units daily and aspart 5 units TID with meals   Mixed hyperlipidemia  - not currently on medication     Hypokalemia  Patient's most recent potassium results are listed below.   Recent Labs     07/30/25  0756 07/31/25 0327 08/01/25 0317   K 3.9 3.4* 3.7      Plan  - Replete potassium per protocol  - Monitor potassium Daily  - Patient's hypokalemia is stable  VTE Risk Mitigation (From admission, onward)           Ordered     Reason for No Pharmacological VTE Prophylaxis  Once        Question:  Reasons:  Answer:  Risk of Bleeding    07/20/25 2020     IP VTE HIGH RISK PATIENT  Once         07/20/25 2020     Place sequential compression device  Until discontinued         07/20/25 2020                    Discharge Planning   GARY: 8/5/2025     Code Status: Full Code   Medical Readiness for Discharge Date:   Discharge Plan A: Home with family   Discharge Delays: None known at this time      Amanda Aguilar DO  Department of Hospital Medicine   [facility] - Telemetry Stepdown

## 2025-08-01 NOTE — SUBJECTIVE & OBJECTIVE
"Interval History: He reports feeling "great" overall despite others expressing concern about his cirrhosis.  He denies current abdominal pain. A bruise on his abdomen appeared yesterday. He has been undergoing paracentesis, initially every other week, with frequency increasing and then decreasing. The patient notes feeling better after fluid removal, with a reduction in abdominal pressure. He denies fevers, chills, or significant belly pain.  Mr. Bach reports experiencing confusion related to his cirrhosis in the past, before initiation of lactulose and rifaximin. He denies headaches, nausea, joint pains, or diarrhea. The patient reports having about three and a half bowel movements per day.  The patient mentions ongoing dental issues, with rotting wisdom teeth on the right upper side and left lower side. He has been advised to have them removed but needs to check his insurance coverage. He denies shortness of breath.  He has a history of a hernia repair without mesh placement. 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 He previously worked as a .    Review of Systems   Constitutional:  Negative for appetite change, chills, fatigue and fever.   Respiratory:  Negative for cough and shortness of breath.    Cardiovascular:  Negative for chest pain and leg swelling.   Gastrointestinal:  Negative for abdominal pain, constipation, diarrhea, nausea and vomiting.   Endocrine: Negative for polyuria.   Genitourinary:  Negative for dysuria.   Musculoskeletal:  Negative for arthralgias, back pain and myalgias.   Skin:  Negative for rash and wound.   Neurological:  Negative for headaches.   Psychiatric/Behavioral:  Negative for confusion.      Objective:     Vital Signs (Most Recent):  Temp: 98.4 °F (36.9 °C) (08/01/25 1603)  Pulse: 77 (08/01/25 1603)  Resp: 19 (08/01/25 0355)  BP: (!) 100/56 (08/01/25 1603)  SpO2: 98 % (08/01/25 1603) Vital Signs (24h Range):  Temp:  [97.9 °F (36.6 °C)-98.7 °F " (37.1 °C)] 98.4 °F (36.9 °C)  Pulse:  [73-78] 77  Resp:  [18-19] 19  SpO2:  [95 %-100 %] 98 %  BP: ()/(54-64) 100/56     Weight: 80.6 kg (177 lb 11.1 oz)  Body mass index is 22.81 kg/m².    Estimated Creatinine Clearance: 116.1 mL/min (based on SCr of 0.8 mg/dL).     Physical Exam  Vitals reviewed.   Constitutional:       General: He is not in acute distress.     Appearance: He is not diaphoretic.      Comments: Temporal wasting present   HENT:      Head: Normocephalic and atraumatic.      Nose: Nose normal.   Eyes:      Conjunctiva/sclera: Conjunctivae normal.   Cardiovascular:      Rate and Rhythm: Normal rate and regular rhythm.      Heart sounds: Normal heart sounds. No murmur heard.  Pulmonary:      Effort: Pulmonary effort is normal. No respiratory distress.      Breath sounds: Normal breath sounds.   Abdominal:      General: Abdomen is protuberant. There is distension.      Tenderness: There is no abdominal tenderness.      Hernia: A hernia is present. Hernia is present in the umbilical area (Red, reduceable).      Comments: Bruise over left side of abdomen  Bandage over paracentesis site   Musculoskeletal:      Right lower leg: No edema.      Left lower leg: No edema.   Skin:     General: Skin is warm.   Neurological:      General: No focal deficit present.      Mental Status: He is alert and oriented to person, place, and time. Mental status is at baseline.      Cranial Nerves: No cranial nerve deficit.   Psychiatric:         Behavior: Behavior normal.          Significant Labs: All pertinent labs within the past 24 hours have been reviewed.    Significant Imaging: I have reviewed all pertinent imaging results/findings within the past 24 hours.

## 2025-08-01 NOTE — PLAN OF CARE
[facility] - Telemetry Stepdown  Discharge Reassessment    Primary Care Provider: Sarmad Estrella MD    Expected Discharge Date: 8/1/2025    Patient to discharge later today home with family.    Discharge Plan A and Plan B have been determined by review of patient's clinical status, future medical and therapeutic needs, and coverage/benefits for post-acute care in coordination with multidisciplinary team members.      Reassessment (most recent)       Discharge Reassessment - 08/01/25 1010          Discharge Reassessment    Assessment Type Discharge Planning Reassessment     Did the patient's condition or plan change since previous assessment? No     Discharge Plan discussed with: Patient     Communicated GARY with patient/caregiver Yes     Discharge Plan A Home with family     Discharge Plan B Home with family     DME Needed Upon Discharge  other (see comments)   TBD    Transition of Care Barriers None     Why the patient remains in the hospital Requires continued medical care        Post-Acute Status    Hospital Resources/Appts/Education Provided Appointments scheduled and added to AVS     Discharge Delays None known at this time                   Rosie Melissa RN     [phone]

## 2025-08-01 NOTE — ASSESSMENT & PLAN NOTE
The likely etiology of thrombocytopenia is liver disease. The patients 3 most recent labs are listed below.  Recent Labs     07/30/25  0756 07/31/25  0327 08/01/25  0317   PLT 42* 37* 44*     Plan  - Will transfuse if platelet count is <50k (if undergoing surgical procedure or have active bleeding).  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

## 2025-08-01 NOTE — PROGRESS NOTES
[facility] - Telemetry Stepdown  Infectious Disease  Progress Note    Patient Name: Corky Bach  MRN: [id number]  Admission Date: 7/20/2025  Length of Stay: 12 days  Attending Physician: Kirk Phillip, *  Primary Care Provider: Sarmad Estrella MD    Isolation Status: No active isolations  Assessment/Plan:      GI  * SBP (spontaneous bacterial peritonitis)  Mr. Bach is a 60 yo with a history of cirrhosis admitted with SBP (ascitic fluid wbc=14K)  with initial culture positive for Strep mitis.  Despite being on vancomycin and ceftriaxone for 5 days, repeat ascitic fluid studies showed a wbc count still around 14K.  Patient was then transitioned to IV meropenem. Despite 5 days of treatment, repeat ascitic fluid studies show a wbc around 17K.  Will broaden to cover fungal and more gram positive organisms. Obtaining further cultures.     Plan  Continue IV meropenem 2g q 8  Add vancomycin pharmacy to dose  Add micafungin 100 mg dialy  Follow up ascitic cultures including fungal and AFB cultures      Changes in management were discussed with the primary team  Thank you for your consult. ID will follow-up with patient. Please contact us if you have any additional questions.    Kimberly Epps MD  Infectious Disease  [facility] - Telemetry Stepdown    Subjective:     Principal Problem:SBP (spontaneous bacterial peritonitis)    HPI: 59-year-old male with CORDOVA cirrhosis c/b esophageal varices, and HE, T2DM, MALT lymphoma, HLD, PHG/GAVE (2024), admitted for abd. pain c/w suspected SBP  in the setting of decompensated cirrhosis with ascites s/p 3 RBC transf and 1 UI PLTs. On CTX for SBP. S/p para. Hepatology deemed anemia was not secondary to GIB as there was no evidence of GIB.   ID is consulted on 7/26 for SBP ppx in the setting of strep mitis growing in culture. Antibiotics were escalated to meropenem at that time given ascitic fluid WBC did not improve despite therapy with vanc and rocephin. Repeat para  "performed on 7/31 with worsening WBC. ID re-consulted for recommendations.   Interval History: He reports feeling "great" overall despite others expressing concern about his cirrhosis.  He denies current abdominal pain. A bruise on his abdomen appeared yesterday. He has been undergoing paracentesis, initially every other week, with frequency increasing and then decreasing. The patient notes feeling better after fluid removal, with a reduction in abdominal pressure. He denies fevers, chills, or significant belly pain.  Mr. Bach reports experiencing confusion related to his cirrhosis in the past, before initiation of lactulose and rifaximin. He denies headaches, nausea, joint pains, or diarrhea. The patient reports having about three and a half bowel movements per day.  The patient mentions ongoing dental issues, with rotting wisdom teeth on the right upper side and left lower side. He has been advised to have them removed but needs to check his insurance coverage. He denies shortness of breath.  He has a history of a hernia repair without mesh placement. 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    Review of Systems   Constitutional:  Negative for appetite change, chills, fatigue and fever.   Respiratory:  Negative for cough and shortness of breath.    Cardiovascular:  Negative for chest pain and leg swelling.   Gastrointestinal:  Negative for abdominal pain, constipation, diarrhea, nausea and vomiting.   Endocrine: Negative for polyuria.   Genitourinary:  Negative for dysuria.   Musculoskeletal:  Negative for arthralgias, back pain and myalgias.   Skin:  Negative for rash and wound.   Neurological:  Negative for headaches.   Psychiatric/Behavioral:  Negative for confusion.      Objective:     Vital Signs (Most Recent):  Temp: 98.4 °F (36.9 °C) (08/01/25 1603)  Pulse: 77 (08/01/25 1603)  Resp: 19 (08/01/25 0355)  BP: (!) 100/56 (08/01/25 1603)  SpO2: 98 % " (08/01/25 1603) Vital Signs (24h Range):  Temp:  [97.9 °F (36.6 °C)-98.7 °F (37.1 °C)] 98.4 °F (36.9 °C)  Pulse:  [73-78] 77  Resp:  [18-19] 19  SpO2:  [95 %-100 %] 98 %  BP: ()/(54-64) 100/56     Weight: 80.6 kg (177 lb 11.1 oz)  Body mass index is 22.81 kg/m².    Estimated Creatinine Clearance: 116.1 mL/min (based on SCr of 0.8 mg/dL).     Physical Exam  Vitals reviewed.   Constitutional:       General: He is not in acute distress.     Appearance: He is not diaphoretic.      Comments: Temporal wasting present   HENT:      Head: Normocephalic and atraumatic.      Nose: Nose normal.   Eyes:      Conjunctiva/sclera: Conjunctivae normal.   Cardiovascular:      Rate and Rhythm: Normal rate and regular rhythm.      Heart sounds: Normal heart sounds. No murmur heard.  Pulmonary:      Effort: Pulmonary effort is normal. No respiratory distress.      Breath sounds: Normal breath sounds.   Abdominal:      General: Abdomen is protuberant. There is distension.      Tenderness: There is no abdominal tenderness.      Hernia: A hernia is present. Hernia is present in the umbilical area (Red, reduceable).      Comments: Bruise over left side of abdomen  Bandage over paracentesis site   Musculoskeletal:      Right lower leg: No edema.      Left lower leg: No edema.   Skin:     General: Skin is warm.   Neurological:      General: No focal deficit present.      Mental Status: He is alert and oriented to person, place, and time. Mental status is at baseline.      Cranial Nerves: No cranial nerve deficit.   Psychiatric:         Behavior: Behavior normal.          Significant Labs: All pertinent labs within the past 24 hours have been reviewed.    Significant Imaging: I have reviewed all pertinent imaging results/findings within the past 24 hours.

## 2025-08-01 NOTE — ASSESSMENT & PLAN NOTE
Patient's most recent potassium results are listed below.   Recent Labs     07/30/25  0756 07/31/25  0327 08/01/25  0317   K 3.9 3.4* 3.7     Plan  - Replete potassium per protocol  - Monitor potassium Daily  - Patient's hypokalemia is stable

## 2025-08-01 NOTE — ASSESSMENT & PLAN NOTE
Patient with hx CORDOVA cirrhosis c/b ascites (requiring paracentesis, on lasix 40 mg qd, eplerenone), grade 1 esophageal varices (EGD on 11/8/2024), hepatic encephalopathy 5 week hx abdominal, back pain since last paracentesis without fevers, chills, nausea, vomiting, diarrhea. CT L spine with congenital lumbar stenosis, degenerative changes. CT A/P with concern for peritonitis. Also noted splenomegaly and portal vein thrombosis, seen previously. INR 1.8. Patient had noted lactic acidosis at 2.9, now resolved. Overalll suspect abdominal pain 2/2 ascites with c/f SBP vs. Worsening of chronic portal vein thrombosis Given CTX and  pRBCs at OSH. Transferred to [facility] for further workup and evaluation.     -started Lasix 20mg and spirolactone 50mg on 7/29, net 2.2 L positive from admission but producing adequate urine output. Abdominal distention/umbilical hernia remains unchanged     - started lactulose 10mg BID for hepatic encephalopathy prophylaxis on admission   -4 BM overnight, continue lactulose   - Liver US showed cirrhotic morphology of the liver, sequela of portal hypertension and large volume of complicated ascites   - paracentesis on 7/21 pulled 4250mL, WBC 14,960 w/ 96% PNMs and aerobic culture was positive for strep mitis oralis   - repeat para on 7/25 pulled 2500mL, WBC 14,860  -repeat para on 7/31 with WBC 17,945 and neutrophils 96.   - ID considering micafungin and vancomycin in addition to continuing IV meropenem due to concern of infection with pathogen not covered by previous abx   -Culture pending and will include AFB culture as well as fungal   -repeat CT abdomen/pelvis with large volume ascites with findings suggestive of associated peritonitis, massive splenomegaly, partially thrombosed portal vein and splenic vein and a small right pleural effusion.   -AFP < 2   - continue home rifaxamin   - CTM CBC, CMP, PT/INR daily   - MELD 3.0: 15 at 8/1/2025  3:17 AM  MELD-Na: 11 at 8/1/2025  3:17 AM  Calculated  from:  Serum Creatinine: 0.8 mg/dL (Using min of 1 mg/dL) at 2025  3:17 AM  Serum Sodium: 134 mmol/L at 2025  3:17 AM  Total Bilirubin: 1.6 mg/dL at 2025  3:17 AM  Serum Albumin: 2 g/dL at 2025  3:17 AM  INR(ratio): 1.3 at 2025  3:17 AM  Age at listin years  Sex: Male at 2025  3:17 AM

## 2025-08-01 NOTE — ASSESSMENT & PLAN NOTE
Patient with hx CORDOVA cirrhosis c/b ascites (requiring paracentesis, on lasix 40 mg qd, eplerenone), grade 1 esophageal varices (EGD on 11/8/2024), hepatic encephalopathy 5 week hx abdominal, back pain since last paracentesis without fevers, chills, nausea, vomiting, diarrhea. CT L spine with congenital lumbar stenosis, degenerative changes. CT A/P with concern for peritonitis. Also noted splenomegaly and portal vein thrombosis, seen previously. INR 1.8. Patient had noted lactic acidosis at 2.9, now resolved. Overalll suspect abdominal pain 2/2 ascites with c/f SBP vs. Worsening of chronic portal vein thrombosis Given CTX and  pRBCs at OSH. Transferred to [facility] [place] for further workup and evaluation.     -started Lasix 20mg and spirolactone 50mg on 7/29, net 2.2 L positive from admission but producing adequate urine output. Abdominal distention/umbilical hernia remains unchanged     - started lactulose 10mg BID for hepatic encephalopathy prophylaxis on admission   -4 BM overnight, continue lactulose   - Liver US showed cirrhotic morphology of the liver, sequela of portal hypertension and large volume of complicated ascites   - paracentesis on 7/21 pulled 4250mL, WBC 14,960 w/ 96% PNMs and aerobic culture was positive for strep mitis oralis   - repeat para on 7/25 pulled 2500mL, WBC 14,860  -repeat para on 7/31 with WBC 17,945 and neutrophils 96.   - ID considering micafungin and vancomycin in addition to continuing IV meropenem due to concern of infection with pathogen not covered by previous abx   -Culture pending and will include AFB culture as well as fungal   -repeat CT abdomen/pelvis with large volume ascites with findings suggestive of associated peritonitis, massive splenomegaly, partially thrombosed portal vein and splenic vein and a small right pleural effusion.   -AFP < 2   - continue home rifaxamin   - CTM CBC, CMP, PT/INR daily   - MELD 3.0: 15 at 8/1/2025  3:17 AM  MELD-Na: 11 at 8/1/2025  3:17 AM  Calculated  from:  Serum Creatinine: 0.8 mg/dL (Using min of 1 mg/dL) at 2025  3:17 AM  Serum Sodium: 134 mmol/L at 2025  3:17 AM  Total Bilirubin: 1.6 mg/dL at 2025  3:17 AM  Serum Albumin: 2 g/dL at 2025  3:17 AM  INR(ratio): 1.3 at 2025  3:17 AM  Age at listin years  Sex: Male at 2025  3:17 AM

## 2025-08-01 NOTE — ASSESSMENT & PLAN NOTE
Patient with hx CORDOVA cirrhosis c/b ascites (requiring paracentesis, on lasix 40 mg qd, eplerenone), grade 1 esophageal varices (EGD on 11/8/2024), hepatic encephalopathy 5 week hx abdominal, back pain since last paracentesis without fevers, chills, nausea, vomiting, diarrhea. CT L spine with congenital lumbar stenosis, degenerative changes. CT A/P with concern for peritonitis. Also noted splenomegaly and portal vein thrombosis, seen previously. INR 1.8. Patient had noted lactic acidosis at 2.9, now resolved. Overalll suspect abdominal pain 2/2 ascites with c/f SBP vs. Worsening of chronic portal vein thrombosis Given CTX and  pRBCs at OSH. [place] [facility].     -started Lasix 20mg and spirolactone 50mg on 7/29, net 2.2 L positive from admission but producing adequate urine output. Abdominal distention/umbilical hernia remains unchanged     - started lactulose 10mg BID for hepatic encephalopathy prophylaxis on admission   -4 BM overnight, continue lactulose   - Liver US showed cirrhotic morphology of the liver, sequela of portal hypertension and large volume of complicated ascites   - paracentesis on 7/21 pulled 4250mL, WBC 14,960 w/ 96% PNMs and aerobic culture was positive for strep mitis oralis   - repeat para on 7/25 pulled 2500mL, WBC 14,860  -repeat para on 7/31 with WBC 17,945 and neutrophils 96.   - ID considering micafungin and vancomycin in addition to continuing IV meropenem due to concern of infection with pathogen not covered by previous abx   -Culture pending and will include AFB culture as well as fungal   -repeat CT abdomen/pelvis with large volume ascites with findings suggestive of associated peritonitis, massive splenomegaly, partially thrombosed portal vein and splenic vein and a small right pleural effusion.   -AFP < 2   - continue home rifaxamin   - CTM CBC, CMP, PT/INR daily   - MELD 3.0: 15 at 8/1/2025  3:17 AM  MELD-Na: 11 at 8/1/2025  3:17 AM  Calculated  from:  Serum Creatinine: 0.8 mg/dL (Using min of 1 mg/dL) at 2025  3:17 AM  Serum Sodium: 134 mmol/L at 2025  3:17 AM  Total Bilirubin: 1.6 mg/dL at 2025  3:17 AM  Serum Albumin: 2 g/dL at 2025  3:17 AM  INR(ratio): 1.3 at 2025  3:17 AM  Age at listin years  Sex: Male at 2025  3:17 AM

## 2025-08-01 NOTE — ASSESSMENT & PLAN NOTE
Patient with hx CORDOVA cirrhosis c/b ascites (requiring paracentesis, on lasix 40 mg qd, eplerenone), grade 1 esophageal varices (EGD on 11/8/2024), hepatic encephalopathy 5 week hx abdominal, back pain since last paracentesis without fevers, chills, nausea, vomiting, diarrhea. CT L spine with congenital lumbar stenosis, degenerative changes. CT A/P with concern for peritonitis. Also noted splenomegaly and portal vein thrombosis, seen previously. INR 1.8. Patient had noted lactic acidosis at 2.9, now resolved. Overalll suspect abdominal pain 2/2 ascites with c/f SBP vs. Worsening of chronic portal vein thrombosis Given CTX and  pRBCs at OSH. Transferred to [facility] [place] for further workup and evaluation.     -started Lasix 20mg and spirolactone 50mg on 7/29, net 2.2 L positive from admission but producing adequate urine output. Abdominal distention/umbilical hernia remains unchanged     - started lactulose 10mg BID for hepatic encephalopathy prophylaxis on admission   -4 BM overnight, continue lactulose   - Liver US showed cirrhotic morphology of the liver, sequela of portal hypertension and large volume of complicated ascites   - paracentesis on 7/21 pulled 4250mL, WBC 14,960 w/ 96% PNMs and aerobic culture was positive for strep mitis oralis   - repeat para on 7/25 pulled 2500mL, WBC 14,860  -repeat para on 7/31 with WBC 17,945 and neutrophils 96.   - ID considering micafungin and vancomycin in addition to continuing IV meropenem due to concern of infection with pathogen not covered by previous abx   -Culture pending and will include AFB culture as well as fungal   -repeat CT abdomen/pelvis with large volume ascites with findings suggestive of associated peritonitis, massive splenomegaly, partially thrombosed portal vein and splenic vein and a small right pleural effusion.   -AFP < 2   - continue home rifaxamin   - CTM CBC, CMP, PT/INR daily   - MELD 3.0: 15 at 8/1/2025  3:17 AM  MELD-Na: 11 at 8/1/2025  3:17 AM  Calculated  from:  Serum Creatinine: 0.8 mg/dL (Using min of 1 mg/dL) at 2025  3:17 AM  Serum Sodium: 134 mmol/L at 2025  3:17 AM  Total Bilirubin: 1.6 mg/dL at 2025  3:17 AM  Serum Albumin: 2 g/dL at 2025  3:17 AM  INR(ratio): 1.3 at 2025  3:17 AM  Age at listin years  Sex: Male at 2025  3:17 AM

## 2025-08-01 NOTE — ASSESSMENT & PLAN NOTE
Patient's FSGs are controlled on current medication regimen.  Last A1c reviewed-   Lab Results   Component Value Date    HGBA1C 8.8 (H) 07/20/2025     Current correctional scale  Low  Maintain anti-hyperglycemic dose as follows-   Antihyperglycemics (From admission, onward)      Start     Stop Route Frequency Ordered    08/01/25 2100  insulin glargine U-100 (Lantus) pen 14 Units         -- SubQ Nightly 08/01/25 1248    08/01/25 1245  insulin aspart U-100 pen 5 Units         -- SubQ 3 times daily with meals 08/01/25 1238    07/20/25 2316  insulin aspart U-100 pen 0-5 Units         -- SubQ Before meals & nightly PRN 07/20/25 2217          Hold Oral hypoglycemics while patient is in the hospital.  - decreased insulin Glargine to 14 units daily and aspart 5 units TID with meals

## 2025-08-01 NOTE — TREATMENT PLAN
Hepatology Treatment Plan    Corky Bach is a 59 y.o. male admitted to hospital 2025 (Hospital Day: 13) due to SBP (spontaneous bacterial peritonitis).     Interval History  Still on Anali (-). Repeat para yesterday afternoon still consistent with SBP.     Objective  Temp:  [97.9 °F (36.6 °C)-98.3 °F (36.8 °C)] 98.1 °F (36.7 °C) (832)  Pulse:  [75-79] 78 (832)  BP: ()/(44-64) 122/62 (832)  Resp:  [18-19] 19 (355)  SpO2:  [95 %-100 %] 100 % (832)    Laboratory  Lab Results   Component Value Date    WBC 4.97 2025    HGB 7.2 (L) 2025    HCT 22.9 (L) 2025    MCV 75 (L) 2025    PLT 44 (L) 2025       Lab Results   Component Value Date     (L) 2025    K 3.7 2025     2025    CO2 26 2025    BUN 15 2025    CREATININE 0.8 2025    CALCIUM 7.7 (L) 2025       Lab Results   Component Value Date    ALBUMIN 2.0 (L) 2025    ALT 29 2025     (H) 2025    GGT 77 (H) 2023    ALKPHOS 207 (H) 2025    BILITOT 1.6 (H) 2025       Lab Results   Component Value Date    INR 1.3 (H) 2025    INR 1.4 (H) 2025    INR 1.3 (H) 2025       MELD 3.0: 15 at 2025  3:17 AM  MELD-Na: 11 at 2025  3:17 AM  Calculated from:  Serum Creatinine: 0.8 mg/dL (Using min of 1 mg/dL) at 2025  3:17 AM  Serum Sodium: 134 mmol/L at 2025  3:17 AM  Total Bilirubin: 1.6 mg/dL at 2025  3:17 AM  Serum Albumin: 2 g/dL at 2025  3:17 AM  INR(ratio): 1.3 at 2025  3:17 AM  Age at listin years  Sex: Male at 2025  3:17 AM    59 year old gentleman with decompensated MASH cirrhosis with ascites, EV, PHG, Barretts esophagus s/p ablation, gastric AVMs, PVT, HE, MALT lymphoma, HTN, HLD, DM2, thrombocytopenia, gout, transferred from outside hospital where he presented with abdominal and back pain of 3 weeks duration. Patient has recurrent SBP on repeat  paracentesis despite being on abx, ID broadened to meropenem 7/25.    Still on Anali (7/27-). Repeat para yesterday afternoon still consistent with SBP.      Plan  - appreciate ID recommendations for recurrent SBP  - will need chronic sbp ppx on discharge   - please obtain daily CBC, BMP, LFT, INR      Thank you for involving us in the care of Corky Bach. Please call with any additional concerns or questions.    Juliet Quiles MD  Gastroenterology Fellow PGY-V  [facility]

## 2025-08-01 NOTE — ASSESSMENT & PLAN NOTE
Hyponatremia is likely due to Cirrhosis. The patient's most recent sodium results are listed below.  Recent Labs     07/30/25  0756 07/31/25  0327 08/01/25  0317    136 134*     Plan  - Correct the sodium by 4-6mEq in 24 hours if hyponatremia persists   - Monitor sodium Daily.   - Patient hyponatremia is stable

## 2025-08-01 NOTE — ASSESSMENT & PLAN NOTE
Mr. Bach is a 58 yo with a history of cirrhosis admitted with SBP (ascitic fluid wbc=14K)  with initial culture positive for Strep mitis.  Despite being on vancomycin and ceftriaxone for 5 days, repeat ascitic fluid studies showed a wbc count still around 14K.  Patient was then transitioned to IV meropenem. Despite 5 days of treatment, repeat ascitic fluid studies show a wbc around 17K.  Will broaden to cover fungal and more gram positive organisms. Obtaining further cultures.     Plan  Continue IV meropenem 2g q 8  Add vancomycin pharmacy to dose  Add micafungin 100 mg dialy  Follow up ascitic cultures including fungal and AFB cultures   Attending Only

## 2025-08-01 NOTE — ASSESSMENT & PLAN NOTE
Patient with hx cirrhosis c/b grade 1 esophageal varices, moderate portal hypertensive gastropathy, multiple angioectasias w/o bleeding (Last EGD 11/8/2024). Presenting with noted anemia of 6.9 at OSH. S/p 3 uPRBC.     Plan  - Monitor serial CBC: Daily  - Transfuse PRBC if patient becomes hemodynamically unstable, symptomatic or H/H drops below 7  -Down trending today 7.8 > 7.5>7.4 >8.2>7.3>7.2. No signs of active bleeding   - 1u pRBCs before admission at OSH, now [place] [facility] [place]  - consulted GI, anemia most likely due to decompensated cirrhosis no acute intervention needed at the moment, will monitor CBC closely   - Hemolytic anemia labs unremarkable

## 2025-08-01 NOTE — ASSESSMENT & PLAN NOTE
Patient with hx CORDOVA cirrhosis c/b ascites (requiring paracentesis, on lasix 40 mg qd, eplerenone), grade 1 esophageal varices (EGD on 11/8/2024), hepatic encephalopathy 5 week hx abdominal, back pain since last paracentesis without fevers, chills, nausea, vomiting, diarrhea. CT L spine with congenital lumbar stenosis, degenerative changes. CT A/P with concern for peritonitis. Also noted splenomegaly and portal vein thrombosis, seen previously. INR 1.8. Patient had noted lactic acidosis at 2.9, now resolved. Overalll suspect abdominal pain 2/2 ascites with c/f SBP vs. Worsening of chronic portal vein thrombosis Given CTX and  pRBCs at OSH. Transferred to [facility] [place] for further workup and evaluation.     -started Lasix 20mg and spirolactone 50mg on 7/29, net 2.2 L positive from admission but producing adequate urine output. Abdominal distention/umbilical hernia remains unchanged     - started lactulose 10mg BID for hepatic encephalopathy prophylaxis on admission   -4 BM overnight, continue lactulose   - Liver US showed cirrhotic morphology of the liver, sequela of portal hypertension and large volume of complicated ascites   - paracentesis on 7/21 pulled 4250mL, WBC 14,960 w/ 96% PNMs and aerobic culture was positive for strep mitis oralis   - repeat para on 7/25 pulled 2500mL, WBC 14,860  -repeat para on 7/31 with WBC 17,945 and neutrophils 96.   - ID considering micafungin and vancomycin in addition to continuing IV meropenem due to concern of infection with pathogen not covered by previous abx   -Culture pending and will include AFB culture as well as fungal   -repeat CT abdomen/pelvis with large volume ascites with findings suggestive of associated peritonitis, massive splenomegaly, partially thrombosed portal vein and splenic vein and a small right pleural effusion.   -AFP < 2   - continue home rifaxamin   - CTM CBC, CMP, PT/INR daily   - MELD 3.0: 15 at 8/1/2025  3:17 AM  MELD-Na: 11 at 8/1/2025  3:17 AM  Calculated  from:  Serum Creatinine: 0.8 mg/dL (Using min of 1 mg/dL) at 2025  3:17 AM  Serum Sodium: 134 mmol/L at 2025  3:17 AM  Total Bilirubin: 1.6 mg/dL at 2025  3:17 AM  Serum Albumin: 2 g/dL at 2025  3:17 AM  INR(ratio): 1.3 at 2025  3:17 AM  Age at listin years  Sex: Male at 2025  3:17 AM

## 2025-08-02 LAB
ABO + RH BLD: NORMAL
ABSOLUTE EOSINOPHIL (OHS): 0.18 K/UL
ABSOLUTE EOSINOPHIL (OHS): 0.21 K/UL
ABSOLUTE MONOCYTE (OHS): 0.39 K/UL (ref 0.3–1)
ABSOLUTE MONOCYTE (OHS): 0.49 K/UL (ref 0.3–1)
ABSOLUTE NEUTROPHIL COUNT (OHS): 2.82 K/UL (ref 1.8–7.7)
ABSOLUTE NEUTROPHIL COUNT (OHS): 3.91 K/UL (ref 1.8–7.7)
ALBUMIN SERPL BCP-MCNC: 1.9 G/DL (ref 3.5–5.2)
ALP SERPL-CCNC: 194 UNIT/L (ref 40–150)
ALT SERPL W/O P-5'-P-CCNC: 26 UNIT/L (ref 0–55)
ANION GAP (OHS): 6 MMOL/L (ref 8–16)
AST SERPL-CCNC: 81 UNIT/L (ref 0–50)
BASOPHILS # BLD AUTO: 0.03 K/UL
BASOPHILS # BLD AUTO: 0.03 K/UL
BASOPHILS NFR BLD AUTO: 0.6 %
BASOPHILS NFR BLD AUTO: 0.8 %
BILIRUB SERPL-MCNC: 1.5 MG/DL (ref 0.1–1)
BLD PROD TYP BPU: NORMAL
BLOOD UNIT EXPIRATION DATE: NORMAL
BLOOD UNIT TYPE CODE: 5100
BUN SERPL-MCNC: 16 MG/DL (ref 6–20)
CALCIUM SERPL-MCNC: 7.6 MG/DL (ref 8.7–10.5)
CHLORIDE SERPL-SCNC: 101 MMOL/L (ref 95–110)
CO2 SERPL-SCNC: 27 MMOL/L (ref 23–29)
CREAT SERPL-MCNC: 0.7 MG/DL (ref 0.5–1.4)
CROSSMATCH INTERPRETATION: NORMAL
DISPENSE STATUS: NORMAL
ERYTHROCYTE [DISTWIDTH] IN BLOOD BY AUTOMATED COUNT: 24.9 % (ref 11.5–14.5)
ERYTHROCYTE [DISTWIDTH] IN BLOOD BY AUTOMATED COUNT: 25.1 % (ref 11.5–14.5)
GFR SERPLBLD CREATININE-BSD FMLA CKD-EPI: >60 ML/MIN/1.73/M2
GLUCOSE SERPL-MCNC: 131 MG/DL (ref 70–110)
HCT VFR BLD AUTO: 21.5 % (ref 40–54)
HCT VFR BLD AUTO: 23.8 % (ref 40–54)
HGB BLD-MCNC: 6.7 GM/DL (ref 14–18)
HGB BLD-MCNC: 7.4 GM/DL (ref 14–18)
IMM GRANULOCYTES # BLD AUTO: 0.02 K/UL (ref 0–0.04)
IMM GRANULOCYTES # BLD AUTO: 0.03 K/UL (ref 0–0.04)
IMM GRANULOCYTES NFR BLD AUTO: 0.5 % (ref 0–0.5)
IMM GRANULOCYTES NFR BLD AUTO: 0.6 % (ref 0–0.5)
INDIRECT COOMBS: NORMAL
INR PPP: 1.3 (ref 0.8–1.2)
LYMPHOCYTES # BLD AUTO: 0.44 K/UL (ref 1–4.8)
LYMPHOCYTES # BLD AUTO: 0.51 K/UL (ref 1–4.8)
MAGNESIUM SERPL-MCNC: 1.5 MG/DL (ref 1.6–2.6)
MCH RBC QN AUTO: 23.4 PG (ref 27–31)
MCH RBC QN AUTO: 23.6 PG (ref 27–31)
MCHC RBC AUTO-ENTMCNC: 31.1 G/DL (ref 32–36)
MCHC RBC AUTO-ENTMCNC: 31.2 G/DL (ref 32–36)
MCV RBC AUTO: 75 FL (ref 82–98)
MCV RBC AUTO: 76 FL (ref 82–98)
NUCLEATED RBC (/100WBC) (OHS): 0 /100 WBC
NUCLEATED RBC (/100WBC) (OHS): 0 /100 WBC
PLATELET # BLD AUTO: 41 K/UL (ref 150–450)
PLATELET # BLD AUTO: 42 K/UL (ref 150–450)
PLATELET BLD QL SMEAR: ABNORMAL
PMV BLD AUTO: 9.1 FL (ref 9.2–12.9)
PMV BLD AUTO: ABNORMAL FL
POCT GLUCOSE: 118 MG/DL (ref 70–110)
POCT GLUCOSE: 183 MG/DL (ref 70–110)
POCT GLUCOSE: 198 MG/DL (ref 70–110)
POCT GLUCOSE: 209 MG/DL (ref 70–110)
POTASSIUM SERPL-SCNC: 3.8 MMOL/L (ref 3.5–5.1)
PROT SERPL-MCNC: 7.2 GM/DL (ref 6–8.4)
PROTHROMBIN TIME: 14 SECONDS (ref 9–12.5)
RBC # BLD AUTO: 2.86 M/UL (ref 4.6–6.2)
RBC # BLD AUTO: 3.14 M/UL (ref 4.6–6.2)
RELATIVE EOSINOPHIL (OHS): 4.1 %
RELATIVE EOSINOPHIL (OHS): 4.6 %
RELATIVE LYMPHOCYTE (OHS): 11.3 % (ref 18–48)
RELATIVE LYMPHOCYTE (OHS): 9.8 % (ref 18–48)
RELATIVE MONOCYTE (OHS): 10.1 % (ref 4–15)
RELATIVE MONOCYTE (OHS): 9.5 % (ref 4–15)
RELATIVE NEUTROPHIL (OHS): 72.7 % (ref 38–73)
RELATIVE NEUTROPHIL (OHS): 75.4 % (ref 38–73)
RH BLD: NORMAL
SODIUM SERPL-SCNC: 134 MMOL/L (ref 136–145)
SPECIMEN OUTDATE: NORMAL
UNIT NUMBER: NORMAL
WBC # BLD AUTO: 3.88 K/UL (ref 3.9–12.7)
WBC # BLD AUTO: 5.18 K/UL (ref 3.9–12.7)

## 2025-08-02 PROCEDURE — 25000003 PHARM REV CODE 250: Mod: NTX

## 2025-08-02 PROCEDURE — P9016 RBC LEUKOCYTES REDUCED: HCPCS | Mod: NTX

## 2025-08-02 PROCEDURE — 63600175 PHARM REV CODE 636 W HCPCS: Mod: NTX | Performed by: INTERNAL MEDICINE

## 2025-08-02 PROCEDURE — 20600001 HC STEP DOWN PRIVATE ROOM: Mod: NTX

## 2025-08-02 PROCEDURE — 86920 COMPATIBILITY TEST SPIN: CPT | Mod: NTX

## 2025-08-02 PROCEDURE — 36415 COLL VENOUS BLD VENIPUNCTURE: CPT | Mod: NTX

## 2025-08-02 PROCEDURE — 83735 ASSAY OF MAGNESIUM: CPT | Mod: NTX

## 2025-08-02 PROCEDURE — 85025 COMPLETE CBC W/AUTO DIFF WBC: CPT | Mod: NTX

## 2025-08-02 PROCEDURE — 80053 COMPREHEN METABOLIC PANEL: CPT | Mod: NTX

## 2025-08-02 PROCEDURE — G0545 PR VISIT INHERENT TO INPT OR OBS CARE, INFECTIOUS DISEASE: HCPCS | Mod: NTX,,, | Performed by: STUDENT IN AN ORGANIZED HEALTH CARE EDUCATION/TRAINING PROGRAM

## 2025-08-02 PROCEDURE — 63600175 PHARM REV CODE 636 W HCPCS: Mod: NTX

## 2025-08-02 PROCEDURE — 25000003 PHARM REV CODE 250: Mod: NTX | Performed by: INTERNAL MEDICINE

## 2025-08-02 PROCEDURE — 85610 PROTHROMBIN TIME: CPT | Mod: NTX

## 2025-08-02 PROCEDURE — 86901 BLOOD TYPING SEROLOGIC RH(D): CPT | Mod: NTX

## 2025-08-02 PROCEDURE — 99233 SBSQ HOSP IP/OBS HIGH 50: CPT | Mod: GC,NTX,, | Performed by: STUDENT IN AN ORGANIZED HEALTH CARE EDUCATION/TRAINING PROGRAM

## 2025-08-02 PROCEDURE — 36430 TRANSFUSION BLD/BLD COMPNT: CPT | Mod: NTX

## 2025-08-02 RX ORDER — PANTOPRAZOLE SODIUM 40 MG/10ML
40 INJECTION, POWDER, LYOPHILIZED, FOR SOLUTION INTRAVENOUS 2 TIMES DAILY
Status: DISCONTINUED | OUTPATIENT
Start: 2025-08-02 | End: 2025-08-03 | Stop reason: HOSPADM

## 2025-08-02 RX ORDER — HYDROCODONE BITARTRATE AND ACETAMINOPHEN 500; 5 MG/1; MG/1
TABLET ORAL
Status: DISCONTINUED | OUTPATIENT
Start: 2025-08-02 | End: 2025-08-03

## 2025-08-02 RX ORDER — PANTOPRAZOLE SODIUM 40 MG/10ML
80 INJECTION, POWDER, LYOPHILIZED, FOR SOLUTION INTRAVENOUS ONCE
Status: COMPLETED | OUTPATIENT
Start: 2025-08-02 | End: 2025-08-02

## 2025-08-02 RX ORDER — CEFTRIAXONE 2 G/1
2 INJECTION, POWDER, FOR SOLUTION INTRAMUSCULAR; INTRAVENOUS
Status: DISCONTINUED | OUTPATIENT
Start: 2025-08-02 | End: 2025-08-03 | Stop reason: HOSPADM

## 2025-08-02 RX ADMIN — VANCOMYCIN HYDROCHLORIDE 1250 MG: 1.25 INJECTION, POWDER, LYOPHILIZED, FOR SOLUTION INTRAVENOUS at 04:08

## 2025-08-02 RX ADMIN — VENLAFAXINE 75 MG: 75 TABLET ORAL at 09:08

## 2025-08-02 RX ADMIN — OXYCODONE HYDROCHLORIDE 5 MG: 5 TABLET ORAL at 09:08

## 2025-08-02 RX ADMIN — MEROPENEM 2 G: 2 INJECTION, POWDER, FOR SOLUTION INTRAVENOUS at 09:08

## 2025-08-02 RX ADMIN — INSULIN GLARGINE 14 UNITS: 100 INJECTION, SOLUTION SUBCUTANEOUS at 09:08

## 2025-08-02 RX ADMIN — RIFAXIMIN 550 MG: 550 TABLET ORAL at 09:08

## 2025-08-02 RX ADMIN — CEFTRIAXONE SODIUM 2 G: 2 INJECTION, POWDER, FOR SOLUTION INTRAMUSCULAR; INTRAVENOUS at 03:08

## 2025-08-02 RX ADMIN — SPIRONOLACTONE 50 MG: 50 TABLET ORAL at 09:08

## 2025-08-02 RX ADMIN — MICAFUNGIN SODIUM 100 MG: 100 INJECTION, POWDER, LYOPHILIZED, FOR SOLUTION INTRAVENOUS at 05:08

## 2025-08-02 RX ADMIN — METHOCARBAMOL 250 MG: 500 TABLET ORAL at 09:08

## 2025-08-02 RX ADMIN — INSULIN ASPART 5 UNITS: 100 INJECTION, SOLUTION INTRAVENOUS; SUBCUTANEOUS at 08:08

## 2025-08-02 RX ADMIN — Medication 10 MG: at 11:08

## 2025-08-02 RX ADMIN — INSULIN ASPART 5 UNITS: 100 INJECTION, SOLUTION INTRAVENOUS; SUBCUTANEOUS at 05:08

## 2025-08-02 RX ADMIN — INSULIN ASPART 5 UNITS: 100 INJECTION, SOLUTION INTRAVENOUS; SUBCUTANEOUS at 12:08

## 2025-08-02 RX ADMIN — FUROSEMIDE 20 MG: 20 TABLET ORAL at 09:08

## 2025-08-02 RX ADMIN — PANTOPRAZOLE SODIUM 80 MG: 40 INJECTION, POWDER, LYOPHILIZED, FOR SOLUTION INTRAVENOUS at 03:08

## 2025-08-02 RX ADMIN — TAMSULOSIN HYDROCHLORIDE 0.4 MG: 0.4 CAPSULE ORAL at 09:08

## 2025-08-02 RX ADMIN — OXYCODONE HYDROCHLORIDE 5 MG: 5 TABLET ORAL at 11:08

## 2025-08-02 RX ADMIN — LACTULOSE 10 G: 20 SOLUTION ORAL at 09:08

## 2025-08-02 RX ADMIN — PANTOPRAZOLE SODIUM 40 MG: 40 INJECTION, POWDER, FOR SOLUTION INTRAVENOUS at 09:08

## 2025-08-02 RX ADMIN — MEROPENEM 2 G: 2 INJECTION, POWDER, FOR SOLUTION INTRAVENOUS at 03:08

## 2025-08-02 RX ADMIN — METHOCARBAMOL 250 MG: 500 TABLET ORAL at 03:08

## 2025-08-02 RX ADMIN — OXYBUTYNIN CHLORIDE 5 MG: 5 TABLET, EXTENDED RELEASE ORAL at 09:08

## 2025-08-02 NOTE — ASSESSMENT & PLAN NOTE

## 2025-08-02 NOTE — ASSESSMENT & PLAN NOTE
Hyponatremia is likely due to Cirrhosis. The patient's most recent sodium results are listed below.  Recent Labs     07/31/25  0327 08/01/25  0317 08/02/25  0233    134* 134*     Plan  - Correct the sodium by 4-6mEq in 24 hours if hyponatremia persists   - Monitor sodium Daily.   - Patient hyponatremia is stable

## 2025-08-02 NOTE — ASSESSMENT & PLAN NOTE
Patient with hx CORDOVA cirrhosis c/b ascites (requiring paracentesis, on lasix 40 mg qd, eplerenone), grade 1 esophageal varices (EGD on 11/8/2024), hepatic encephalopathy 5 week hx abdominal, back pain since last paracentesis without fevers, chills, nausea, vomiting, diarrhea. CT A/P with concern for peritonitis. Also noted splenomegaly and portal vein thrombosis, seen previously. INR 1.8. Patient had noted lactic acidosis at 2.9, now resolved. Overalll suspect abdominal pain 2/2 ascites with c/f SBP vs. Worsening of chronic portal vein thrombosis Given CTX and  pRBCs at OSH. Transferred to [facility] [place] for further workup and evaluation.     -started Lasix 20mg and spirolactone 50mg on 7/29, net 2.2 L positive from admission but producing adequate urine output. Abdominal distention/umbilical hernia remains unchanged     - started lactulose 10mg BID for hepatic encephalopathy prophylaxis on admission   -3 BM overnight, continue lactulose   - Liver US showed cirrhotic morphology of the liver, sequela of portal hypertension and large volume of complicated ascites   - paracentesis on 7/21 pulled 4250mL, WBC 14,960 w/ 96% PNMs and aerobic culture was positive for strep mitis oralis   - repeat para on 7/25 pulled 2500mL, WBC 14,860  -repeat para on 7/31 with WBC 17,945 and neutrophils 96.   - ID started micafungin, ceftriaxone and vancomycin. D/C Meropenem 8/2  -Culture pending and will include AFB culture as well as fungal, will likely need repeat para   -repeat CT abdomen/pelvis with large volume ascites with findings suggestive of associated peritonitis, massive splenomegaly, partially thrombosed portal vein and splenic vein and a small right pleural effusion.   -AFP < 2   - continue home rifaxamin   - CTM CBC, CMP, PT/INR daily   -started on Iv protonix 40 BID  - MELD 3.0: 15 at 8/2/2025  2:33 AM  MELD-Na: 11 at 8/2/2025  2:33 AM  Calculated from:  Serum Creatinine: 0.7 mg/dL (Using min of 1 mg/dL) at 8/2/2025  2:33 AM  Serum  Sodium: 134 mmol/L at 2025  2:33 AM  Total Bilirubin: 1.5 mg/dL at 2025  2:33 AM  Serum Albumin: 1.9 g/dL at 2025  2:33 AM  INR(ratio): 1.3 at 2025  2:33 AM  Age at listin years  Sex: Male at 2025  2:33 AM

## 2025-08-02 NOTE — ASSESSMENT & PLAN NOTE
Patient's most recent potassium results are listed below.   Recent Labs     07/31/25  0327 08/01/25  0317 08/02/25  0233   K 3.4* 3.7 3.8     Plan  - Replete potassium per protocol  - Monitor potassium Daily  - Patient's hypokalemia is stable

## 2025-08-02 NOTE — ASSESSMENT & PLAN NOTE

## 2025-08-02 NOTE — SUBJECTIVE & OBJECTIVE
Interval History: NAEON. VSS however Hgb 6.7 on morning lab. Transfused 1U PRBC. Patient reporting some feelings of depression and sadness this morning but denies other complaints such as nausea, vomiting, diarrhea, SOB, chest pain. Reporting 3 bowel movements overnight.     Review of Systems   Constitutional:  Negative for fatigue and fever.   Respiratory:  Negative for cough, chest tightness and shortness of breath.    Cardiovascular:  Negative for chest pain and leg swelling.   Gastrointestinal:  Positive for abdominal distention. Negative for constipation, diarrhea, nausea and vomiting.   Genitourinary:  Negative for dysuria.   Neurological:  Negative for dizziness and headaches.     Objective:     Vital Signs (Most Recent):  Temp: 98.3 °F (36.8 °C) (08/02/25 0325)  Pulse: 71 (08/02/25 0325)  Resp: 18 (08/02/25 0325)  BP: (!) 97/53 (08/02/25 0325)  SpO2: 98 % (08/02/25 0325) Vital Signs (24h Range):  Temp:  [98.1 °F (36.7 °C)-98.7 °F (37.1 °C)] 98.3 °F (36.8 °C)  Pulse:  [71-78] 71  Resp:  [18] 18  SpO2:  [97 %-100 %] 98 %  BP: ()/(53-62) 97/53     Weight: 80.6 kg (177 lb 11.1 oz)  Body mass index is 22.81 kg/m².    Intake/Output Summary (Last 24 hours) at 8/2/2025 0788  Last data filed at 8/2/2025 0408  Gross per 24 hour   Intake 200 ml   Output 200 ml   Net 0 ml         Physical Exam  Constitutional:       Appearance: Normal appearance.   Cardiovascular:      Rate and Rhythm: Normal rate and regular rhythm.      Pulses: Normal pulses.      Heart sounds: Murmur heard.   Pulmonary:      Effort: Pulmonary effort is normal. No respiratory distress.      Breath sounds: Normal breath sounds. No wheezing.   Abdominal:      General: Bowel sounds are normal.      Tenderness: There is abdominal tenderness.      Hernia: A hernia is present.      Comments: Tenderness in LLQ at paracentesis site    Musculoskeletal:      Right lower leg: No edema.      Left lower leg: No edema.   Skin:     Findings: Bruising present.       Comments: Abdominal bruising    Neurological:      General: No focal deficit present.      Mental Status: He is alert and oriented to person, place, and time.   Psychiatric:         Mood and Affect: Mood normal.         Behavior: Behavior normal.               Significant Labs: All pertinent labs within the past 24 hours have been reviewed.  CBC:   Recent Labs   Lab 08/01/25 0317 08/02/25  0233   WBC 4.97 3.88*   HGB 7.2* 6.7*   HCT 22.9* 21.5*   PLT 44* 42*     CMP:   Recent Labs   Lab 08/01/25 0317 08/02/25 0233   * 134*   K 3.7 3.8    101   CO2 26 27   GLU 69* 131*   BUN 15 16   CREATININE 0.8 0.7   CALCIUM 7.7* 7.6*   PROT 7.2 7.2   ALBUMIN 2.0* 1.9*   BILITOT 1.6* 1.5*   ALKPHOS 207* 194*   * 81*   ALT 29 26   ANIONGAP 6* 6*       Significant Imaging: I have reviewed all pertinent imaging results/findings within the past 24 hours.  I have reviewed and interpreted all pertinent imaging results/findings within the past 24 hours.

## 2025-08-02 NOTE — ASSESSMENT & PLAN NOTE
Mr. Bach is a 58 yo with a history of cirrhosis admitted with SBP (ascitic fluid wbc=14K)  with initial culture positive for Strep mitis.  Despite being on vancomycin and ceftriaxone for 5 days, repeat ascitic fluid studies showed a wbc count still around 14K.  Patient was then transitioned to IV meropenem. Despite 5 days of treatment, repeat ascitic fluid studies show a wbc around 17K.  There appears to be no evidence of ESBL organisms in the ascitic fluid, can de-escalate. Will broaden to cover fungal and more gram positive organisms. Obtaining further cultures.     Plan  Change meropenem to ceftriaxone 2 g daily  Continue vancomycin pharmacy to dose  Continue micafungin 100 mg dialy  Follow up ascitic cultures including fungal and AFB cultures  Patient will need follow up paracentesis after 7 days of treatment.   Consider repeat CT with contrast if ascitic fluid does not show signs of improvement

## 2025-08-02 NOTE — ASSESSMENT & PLAN NOTE

## 2025-08-02 NOTE — PROGRESS NOTES
[facility] - Tele[facility] Medicine  Progress Note    Patient Name: Corky Bach  MRN: [id number]  Patient Class: IP- Inpatient   Admission Date: 7/20/2025  Length of Stay: 13 days  Attending Physician: Kirk Phillip, *  Primary Care Provider: Sarmad Estrella MD      Subjective     Principal Problem:SBP (spontaneous bacterial peritonitis)    HPI:  Corky Bach is a 58 y/o male with a notable hx of CORDOVA cirrhosis c/b esophageal varices, T2DM, MALT lymphoma, HLD, gout, and depression transferred to [facility] [place] for hepatology and GI consult.     Patient initially presented to the ED of [facility] on 7/20 with complaints of chronic back and abdominal pain that had been ongoing for appx 5 weeks. He states that the back pain began to occur shortly after his last paracentesis on 7/12 located around his mid lumbar spine, starting with a dull back/ flank pain and quickly radiating to upper abdominal pain. He was trying to use ibuprofen and tylenol to help with the pain however the pain continued to progress. He eventually started to take percocet from a prior hospitalization however this was ineffective. He denies any fevers, chills, nausea, vomiting, constipation/diarrhea, melena, hematemesis during this time. At this point he presented to the ED for further evaluation.     Initially BPs were in the low 100s. He was found to be heme occult positive. Hb 6.9 and platelets 67. AST 13, ALT 6, , bili 1.2, INR 1.8, lactic 2.9. CT Lumbar spine obtained without acute lumbar fracture, congenital lumbar stenosis with degenerative changes. CT A/P without contrast showed moderate r sized pleural effusion, splenomegaly, calcified portal vein thrombus, ascites with thickening of surrounding peritoneal reflections c/f inflammatory peritonitis. Patient started on CTX, given 1u PRBC, and transferred to [facility] [place] for GI/Hepatology evaluation.         Overview/Hospital Course:  Patient is a 59 M w/ hx of CORDOVA  cirrhosis, c/b esophageal varices, T2DM, MALT lymphoma, HLD, gout, admitted for abdminal pain and suspected SBP with decompensated cirrhosis and ascites. Found to have a hemoglobin of 6.9 on presentation, received 1 unit PRBC, no signs of bleeding. Discussed with hepatology and they did not think his presentation was c/f a GIB. Treated empirically for suspected SBP with ceftriaxone. Diagnostic and therapeutic paracentesis drained 4250 mL, lab results positive for SBP. Awaiting cultures. Ascites is now resolved after paracentesis. Hemoglobin and platelets were low today, 6.4 and 40 respectively. Transfused 1U PRBCs and 1U platelets on 7/22. Monitoring hemoglobin. Continuing ceftriaxone and albumin for SBP treatment, added vanc to cover enterococcus while awaiting culture data. Hemoglobin dropped again, transfused 1U PRBCs on 7/24. Aerobic culture from paracentesis sample was positive for strep mitis oralis. Repeat paracentesis on 7/25, pulled 2.5 L and labs revealed an ascitic fluid WBC count of around 14K again. ID recommended to discontinue vanc and CTX and start IV meropenem with repeat paracentesis after 5 days of IV meropenem treatment. Bowel regimen de-escalated on 7/28 due to patient having multiple soft stool bowel movements on night of 7/27. FOBT positive on 7/28 with no signs of active bleeding. Repeat para performed 7/31 with worsening WBC count at 17K. Vancomycin and micafungin started on 8/1 following ID consult. 1 Unit PRBC transfused on 8/2 for Hgb 6.7.     Interval History: NAEON. VSS however Hgb 6.7 on morning lab. Transfused 1U PRBC. Patient reporting some feelings of depression and sadness this morning but denies other complaints such as nausea, vomiting, diarrhea, SOB, chest pain. Reporting 3 bowel movements overnight. Meropenem changed to Ceftriaxone.     Review of Systems   Constitutional:  Negative for fatigue and fever.   Respiratory:  Negative for cough, chest tightness and shortness of  breath.    Cardiovascular:  Negative for chest pain and leg swelling.   Gastrointestinal:  Positive for abdominal distention. Negative for constipation, diarrhea, nausea and vomiting.   Genitourinary:  Negative for dysuria.   Neurological:  Negative for dizziness and headaches.     Objective:     Vital Signs (Most Recent):  Temp: 98.3 °F (36.8 °C) (08/02/25 0325)  Pulse: 71 (08/02/25 0325)  Resp: 18 (08/02/25 0325)  BP: (!) 97/53 (08/02/25 0325)  SpO2: 98 % (08/02/25 0325) Vital Signs (24h Range):  Temp:  [98.1 °F (36.7 °C)-98.7 °F (37.1 °C)] 98.3 °F (36.8 °C)  Pulse:  [71-78] 71  Resp:  [18] 18  SpO2:  [97 %-100 %] 98 %  BP: ()/(53-62) 97/53     Weight: 80.6 kg (177 lb 11.1 oz)  Body mass index is 22.81 kg/m².    Intake/Output Summary (Last 24 hours) at 8/2/2025 0743  Last data filed at 8/2/2025 0408  Gross per 24 hour   Intake 200 ml   Output 200 ml   Net 0 ml         Physical Exam  Constitutional:       Appearance: Normal appearance.   Cardiovascular:      Rate and Rhythm: Normal rate and regular rhythm.      Pulses: Normal pulses.      Heart sounds: Murmur heard.   Pulmonary:      Effort: Pulmonary effort is normal. No respiratory distress.      Breath sounds: Normal breath sounds. No wheezing.   Abdominal:      General: Bowel sounds are normal.      Tenderness: There is abdominal tenderness.      Hernia: A hernia is present.      Comments: Tenderness in LLQ at paracentesis site    Musculoskeletal:      Right lower leg: No edema.      Left lower leg: No edema.   Skin:     Findings: Bruising present.      Comments: Abdominal bruising    Neurological:      General: No focal deficit present.      Mental Status: He is alert and oriented to person, place, and time.   Psychiatric:         Mood and Affect: Mood normal.         Behavior: Behavior normal.               Significant Labs: All pertinent labs within the past 24 hours have been reviewed.  CBC:   Recent Labs   Lab 08/01/25  0317 08/02/25  0233   WBC 4.97  3.88*   HGB 7.2* 6.7*   HCT 22.9* 21.5*   PLT 44* 42*     CMP:   Recent Labs   Lab 08/01/25  0317 08/02/25  0233   * 134*   K 3.7 3.8    101   CO2 26 27   GLU 69* 131*   BUN 15 16   CREATININE 0.8 0.7   CALCIUM 7.7* 7.6*   PROT 7.2 7.2   ALBUMIN 2.0* 1.9*   BILITOT 1.6* 1.5*   ALKPHOS 207* 194*   * 81*   ALT 29 26   ANIONGAP 6* 6*       Significant Imaging: I have reviewed all pertinent imaging results/findings within the past 24 hours.  I have reviewed and interpreted all pertinent imaging results/findings within the past 24 hours.    Assessment & Plan  SBP (spontaneous bacterial peritonitis)  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  Abdominal pain  Ascites  Portal hypertensive gastropathy  Portal vein thrombosis  Splenomegaly  Patient with hx CORDOVA cirrhosis c/b ascites (requiring paracentesis, on lasix 40 mg qd, eplerenone), grade 1 esophageal varices (EGD on 11/8/2024), hepatic encephalopathy 5 week hx abdominal, back pain since last paracentesis without fevers, chills, nausea, vomiting, diarrhea. CT A/P with concern for peritonitis. Also noted splenomegaly and portal vein thrombosis, seen previously. INR 1.8. Patient had noted lactic acidosis at 2.9, now resolved. Overalll suspect abdominal pain 2/2 ascites with c/f SBP vs. Worsening of chronic portal vein thrombosis Given CTX and  pRBCs at OSH. [place] [facility] [place].     -started Lasix 20mg and spirolactone 50mg on 7/29, net 2.2 L positive from admission but producing adequate urine output. Abdominal distention/umbilical hernia remains unchanged     - started lactulose 10mg BID for hepatic encephalopathy prophylaxis on admission   -3 BM overnight, continue lactulose   - Liver US showed cirrhotic morphology of the liver, sequela of portal hypertension and large volume of complicated ascites   - paracentesis on 7/21 pulled 4250mL, WBC 14,960 w/ 96% PNMs and aerobic culture was positive for strep mitis oralis   - repeat  para on  pulled 2500mL, WBC 14,860  -repeat para on  with WBC 17,945 and neutrophils 96.   - ID started micafungin, ceftriaxone and vancomycin. D/C Meropenem   -Culture pending and will include AFB culture as well as fungal, will likely need repeat para   -repeat CT abdomen/pelvis with large volume ascites with findings suggestive of associated peritonitis, massive splenomegaly, partially thrombosed portal vein and splenic vein and a small right pleural effusion.   -AFP < 2   - continue home rifaxamin   - CTM CBC, CMP, PT/INR daily   -started on Iv protonix 40 BID  - MELD 3.0: 15 at 2025  2:33 AM  MELD-Na: 11 at 2025  2:33 AM  Calculated from:  Serum Creatinine: 0.7 mg/dL (Using min of 1 mg/dL) at 2025  2:33 AM  Serum Sodium: 134 mmol/L at 2025  2:33 AM  Total Bilirubin: 1.5 mg/dL at 2025  2:33 AM  Serum Albumin: 1.9 g/dL at 2025  2:33 AM  INR(ratio): 1.3 at 2025  2:33 AM  Age at listin years  Sex: Male at 2025  2:33 AM     Anemia  Patient with hx cirrhosis c/b grade 1 esophageal varices, moderate portal hypertensive gastropathy, multiple angioectasias w/o bleeding (Last EGD 2024). Presenting with noted anemia of 6.9 at OSH. S/p 3 uPRBC.     Plan  - Monitor serial CBC: Daily  - Transfuse PRBC if patient becomes hemodynamically unstable, symptomatic or H/H drops below 7  -Down trending today 7.8 > 7.5>7.4 >8.2>7.3>7.2>6.7. Transfused 1 unit on . No signs of active bleeding   - Hemolytic anemia labs unremarkable   Mood disorder  Continue home venlafaxine  Hyponatremia  Hyponatremia is likely due to Cirrhosis. The patient's most recent sodium results are listed below.  Recent Labs     25  0327 25  0317 25  0233    134* 134*     Plan  - Correct the sodium by 4-6mEq in 24 hours if hyponatremia persists   - Monitor sodium Daily.   - Patient hyponatremia is stable  Thrombocytopenia  The likely etiology of thrombocytopenia is liver disease. The  patients 3 most recent labs are listed below.  Recent Labs     07/31/25 0327 08/01/25 0317 08/02/25  0233   PLT 37* 44* 42*     Plan  - Will transfuse if platelet count is <50k (if undergoing surgical procedure or have active bleeding).  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  Underwent treatment including repeated Rituxan q2 month infusions. Followed by Hem[facility]Onc at [facility]. Most recent testing has suggested remission leading to potential transplant candidacy for cirrhosis to be re-instituted.   Type 2 diabetes mellitus with hyperglycemia, with long-term current use of insulin  Patient's FSGs are controlled on current medication regimen.  Last A1c reviewed-   Lab Results   Component Value Date    HGBA1C 8.8 (H) 07/20/2025     Current correctional scale  Low  Maintain anti-hyperglycemic dose as follows-   Antihyperglycemics (From admission, onward)      Start     Stop Route Frequency Ordered    08/01/25 2100  insulin glargine U-100 (Lantus) pen 14 Units         -- SubQ Nightly 08/01/25 1248    08/01/25 1245  insulin aspart U-100 pen 5 Units         -- SubQ 3 times daily with meals 08/01/25 1238    07/20/25 2316  insulin aspart U-100 pen 0-5 Units         -- SubQ Before meals & nightly PRN 07/20/25 2217          Hold Oral hypoglycemics while patient is in the hospital.  - decreased insulin Glargine to 14 units daily and aspart 5 units TID with meals   Mixed hyperlipidemia  - not currently on medication     Hypokalemia  Patient's most recent potassium results are listed below.   Recent Labs     07/31/25 0327 08/01/25  0317 08/02/25  0233   K 3.4* 3.7 3.8     Plan  - Replete potassium per protocol  - Monitor potassium Daily  - Patient's hypokalemia is stable  VTE Risk  Mitigation (From admission, onward)           Ordered     Reason for No Pharmacological VTE Prophylaxis  Once        Question:  Reasons:  Answer:  Risk of Bleeding    07/20/25 2020     IP VTE HIGH RISK PATIENT  Once         07/20/25 2020     Place sequential compression device  Until discontinued         07/20/25 2020                    Discharge Planning   GARY: 8/5/2025     Code Status: Full Code   Medical Readiness for Discharge Date:   Discharge Plan A: Home with family   Discharge Delays: None known at this time      Amanda Aguilar DO  Department of Hospital Medicine   [facility] - Telemetry Stepdown

## 2025-08-02 NOTE — ASSESSMENT & PLAN NOTE

## 2025-08-02 NOTE — ASSESSMENT & PLAN NOTE
The likely etiology of thrombocytopenia is liver disease. The patients 3 most recent labs are listed below.  Recent Labs     07/31/25  0327 08/01/25  0317 08/02/25  0233   PLT 37* 44* 42*     Plan  - Will transfuse if platelet count is <50k (if undergoing surgical procedure or have active bleeding).  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

## 2025-08-02 NOTE — ASSESSMENT & PLAN NOTE
STABLE     MALT-rivka with bone marrow involvement.  Underwent treatment including repeated Rituxan q2 month infusions. Followed by Heme-Onc at [facility]. Most recent testing has suggested remission leading to potential transplant candidacy for cirrhosis to be re-instituted.

## 2025-08-02 NOTE — PROGRESS NOTES
[facility] - Telemetry Stepdown  Infectious Disease  Progress Note    Patient Name: Corky Bach  MRN: [id number]  Admission Date: 7/20/2025  Length of Stay: 13 days  Attending Physician: Kirk Phillip, *  Primary Care Provider: Sarmad Estrella MD    Isolation Status: No active isolations  Assessment/Plan:      GI  * SBP (spontaneous bacterial peritonitis)  Mr. Bach is a 60 yo with a history of cirrhosis admitted with SBP (ascitic fluid wbc=14K)  with initial culture positive for Strep mitis.  Despite being on vancomycin and ceftriaxone for 5 days, repeat ascitic fluid studies showed a wbc count still around 14K.  Patient was then transitioned to IV meropenem. Despite 5 days of treatment, repeat ascitic fluid studies show a wbc around 17K.  There appears to be no evidence of ESBL organisms in the ascitic fluid, can de-escalate. Will broaden to cover fungal and more gram positive organisms. Obtaining further cultures.     Plan  Change meropenem to ceftriaxone 2 g daily  Continue vancomycin pharmacy to dose  Continue micafungin 100 mg dialy  Follow up ascitic cultures including fungal and AFB cultures  Patient will need follow up paracentesis after 7 days of treatment.   Consider repeat CT with contrast if ascitic fluid continues to have an elevated WBC or if condition worsens      Management discussed with the primary team  Thank you for your consult. I will follow-up with patient. Please contact us if you have any additional questions.    Kimberly Epps MD  Infectious Disease  [facility] - Telemetry Stepdown    Subjective:     Principal Problem:SBP (spontaneous bacterial peritonitis)    HPI: 59-year-old male with CORDOVA cirrhosis c/b esophageal varices, and HE, T2DM, MALT lymphoma, HLD, PHG/[facility] (2024), admitted for abd. pain c/w suspected SBP  in the setting of decompensated cirrhosis with ascites s/p 3 RBC transf and 1 UI PLTs. On CTX for SBP. S/p para. Hepatology deemed anemia was not secondary to  "GIB as there was no evidence of GIB.   ID is consulted on 7/26 for SBP ppx in the setting of strep mitis growing in culture. Antibiotics were escalated to meropenem at that time given ascitic fluid WBC did not improve despite therapy with vanc and rocephin. Repeat para performed on 7/31 with worsening WBC. ID re-consulted for recommendations.   Interval History: Afebrile overnight. He reports no pain or other significant symptoms related to his abdominal condition. The patient denies feeling feverish or experiencing any weakness, excessive sleepiness, or sweating. He mentions some discomfort when pressure is applied to his hernia, describing it as hurting "a little bit."  The patient has noticed bruising on his side, primarily on one side, which he states "just kind of popped up" without any history of falls or trauma. He denies any chest pain, urinary symptoms, rashes, or skin breakdown. He states his belly feels "alright" and doesn't feel like it needs more fluid removed.  Regarding his current treatment, the patient denies any problems with the antibiotics he's receiving, including no loose stools, diarrhea, or belly cramping. He has been able to eat and sleep without issues.      Review of Systems  - General: Denies fever, sweats, weakness, feeling super sleepy  - Skin: Denies rash or skin breakdown, positive for bruising on ribs  - Cardiovascular: Denies chest pain, positive for mild leg swelling  - Gastrointestinal: Denies abdominal pain, loose stools, diarrhea, belly cramping, positive for tight feeling in abdomen  - Genitourinary: Denies urinary symptoms  - Musculoskeletal: Positive for mild pain when hernia is pressed    Objective:     Vital Signs (Most Recent):  Temp: 98.3 °F (36.8 °C) (08/02/25 0325)  Pulse: 71 (08/02/25 0325)  Resp: 18 (08/02/25 0325)  BP: (!) 97/53 (08/02/25 0325)  SpO2: 98 % (08/02/25 0325) Vital Signs (24h Range):  Temp:  [98.1 °F (36.7 °C)-98.7 °F (37.1 °C)] 98.3 °F (36.8 °C)  Pulse: "  [71-78] 71  Resp:  [18] 18  SpO2:  [97 %-100 %] 98 %  BP: ()/(53-62) 97/53     Weight: 80.6 kg (177 lb 11.1 oz)  Body mass index is 22.81 kg/m².    Estimated Creatinine Clearance: 132.7 mL/min (based on SCr of 0.7 mg/dL).     Physical Exam  Vitals reviewed.   Constitutional:       General: He is not in acute distress.     Appearance: Normal appearance. He is not ill-appearing or diaphoretic.   HENT:      Head: Normocephalic and atraumatic.      Nose: Nose normal.   Eyes:      Conjunctiva/sclera: Conjunctivae normal.   Cardiovascular:      Rate and Rhythm: Normal rate and regular rhythm.      Heart sounds: Normal heart sounds. No murmur heard.  Pulmonary:      Effort: Pulmonary effort is normal. No respiratory distress.      Breath sounds: Normal breath sounds.   Abdominal:      General: Abdomen is protuberant. Bowel sounds are normal. There is distension.      Tenderness: There is no abdominal tenderness.      Hernia: A hernia is present. Hernia is present in the umbilical area (Redness present, reduceable).      Comments: Bruising on LUQ   Musculoskeletal:      Right lower leg: No edema.      Left lower leg: No edema.   Skin:     General: Skin is warm.   Neurological:      General: No focal deficit present.      Mental Status: He is alert and oriented to person, place, and time. Mental status is at baseline.      Cranial Nerves: No cranial nerve deficit.   Psychiatric:         Behavior: Behavior normal.          Significant Labs: All pertinent labs within the past 24 hours have been reviewed.    Significant Imaging: I have reviewed all pertinent imaging results/findings within the past 24 hours.

## 2025-08-02 NOTE — ASSESSMENT & PLAN NOTE
Patient with hx cirrhosis c/b grade 1 esophageal varices, moderate portal hypertensive gastropathy, multiple angioectasias w/o bleeding (Last EGD 11/8/2024). Presenting with noted anemia of 6.9 at OSH. S/p 3 uPRBC.     Plan  - Monitor serial CBC: Daily  - Transfuse PRBC if patient becomes hemodynamically unstable, symptomatic or H/H drops below 7  -Down trending today 7.8 > 7.5>7.4 >8.2>7.3>7.2>6.7. Transfused 1 unit on 8/2. No signs of active bleeding   - Hemolytic anemia labs unremarkable

## 2025-08-02 NOTE — ASSESSMENT & PLAN NOTE

## 2025-08-02 NOTE — SUBJECTIVE & OBJECTIVE
"Interval History: Afebrile overnight. He reports no pain or other significant symptoms related to his abdominal condition. The patient denies feeling feverish or experiencing any weakness, excessive sleepiness, or sweating. He mentions some discomfort when pressure is applied to his hernia, describing it as hurting "a little bit."  The patient has noticed bruising on his side, primarily on one side, which he states "just kind of popped up" without any history of falls or trauma. He denies any chest pain, urinary symptoms, rashes, or skin breakdown. He states his belly feels "alright" and doesn't feel like it needs more fluid removed.  Regarding his current treatment, the patient denies any problems with the antibiotics he's receiving, including no loose stools, diarrhea, or belly cramping. He has been able to eat and sleep without issues.      Review of Systems  - General: Denies fever, sweats, weakness, feeling super sleepy  - Skin: Denies rash or skin breakdown, positive for bruising on ribs  - Cardiovascular: Denies chest pain, positive for mild leg swelling  - Gastrointestinal: Denies abdominal pain, loose stools, diarrhea, belly cramping, positive for tight feeling in abdomen  - Genitourinary: Denies urinary symptoms  - Musculoskeletal: Positive for mild pain when hernia is pressed    Objective:     Vital Signs (Most Recent):  Temp: 98.3 °F (36.8 °C) (08/02/25 0325)  Pulse: 71 (08/02/25 0325)  Resp: 18 (08/02/25 0325)  BP: (!) 97/53 (08/02/25 0325)  SpO2: 98 % (08/02/25 0325) Vital Signs (24h Range):  Temp:  [98.1 °F (36.7 °C)-98.7 °F (37.1 °C)] 98.3 °F (36.8 °C)  Pulse:  [71-78] 71  Resp:  [18] 18  SpO2:  [97 %-100 %] 98 %  BP: ()/(53-62) 97/53     Weight: 80.6 kg (177 lb 11.1 oz)  Body mass index is 22.81 kg/m².    Estimated Creatinine Clearance: 132.7 mL/min (based on SCr of 0.7 mg/dL).     Physical Exam  Vitals reviewed.   Constitutional:       General: He is not in acute distress.     Appearance: " Normal appearance. He is not ill-appearing or diaphoretic.   HENT:      Head: Normocephalic and atraumatic.      Nose: Nose normal.   Eyes:      Conjunctiva/sclera: Conjunctivae normal.   Cardiovascular:      Rate and Rhythm: Normal rate and regular rhythm.      Heart sounds: Normal heart sounds. No murmur heard.  Pulmonary:      Effort: Pulmonary effort is normal. No respiratory distress.      Breath sounds: Normal breath sounds.   Abdominal:      General: Abdomen is protuberant. Bowel sounds are normal. There is distension.      Tenderness: There is no abdominal tenderness.      Hernia: A hernia is present. Hernia is present in the umbilical area (Redness present, reduceable).      Comments: Bruising on LUQ   Musculoskeletal:      Right lower leg: No edema.      Left lower leg: No edema.   Skin:     General: Skin is warm.   Neurological:      General: No focal deficit present.      Mental Status: He is alert and oriented to person, place, and time. Mental status is at baseline.      Cranial Nerves: No cranial nerve deficit.   Psychiatric:         Behavior: Behavior normal.          Significant Labs: All pertinent labs within the past 24 hours have been reviewed.    Significant Imaging: I have reviewed all pertinent imaging results/findings within the past 24 hours.

## 2025-08-03 VITALS
WEIGHT: 177.69 LBS | SYSTOLIC BLOOD PRESSURE: 105 MMHG | RESPIRATION RATE: 18 BRPM | OXYGEN SATURATION: 99 % | BODY MASS INDEX: 22.8 KG/M2 | DIASTOLIC BLOOD PRESSURE: 59 MMHG | HEIGHT: 74 IN | HEART RATE: 86 BPM | TEMPERATURE: 97 F

## 2025-08-03 LAB
ABSOLUTE EOSINOPHIL (OHS): 0.24 K/UL
ABSOLUTE MONOCYTE (OHS): 0.4 K/UL (ref 0.3–1)
ABSOLUTE NEUTROPHIL COUNT (OHS): 2.82 K/UL (ref 1.8–7.7)
ALBUMIN SERPL BCP-MCNC: 1.9 G/DL (ref 3.5–5.2)
ALP SERPL-CCNC: 187 UNIT/L (ref 40–150)
ALT SERPL W/O P-5'-P-CCNC: 16 UNIT/L (ref 0–55)
ANION GAP (OHS): 4 MMOL/L (ref 8–16)
AST SERPL-CCNC: 48 UNIT/L (ref 0–50)
BASOPHILS # BLD AUTO: 0.02 K/UL
BASOPHILS NFR BLD AUTO: 0.5 %
BILIRUB SERPL-MCNC: 1.3 MG/DL (ref 0.1–1)
BUN SERPL-MCNC: 17 MG/DL (ref 6–20)
CALCIUM SERPL-MCNC: 7.7 MG/DL (ref 8.7–10.5)
CHLORIDE SERPL-SCNC: 104 MMOL/L (ref 95–110)
CO2 SERPL-SCNC: 26 MMOL/L (ref 23–29)
CREAT SERPL-MCNC: 0.8 MG/DL (ref 0.5–1.4)
ERYTHROCYTE [DISTWIDTH] IN BLOOD BY AUTOMATED COUNT: 24.6 % (ref 11.5–14.5)
GFR SERPLBLD CREATININE-BSD FMLA CKD-EPI: >60 ML/MIN/1.73/M2
GLUCOSE SERPL-MCNC: 181 MG/DL (ref 70–110)
HCT VFR BLD AUTO: 23.3 % (ref 40–54)
HGB BLD-MCNC: 8.3 GM/DL (ref 14–18)
IMM GRANULOCYTES # BLD AUTO: 0.05 K/UL (ref 0–0.04)
IMM GRANULOCYTES NFR BLD AUTO: 1.3 % (ref 0–0.5)
INR PPP: 1.3 (ref 0.8–1.2)
LYMPHOCYTES # BLD AUTO: 0.42 K/UL (ref 1–4.8)
MAGNESIUM SERPL-MCNC: 1.5 MG/DL (ref 1.6–2.6)
MCH RBC QN AUTO: 27.1 PG (ref 27–31)
MCHC RBC AUTO-ENTMCNC: 35.6 G/DL (ref 32–36)
MCV RBC AUTO: 76 FL (ref 82–98)
NUCLEATED RBC (/100WBC) (OHS): 0 /100 WBC
PLATELET # BLD AUTO: 40 K/UL (ref 150–450)
PLATELET BLD QL SMEAR: ABNORMAL
PMV BLD AUTO: 10 FL (ref 9.2–12.9)
POCT GLUCOSE: 179 MG/DL (ref 70–110)
POCT GLUCOSE: 187 MG/DL (ref 70–110)
POCT GLUCOSE: 191 MG/DL (ref 70–110)
POTASSIUM SERPL-SCNC: 4.2 MMOL/L (ref 3.5–5.1)
PROT SERPL-MCNC: 7.1 GM/DL (ref 6–8.4)
PROTHROMBIN TIME: 14.1 SECONDS (ref 9–12.5)
RBC # BLD AUTO: 3.06 M/UL (ref 4.6–6.2)
RELATIVE EOSINOPHIL (OHS): 6.1 %
RELATIVE LYMPHOCYTE (OHS): 10.6 % (ref 18–48)
RELATIVE MONOCYTE (OHS): 10.1 % (ref 4–15)
RELATIVE NEUTROPHIL (OHS): 71.4 % (ref 38–73)
SODIUM SERPL-SCNC: 134 MMOL/L (ref 136–145)
WBC # BLD AUTO: 3.95 K/UL (ref 3.9–12.7)

## 2025-08-03 PROCEDURE — 25000003 PHARM REV CODE 250: Mod: NTX

## 2025-08-03 PROCEDURE — 36415 COLL VENOUS BLD VENIPUNCTURE: CPT | Mod: NTX

## 2025-08-03 PROCEDURE — 63600175 PHARM REV CODE 636 W HCPCS: Mod: NTX

## 2025-08-03 PROCEDURE — 85610 PROTHROMBIN TIME: CPT | Mod: NTX

## 2025-08-03 PROCEDURE — 83735 ASSAY OF MAGNESIUM: CPT | Mod: NTX

## 2025-08-03 PROCEDURE — 82040 ASSAY OF SERUM ALBUMIN: CPT | Mod: NTX

## 2025-08-03 PROCEDURE — 85025 COMPLETE CBC W/AUTO DIFF WBC: CPT | Mod: NTX

## 2025-08-03 RX ORDER — IBUPROFEN 200 MG
16 TABLET ORAL
Status: CANCELLED | OUTPATIENT
Start: 2025-08-03

## 2025-08-03 RX ORDER — METHOCARBAMOL 500 MG/1
250 TABLET, FILM COATED ORAL 3 TIMES DAILY
Status: CANCELLED | OUTPATIENT
Start: 2025-08-03

## 2025-08-03 RX ORDER — OXYCODONE HYDROCHLORIDE 5 MG/1
5 TABLET ORAL EVERY 6 HOURS PRN
Refills: 0 | Status: CANCELLED | OUTPATIENT
Start: 2025-08-03

## 2025-08-03 RX ORDER — SODIUM CHLORIDE 0.9 % (FLUSH) 0.9 %
10 SYRINGE (ML) INJECTION EVERY 12 HOURS PRN
Status: CANCELLED | OUTPATIENT
Start: 2025-08-03

## 2025-08-03 RX ORDER — GLUCAGON 1 MG
1 KIT INJECTION
Status: CANCELLED | OUTPATIENT
Start: 2025-08-03

## 2025-08-03 RX ORDER — SPIRONOLACTONE 50 MG/1
50 TABLET, FILM COATED ORAL DAILY
Status: CANCELLED | OUTPATIENT
Start: 2025-08-04

## 2025-08-03 RX ORDER — VENLAFAXINE 75 MG/1
75 TABLET ORAL 2 TIMES DAILY
Status: CANCELLED | OUTPATIENT
Start: 2025-08-03

## 2025-08-03 RX ORDER — IBUPROFEN 200 MG
24 TABLET ORAL
Status: CANCELLED | OUTPATIENT
Start: 2025-08-03

## 2025-08-03 RX ORDER — NALOXONE HCL 0.4 MG/ML
0.02 VIAL (ML) INJECTION
Status: CANCELLED | OUTPATIENT
Start: 2025-08-03

## 2025-08-03 RX ORDER — FUROSEMIDE 40 MG/1
40 TABLET ORAL DAILY
Status: DISCONTINUED | OUTPATIENT
Start: 2025-08-04 | End: 2025-08-03 | Stop reason: HOSPADM

## 2025-08-03 RX ORDER — INSULIN GLARGINE 100 [IU]/ML
14 INJECTION, SOLUTION SUBCUTANEOUS NIGHTLY
Status: CANCELLED | OUTPATIENT
Start: 2025-08-03

## 2025-08-03 RX ORDER — PANTOPRAZOLE SODIUM 40 MG/10ML
40 INJECTION, POWDER, LYOPHILIZED, FOR SOLUTION INTRAVENOUS 2 TIMES DAILY
Status: CANCELLED | OUTPATIENT
Start: 2025-08-03

## 2025-08-03 RX ORDER — ONDANSETRON HYDROCHLORIDE 2 MG/ML
4 INJECTION, SOLUTION INTRAVENOUS EVERY 6 HOURS PRN
Status: CANCELLED | OUTPATIENT
Start: 2025-08-03

## 2025-08-03 RX ORDER — INSULIN ASPART 100 [IU]/ML
5 INJECTION, SOLUTION INTRAVENOUS; SUBCUTANEOUS
Status: CANCELLED | OUTPATIENT
Start: 2025-08-03

## 2025-08-03 RX ORDER — TALC
6 POWDER (GRAM) TOPICAL NIGHTLY PRN
Status: CANCELLED | OUTPATIENT
Start: 2025-08-03

## 2025-08-03 RX ORDER — FUROSEMIDE 40 MG/1
40 TABLET ORAL DAILY
Status: CANCELLED | OUTPATIENT
Start: 2025-08-04

## 2025-08-03 RX ORDER — OXYBUTYNIN CHLORIDE 5 MG/1
5 TABLET, EXTENDED RELEASE ORAL DAILY
Status: CANCELLED | OUTPATIENT
Start: 2025-08-04

## 2025-08-03 RX ORDER — ACETAMINOPHEN 500 MG
500 TABLET ORAL EVERY 6 HOURS PRN
Status: CANCELLED | OUTPATIENT
Start: 2025-08-03

## 2025-08-03 RX ORDER — INSULIN ASPART 100 [IU]/ML
0-5 INJECTION, SOLUTION INTRAVENOUS; SUBCUTANEOUS
Status: CANCELLED | OUTPATIENT
Start: 2025-08-03

## 2025-08-03 RX ORDER — TAMSULOSIN HYDROCHLORIDE 0.4 MG/1
0.4 CAPSULE ORAL NIGHTLY
Status: CANCELLED | OUTPATIENT
Start: 2025-08-03

## 2025-08-03 RX ORDER — CEFTRIAXONE 2 G/1
2 INJECTION, POWDER, FOR SOLUTION INTRAMUSCULAR; INTRAVENOUS
Status: CANCELLED | OUTPATIENT
Start: 2025-08-04

## 2025-08-03 RX ORDER — OXYCODONE HYDROCHLORIDE 10 MG/1
10 TABLET ORAL EVERY 6 HOURS PRN
Refills: 0 | Status: CANCELLED | OUTPATIENT
Start: 2025-08-03

## 2025-08-03 RX ADMIN — METHOCARBAMOL 250 MG: 500 TABLET ORAL at 08:08

## 2025-08-03 RX ADMIN — CEFTRIAXONE SODIUM 2 G: 2 INJECTION, POWDER, FOR SOLUTION INTRAMUSCULAR; INTRAVENOUS at 01:08

## 2025-08-03 RX ADMIN — FUROSEMIDE 20 MG: 20 TABLET ORAL at 08:08

## 2025-08-03 RX ADMIN — MICAFUNGIN SODIUM 100 MG: 100 INJECTION, POWDER, LYOPHILIZED, FOR SOLUTION INTRAVENOUS at 01:08

## 2025-08-03 RX ADMIN — LACTULOSE 10 G: 20 SOLUTION ORAL at 08:08

## 2025-08-03 RX ADMIN — INSULIN ASPART 5 UNITS: 100 INJECTION, SOLUTION INTRAVENOUS; SUBCUTANEOUS at 08:08

## 2025-08-03 RX ADMIN — PANTOPRAZOLE SODIUM 40 MG: 40 INJECTION, POWDER, FOR SOLUTION INTRAVENOUS at 08:08

## 2025-08-03 RX ADMIN — VENLAFAXINE 75 MG: 75 TABLET ORAL at 08:08

## 2025-08-03 RX ADMIN — SPIRONOLACTONE 50 MG: 50 TABLET ORAL at 08:08

## 2025-08-03 RX ADMIN — RIFAXIMIN 550 MG: 550 TABLET ORAL at 08:08

## 2025-08-03 RX ADMIN — OXYBUTYNIN CHLORIDE 5 MG: 5 TABLET, EXTENDED RELEASE ORAL at 08:08

## 2025-08-03 RX ADMIN — INSULIN ASPART 5 UNITS: 100 INJECTION, SOLUTION INTRAVENOUS; SUBCUTANEOUS at 12:08

## 2025-08-03 NOTE — ASSESSMENT & PLAN NOTE
Patient's most recent potassium results are listed below.   Recent Labs     08/01/25  0317 08/02/25  0233 08/03/25  0642   K 3.7 3.8 4.2     Plan  - Replete potassium per protocol  - Monitor potassium Daily  - Patient's hypokalemia is stable

## 2025-08-03 NOTE — ASSESSMENT & PLAN NOTE
The likely etiology of thrombocytopenia is liver disease. The patients 3 most recent labs are listed below.  Recent Labs     08/02/25  0233 08/02/25  1551 08/03/25  0642   PLT 42* 41* 40*     Plan  - Will transfuse if platelet count is <50k (if undergoing surgical procedure or have active bleeding).  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

## 2025-08-03 NOTE — ASSESSMENT & PLAN NOTE
STABLE     MALT-rivka with bone marrow involvement.  Underwent treatment including repeated Rituxan q2 month infusions. Followed by Heme-Onc at [facility]. Most recent testing has suggested remission leading to potential transplant candidacy for cirrhosis to be re-instituted.

## 2025-08-03 NOTE — ASSESSMENT & PLAN NOTE
Patient with hx CORDOVA cirrhosis c/b ascites (requiring paracentesis, on lasix 40 mg qd, eplerenone), grade 1 esophageal varices (EGD on 11/8/2024), hepatic encephalopathy 5 week hx abdominal, back pain since last paracentesis without fevers, chills, nausea, vomiting, diarrhea. CT A/P with concern for peritonitis. Also noted splenomegaly and portal vein thrombosis, seen previously. INR 1.8. Patient had noted lactic acidosis at 2.9, now resolved. Overalll suspect abdominal pain 2/2 ascites with c/f SBP vs. Worsening of chronic portal vein thrombosis Given CTX and  pRBCs at OSH. Transferred to [facility] [place] workup and evaluation.     -started Lasix 40mg and spirolactone 50mg, net 2.2 L positive from admission but producing adequate urine output. Abdominal distention/umbilical hernia remains unchanged     - started lactulose 10mg BID for hepatic encephalopathy prophylaxis on admission   -4 BM overnight, continue lactulose   - Liver US showed cirrhotic morphology of the liver, sequela of portal hypertension and large volume of complicated ascites   - paracentesis on 7/21 pulled 4250mL, WBC 14,960 w/ 96% PNMs and aerobic culture was positive for strep mitis oralis   - repeat para on 7/25 pulled 2500mL, WBC 14,860  -repeat para on 7/31 with WBC 17,945 and neutrophils 96.   - ID started micafungin, ceftriaxone and vancomycin. D/C Meropenem 8/2  -Culture pending and will include AFB culture as well as fungal, will likely need repeat para   -repeat CT abdomen/pelvis with large volume ascites with findings suggestive of associated peritonitis, massive splenomegaly, partially thrombosed portal vein and splenic vein and a small right pleural effusion.   -AFP < 2   - continue home rifaxamin   - CTM CBC, CMP, PT/INR daily   -started on Iv protonix 40 BID  - MELD 3.0: 15 at 8/3/2025  6:42 AM  MELD-Na: 10 at 8/3/2025  6:42 AM  Calculated from:  Serum Creatinine: 0.8 mg/dL (Using min of 1 mg/dL) at 8/3/2025  6:42 AM  Serum Sodium:  134 mmol/L at 8/3/2025  6:42 AM  Total Bilirubin: 1.3 mg/dL at 8/3/2025  6:42 AM  Serum Albumin: 1.9 g/dL at 8/3/2025  6:42 AM  INR(ratio): 1.3 at 8/3/2025  6:42 AM  Age at listin years  Sex: Male at 8/3/2025  6:42 AM

## 2025-08-03 NOTE — ASSESSMENT & PLAN NOTE
Patient with hx CORDOVA cirrhosis c/b ascites (requiring paracentesis, on lasix 40 mg qd, eplerenone), grade 1 esophageal varices (EGD on 11/8/2024), hepatic encephalopathy 5 week hx abdominal, back pain since last paracentesis without fevers, chills, nausea, vomiting, diarrhea. CT A/P with concern for peritonitis. Also noted splenomegaly and portal vein thrombosis, seen previously. INR 1.8. Patient had noted lactic acidosis at 2.9, now resolved. Overalll suspect abdominal pain 2/2 ascites with c/f SBP vs. Worsening of chronic portal vein thrombosis Given CTX and  pRBCs at OSH. [place] [facility] [place].     -started Lasix 40mg and spirolactone 50mg, net 2.2 L positive from admission but producing adequate urine output. Abdominal distention/umbilical hernia remains unchanged     - started lactulose 10mg BID for hepatic encephalopathy prophylaxis on admission   -4 BM overnight, continue lactulose   - Liver US showed cirrhotic morphology of the liver, sequela of portal hypertension and large volume of complicated ascites   - paracentesis on 7/21 pulled 4250mL, WBC 14,960 w/ 96% PNMs and aerobic culture was positive for strep mitis oralis   - repeat para on 7/25 pulled 2500mL, WBC 14,860  -repeat para on 7/31 with WBC 17,945 and neutrophils 96.   - ID started micafungin, ceftriaxone and vancomycin. D/C Meropenem 8/2  -Culture pending and will include AFB culture as well as fungal, will likely need repeat para   -repeat CT abdomen/pelvis with large volume ascites with findings suggestive of associated peritonitis, massive splenomegaly, partially thrombosed portal vein and splenic vein and a small right pleural effusion.   -AFP < 2   - continue home rifaxamin   - CTM CBC, CMP, PT/INR daily   -started on Iv protonix 40 BID  - MELD 3.0: 15 at 8/3/2025  6:42 AM  MELD-Na: 10 at 8/3/2025  6:42 AM  Calculated from:  Serum Creatinine: 0.8 mg/dL (Using min of 1 mg/dL) at 8/3/2025  6:42 AM  Serum Sodium:  134 mmol/L at 8/3/2025  6:42 AM  Total Bilirubin: 1.3 mg/dL at 8/3/2025  6:42 AM  Serum Albumin: 1.9 g/dL at 8/3/2025  6:42 AM  INR(ratio): 1.3 at 8/3/2025  6:42 AM  Age at listin years  Sex: Male at 8/3/2025  6:42 AM

## 2025-08-03 NOTE — ASSESSMENT & PLAN NOTE
The likely etiology of thrombocytopenia is liver disease. The patients 3 most recent labs are listed below.  Recent Labs     08/02/25  0233 08/02/25  1551 08/03/25  0642   PLT 42* 41* 40*     Plan  - Will transfuse if platelet count is <50k (if undergoing surgical procedure or have active bleeding).  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

## 2025-08-03 NOTE — PROGRESS NOTES
[facility] - Telemetry [facility] Medicine  Progress Note    Patient Name: Corky Bach  MRN: [id number]  Patient Class: IP- Inpatient   Admission Date: 7/20/2025  Length of Stay: 14 days  Attending Physician: Kirk Phillip, *  Primary Care Provider: Sarmad Estrella MD      Subjective     Principal Problem:SBP (spontaneous bacterial peritonitis)      HPI:  Corky Bach is a 60 y/o male with a notable hx of CORDOVA cirrhosis c/b esophageal varices, T2DM, MALT lymphoma, HLD, gout, and depression transferred to [facility] [place] for hepatology and GI consult.     Patient initially presented to the ED of [facility] on 7/20 with complaints of chronic back and abdominal pain that had been ongoing for appx 5 weeks. He states that the back pain began to occur shortly after his last paracentesis on 7/12 located around his mid lumbar spine, starting with a dull back/ flank pain and quickly radiating to upper abdominal pain. He was trying to use ibuprofen and tylenol to help with the pain however the pain continued to progress. He eventually started to take percocet from a prior hospitalization however this was ineffective. He denies any fevers, chills, nausea, vomiting, constipation/diarrhea, melena, hematemesis during this time. At this point he presented to the ED for further evaluation.     Initially BPs were in the low 100s. He was found to be heme occult positive. Hb 6.9 and platelets 67. AST 13, ALT 6, , bili 1.2, INR 1.8, lactic 2.9. CT Lumbar spine obtained without acute lumbar fracture, congenital lumbar stenosis with degenerative changes. CT A/P without contrast showed moderate r sized pleural effusion, splenomegaly, calcified portal vein thrombus, ascites with thickening of surrounding peritoneal reflections c/f inflammatory peritonitis. Patient started on CTX, given 1u PRBC, and transferred to [facility] for GI/Hepatology evaluation.         Overview/Hospital Course:  Patient is a 59 M w/ hx of CORDOVA  cirrhosis, c/b esophageal varices, T2DM, MALT lymphoma, HLD, gout, admitted for abdminal pain and suspected SBP with decompensated cirrhosis and ascites. Found to have a hemoglobin of 6.9 on presentation, received 1 unit PRBC, no signs of bleeding. Discussed with hepatology and they did not think his presentation was c/f a GIB. Treated empirically for suspected SBP with ceftriaxone. Diagnostic and therapeutic paracentesis drained 4250 mL, lab results positive for SBP. Awaiting cultures. Ascites is now resolved after paracentesis. Hemoglobin and platelets were low today, 6.4 and 40 respectively. Transfused 1U PRBCs and 1U platelets on 7/22. Monitoring hemoglobin. Continuing ceftriaxone and albumin for SBP treatment, added vanc to cover enterococcus while awaiting culture data. Hemoglobin dropped again, transfused 1U PRBCs on 7/24. Aerobic culture from paracentesis sample was positive for strep mitis oralis. Repeat paracentesis on 7/25, pulled 2.5 L and labs revealed an ascitic fluid WBC count of around 14K again. ID recommended to discontinue vanc and CTX and start IV meropenem with repeat paracentesis after 5 days of IV meropenem treatment. Bowel regimen de-escalated on 7/28 due to patient having multiple soft stool bowel movements on night of 7/27. FOBT positive on 7/28 with no signs of active bleeding. Repeat para performed 7/31 with worsening WBC count at 17K. Vancomycin and micafungin started on 8/1 following ID consult. 1 Unit PRBC transfused on 8/2 for Hgb 6.7.     Interval History: NAEON. Patient feels well today. Denies nausea, vomiting, constipation, SOB, headaches, confusion. States he had 4 non bloody BM overnight. Denies abdominal pain but reporting mild tenderness at paracentesis site.     Review of Systems   Constitutional:  Negative for fatigue and fever.   Respiratory:  Negative for cough, chest tightness, shortness of breath and wheezing.    Cardiovascular:  Negative for chest pain and leg swelling.    Gastrointestinal:  Positive for abdominal distention. Negative for abdominal pain, constipation, diarrhea, nausea and vomiting.   Genitourinary:  Negative for difficulty urinating.   Neurological:  Negative for dizziness.     Objective:     Vital Signs (Most Recent):  Temp: 97.8 °F (36.6 °C) (08/03/25 0800)  Pulse: 75 (08/03/25 0800)  Resp: 16 (08/03/25 0800)  BP: (!) 105/56 (08/03/25 0840)  SpO2: 98 % (08/03/25 0800) Vital Signs (24h Range):  Temp:  [97.8 °F (36.6 °C)-98.7 °F (37.1 °C)] 97.8 °F (36.6 °C)  Pulse:  [72-88] 75  Resp:  [16-18] 16  SpO2:  [96 %-98 %] 98 %  BP: ()/(44-60) 105/56     Weight: 80.6 kg (177 lb 11.1 oz)  Body mass index is 22.81 kg/m².    Intake/Output Summary (Last 24 hours) at 8/3/2025 0923  Last data filed at 8/3/2025 0512  Gross per 24 hour   Intake 395.83 ml   Output 300 ml   Net 95.83 ml         Physical Exam  Constitutional:       Appearance: He is ill-appearing.   Cardiovascular:      Rate and Rhythm: Normal rate and regular rhythm.      Pulses: Normal pulses.      Heart sounds: Murmur heard.   Pulmonary:      Effort: Pulmonary effort is normal. No respiratory distress.      Breath sounds: No wheezing.   Abdominal:      General: Bowel sounds are normal. There is distension.      Tenderness: There is abdominal tenderness.      Hernia: A hernia is present.   Musculoskeletal:      Right lower leg: Edema present.      Left lower leg: Edema present.   Neurological:      Mental Status: He is alert and oriented to person, place, and time.   Psychiatric:         Mood and Affect: Mood normal.         Behavior: Behavior normal.               Significant Labs: All pertinent labs within the past 24 hours have been reviewed.  BMP:   Recent Labs   Lab 08/03/25  0642   *   *   K 4.2      CO2 26   BUN 17   CREATININE 0.8   CALCIUM 7.7*   MG 1.5*     CBC:   Recent Labs   Lab 08/02/25  0233 08/02/25  1551 08/03/25  0642   WBC 3.88* 5.18 3.95   HGB 6.7* 7.4* 8.3*   HCT 21.5*  23.8* 23.3*   PLT 42* 41* 40*       Significant Imaging: I have reviewed all pertinent imaging results/findings within the past 24 hours.  I have reviewed and interpreted all pertinent imaging results/findings within the past 24 hours.      Assessment & Plan  SBP (spontaneous bacterial peritonitis)  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  Abdominal pain  Ascites  Portal hypertensive gastropathy  Portal vein thrombosis  Splenomegaly  Patient with hx CORDOVA cirrhosis c/b ascites (requiring paracentesis, on lasix 40 mg qd, eplerenone), grade 1 esophageal varices (EGD on 11/8/2024), hepatic encephalopathy 5 week hx abdominal, back pain since last paracentesis without fevers, chills, nausea, vomiting, diarrhea. CT A/P with concern for peritonitis. Also noted splenomegaly and portal vein thrombosis, seen previously. INR 1.8. Patient had noted lactic acidosis at 2.9, now resolved. Overalll suspect abdominal pain 2/2 ascites with c/f SBP vs. Worsening of chronic portal vein thrombosis Given CTX and  pRBCs at OSH. Transferred to [facility] for further workup and evaluation.     -started Lasix 40mg and spirolactone 50mg, net 2.2 L positive from admission but producing adequate urine output. Abdominal distention/umbilical hernia remains unchanged     - started lactulose 10mg BID for hepatic encephalopathy prophylaxis on admission   -4 BM overnight, continue lactulose   - Liver US showed cirrhotic morphology of the liver, sequela of portal hypertension and large volume of complicated ascites   - paracentesis on 7/21 pulled 4250mL, WBC 14,960 w/ 96% PNMs and aerobic culture was positive for strep mitis oralis   - repeat para on 7/25 pulled 2500mL, WBC 14,860  -repeat para on 7/31 with WBC 17,945 and neutrophils 96.   - ID started micafungin, ceftriaxone and vancomycin. D/C Meropenem 8/2  -Culture pending and will include AFB culture as well as fungal, will likely need repeat para   -repeat CT abdomen/pelvis with large volume ascites with  findings suggestive of associated peritonitis, massive splenomegaly, partially thrombosed portal vein and splenic vein and a small right pleural effusion.   -AFP < 2   - continue home rifaxamin   - CTM CBC, CMP, PT/INR daily   -started on Iv protonix 40 BID  - MELD 3.0: 15 at 8/3/2025  6:42 AM  MELD-Na: 10 at 8/3/2025  6:42 AM  Calculated from:  Serum Creatinine: 0.8 mg/dL (Using min of 1 mg/dL) at 8/3/2025  6:42 AM  Serum Sodium: 134 mmol/L at 8/3/2025  6:42 AM  Total Bilirubin: 1.3 mg/dL at 8/3/2025  6:42 AM  Serum Albumin: 1.9 g/dL at 8/3/2025  6:42 AM  INR(ratio): 1.3 at 8/3/2025  6:42 AM  Age at listin years  Sex: Male at 8/3/2025  6:42 AM     Anemia  Patient with hx cirrhosis c/b grade 1 esophageal varices, moderate portal hypertensive gastropathy, multiple angioectasias w/o bleeding (Last EGD 2024). Presenting with noted anemia of 6.9 at OSH. S/p 3 uPRBC.     Plan  - Monitor serial CBC: Daily  - Transfuse PRBC if patient becomes hemodynamically unstable, symptomatic or H/H drops below 7  -Stable today 7.8 > 7.5>7.4 >8.2>7.3>7.2>6.7>8.3. Transfused 1 unit on . No signs of active bleeding   - Hemolytic anemia labs unremarkable   Mood disorder  Continue home venlafaxine  Hyponatremia  Hyponatremia is likely due to Cirrhosis. The patient's most recent sodium results are listed below.  Recent Labs     25  0317 25  0233 25  0642   * 134* 134*     Plan  - Correct the sodium by 4-6mEq in 24 hours if hyponatremia persists   - Monitor sodium Daily.   - Patient hyponatremia is stable  Thrombocytopenia  The likely etiology of thrombocytopenia is liver disease. The patients 3 most recent labs are listed below.  Recent Labs     25  0233 25  1551 25  0642   PLT 42* 41* 40*     Plan  - Will transfuse if platelet count is <50k (if undergoing surgical procedure or have active bleeding).  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  Underwent treatment including repeated Rituxan q2 month infusions. Followed by Heme-Onc at [facility]. Most recent testing has suggested remission leading to potential transplant candidacy for cirrhosis to be re-instituted.   Type 2 diabetes mellitus with hyperglycemia, with long-term current use of insulin  Patient's FSGs are controlled on current medication regimen.  Last A1c reviewed-   Lab Results   Component Value Date    HGBA1C 8.8 (H) 07/20/2025     Current correctional scale  Low  Maintain anti-hyperglycemic dose as follows-   Antihyperglycemics (From admission, onward)      Start     Stop Route Frequency Ordered    08/01/25 2100  insulin glargine U-100 (Lantus) pen 14 Units         -- SubQ Nightly 08/01/25 1248    08/01/25 1245  insulin aspart U-100 pen 5 Units         -- SubQ 3 times daily with meals 08/01/25 1238    07/20/25 2316  insulin aspart U-100 pen 0-5 Units         -- SubQ Before meals & nightly PRN 07/20/25 2217          Hold Oral hypoglycemics while patient is in the hospital.  - decreased insulin Glargine to 14 units daily and aspart 5 units TID with meals   Mixed hyperlipidemia  - not currently on medication     Hypokalemia  Patient's most recent potassium results are listed below.   Recent Labs     08/01/25  0317 08/02/25  0233 08/03/25  0642   K 3.7 3.8 4.2     Plan  - Replete potassium per protocol  - Monitor potassium Daily  - Patient's hypokalemia is stable  VTE Risk Mitigation (From admission, onward)           Ordered     Reason for No Pharmacological VTE Prophylaxis  Once        Question:  Reasons:  Answer:  Risk of Bleeding    07/20/25 2020     IP VTE HIGH RISK PATIENT  Once         07/20/25 2020     Place sequential compression device  Until discontinued         07/20/25 2020                     Discharge Planning   GARY: 8/5/2025 or pending transfer     Code Status: Full Code   Medical Readiness for Discharge Date:   Discharge Plan A: Home with family   Discharge Delays: None known at this time        Amanda Aguilar DO  Department of Hospital Medicine   [facility] - Telemetry Stepdown

## 2025-08-03 NOTE — ASSESSMENT & PLAN NOTE
Patient with hx CORDOVA cirrhosis c/b ascites (requiring paracentesis, on lasix 40 mg qd, eplerenone), grade 1 esophageal varices (EGD on 11/8/2024), hepatic encephalopathy 5 week hx abdominal, back pain since last paracentesis without fevers, chills, nausea, vomiting, diarrhea. CT A/P with concern for peritonitis. Also noted splenomegaly and portal vein thrombosis, seen previously. INR 1.8. Patient had noted lactic acidosis at 2.9, now resolved. Overalll suspect abdominal pain 2/2 ascites with c/f SBP vs. Worsening of chronic portal vein thrombosis Given CTX and  pRBCs at OSH. [place] [facility] [place] and evaluation.     -started Lasix 40mg and spirolactone 50mg, net 2.2 L positive from admission but producing adequate urine output. Abdominal distention/umbilical hernia remains unchanged     - started lactulose 10mg BID for hepatic encephalopathy prophylaxis on admission   -4 BM overnight, continue lactulose   - Liver US showed cirrhotic morphology of the liver, sequela of portal hypertension and large volume of complicated ascites   - paracentesis on 7/21 pulled 4250mL, WBC 14,960 w/ 96% PNMs and aerobic culture was positive for strep mitis oralis   - repeat para on 7/25 pulled 2500mL, WBC 14,860  -repeat para on 7/31 with WBC 17,945 and neutrophils 96.   - ID started micafungin, ceftriaxone and vancomycin. D/C Meropenem 8/2  -Culture pending and will include AFB culture as well as fungal, will likely need repeat para   -repeat CT abdomen/pelvis with large volume ascites with findings suggestive of associated peritonitis, massive splenomegaly, partially thrombosed portal vein and splenic vein and a small right pleural effusion.   -AFP < 2   - continue home rifaxamin   - CTM CBC, CMP, PT/INR daily   -started on Iv protonix 40 BID  - MELD 3.0: 15 at 8/3/2025  6:42 AM  MELD-Na: 10 at 8/3/2025  6:42 AM  Calculated from:  Serum Creatinine: 0.8 mg/dL (Using min of 1 mg/dL) at 8/3/2025  6:42 AM  Serum Sodium:  134 mmol/L at 8/3/2025  6:42 AM  Total Bilirubin: 1.3 mg/dL at 8/3/2025  6:42 AM  Serum Albumin: 1.9 g/dL at 8/3/2025  6:42 AM  INR(ratio): 1.3 at 8/3/2025  6:42 AM  Age at listin years  Sex: Male at 8/3/2025  6:42 AM

## 2025-08-03 NOTE — NURSING
Pt was picked up by EMS to be transferred to [facility]. Report was giving to receiving nurse RIK Santiago. Pt belonging were given.VSS.

## 2025-08-03 NOTE — ASSESSMENT & PLAN NOTE
Patient with hx CORDOVA cirrhosis c/b ascites (requiring paracentesis, on lasix 40 mg qd, eplerenone), grade 1 esophageal varices (EGD on 11/8/2024), hepatic encephalopathy 5 week hx abdominal, back pain since last paracentesis without fevers, chills, nausea, vomiting, diarrhea. CT A/P with concern for peritonitis. Also noted splenomegaly and portal vein thrombosis, seen previously. INR 1.8. Patient had noted lactic acidosis at 2.9, now resolved. Overalll suspect abdominal pain 2/2 ascites with c/f SBP vs. Worsening of chronic portal vein thrombosis Given CTX and  pRBCs at OSH. Transferred to [facility] [place] workup and evaluation.     -started Lasix 40mg and spirolactone 50mg, net 2.2 L positive from admission but producing adequate urine output. Abdominal distention/umbilical hernia remains unchanged     - started lactulose 10mg BID for hepatic encephalopathy prophylaxis on admission   -4 BM overnight, continue lactulose   - Liver US showed cirrhotic morphology of the liver, sequela of portal hypertension and large volume of complicated ascites   - paracentesis on 7/21 pulled 4250mL, WBC 14,960 w/ 96% PNMs and aerobic culture was positive for strep mitis oralis   - repeat para on 7/25 pulled 2500mL, WBC 14,860  -repeat para on 7/31 with WBC 17,945 and neutrophils 96.   - ID started micafungin, ceftriaxone and vancomycin. D/C Meropenem 8/2  -Culture pending and will include AFB culture as well as fungal, will likely need repeat para   -repeat CT abdomen/pelvis with large volume ascites with findings suggestive of associated peritonitis, massive splenomegaly, partially thrombosed portal vein and splenic vein and a small right pleural effusion.   -AFP < 2   - continue home rifaxamin   - CTM CBC, CMP, PT/INR daily   -started on Iv protonix 40 BID  - MELD 3.0: 15 at 8/3/2025  6:42 AM  MELD-Na: 10 at 8/3/2025  6:42 AM  Calculated from:  Serum Creatinine: 0.8 mg/dL (Using min of 1 mg/dL) at 8/3/2025  6:42 AM  Serum Sodium:  134 mmol/L at 8/3/2025  6:42 AM  Total Bilirubin: 1.3 mg/dL at 8/3/2025  6:42 AM  Serum Albumin: 1.9 g/dL at 8/3/2025  6:42 AM  INR(ratio): 1.3 at 8/3/2025  6:42 AM  Age at listin years  Sex: Male at 8/3/2025  6:42 AM

## 2025-08-03 NOTE — ASSESSMENT & PLAN NOTE
Patient with hx cirrhosis c/b grade 1 esophageal varices, moderate portal hypertensive gastropathy, multiple angioectasias w/o bleeding (Last EGD 11/8/2024). Presenting with noted anemia of 6.9 at OSH. S/p 3 uPRBC.     Plan  - Monitor serial CBC: Daily  - Transfuse PRBC if patient becomes hemodynamically unstable, symptomatic or H/H drops below 7  -Stable today 7.8 > 7.5>7.4 >8.2>7.3>7.2>6.7>8.3. Transfused 1 unit on 8/2. No signs of active bleeding   - Hemolytic anemia labs unremarkable

## 2025-08-03 NOTE — ASSESSMENT & PLAN NOTE
Hyponatremia is likely due to Cirrhosis. The patient's most recent sodium results are listed below.  Recent Labs     08/01/25  0317 08/02/25  0233 08/03/25  0642   * 134* 134*     Plan  - Correct the sodium by 4-6mEq in 24 hours if hyponatremia persists   - Monitor sodium Daily.   - Patient hyponatremia is stable

## 2025-08-03 NOTE — ASSESSMENT & PLAN NOTE
Patient with hx CORDOVA cirrhosis c/b ascites (requiring paracentesis, on lasix 40 mg qd, eplerenone), grade 1 esophageal varices (EGD on 11/8/2024), hepatic encephalopathy 5 week hx abdominal, back pain since last paracentesis without fevers, chills, nausea, vomiting, diarrhea. CT A/P with concern for peritonitis. Also noted splenomegaly and portal vein thrombosis, seen previously. INR 1.8. Patient had noted lactic acidosis at 2.9, now resolved. Overalll suspect abdominal pain 2/2 ascites with c/f SBP vs. Worsening of chronic portal vein thrombosis Given CTX and  pRBCs at OSH. Transferred to [facility] [place] for further workup and evaluation.     -started Lasix 40mg and spirolactone 50mg, net 2.2 L positive from admission but producing adequate urine output. Abdominal distention/umbilical hernia remains unchanged     - started lactulose 10mg BID for hepatic encephalopathy prophylaxis on admission   -4 BM overnight, continue lactulose   - Liver US showed cirrhotic morphology of the liver, sequela of portal hypertension and large volume of complicated ascites   - paracentesis on 7/21 pulled 4250mL, WBC 14,960 w/ 96% PNMs and aerobic culture was positive for strep mitis oralis   - repeat para on 7/25 pulled 2500mL, WBC 14,860  -repeat para on 7/31 with WBC 17,945 and neutrophils 96.   - ID started micafungin, ceftriaxone and vancomycin. D/C Meropenem 8/2  -Culture pending and will include AFB culture as well as fungal, will likely need repeat para   -repeat CT abdomen/pelvis with large volume ascites with findings suggestive of associated peritonitis, massive splenomegaly, partially thrombosed portal vein and splenic vein and a small right pleural effusion.   -AFP < 2   - continue home rifaxamin   - CTM CBC, CMP, PT/INR daily   -started on Iv protonix 40 BID  - MELD 3.0: 15 at 8/3/2025  6:42 AM  MELD-Na: 10 at 8/3/2025  6:42 AM  Calculated from:  Serum Creatinine: 0.8 mg/dL (Using min of 1 mg/dL) at 8/3/2025  6:42 AM  Serum Sodium:  134 mmol/L at 8/3/2025  6:42 AM  Total Bilirubin: 1.3 mg/dL at 8/3/2025  6:42 AM  Serum Albumin: 1.9 g/dL at 8/3/2025  6:42 AM  INR(ratio): 1.3 at 8/3/2025  6:42 AM  Age at listin years  Sex: Male at 8/3/2025  6:42 AM

## 2025-08-03 NOTE — ASSESSMENT & PLAN NOTE
Patient with hx CORDOVA cirrhosis c/b ascites (requiring paracentesis, on lasix 40 mg qd, eplerenone), grade 1 esophageal varices (EGD on 11/8/2024), hepatic encephalopathy 5 week hx abdominal, back pain since last paracentesis without fevers, chills, nausea, vomiting, diarrhea. CT A/P with concern for peritonitis. Also noted splenomegaly and portal vein thrombosis, seen previously. INR 1.8. Patient had noted lactic acidosis at 2.9, now resolved. Overalll suspect abdominal pain 2/2 ascites with c/f SBP vs. Worsening of chronic portal vein thrombosis Given CTX and  pRBCs at OSH. Transferred to [place] for further workup and evaluation.     -started Lasix 40mg and spirolactone 50mg, net 2.2 L positive from admission but producing adequate urine output. Abdominal distention/umbilical hernia remains unchanged     - started lactulose 10mg BID for hepatic encephalopathy prophylaxis on admission   -4 BM overnight, continue lactulose   - Liver US showed cirrhotic morphology of the liver, sequela of portal hypertension and large volume of complicated ascites   - paracentesis on 7/21 pulled 4250mL, WBC 14,960 w/ 96% PNMs and aerobic culture was positive for strep mitis oralis   - repeat para on 7/25 pulled 2500mL, WBC 14,860  -repeat para on 7/31 with WBC 17,945 and neutrophils 96.   - ID started micafungin, ceftriaxone and vancomycin. D/C Meropenem 8/2  -Culture pending and will include AFB culture as well as fungal, will likely need repeat para   -repeat CT abdomen/pelvis with large volume ascites with findings suggestive of associated peritonitis, massive splenomegaly, partially thrombosed portal vein and splenic vein and a small right pleural effusion.   -AFP < 2   - continue home rifaxamin   - CTM CBC, CMP, PT/INR daily   -started on Iv protonix 40 BID  - MELD 3.0: 15 at 8/3/2025  6:42 AM  MELD-Na: 10 at 8/3/2025  6:42 AM  Calculated from:  Serum Creatinine: 0.8 mg/dL (Using min of 1 mg/dL) at 8/3/2025  6:42 AM  Serum Sodium:  134 mmol/L at 8/3/2025  6:42 AM  Total Bilirubin: 1.3 mg/dL at 8/3/2025  6:42 AM  Serum Albumin: 1.9 g/dL at 8/3/2025  6:42 AM  INR(ratio): 1.3 at 8/3/2025  6:42 AM  Age at listin years  Sex: Male at 8/3/2025  6:42 AM

## 2025-08-03 NOTE — ASSESSMENT & PLAN NOTE
Patient with hx CORDOVA cirrhosis c/b ascites (requiring paracentesis, on lasix 40 mg qd, eplerenone), grade 1 esophageal varices (EGD on 11/8/2024), hepatic encephalopathy 5 week hx abdominal, back pain since last paracentesis without fevers, chills, nausea, vomiting, diarrhea. CT A/P with concern for peritonitis. Also noted splenomegaly and portal vein thrombosis, seen previously. INR 1.8. Patient had noted lactic acidosis at 2.9, now resolved. Overalll suspect abdominal pain 2/2 ascites with c/f SBP vs. Worsening of chronic portal vein thrombosis Given CTX and  pRBCs at OSH. Transferred to [facility] [place] for further workup and evaluation.     -started Lasix 40mg and spirolactone 50mg, net 2.2 L positive from admission but producing adequate urine output. Abdominal distention/umbilical hernia remains unchanged     - started lactulose 10mg BID for hepatic encephalopathy prophylaxis on admission   -4 BM overnight, continue lactulose   - Liver US showed cirrhotic morphology of the liver, sequela of portal hypertension and large volume of complicated ascites   - paracentesis on 7/21 pulled 4250mL, WBC 14,960 w/ 96% PNMs and aerobic culture was positive for strep mitis oralis   - repeat para on 7/25 pulled 2500mL, WBC 14,860  -repeat para on 7/31 with WBC 17,945 and neutrophils 96.   - ID started micafungin, ceftriaxone and vancomycin. D/C Meropenem 8/2  -Culture pending and will include AFB culture as well as fungal, will likely need repeat para   -repeat CT abdomen/pelvis with large volume ascites with findings suggestive of associated peritonitis, massive splenomegaly, partially thrombosed portal vein and splenic vein and a small right pleural effusion.   -AFP < 2   - continue home rifaxamin   - CTM CBC, CMP, PT/INR daily   -started on Iv protonix 40 BID  - MELD 3.0: 15 at 8/3/2025  6:42 AM  MELD-Na: 10 at 8/3/2025  6:42 AM  Calculated from:  Serum Creatinine: 0.8 mg/dL (Using min of 1 mg/dL) at 8/3/2025  6:42 AM  Serum Sodium:  134 mmol/L at 8/3/2025  6:42 AM  Total Bilirubin: 1.3 mg/dL at 8/3/2025  6:42 AM  Serum Albumin: 1.9 g/dL at 8/3/2025  6:42 AM  INR(ratio): 1.3 at 8/3/2025  6:42 AM  Age at listin years  Sex: Male at 8/3/2025  6:42 AM

## 2025-08-03 NOTE — ASSESSMENT & PLAN NOTE
Patient with hx CORDOVA cirrhosis c/b ascites (requiring paracentesis, on lasix 40 mg qd, eplerenone), grade 1 esophageal varices (EGD on 11/8/2024), hepatic encephalopathy 5 week hx abdominal, back pain since last paracentesis without fevers, chills, nausea, vomiting, diarrhea. CT A/P with concern for peritonitis. Also noted splenomegaly and portal vein thrombosis, seen previously. INR 1.8. Patient had noted lactic acidosis at 2.9, now resolved. Overalll suspect abdominal pain 2/2 ascites with c/f SBP vs. Worsening of chronic portal vein thrombosis Given CTX and  pRBCs at OSH. [place] [facility].     -started Lasix 40mg and spirolactone 50mg, net 2.2 L positive from admission but producing adequate urine output. Abdominal distention/umbilical hernia remains unchanged     - started lactulose 10mg BID for hepatic encephalopathy prophylaxis on admission   -4 BM overnight, continue lactulose   - Liver US showed cirrhotic morphology of the liver, sequela of portal hypertension and large volume of complicated ascites   - paracentesis on 7/21 pulled 4250mL, WBC 14,960 w/ 96% PNMs and aerobic culture was positive for strep mitis oralis   - repeat para on 7/25 pulled 2500mL, WBC 14,860  -repeat para on 7/31 with WBC 17,945 and neutrophils 96.   - ID started micafungin, ceftriaxone and vancomycin. D/C Meropenem 8/2  -Culture pending and will include AFB culture as well as fungal, will likely need repeat para   -repeat CT abdomen/pelvis with large volume ascites with findings suggestive of associated peritonitis, massive splenomegaly, partially thrombosed portal vein and splenic vein and a small right pleural effusion.   -AFP < 2   - continue home rifaxamin   - CTM CBC, CMP, PT/INR daily   -started on Iv protonix 40 BID  - MELD 3.0: 15 at 8/3/2025  6:42 AM  MELD-Na: 10 at 8/3/2025  6:42 AM  Calculated from:  Serum Creatinine: 0.8 mg/dL (Using min of 1 mg/dL) at 8/3/2025  6:42 AM  Serum Sodium:  134 mmol/L at 8/3/2025  6:42 AM  Total Bilirubin: 1.3 mg/dL at 8/3/2025  6:42 AM  Serum Albumin: 1.9 g/dL at 8/3/2025  6:42 AM  INR(ratio): 1.3 at 8/3/2025  6:42 AM  Age at listin years  Sex: Male at 8/3/2025  6:42 AM

## 2025-08-03 NOTE — PLAN OF CARE
Patients discharged and transferred to [facility] with [facility].       08/03/25 1453   Final Note   Assessment Type Final Discharge Note   Anticipated Discharge Disposition Short Term   Post-Acute Status   Discharge Delays None known at this time

## 2025-08-03 NOTE — ASSESSMENT & PLAN NOTE
Patient with hx CORDOVA cirrhosis c/b ascites (requiring paracentesis, on lasix 40 mg qd, eplerenone), grade 1 esophageal varices (EGD on 11/8/2024), hepatic encephalopathy 5 week hx abdominal, back pain since last paracentesis without fevers, chills, nausea, vomiting, diarrhea. CT A/P with concern for peritonitis. Also noted splenomegaly and portal vein thrombosis, seen previously. INR 1.8. Patient had noted lactic acidosis at 2.9, now resolved. Overalll suspect abdominal pain 2/2 ascites with c/f SBP vs. Worsening of chronic portal vein thrombosis Given CTX and  pRBCs at OSH. Transferred to [facility] [place] for further workup and evaluation.     -started Lasix 40mg and spirolactone 50mg, net 2.2 L positive from admission but producing adequate urine output. Abdominal distention/umbilical hernia remains unchanged     - started lactulose 10mg BID for hepatic encephalopathy prophylaxis on admission   -4 BM overnight, continue lactulose   - Liver US showed cirrhotic morphology of the liver, sequela of portal hypertension and large volume of complicated ascites   - paracentesis on 7/21 pulled 4250mL, WBC 14,960 w/ 96% PNMs and aerobic culture was positive for strep mitis oralis   - repeat para on 7/25 pulled 2500mL, WBC 14,860  -repeat para on 7/31 with WBC 17,945 and neutrophils 96.   - ID started micafungin, ceftriaxone and vancomycin. D/C Meropenem 8/2  -Culture pending and will include AFB culture as well as fungal, will likely need repeat para   -repeat CT abdomen/pelvis with large volume ascites with findings suggestive of associated peritonitis, massive splenomegaly, partially thrombosed portal vein and splenic vein and a small right pleural effusion.   -AFP < 2   - continue home rifaxamin   - CTM CBC, CMP, PT/INR daily   -started on Iv protonix 40 BID  - MELD 3.0: 15 at 8/3/2025  6:42 AM  MELD-Na: 10 at 8/3/2025  6:42 AM  Calculated from:  Serum Creatinine: 0.8 mg/dL (Using min of 1 mg/dL) at 8/3/2025  6:42 AM  Serum Sodium:  134 mmol/L at 8/3/2025  6:42 AM  Total Bilirubin: 1.3 mg/dL at 8/3/2025  6:42 AM  Serum Albumin: 1.9 g/dL at 8/3/2025  6:42 AM  INR(ratio): 1.3 at 8/3/2025  6:42 AM  Age at listin years  Sex: Male at 8/3/2025  6:42 AM

## 2025-08-03 NOTE — SUBJECTIVE & OBJECTIVE
Interval History: NAEON. Patient feels well today. Denies nausea, vomiting, constipation, SOB, headaches, confusion. States he had 4 non bloody BM overnight. Denies abdominal pain but reporting mild tenderness at paracentesis site.     Review of Systems   Constitutional:  Negative for fatigue and fever.   Respiratory:  Negative for cough, chest tightness, shortness of breath and wheezing.    Cardiovascular:  Negative for chest pain and leg swelling.   Gastrointestinal:  Positive for abdominal distention. Negative for abdominal pain, constipation, diarrhea, nausea and vomiting.   Genitourinary:  Negative for difficulty urinating.   Neurological:  Negative for dizziness.     Objective:     Vital Signs (Most Recent):  Temp: 97.8 °F (36.6 °C) (08/03/25 0800)  Pulse: 75 (08/03/25 0800)  Resp: 16 (08/03/25 0800)  BP: (!) 105/56 (08/03/25 0840)  SpO2: 98 % (08/03/25 0800) Vital Signs (24h Range):  Temp:  [97.8 °F (36.6 °C)-98.7 °F (37.1 °C)] 97.8 °F (36.6 °C)  Pulse:  [72-88] 75  Resp:  [16-18] 16  SpO2:  [96 %-98 %] 98 %  BP: ()/(44-60) 105/56     Weight: 80.6 kg (177 lb 11.1 oz)  Body mass index is 22.81 kg/m².    Intake/Output Summary (Last 24 hours) at 8/3/2025 0939  Last data filed at 8/3/2025 0512  Gross per 24 hour   Intake 395.83 ml   Output 300 ml   Net 95.83 ml         Physical Exam  Constitutional:       Appearance: He is ill-appearing.   Cardiovascular:      Rate and Rhythm: Normal rate and regular rhythm.      Pulses: Normal pulses.      Heart sounds: Murmur heard.   Pulmonary:      Effort: Pulmonary effort is normal. No respiratory distress.      Breath sounds: No wheezing.   Abdominal:      General: Bowel sounds are normal. There is distension.      Tenderness: There is abdominal tenderness.      Hernia: A hernia is present.   Musculoskeletal:      Right lower leg: Edema present.      Left lower leg: Edema present.   Neurological:      Mental Status: He is alert and oriented to person, place, and time.    Psychiatric:         Mood and Affect: Mood normal.         Behavior: Behavior normal.               Significant Labs: All pertinent labs within the past 24 hours have been reviewed.  BMP:   Recent Labs   Lab 08/03/25  0642   *   *   K 4.2      CO2 26   BUN 17   CREATININE 0.8   CALCIUM 7.7*   MG 1.5*     CBC:   Recent Labs   Lab 08/02/25  0233 08/02/25  1551 08/03/25  0642   WBC 3.88* 5.18 3.95   HGB 6.7* 7.4* 8.3*   HCT 21.5* 23.8* 23.3*   PLT 42* 41* 40*       Significant Imaging: I have reviewed all pertinent imaging results/findings within the past 24 hours.  I have reviewed and interpreted all pertinent imaging results/findings within the past 24 hours.

## 2025-08-03 NOTE — DISCHARGE SUMMARY
[facility] - Telemetry [facility] Medicine  Discharge Summary      Patient Name: Corky Bach  MRN: [id number]  JOSE: [id number]  Patient Class: IP- Inpatient  Admission Date: 7/20/2025  Hospital Length of Stay: 14 days  Discharge Date and Time: 08/03/2025 2:35 PM  Attending Physician: Kirk Phillip, *   Discharging Provider: Amanda Aguilar DO  Primary Care Provider: Sarmad Estrella MD  [facility] Medicine Team: [facility] HOSP MED 6 Amanda Aguilar DO  Primary Care Team: [facility] HOSP MED 6    HPI:   Corky Bach is a 60 y/o male with a notable hx of CORDOVA cirrhosis c/b esophageal varices, T2DM, MALT lymphoma, HLD, gout, and depression transferred to [facility] for hepatology and GI consult.     Patient initially presented to the ED of [facility] on 7/20 with complaints of chronic back and abdominal pain that had been ongoing for appx 5 weeks. He states that the back pain began to occur shortly after his last paracentesis on 7/12 located around his mid lumbar spine, starting with a dull back/ flank pain and quickly radiating to upper abdominal pain. He was trying to use ibuprofen and tylenol to help with the pain however the pain continued to progress. He eventually started to take percocet from a prior hospitalization however this was ineffective. He denies any fevers, chills, nausea, vomiting, constipation/diarrhea, melena, hematemesis during this time. At this point he presented to the ED for further evaluation.     Initially BPs were in the low 100s. He was found to be heme occult positive. Hb 6.9 and platelets 67. AST 13, ALT 6, , bili 1.2, INR 1.8, lactic 2.9. CT Lumbar spine obtained without acute lumbar fracture, congenital lumbar stenosis with degenerative changes. CT A/P without contrast showed moderate r sized pleural effusion, splenomegaly, calcified portal vein thrombus, ascites with thickening of surrounding peritoneal reflections c/f inflammatory peritonitis. Patient started on CTX, given  1u PRBC, and transferred to [facility] for GI/Hepatology evaluation.           * No surgery found *      Hospital Course:   Mr. Bach is a 60 y/o male with a notable hx of CORDOVA cirrhosis c/b esophageal varices an routine paracentesis, T2DM, MALT lymphoma, HLD, gout, and depression transferred to [facility] for hepatology and GI consult. He iwas transferred from [facility] on 7/20 with complaints of chronic back and abdominal pain that had been ongoing for appx 5 weeks, he was found to be heme occult positive. Hb 6.9. CT with splenomegaly, calcified portal vein thrombus, ascites with thickening of surrounding peritoneal reflections c/f inflammatory peritonitis. He was given a unit of blood and had diagnostic paracentesis preformed the following day confirming SBP with WBC of 14k. Started on vancomycin and ceftriaxone however culture growing Streptococcus mitis oralis. Repeat paracentesis was done a few days later to reassess, WBC count did not improve so antibiotic regimen switched to IV Meropenem which he completed a 5 day course of with subsequent increase of WBC to 19K on third paracentesis. ID re-started ceftriaxone and vancomycin, added micafungin. He has required 2 additional units of blood over the course of his two week stay, as well as one platelet transfusion. ID has signed off of his care. He has previously undergone routine paracentesis at [facility] in [place], [place] and is stable for transfer there. He will continue his antibiotic regimen of Micafungin, Vancomycin and ceftriaxone. Repeat paracentesis to be done in 5 days to evaluate for improvement in WBC count.      Goals of Care Treatment Preferences:  Code Status: Full Code    Patient discharged as a transfer to [facility] for continuation of his care.      Consults:   Consults (From admission, onward)          Status Ordering Provider     Pharmacy to dose Vancomycin consult  Once        Provider:  (Not yet assigned)   Placed in  ""And" Linked Group    Acknowledged ANISHA POWER     Inpatient consult to Infectious Diseases  Once        Provider:  (Not yet assigned)    Completed GERTRUDE LEE     Inpatient consult to Hospital Medicine-General  Once        Provider:  (Not yet assigned)    Completed ANISHA POWER     Inpatient consult to Infectious Diseases  Once        Provider:  (Not yet assigned)    Completed JOSE MIGUEL CHAUHAN     Pharmacy to dose Vancomycin consult  Once        Provider:  (Not yet assigned)   Placed in "And" Linked Group    Completed MODESTO CABRAL     Inpatient consult to Hospital Medicine-[facility]  Once        Provider:  (Not yet assigned)    Completed JOSE MIGUEL CHUAHAN     Inpatient consult to [facility] Medicine-General  Once        Provider:  (Not yet assigned)    Completed GASTON JI     Inpatient consult to Hepatology  Once        Provider:  (Not yet assigned)    Completed ANISHA POWER            Assessment & Plan  SBP (spontaneous bacterial peritonitis)  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  Abdominal pain  Ascites  Portal hypertensive gastropathy  Portal vein thrombosis  Splenomegaly  Patient with hx CORDOVA cirrhosis c/b ascites (requiring paracentesis, on lasix 40 mg qd, eplerenone), grade 1 esophageal varices (EGD on 11/8/2024), hepatic encephalopathy 5 week hx abdominal, back pain since last paracentesis without fevers, chills, nausea, vomiting, diarrhea. CT A/P with concern for peritonitis. Also noted splenomegaly and portal vein thrombosis, seen previously. INR 1.8. Patient had noted lactic acidosis at 2.9, now resolved. Overalll suspect abdominal pain 2/2 ascites with c/f SBP vs. Worsening of chronic portal vein thrombosis Given CTX and  pRBCs at OSH. Transferred to [facility] [place] for further workup and evaluation.     -started Lasix 40mg and spirolactone 50mg, net 2.2 L positive from admission but producing adequate urine output. Abdominal distention/umbilical hernia remains unchanged     - " started lactulose 10mg BID for hepatic encephalopathy prophylaxis on admission   -4 BM overnight, continue lactulose   - Liver US showed cirrhotic morphology of the liver, sequela of portal hypertension and large volume of complicated ascites   - paracentesis on  pulled 4250mL, WBC 14,960 w/ 96% PNMs and aerobic culture was positive for strep mitis oralis   - repeat para on  pulled 2500mL, WBC 14,860  -repeat para on  with WBC 17,945 and neutrophils 96.   - ID started micafungin, ceftriaxone and vancomycin. D/C Meropenem   -Culture pending and will include AFB culture as well as fungal, will likely need repeat para   -repeat CT abdomen/pelvis with large volume ascites with findings suggestive of associated peritonitis, massive splenomegaly, partially thrombosed portal vein and splenic vein and a small right pleural effusion.   -AFP < 2   - continue home rifaxamin   - CTM CBC, CMP, PT/INR daily   -started on Iv protonix 40 BID  - MELD 3.0: 15 at 8/3/2025  6:42 AM  MELD-Na: 10 at 8/3/2025  6:42 AM  Calculated from:  Serum Creatinine: 0.8 mg/dL (Using min of 1 mg/dL) at 8/3/2025  6:42 AM  Serum Sodium: 134 mmol/L at 8/3/2025  6:42 AM  Total Bilirubin: 1.3 mg/dL at 8/3/2025  6:42 AM  Serum Albumin: 1.9 g/dL at 8/3/2025  6:42 AM  INR(ratio): 1.3 at 8/3/2025  6:42 AM  Age at listin years  Sex: Male at 8/3/2025  6:42 AM     Anemia  Patient with hx cirrhosis c/b grade 1 esophageal varices, moderate portal hypertensive gastropathy, multiple angioectasias w/o bleeding (Last EGD 2024). Presenting with noted anemia of 6.9 at OSH. S/p 3 uPRBC.     Plan  - Monitor serial CBC: Daily  - Transfuse PRBC if patient becomes hemodynamically unstable, symptomatic or H/H drops below 7  -Stable today 7.8 > 7.5>7.4 >8.2>7.3>7.2>6.7>8.3. Transfused 1 unit on . No signs of active bleeding   - Hemolytic anemia labs unremarkable   Mood disorder  Continue home venlafaxine  Hyponatremia  Hyponatremia is likely due to  Cirrhosis. The patient's most recent sodium results are listed below.  Recent Labs     08/01/25  0317 08/02/25  0233 08/03/25  0642   * 134* 134*     Plan  - Correct the sodium by 4-6mEq in 24 hours if hyponatremia persists   - Monitor sodium Daily.   - Patient hyponatremia is stable  Thrombocytopenia  The likely etiology of thrombocytopenia is liver disease. The patients 3 most recent labs are listed below.  Recent Labs     08/02/25  0233 08/02/25  1551 08/03/25  0642   PLT 42* 41* 40*     Plan  - Will transfuse if platelet count is <50k (if undergoing surgical procedure or have active bleeding).  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  Underwent treatment including repeated Rituxan q2 month infusions. Followed by Heme-Onc at [facility]. Most recent testing has suggested remission leading to potential transplant candidacy for cirrhosis to be re-instituted.   Type 2 diabetes mellitus with hyperglycemia, with long-term current use of insulin  Patient's FSGs are controlled on current medication regimen.  Last A1c reviewed-   Lab Results   Component Value Date    HGBA1C 8.8 (H) 07/20/2025     Current correctional scale  Low  Maintain anti-hyperglycemic dose as follows-   Antihyperglycemics (From admission, onward)      Start     Stop Route Frequency Ordered    08/01/25 2100  insulin glargine U-100 (Lantus) pen 14 Units         -- SubQ Nightly 08/01/25 1248    08/01/25 1245  insulin aspart U-100 pen 5 Units         -- SubQ 3 times daily with meals 08/01/25 1238    07/20/25 2316  insulin aspart U-100 pen 0-5 Units         -- SubQ Before meals & nightly PRN 07/20/25 2217          Hold Oral hypoglycemics while patient is in the hospital.  - decreased insulin Glargine to  14 units daily and aspart 5 units TID with meals   Mixed hyperlipidemia  - not currently on medication     Hypokalemia  Patient's most recent potassium results are listed below.   Recent Labs     08/01/25  0317 08/02/25  0233 08/03/25  0642   K 3.7 3.8 4.2     Plan  - Replete potassium per protocol  - Monitor potassium Daily  - Patient's hypokalemia is stable  Final Active Diagnoses:    Diagnosis Date Noted POA    PRINCIPAL PROBLEM:  SBP (spontaneous bacterial peritonitis) [K65.2] 10/25/2022 Yes    Hypokalemia [E87.6] 07/31/2025 Yes    Abdominal pain [R10.9] 07/21/2025 Yes    Hyponatremia [E87.1] 07/20/2025 Yes    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 [K75.81, K74.60] 10/16/2024 Yes    Type 2 diabetes mellitus with hyperglycemia, with long-term current use of insulin [E11.65, Z79.4] 09/22/2022 Not Applicable    Ascites [R18.8] 11/10/2021 Yes    Portal vein thrombosis [I81] 08/10/2021 Yes    Splenomegaly [R16.1] 08/10/2021 Yes    MALT lymphoma [C88.40] 05/25/2021 Yes    Benign prostatic hyperplasia with incomplete bladder emptying [N40.1, R39.14] 07/29/2020 Yes    Portal hypertensive gastropathy [K76.6, K31.89] 11/13/2018 Yes    Anemia [D64.9] 04/19/2017 Yes    Thrombocytopenia [D69.6] 04/19/2017 Yes    Mixed hyperlipidemia [E78.2] 12/04/2014 Yes    Mood disorder [F39] 12/04/2014 Yes      Problems Resolved During this Admission:    Diagnosis Date Noted Date Resolved POA    SHAREE (acute kidney injury) [N17.9] 07/20/2025 07/26/2025 Yes       Discharged Condition: stable    Disposition: Discharge to transfer to [facility]     Significant Diagnostic Studies: Labs: All labs within the past 24 hours have been reviewed  Microbiology: Peritoneal Fluid     Pending Diagnostic Studies:       Procedure Component Value Units Date/Time    Cytology, Fluid/Wash/Brush [[id number]] Collected: 07/31/25 1502    Order Status: Sent Lab Status: No result     Specimen: Body Fluid from Paracentesis Fluid     VANCOMYCIN, TROUGH  "[[id number]]     Order Status: Sent Lab Status: No result     Specimen: Blood            Medications:  Reconciled Home Medications:      Medication List        CONTINUE taking these medications      XIFAXAN 550 mg Tab  Generic drug: rifAXIMin  Take 1 tablet (550 mg total) by mouth 2 (two) times daily.            ASK your doctor about these medications      acetaminophen 500 MG tablet  Commonly known as: TYLENOL  Take 1,000 mg by mouth 2 (two) times daily as needed for Pain.     BD ULTRA-FINE ORIG PEN NEEDLE 29 gauge x 1/2" Ndle  Generic drug: pen needle, diabetic  USE PEN NEEDLE TO INJECT INSULIN EVERY EVENING     LANTUS SOLOSTAR U-100 INSULIN 100 unit/mL (3 mL) Inpn pen  Generic drug: insulin glargine U-100 (Lantus)  Inject 60 Units into the skin every evening.     linaGLIPtin 5 mg Tab tablet  Commonly known as: TRADJENTA  TAKE 1 TABLET (5 MG TOTAL) BY MOUTH ONCE DAILY.     metOLazone 2.5 MG tablet  Commonly known as: ZAROXOLYN  Take 2 tablets (5 mg total) by mouth once daily. Take 30 minutes before other water pills     oxybutynin 5 MG Tr24  Commonly known as: DITROPAN-XL  Take 1 tablet (5 mg total) by mouth once daily.     tamsulosin 0.4 mg Cap  Commonly known as: FLOMAX  Take 1 capsule (0.4 mg total) by mouth every evening.     venlafaxine 75 MG tablet  Commonly known as: EFFEXOR  TAKE 1 TABLET (75 MG TOTAL) BY MOUTH 2 (TWO) TIMES DAILY.              Time spent on the discharge of patient: 32 minutes         Amanda Aguilar DO  Department of Hospital Medicine  [facility] - Telemetry Stepdown  "

## 2025-08-03 NOTE — ASSESSMENT & PLAN NOTE
STABLE     MALT-rivka with bone marrow involvement.  Underwent treatment including repeated Rituxan q2 month infusions. Followed by Heme-Onc at [facility]. Most recent testing has suggested remission leading to potential transplant candidacy for cirrhosis to be re-instituted.

## 2025-08-03 NOTE — PLAN OF CARE
Problem: Adult Inpatient Plan of Care  Goal: Plan of Care Review  Outcome: Progressing  Goal: Patient-Specific Goal (Individualized)  Outcome: Progressing  Goal: Absence of Hospital-Acquired Illness or Injury  Outcome: Progressing  Goal: Optimal Comfort and Wellbeing  Outcome: Progressing  Goal: Readiness for Transition of Care  Outcome: Progressing     Problem: Fall Injury Risk  Goal: Absence of Fall and Fall-Related Injury  Outcome: Progressing      no

## 2025-08-03 NOTE — PROGRESS NOTES
Pharmacokinetic Assessment Follow Up: IV Vancomycin    Vancomycin order has been discontinued. Pharmacy will sign off. Please reconsult as needed!     Thank you for the consult,   Carter Crespo

## 2025-08-03 NOTE — ASSESSMENT & PLAN NOTE
Patient with hx CORDOVA cirrhosis c/b ascites (requiring paracentesis, on lasix 40 mg qd, eplerenone), grade 1 esophageal varices (EGD on 11/8/2024), hepatic encephalopathy 5 week hx abdominal, back pain since last paracentesis without fevers, chills, nausea, vomiting, diarrhea. CT A/P with concern for peritonitis. Also noted splenomegaly and portal vein thrombosis, seen previously. INR 1.8. Patient had noted lactic acidosis at 2.9, now resolved. Overalll suspect abdominal pain 2/2 ascites with c/f SBP vs. Worsening of chronic portal vein thrombosis Given CTX and  pRBCs at OSH. Transferred to [facility] [place] for further workup and evaluation.     -started Lasix 40mg and spirolactone 50mg, net 2.2 L positive from admission but producing adequate urine output. Abdominal distention/umbilical hernia remains unchanged     - started lactulose 10mg BID for hepatic encephalopathy prophylaxis on admission   -4 BM overnight, continue lactulose   - Liver US showed cirrhotic morphology of the liver, sequela of portal hypertension and large volume of complicated ascites   - paracentesis on 7/21 pulled 4250mL, WBC 14,960 w/ 96% PNMs and aerobic culture was positive for strep mitis oralis   - repeat para on 7/25 pulled 2500mL, WBC 14,860  -repeat para on 7/31 with WBC 17,945 and neutrophils 96.   - ID started micafungin, ceftriaxone and vancomycin. D/C Meropenem 8/2  -Culture pending and will include AFB culture as well as fungal, will likely need repeat para   -repeat CT abdomen/pelvis with large volume ascites with findings suggestive of associated peritonitis, massive splenomegaly, partially thrombosed portal vein and splenic vein and a small right pleural effusion.   -AFP < 2   - continue home rifaxamin   - CTM CBC, CMP, PT/INR daily   -started on Iv protonix 40 BID  - MELD 3.0: 15 at 8/3/2025  6:42 AM  MELD-Na: 10 at 8/3/2025  6:42 AM  Calculated from:  Serum Creatinine: 0.8 mg/dL (Using min of 1 mg/dL) at 8/3/2025  6:42 AM  Serum Sodium:  134 mmol/L at 8/3/2025  6:42 AM  Total Bilirubin: 1.3 mg/dL at 8/3/2025  6:42 AM  Serum Albumin: 1.9 g/dL at 8/3/2025  6:42 AM  INR(ratio): 1.3 at 8/3/2025  6:42 AM  Age at listin years  Sex: Male at 8/3/2025  6:42 AM

## 2025-08-03 NOTE — ASSESSMENT & PLAN NOTE
Patient with hx CORDOVA cirrhosis c/b ascites (requiring paracentesis, on lasix 40 mg qd, eplerenone), grade 1 esophageal varices (EGD on 11/8/2024), hepatic encephalopathy 5 week hx abdominal, back pain since last paracentesis without fevers, chills, nausea, vomiting, diarrhea. CT A/P with concern for peritonitis. Also noted splenomegaly and portal vein thrombosis, seen previously. INR 1.8. Patient had noted lactic acidosis at 2.9, now resolved. Overalll suspect abdominal pain 2/2 ascites with c/f SBP vs. Worsening of chronic portal vein thrombosis Given CTX and  pRBCs at OSH. Transferred to [facility] [place] for further workup and evaluation.     -started Lasix 40mg and spirolactone 50mg, net 2.2 L positive from admission but producing adequate urine output. Abdominal distention/umbilical hernia remains unchanged     - started lactulose 10mg BID for hepatic encephalopathy prophylaxis on admission   -4 BM overnight, continue lactulose   - Liver US showed cirrhotic morphology of the liver, sequela of portal hypertension and large volume of complicated ascites   - paracentesis on 7/21 pulled 4250mL, WBC 14,960 w/ 96% PNMs and aerobic culture was positive for strep mitis oralis   - repeat para on 7/25 pulled 2500mL, WBC 14,860  -repeat para on 7/31 with WBC 17,945 and neutrophils 96.   - ID started micafungin, ceftriaxone and vancomycin. D/C Meropenem 8/2  -Culture pending and will include AFB culture as well as fungal, will likely need repeat para   -repeat CT abdomen/pelvis with large volume ascites with findings suggestive of associated peritonitis, massive splenomegaly, partially thrombosed portal vein and splenic vein and a small right pleural effusion.   -AFP < 2   - continue home rifaxamin   - CTM CBC, CMP, PT/INR daily   -started on Iv protonix 40 BID  - MELD 3.0: 15 at 8/3/2025  6:42 AM  MELD-Na: 10 at 8/3/2025  6:42 AM  Calculated from:  Serum Creatinine: 0.8 mg/dL (Using min of 1 mg/dL) at 8/3/2025  6:42 AM  Serum Sodium:  134 mmol/L at 8/3/2025  6:42 AM  Total Bilirubin: 1.3 mg/dL at 8/3/2025  6:42 AM  Serum Albumin: 1.9 g/dL at 8/3/2025  6:42 AM  INR(ratio): 1.3 at 8/3/2025  6:42 AM  Age at listin years  Sex: Male at 8/3/2025  6:42 AM

## 2025-08-03 NOTE — ASSESSMENT & PLAN NOTE
Patient with hx CORDOVA cirrhosis c/b ascites (requiring paracentesis, on lasix 40 mg qd, eplerenone), grade 1 esophageal varices (EGD on 11/8/2024), hepatic encephalopathy 5 week hx abdominal, back pain since last paracentesis without fevers, chills, nausea, vomiting, diarrhea. CT A/P with concern for peritonitis. Also noted splenomegaly and portal vein thrombosis, seen previously. INR 1.8. Patient had noted lactic acidosis at 2.9, now resolved. Overalll suspect abdominal pain 2/2 ascites with c/f SBP vs. Worsening of chronic portal vein thrombosis Given CTX and  pRBCs at OSH. Transferred to [facility] [place] for further workup and evaluation.     -started Lasix 40mg and spirolactone 50mg, net 2.2 L positive from admission but producing adequate urine output. Abdominal distention/umbilical hernia remains unchanged     - started lactulose 10mg BID for hepatic encephalopathy prophylaxis on admission   -4 BM overnight, continue lactulose   - Liver US showed cirrhotic morphology of the liver, sequela of portal hypertension and large volume of complicated ascites   - paracentesis on 7/21 pulled 4250mL, WBC 14,960 w/ 96% PNMs and aerobic culture was positive for strep mitis oralis   - repeat para on 7/25 pulled 2500mL, WBC 14,860  -repeat para on 7/31 with WBC 17,945 and neutrophils 96.   - ID started micafungin, ceftriaxone and vancomycin. D/C Meropenem 8/2  -Culture pending and will include AFB culture as well as fungal, will likely need repeat para   -repeat CT abdomen/pelvis with large volume ascites with findings suggestive of associated peritonitis, massive splenomegaly, partially thrombosed portal vein and splenic vein and a small right pleural effusion.   -AFP < 2   - continue home rifaxamin   - CTM CBC, CMP, PT/INR daily   -started on Iv protonix 40 BID  - MELD 3.0: 15 at 8/3/2025  6:42 AM  MELD-Na: 10 at 8/3/2025  6:42 AM  Calculated from:  Serum Creatinine: 0.8 mg/dL (Using min of 1 mg/dL) at 8/3/2025  6:42 AM  Serum Sodium:  134 mmol/L at 8/3/2025  6:42 AM  Total Bilirubin: 1.3 mg/dL at 8/3/2025  6:42 AM  Serum Albumin: 1.9 g/dL at 8/3/2025  6:42 AM  INR(ratio): 1.3 at 8/3/2025  6:42 AM  Age at listin years  Sex: Male at 8/3/2025  6:42 AM

## 2025-08-03 NOTE — NURSING
Called report to receiving nurse RIK Santiago at [facility]. Questions were asked, answers were given.

## 2025-08-04 LAB — BACTERIA SPEC AEROBE CULT: NO GROWTH

## 2025-08-05 LAB — BACTERIA SPEC ANAEROBE CULT: NORMAL

## 2025-08-06 LAB — FUNGUS SPEC CULT: NORMAL
